# Patient Record
Sex: FEMALE | Race: BLACK OR AFRICAN AMERICAN | Employment: OTHER | URBAN - METROPOLITAN AREA
[De-identification: names, ages, dates, MRNs, and addresses within clinical notes are randomized per-mention and may not be internally consistent; named-entity substitution may affect disease eponyms.]

---

## 2017-03-18 PROBLEM — A41.9 SEPSIS (HCC): Status: ACTIVE | Noted: 2017-03-18

## 2017-04-04 ENCOUNTER — HOSPITAL ENCOUNTER (OUTPATIENT)
Dept: OTHER | Age: 67
Discharge: HOME | End: 2017-04-04
Attending: INTERNAL MEDICINE | Admitting: INTERNAL MEDICINE

## 2017-04-04 LAB
ALBUMIN SERPL-MCNC: 3.3 GM/DL (ref 3.4–5)
ALP BLD-CCNC: 85 IU/L (ref 40–129)
ALT SERPL-CCNC: 16 U/L (ref 10–40)
ANION GAP SERPL CALCULATED.3IONS-SCNC: 13 MMOL/L (ref 4–16)
AST SERPL-CCNC: 15 IU/L (ref 15–37)
BILIRUB SERPL-MCNC: 0.3 MG/DL (ref 0–1)
BUN BLDV-MCNC: 8 MG/DL (ref 6–23)
CALCIUM SERPL-MCNC: 9.6 MG/DL (ref 8.3–10.6)
CHLORIDE BLD-SCNC: 107 MMOL/L (ref 99–110)
CO2: 23 MMOL/L (ref 21–32)
CREAT SERPL-MCNC: 0.7 MG/DL (ref 0.6–1.1)
GFR AFRICAN AMERICAN: >60 ML/MIN/1.73M2
GFR NON-AFRICAN AMERICAN: >60 ML/MIN/1.73M2
GLUCOSE BLD-MCNC: 133 MG/DL (ref 70–140)
POTASSIUM SERPL-SCNC: 4.9 MMOL/L (ref 3.5–5.1)
SODIUM BLD-SCNC: 143 MMOL/L (ref 135–145)
TOTAL PROTEIN: 5.7 GM/DL (ref 6.4–8.2)

## 2017-04-10 PROBLEM — J44.9 CHRONIC OBSTRUCTIVE PULMONARY DISEASE (HCC): Status: ACTIVE | Noted: 2017-04-10

## 2017-04-14 ENCOUNTER — HOSPITAL ENCOUNTER (OUTPATIENT)
Dept: CT IMAGING | Age: 67
Discharge: HOME | End: 2017-05-13
Attending: INTERNAL MEDICINE | Admitting: INTERNAL MEDICINE

## 2017-04-26 ENCOUNTER — HOSPITAL ENCOUNTER (OUTPATIENT)
Dept: CT IMAGING | Age: 67
Discharge: HOME | End: 2017-04-26
Attending: INTERNAL MEDICINE | Admitting: INTERNAL MEDICINE

## 2017-04-26 DIAGNOSIS — C34.90 MALIGNANT NEOPLASM OF LUNG, UNSPECIFIED LATERALITY, UNSPECIFIED PART OF LUNG (HCC): ICD-10-CM

## 2017-07-21 PROBLEM — J18.9 HCAP (HEALTHCARE-ASSOCIATED PNEUMONIA): Status: ACTIVE | Noted: 2017-07-21

## 2017-10-08 PROBLEM — M62.82 NON-TRAUMATIC RHABDOMYOLYSIS: Status: ACTIVE | Noted: 2017-10-08
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
## 2018-02-20 ENCOUNTER — HOSPITAL ENCOUNTER (OUTPATIENT)
Dept: GENERAL RADIOLOGY | Age: 68
Discharge: HOME | End: 2018-02-20
Attending: PODIATRIST | Admitting: PODIATRIST

## 2018-02-20 DIAGNOSIS — M79.671 RIGHT FOOT PAIN: ICD-10-CM

## 2018-02-20 DIAGNOSIS — M25.475 SWELLING OF FOOT JOINT, LEFT: ICD-10-CM

## 2018-02-22 PROBLEM — J96.00 ACUTE RESPIRATORY FAILURE (HCC): Status: ACTIVE | Noted: 2018-02-22
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
## 2018-04-20 PROBLEM — I95.9 SEPSIS ASSOCIATED HYPOTENSION (HCC): Status: ACTIVE | Noted: 2018-04-20

## 2018-04-20 PROBLEM — A41.9 SEPSIS ASSOCIATED HYPOTENSION (HCC): Status: ACTIVE | Noted: 2018-04-20

## 2018-04-20 PROBLEM — I95.9 HYPOTENSION: Status: ACTIVE | Noted: 2018-04-20

## 2018-05-01 PROBLEM — J96.00 RESPIRATORY FAILURE, ACUTE (HCC): Status: ACTIVE | Noted: 2018-05-01

## 2018-06-05 ENCOUNTER — TELEPHONE (OUTPATIENT)
Dept: CARDIOLOGY CLINIC | Age: 68
End: 2018-06-05

## 2018-06-07 ENCOUNTER — HOSPITAL ENCOUNTER (OUTPATIENT)
Dept: OTHER | Age: 68
Discharge: HOME | End: 2018-06-07
Attending: FAMILY MEDICINE | Admitting: FAMILY MEDICINE

## 2018-06-07 LAB
BACTERIA: ABNORMAL /HPF
BILIRUBIN URINE: ABNORMAL MG/DL
BLOOD, URINE: NEGATIVE
CALCIUM OXALATE CRYSTALS: ABNORMAL /HPF
CLARITY: CLEAR
COLOR: YELLOW
GLUCOSE, URINE: NEGATIVE MG/DL
ICTOTEST: NEGATIVE
KETONES, URINE: NEGATIVE MG/DL
LEUKOCYTE ESTERASE, URINE: ABNORMAL
MUCUS: ABNORMAL HPF
NITRITE URINE, QUANTITATIVE: NEGATIVE
PH, URINE: 5 (ref 5–8)
PROTEIN UA: NEGATIVE MG/DL
RBC URINE: 3 /HPF (ref 0–6)
SPECIFIC GRAVITY UA: 1.03 (ref 1–1.03)
SQUAMOUS EPITHELIAL: 2 /HPF
TRICHOMONAS: ABNORMAL /HPF
UROBILINOGEN, URINE: 1 MG/DL (ref 0.2–1)
WBC UA: 1 /HPF (ref 0–5)

## 2018-06-09 LAB
CULTURE: NORMAL
REPORT STATUS: NORMAL
REQUEST PROBLEM: NORMAL
SPECIMEN: NORMAL
TOTAL COLONY COUNT: NORMAL

## 2018-06-12 PROBLEM — G93.40 ENCEPHALOPATHY: Status: ACTIVE | Noted: 2018-06-12

## 2018-08-30 ENCOUNTER — HOSPITAL ENCOUNTER (OUTPATIENT)
Dept: PULMONOLOGY | Age: 68
Discharge: HOME | End: 2018-08-30
Attending: INTERNAL MEDICINE | Admitting: INTERNAL MEDICINE

## 2018-08-30 NOTE — PROGRESS NOTES
8/30/2018 11:58 AM  Patient Room #: Room/bed info not found  Patient Name: Raúl Enriquez    (Step 1 Done by RN if possible otherwise call Pulmonary Diagnostics)  1. Place patient on room air at rest for at least 30 minutes. If patient falls below 88% before 30 minutes then you can record the level and stop. Record room air saturation level _93_ %. If patient is at 88% or below, they will qualify for home oxygen and you can stop. If level does not fall below 88%, fill in level above. If indicated continue to Step 2. Signature:__Stu River RRT___ Date: _8/30/18___  (Step 2&3 Done by RCP)  2. Ambulate patient on room air until saturation falls below 89%. Record level of room air saturation with ambulation_uta___ %. Next, place patient back on ______lpm oxygen and ambulate, record level _____%. (Note:  this level must show improvement from room air level done with ambulation.)  If patients saturation on room air with ambulation is 88% or below AND patient shows improvement with oxygen during ambulation, they will qualify for home oxygen and you can stop. If patient does not drop below 89%, then patient should have an overnight oximetry trending on room air to see if level falls below 88%. Complete level in Step 3 below. 3. Room air overnight oximetry level 88 % for__________  cumulative minutes. If patients room air oxygen level is < 89% for at least 5 cumulative minutes, patient will qualify for home oxygen and you can stop. (Attach Night Trending Report)    Complete order below: Diagnosis:______________________________________  Home oxygen at:  Length of Need: ? Lifetime ?  3 Months     _____lpm or _____%   via  [] nasal cannula  []mask  [] other:________________         []continuous []  with activity  []  Nocturnal   [] Portable Tanks []  Concentrator        Therapist Signature:  __Stu River RRT   Date:  _8/30/18  _____  Physician Signature:  ___Electronically Signed in

## 2018-10-23 ENCOUNTER — HOSPITAL ENCOUNTER (OUTPATIENT)
Age: 68
Setting detail: SPECIMEN
Discharge: HOME | End: 2018-10-23
Payer: MEDICARE

## 2018-10-23 LAB
BACTERIA: NEGATIVE /HPF
BILIRUBIN URINE: NEGATIVE MG/DL
BLOOD, URINE: NEGATIVE
CLARITY: CLEAR
COLOR: YELLOW
GLUCOSE, URINE: NEGATIVE MG/DL
KETONES, URINE: NEGATIVE MG/DL
LEUKOCYTE ESTERASE, URINE: NEGATIVE
NITRITE URINE, QUANTITATIVE: POSITIVE
PH, URINE: 5 (ref 5–8)
PROTEIN UA: NEGATIVE MG/DL
RBC URINE: ABNORMAL /HPF (ref 0–6)
SPECIFIC GRAVITY UA: 1.01 (ref 1–1.03)
SQUAMOUS EPITHELIAL: 3 /HPF
TRICHOMONAS: ABNORMAL /HPF
UROBILINOGEN, URINE: NORMAL MG/DL (ref 0.2–1)
WBC UA: 1 /HPF (ref 0–5)

## 2018-10-23 PROCEDURE — 87086 URINE CULTURE/COLONY COUNT: CPT

## 2018-10-23 PROCEDURE — 87077 CULTURE AEROBIC IDENTIFY: CPT

## 2018-10-23 PROCEDURE — 87186 SC STD MICRODIL/AGAR DIL: CPT

## 2018-10-23 PROCEDURE — 81001 URINALYSIS AUTO W/SCOPE: CPT

## 2018-10-26 LAB
CULTURE: NORMAL
Lab: NORMAL
ORGANISM: NORMAL
REPORT STATUS: NORMAL
SPECIMEN: NORMAL
TOTAL COLONY COUNT: NORMAL

## 2018-10-29 ENCOUNTER — HOSPITAL ENCOUNTER (EMERGENCY)
Age: 68
Discharge: HOME | End: 2018-10-29
Attending: EMERGENCY MEDICINE
Payer: MEDICARE

## 2018-10-29 ENCOUNTER — APPOINTMENT (OUTPATIENT)
Dept: GENERAL RADIOLOGY | Age: 68
End: 2018-10-29
Payer: MEDICARE

## 2018-10-29 VITALS
SYSTOLIC BLOOD PRESSURE: 129 MMHG | BODY MASS INDEX: 35.87 KG/M2 | RESPIRATION RATE: 19 BRPM | WEIGHT: 190 LBS | TEMPERATURE: 98.4 F | HEART RATE: 96 BPM | DIASTOLIC BLOOD PRESSURE: 88 MMHG | OXYGEN SATURATION: 93 % | HEIGHT: 61 IN

## 2018-10-29 DIAGNOSIS — N30.00 ACUTE CYSTITIS WITHOUT HEMATURIA: Primary | ICD-10-CM

## 2018-10-29 DIAGNOSIS — R10.30 LOWER ABDOMINAL PAIN: ICD-10-CM

## 2018-10-29 LAB
ALBUMIN SERPL-MCNC: 3.1 GM/DL (ref 3.4–5)
ALP BLD-CCNC: 176 IU/L (ref 40–129)
ALT SERPL-CCNC: 28 U/L (ref 10–40)
ANION GAP SERPL CALCULATED.3IONS-SCNC: 11 MMOL/L (ref 4–16)
AST SERPL-CCNC: 36 IU/L (ref 15–37)
BASOPHILS ABSOLUTE: 0.1 K/CU MM
BASOPHILS RELATIVE PERCENT: 0.7 % (ref 0–1)
BILIRUB SERPL-MCNC: 0.2 MG/DL (ref 0–1)
BUN BLDV-MCNC: 12 MG/DL (ref 6–23)
CALCIUM SERPL-MCNC: 10.1 MG/DL (ref 8.3–10.6)
CHLORIDE BLD-SCNC: 106 MMOL/L (ref 99–110)
CO2: 23 MMOL/L (ref 21–32)
CREAT SERPL-MCNC: 0.5 MG/DL (ref 0.6–1.1)
DIFFERENTIAL TYPE: ABNORMAL
EOSINOPHILS ABSOLUTE: 0.5 K/CU MM
EOSINOPHILS RELATIVE PERCENT: 5.6 % (ref 0–3)
GFR AFRICAN AMERICAN: >60 ML/MIN/1.73M2
GFR NON-AFRICAN AMERICAN: >60 ML/MIN/1.73M2
GLUCOSE BLD-MCNC: 86 MG/DL (ref 70–99)
HCT VFR BLD CALC: 46.7 % (ref 37–47)
HEMOGLOBIN: 15.2 GM/DL (ref 12.5–16)
IMMATURE NEUTROPHIL %: 0.2 % (ref 0–0.43)
LACTATE: 1.7 MMOL/L (ref 0.4–2)
LYMPHOCYTES ABSOLUTE: 4.2 K/CU MM
LYMPHOCYTES RELATIVE PERCENT: 47 % (ref 24–44)
MCH RBC QN AUTO: 27.7 PG (ref 27–31)
MCHC RBC AUTO-ENTMCNC: 32.5 % (ref 32–36)
MCV RBC AUTO: 85.1 FL (ref 78–100)
MONOCYTES ABSOLUTE: 0.6 K/CU MM
MONOCYTES RELATIVE PERCENT: 6.6 % (ref 0–4)
NUCLEATED RBC %: 0 %
PDW BLD-RTO: 18.2 % (ref 11.7–14.9)
PLATELET # BLD: 373 K/CU MM (ref 140–440)
PMV BLD AUTO: 11.1 FL (ref 7.5–11.1)
POTASSIUM SERPL-SCNC: 4.1 MMOL/L (ref 3.5–5.1)
RBC # BLD: 5.49 M/CU MM (ref 4.2–5.4)
SEGMENTED NEUTROPHILS ABSOLUTE COUNT: 3.6 K/CU MM
SEGMENTED NEUTROPHILS RELATIVE PERCENT: 39.9 % (ref 36–66)
SODIUM BLD-SCNC: 140 MMOL/L (ref 135–145)
TOTAL IMMATURE NEUTOROPHIL: 0.02 K/CU MM
TOTAL NUCLEATED RBC: 0 K/CU MM
TOTAL PROTEIN: 6.6 GM/DL (ref 6.4–8.2)
WBC # BLD: 8.9 K/CU MM (ref 4–10.5)

## 2018-10-29 PROCEDURE — 94761 N-INVAS EAR/PLS OXIMETRY MLT: CPT

## 2018-10-29 PROCEDURE — 2580000003 HC RX 258: Performed by: EMERGENCY MEDICINE

## 2018-10-29 PROCEDURE — 6370000000 HC RX 637 (ALT 250 FOR IP): Performed by: EMERGENCY MEDICINE

## 2018-10-29 PROCEDURE — 80053 COMPREHEN METABOLIC PANEL: CPT

## 2018-10-29 PROCEDURE — 85025 COMPLETE CBC W/AUTO DIFF WBC: CPT

## 2018-10-29 PROCEDURE — 87040 BLOOD CULTURE FOR BACTERIA: CPT

## 2018-10-29 PROCEDURE — 4500000027

## 2018-10-29 PROCEDURE — 36415 COLL VENOUS BLD VENIPUNCTURE: CPT

## 2018-10-29 PROCEDURE — 71045 X-RAY EXAM CHEST 1 VIEW: CPT

## 2018-10-29 PROCEDURE — 94640 AIRWAY INHALATION TREATMENT: CPT

## 2018-10-29 PROCEDURE — 83605 ASSAY OF LACTIC ACID: CPT

## 2018-10-29 PROCEDURE — 99284 EMERGENCY DEPT VISIT MOD MDM: CPT

## 2018-10-29 PROCEDURE — 96360 HYDRATION IV INFUSION INIT: CPT

## 2018-10-29 RX ORDER — HYDROCODONE BITARTRATE AND ACETAMINOPHEN 5; 325 MG/1; MG/1
1 TABLET ORAL ONCE
Status: COMPLETED | OUTPATIENT
Start: 2018-10-29 | End: 2018-10-29

## 2018-10-29 RX ORDER — AMOXICILLIN AND CLAVULANATE POTASSIUM 875; 125 MG/1; MG/1
1 TABLET, FILM COATED ORAL ONCE
Status: COMPLETED | OUTPATIENT
Start: 2018-10-29 | End: 2018-10-29

## 2018-10-29 RX ORDER — 0.9 % SODIUM CHLORIDE 0.9 %
1000 INTRAVENOUS SOLUTION INTRAVENOUS ONCE
Status: COMPLETED | OUTPATIENT
Start: 2018-10-29 | End: 2018-10-29

## 2018-10-29 RX ORDER — AMOXICILLIN AND CLAVULANATE POTASSIUM 875; 125 MG/1; MG/1
1 TABLET, FILM COATED ORAL 2 TIMES DAILY
Qty: 20 TABLET | Refills: 0 | Status: SHIPPED | OUTPATIENT
Start: 2018-10-29 | End: 2018-11-08

## 2018-10-29 RX ORDER — PHENAZOPYRIDINE HYDROCHLORIDE 100 MG/1
100 TABLET, FILM COATED ORAL 3 TIMES DAILY PRN
Qty: 10 TABLET | Refills: 0 | Status: SHIPPED | OUTPATIENT
Start: 2018-10-29 | End: 2018-11-01

## 2018-10-29 RX ORDER — IPRATROPIUM BROMIDE AND ALBUTEROL SULFATE 2.5; .5 MG/3ML; MG/3ML
3 SOLUTION RESPIRATORY (INHALATION) ONCE
Status: COMPLETED | OUTPATIENT
Start: 2018-10-29 | End: 2018-10-29

## 2018-10-29 RX ORDER — HYDROCODONE BITARTRATE AND ACETAMINOPHEN 5; 325 MG/1; MG/1
1 TABLET ORAL EVERY 6 HOURS PRN
Qty: 12 TABLET | Refills: 0 | Status: SHIPPED | OUTPATIENT
Start: 2018-10-29 | End: 2018-11-01

## 2018-10-29 RX ORDER — PHENAZOPYRIDINE HYDROCHLORIDE 100 MG/1
200 TABLET, FILM COATED ORAL ONCE
Status: COMPLETED | OUTPATIENT
Start: 2018-10-29 | End: 2018-10-29

## 2018-10-29 RX ADMIN — AMOXICILLIN AND CLAVULANATE POTASSIUM 1 TABLET: 875; 125 TABLET, FILM COATED ORAL at 17:28

## 2018-10-29 RX ADMIN — PHENAZOPYRIDINE HYDROCHLORIDE 200 MG: 100 TABLET ORAL at 17:34

## 2018-10-29 RX ADMIN — SODIUM CHLORIDE 1000 ML: 9 INJECTION, SOLUTION INTRAVENOUS at 17:17

## 2018-10-29 RX ADMIN — HYDROCODONE BITARTRATE AND ACETAMINOPHEN 1 TABLET: 5; 325 TABLET ORAL at 17:34

## 2018-10-29 RX ADMIN — IPRATROPIUM BROMIDE AND ALBUTEROL SULFATE 3 AMPULE: .5; 3 SOLUTION RESPIRATORY (INHALATION) at 18:12

## 2018-10-29 ASSESSMENT — PAIN DESCRIPTION - LOCATION: LOCATION: ABDOMEN

## 2018-10-29 ASSESSMENT — PAIN DESCRIPTION - DESCRIPTORS: DESCRIPTORS: ACHING;SHARP

## 2018-10-29 ASSESSMENT — PAIN DESCRIPTION - PAIN TYPE: TYPE: ACUTE PAIN

## 2018-10-29 ASSESSMENT — PAIN DESCRIPTION - ORIENTATION: ORIENTATION: LOWER

## 2018-10-29 ASSESSMENT — PAIN SCALES - GENERAL: PAINLEVEL_OUTOF10: 9

## 2018-10-29 NOTE — ED PROVIDER NOTES
rhythm  Pulmonary/Chest:  Normal breath sounds, No respiratory distress, No wheezing  Abdomen: Bowel sounds normal, Soft, No tenderness, No masses, No pulsatile masses  Back:  No tenderness, No CVA tenderness  Extremities:  Normal range of motion, Intact distal pulses, No edema, No tenderness  Skin:  Warm, Dry, No erythema, No rash      EKG:    None    Radiology / Procedures:  XR CHEST PORTABLE (Final result)   Result time 10/29/18 17:14:26   Final result by Maranda Maurer MD (10/29/18 17:14:26)                Impression:    No acute process. Narrative:    EXAMINATION:  SINGLE XRAY VIEW OF THE CHEST    10/29/2018 4:20 pm    COMPARISON:  07/04/2018    HISTORY:  ORDERING SYSTEM PROVIDED HISTORY: chest pain  TECHNOLOGIST PROVIDED HISTORY:  Reason for exam:->chest pain  Ordering Physician Provided Reason for Exam: chest pain  Acuity: Acute  Type of Exam: Initial    FINDINGS:  The lungs are without acute focal process.  There is no effusion or  pneumothorax. The cardiomediastinal silhouette is stable. The osseous  structures are stable.                    Labs Reviewed   CBC WITH AUTO DIFFERENTIAL - Abnormal; Notable for the following:        Result Value    RBC 5.49 (*)     RDW 18.2 (*)     Lymphocytes % 47.0 (*)     Monocytes % 6.6 (*)     Eosinophils % 5.6 (*)     All other components within normal limits   COMPREHENSIVE METABOLIC PANEL - Abnormal; Notable for the following:     CREATININE 0.5 (*)     Alb 3.1 (*)     Alkaline Phosphatase 176 (*)     All other components within normal limits   CULTURE BLOOD #1   CULTURE BLOOD #1   LACTIC ACID, PLASMA       ED COURSE & MEDICAL DECISION MAKING:  Pertinent Labs & Imaging studies reviewed. (See chart for details)  On exam, the patient is afebrile and nontoxic appearing. She is hemodynamically stable and neurologically intact. Labs are obtained and There are no clinically significant lab abnormalities.  The patient had a urine culture 3 days ago that was

## 2018-11-03 LAB
CULTURE: NORMAL
CULTURE: NORMAL
Lab: NORMAL
Lab: NORMAL
REPORT STATUS: NORMAL
REPORT STATUS: NORMAL
SPECIMEN: NORMAL
SPECIMEN: NORMAL

## 2018-11-05 ENCOUNTER — OFFICE VISIT (OUTPATIENT)
Dept: INTERNAL MEDICINE CLINIC | Age: 68
End: 2018-11-05
Payer: MEDICARE

## 2018-11-05 VITALS
HEIGHT: 60 IN | OXYGEN SATURATION: 90 % | DIASTOLIC BLOOD PRESSURE: 80 MMHG | BODY MASS INDEX: 31.8 KG/M2 | SYSTOLIC BLOOD PRESSURE: 128 MMHG | WEIGHT: 162 LBS | HEART RATE: 86 BPM

## 2018-11-05 DIAGNOSIS — E78.2 MIXED HYPERLIPIDEMIA: ICD-10-CM

## 2018-11-05 DIAGNOSIS — Z98.61 CAD S/P PERCUTANEOUS CORONARY ANGIOPLASTY: ICD-10-CM

## 2018-11-05 DIAGNOSIS — G89.29 CHRONIC LOW BACK PAIN WITH BILATERAL SCIATICA, UNSPECIFIED BACK PAIN LATERALITY: ICD-10-CM

## 2018-11-05 DIAGNOSIS — Z85.118 HISTORY OF LUNG CANCER: ICD-10-CM

## 2018-11-05 DIAGNOSIS — I25.10 CAD S/P PERCUTANEOUS CORONARY ANGIOPLASTY: ICD-10-CM

## 2018-11-05 DIAGNOSIS — E79.0 HYPERURICEMIA: ICD-10-CM

## 2018-11-05 DIAGNOSIS — F17.200 SMOKER: ICD-10-CM

## 2018-11-05 DIAGNOSIS — E55.9 VITAMIN D DEFICIENCY: ICD-10-CM

## 2018-11-05 DIAGNOSIS — M32.9 SYSTEMIC LUPUS ERYTHEMATOSUS, UNSPECIFIED SLE TYPE, UNSPECIFIED ORGAN INVOLVEMENT STATUS (HCC): ICD-10-CM

## 2018-11-05 DIAGNOSIS — Z11.59 ENCOUNTER FOR HEPATITIS C SCREENING TEST FOR LOW RISK PATIENT: ICD-10-CM

## 2018-11-05 DIAGNOSIS — F41.1 GENERALIZED ANXIETY DISORDER: ICD-10-CM

## 2018-11-05 DIAGNOSIS — M54.41 CHRONIC LOW BACK PAIN WITH BILATERAL SCIATICA, UNSPECIFIED BACK PAIN LATERALITY: ICD-10-CM

## 2018-11-05 DIAGNOSIS — J44.9 CHRONIC OBSTRUCTIVE PULMONARY DISEASE, UNSPECIFIED COPD TYPE (HCC): ICD-10-CM

## 2018-11-05 DIAGNOSIS — E11.42 CONTROLLED TYPE 2 DIABETES MELLITUS WITH DIABETIC POLYNEUROPATHY, WITHOUT LONG-TERM CURRENT USE OF INSULIN (HCC): Primary | ICD-10-CM

## 2018-11-05 DIAGNOSIS — Z11.4 ENCOUNTER FOR SCREENING FOR HIV: ICD-10-CM

## 2018-11-05 DIAGNOSIS — Z12.39 BREAST CANCER SCREENING: ICD-10-CM

## 2018-11-05 DIAGNOSIS — E53.8 VITAMIN B12 DEFICIENCY: ICD-10-CM

## 2018-11-05 DIAGNOSIS — E66.9 OBESITY (BMI 30.0-34.9): ICD-10-CM

## 2018-11-05 DIAGNOSIS — I10 ESSENTIAL HYPERTENSION: ICD-10-CM

## 2018-11-05 DIAGNOSIS — M54.42 CHRONIC LOW BACK PAIN WITH BILATERAL SCIATICA, UNSPECIFIED BACK PAIN LATERALITY: ICD-10-CM

## 2018-11-05 PROBLEM — J96.00 RESPIRATORY FAILURE, ACUTE (HCC): Status: RESOLVED | Noted: 2018-05-01 | Resolved: 2018-11-05

## 2018-11-05 PROBLEM — J18.9 HCAP (HEALTHCARE-ASSOCIATED PNEUMONIA): Status: RESOLVED | Noted: 2017-07-21 | Resolved: 2018-11-05

## 2018-11-05 PROBLEM — I95.9 HYPOTENSION: Status: RESOLVED | Noted: 2018-04-20 | Resolved: 2018-11-05

## 2018-11-05 PROBLEM — M62.82 NON-TRAUMATIC RHABDOMYOLYSIS: Status: RESOLVED | Noted: 2017-10-08 | Resolved: 2018-11-05

## 2018-11-05 PROBLEM — G93.40 ENCEPHALOPATHY: Status: RESOLVED | Noted: 2018-06-12 | Resolved: 2018-11-05

## 2018-11-05 PROBLEM — I95.9 SEPSIS ASSOCIATED HYPOTENSION (HCC): Status: RESOLVED | Noted: 2018-04-20 | Resolved: 2018-11-05

## 2018-11-05 PROBLEM — A41.9 SEPSIS ASSOCIATED HYPOTENSION (HCC): Status: RESOLVED | Noted: 2018-04-20 | Resolved: 2018-11-05

## 2018-11-05 PROBLEM — M10.9 GOUT: Status: ACTIVE | Noted: 2018-01-01

## 2018-11-05 PROBLEM — Z12.11 COLON CANCER SCREENING: Status: ACTIVE | Noted: 2018-11-05

## 2018-11-05 PROBLEM — J96.00 ACUTE RESPIRATORY FAILURE (HCC): Status: RESOLVED | Noted: 2018-02-22 | Resolved: 2018-11-05

## 2018-11-05 PROBLEM — A41.9 SEPSIS (HCC): Status: RESOLVED | Noted: 2017-03-18 | Resolved: 2018-11-05

## 2018-11-05 PROCEDURE — 1123F ACP DISCUSS/DSCN MKR DOCD: CPT | Performed by: INTERNAL MEDICINE

## 2018-11-05 PROCEDURE — 3017F COLORECTAL CA SCREEN DOC REV: CPT | Performed by: INTERNAL MEDICINE

## 2018-11-05 PROCEDURE — G8400 PT W/DXA NO RESULTS DOC: HCPCS | Performed by: INTERNAL MEDICINE

## 2018-11-05 PROCEDURE — 3023F SPIROM DOC REV: CPT | Performed by: INTERNAL MEDICINE

## 2018-11-05 PROCEDURE — 3044F HG A1C LEVEL LT 7.0%: CPT | Performed by: INTERNAL MEDICINE

## 2018-11-05 PROCEDURE — G8484 FLU IMMUNIZE NO ADMIN: HCPCS | Performed by: INTERNAL MEDICINE

## 2018-11-05 PROCEDURE — 1090F PRES/ABSN URINE INCON ASSESS: CPT | Performed by: INTERNAL MEDICINE

## 2018-11-05 PROCEDURE — G8598 ASA/ANTIPLAT THER USED: HCPCS | Performed by: INTERNAL MEDICINE

## 2018-11-05 PROCEDURE — G8926 SPIRO NO PERF OR DOC: HCPCS | Performed by: INTERNAL MEDICINE

## 2018-11-05 PROCEDURE — 4040F PNEUMOC VAC/ADMIN/RCVD: CPT | Performed by: INTERNAL MEDICINE

## 2018-11-05 PROCEDURE — 2022F DILAT RTA XM EVC RTNOPTHY: CPT | Performed by: INTERNAL MEDICINE

## 2018-11-05 PROCEDURE — G8427 DOCREV CUR MEDS BY ELIG CLIN: HCPCS | Performed by: INTERNAL MEDICINE

## 2018-11-05 PROCEDURE — 99205 OFFICE O/P NEW HI 60 MIN: CPT | Performed by: INTERNAL MEDICINE

## 2018-11-05 PROCEDURE — G8417 CALC BMI ABV UP PARAM F/U: HCPCS | Performed by: INTERNAL MEDICINE

## 2018-11-05 PROCEDURE — 1101F PT FALLS ASSESS-DOCD LE1/YR: CPT | Performed by: INTERNAL MEDICINE

## 2018-11-05 PROCEDURE — 4004F PT TOBACCO SCREEN RCVD TLK: CPT | Performed by: INTERNAL MEDICINE

## 2018-11-05 RX ORDER — AMITRIPTYLINE HYDROCHLORIDE 50 MG/1
50 TABLET, FILM COATED ORAL NIGHTLY
Status: ON HOLD | COMMUNITY
End: 2019-10-23 | Stop reason: HOSPADM

## 2018-11-05 RX ORDER — LOSARTAN POTASSIUM 50 MG/1
50 TABLET ORAL DAILY
Status: ON HOLD | COMMUNITY
End: 2019-01-27 | Stop reason: HOSPADM

## 2018-11-05 RX ORDER — LORAZEPAM 2 MG/1
2 TABLET ORAL EVERY 8 HOURS PRN
COMMUNITY
End: 2018-11-05 | Stop reason: ALTCHOICE

## 2018-11-05 RX ORDER — CITALOPRAM 10 MG/1
10 TABLET ORAL DAILY
Qty: 30 TABLET | Refills: 3 | Status: SHIPPED | OUTPATIENT
Start: 2018-11-05 | End: 2019-02-13 | Stop reason: SDUPTHER

## 2018-11-05 ASSESSMENT — ENCOUNTER SYMPTOMS
BACK PAIN: 1
DIARRHEA: 1
VOMITING: 0
HEARTBURN: 0
COUGH: 0
WHEEZING: 0
BLOOD IN STOOL: 0
NAUSEA: 0
SORE THROAT: 0
ABDOMINAL PAIN: 0
BLURRED VISION: 0
SPUTUM PRODUCTION: 0
DOUBLE VISION: 0
SHORTNESS OF BREATH: 0

## 2018-11-06 PROBLEM — I10 ESSENTIAL HYPERTENSION: Status: ACTIVE | Noted: 2018-11-06

## 2018-11-26 PROBLEM — J96.10 CHRONIC RESPIRATORY FAILURE (HCC): Status: ACTIVE | Noted: 2018-11-26

## 2018-12-05 PROBLEM — Z12.39 BREAST CANCER SCREENING: Status: RESOLVED | Noted: 2018-11-05 | Resolved: 2018-12-05

## 2018-12-05 PROBLEM — Z12.11 COLON CANCER SCREENING: Status: RESOLVED | Noted: 2018-11-05 | Resolved: 2018-12-05

## 2019-01-25 ENCOUNTER — HOSPITAL ENCOUNTER (OUTPATIENT)
Age: 69
Setting detail: OBSERVATION
Discharge: HOME | End: 2019-01-27
Attending: EMERGENCY MEDICINE | Admitting: FAMILY MEDICINE
Payer: MEDICARE

## 2019-01-25 ENCOUNTER — APPOINTMENT (OUTPATIENT)
Dept: CT IMAGING | Age: 69
End: 2019-01-25
Payer: MEDICARE

## 2019-01-25 DIAGNOSIS — R55 SYNCOPE AND COLLAPSE: Primary | ICD-10-CM

## 2019-01-25 DIAGNOSIS — R53.83 FATIGUE, UNSPECIFIED TYPE: ICD-10-CM

## 2019-01-25 DIAGNOSIS — J44.9 CHRONIC OBSTRUCTIVE PULMONARY DISEASE, UNSPECIFIED COPD TYPE (HCC): ICD-10-CM

## 2019-01-25 LAB
ALBUMIN SERPL-MCNC: 2.7 GM/DL (ref 3.4–5)
ALP BLD-CCNC: 146 IU/L (ref 40–129)
ALT SERPL-CCNC: 34 U/L (ref 10–40)
ANION GAP SERPL CALCULATED.3IONS-SCNC: 10 MMOL/L (ref 4–16)
AST SERPL-CCNC: 33 IU/L (ref 15–37)
BACTERIA: NEGATIVE /HPF
BASOPHILS ABSOLUTE: 0.1 K/CU MM
BASOPHILS RELATIVE PERCENT: 0.6 % (ref 0–1)
BILIRUB SERPL-MCNC: 0.2 MG/DL (ref 0–1)
BILIRUBIN URINE: NEGATIVE MG/DL
BLOOD, URINE: NEGATIVE
BUN BLDV-MCNC: 6 MG/DL (ref 6–23)
CALCIUM SERPL-MCNC: 9.7 MG/DL (ref 8.3–10.6)
CHLORIDE BLD-SCNC: 111 MMOL/L (ref 99–110)
CLARITY: CLEAR
CO2: 21 MMOL/L (ref 21–32)
COLOR: YELLOW
CREAT SERPL-MCNC: 0.6 MG/DL (ref 0.6–1.1)
DIFFERENTIAL TYPE: ABNORMAL
EOSINOPHILS ABSOLUTE: 0.1 K/CU MM
EOSINOPHILS RELATIVE PERCENT: 1.3 % (ref 0–3)
GFR AFRICAN AMERICAN: >60 ML/MIN/1.73M2
GFR NON-AFRICAN AMERICAN: >60 ML/MIN/1.73M2
GLUCOSE BLD-MCNC: 104 MG/DL (ref 70–99)
GLUCOSE BLD-MCNC: 140 MG/DL (ref 70–99)
GLUCOSE, URINE: NEGATIVE MG/DL
HCT VFR BLD CALC: 43.7 % (ref 37–47)
HEMOGLOBIN: 13.6 GM/DL (ref 12.5–16)
IMMATURE NEUTROPHIL %: 0.2 % (ref 0–0.43)
KETONES, URINE: NEGATIVE MG/DL
LEUKOCYTE ESTERASE, URINE: NEGATIVE
LYMPHOCYTES ABSOLUTE: 4.7 K/CU MM
LYMPHOCYTES RELATIVE PERCENT: 49.3 % (ref 24–44)
MCH RBC QN AUTO: 26.2 PG (ref 27–31)
MCHC RBC AUTO-ENTMCNC: 31.1 % (ref 32–36)
MCV RBC AUTO: 84.2 FL (ref 78–100)
MONOCYTES ABSOLUTE: 0.8 K/CU MM
MONOCYTES RELATIVE PERCENT: 8.7 % (ref 0–4)
MUCUS: ABNORMAL HPF
NITRITE URINE, QUANTITATIVE: NEGATIVE
NUCLEATED RBC %: 0 %
PDW BLD-RTO: 21.4 % (ref 11.7–14.9)
PH, URINE: 5 (ref 5–8)
PLATELET # BLD: 312 K/CU MM (ref 140–440)
PMV BLD AUTO: 12.9 FL (ref 7.5–11.1)
POTASSIUM SERPL-SCNC: 4.1 MMOL/L (ref 3.5–5.1)
PROTEIN UA: NEGATIVE MG/DL
RBC # BLD: 5.19 M/CU MM (ref 4.2–5.4)
RBC URINE: ABNORMAL /HPF (ref 0–6)
REASON FOR REJECTION: NORMAL
REJECTED TEST: NORMAL
SEGMENTED NEUTROPHILS ABSOLUTE COUNT: 3.8 K/CU MM
SEGMENTED NEUTROPHILS RELATIVE PERCENT: 39.9 % (ref 36–66)
SODIUM BLD-SCNC: 142 MMOL/L (ref 135–145)
SOURCE: NORMAL
SPECIFIC GRAVITY UA: 1.01 (ref 1–1.03)
SQUAMOUS EPITHELIAL: <1 /HPF
TOTAL IMMATURE NEUTOROPHIL: 0.02 K/CU MM
TOTAL NUCLEATED RBC: 0 K/CU MM
TOTAL PROTEIN: 5.7 GM/DL (ref 6.4–8.2)
TRANSITIONAL EPITHELIAL: <1 /HPF
TRICHOMONAS: ABNORMAL /HPF
TROPONIN T: <0.01 NG/ML
TSH HIGH SENSITIVITY: 0.53 UIU/ML (ref 0.27–4.2)
UROBILINOGEN, URINE: NORMAL MG/DL (ref 0.2–1)
WBC # BLD: 9.5 K/CU MM (ref 4–10.5)
WBC UA: 1 /HPF (ref 0–5)

## 2019-01-25 PROCEDURE — G0378 HOSPITAL OBSERVATION PER HR: HCPCS

## 2019-01-25 PROCEDURE — 6370000000 HC RX 637 (ALT 250 FOR IP): Performed by: EMERGENCY MEDICINE

## 2019-01-25 PROCEDURE — 94640 AIRWAY INHALATION TREATMENT: CPT

## 2019-01-25 PROCEDURE — 6370000000 HC RX 637 (ALT 250 FOR IP): Performed by: NURSE PRACTITIONER

## 2019-01-25 PROCEDURE — 36415 COLL VENOUS BLD VENIPUNCTURE: CPT

## 2019-01-25 PROCEDURE — 6360000002 HC RX W HCPCS: Performed by: NURSE PRACTITIONER

## 2019-01-25 PROCEDURE — 70450 CT HEAD/BRAIN W/O DYE: CPT

## 2019-01-25 PROCEDURE — 85025 COMPLETE CBC W/AUTO DIFF WBC: CPT

## 2019-01-25 PROCEDURE — 82962 GLUCOSE BLOOD TEST: CPT

## 2019-01-25 PROCEDURE — 80053 COMPREHEN METABOLIC PANEL: CPT

## 2019-01-25 PROCEDURE — 84443 ASSAY THYROID STIM HORMONE: CPT

## 2019-01-25 PROCEDURE — 81001 URINALYSIS AUTO W/SCOPE: CPT

## 2019-01-25 PROCEDURE — 94761 N-INVAS EAR/PLS OXIMETRY MLT: CPT

## 2019-01-25 PROCEDURE — 84484 ASSAY OF TROPONIN QUANT: CPT

## 2019-01-25 PROCEDURE — 93005 ELECTROCARDIOGRAM TRACING: CPT | Performed by: EMERGENCY MEDICINE

## 2019-01-25 PROCEDURE — 99285 EMERGENCY DEPT VISIT HI MDM: CPT

## 2019-01-25 PROCEDURE — 2580000003 HC RX 258: Performed by: NURSE PRACTITIONER

## 2019-01-25 RX ORDER — HYDROCODONE BITARTRATE AND ACETAMINOPHEN 5; 325 MG/1; MG/1
1 TABLET ORAL EVERY 6 HOURS PRN
Status: DISCONTINUED | OUTPATIENT
Start: 2019-01-25 | End: 2019-01-26

## 2019-01-25 RX ORDER — ALBUTEROL SULFATE 90 UG/1
2 AEROSOL, METERED RESPIRATORY (INHALATION) EVERY 6 HOURS PRN
Status: DISCONTINUED | OUTPATIENT
Start: 2019-01-25 | End: 2019-01-27 | Stop reason: HOSPADM

## 2019-01-25 RX ORDER — CITALOPRAM 20 MG/1
10 TABLET ORAL DAILY
Status: DISCONTINUED | OUTPATIENT
Start: 2019-01-26 | End: 2019-01-27 | Stop reason: HOSPADM

## 2019-01-25 RX ORDER — ALBUTEROL SULFATE 2.5 MG/3ML
2.5 SOLUTION RESPIRATORY (INHALATION) EVERY 6 HOURS PRN
Status: DISCONTINUED | OUTPATIENT
Start: 2019-01-25 | End: 2019-01-26

## 2019-01-25 RX ORDER — DEXTROSE MONOHYDRATE 25 G/50ML
12.5 INJECTION, SOLUTION INTRAVENOUS PRN
Status: DISCONTINUED | OUTPATIENT
Start: 2019-01-25 | End: 2019-01-27 | Stop reason: HOSPADM

## 2019-01-25 RX ORDER — IPRATROPIUM BROMIDE AND ALBUTEROL SULFATE 2.5; .5 MG/3ML; MG/3ML
1 SOLUTION RESPIRATORY (INHALATION) ONCE
Status: COMPLETED | OUTPATIENT
Start: 2019-01-25 | End: 2019-01-25

## 2019-01-25 RX ORDER — CLOPIDOGREL BISULFATE 75 MG/1
75 TABLET ORAL DAILY
Status: DISCONTINUED | OUTPATIENT
Start: 2019-01-26 | End: 2019-01-27 | Stop reason: HOSPADM

## 2019-01-25 RX ORDER — NICOTINE POLACRILEX 4 MG
15 LOZENGE BUCCAL PRN
Status: DISCONTINUED | OUTPATIENT
Start: 2019-01-25 | End: 2019-01-27 | Stop reason: HOSPADM

## 2019-01-25 RX ORDER — DEXTROSE MONOHYDRATE 50 MG/ML
100 INJECTION, SOLUTION INTRAVENOUS PRN
Status: DISCONTINUED | OUTPATIENT
Start: 2019-01-25 | End: 2019-01-27 | Stop reason: HOSPADM

## 2019-01-25 RX ORDER — SODIUM CHLORIDE 0.9 % (FLUSH) 0.9 %
10 SYRINGE (ML) INJECTION PRN
Status: DISCONTINUED | OUTPATIENT
Start: 2019-01-25 | End: 2019-01-27 | Stop reason: HOSPADM

## 2019-01-25 RX ORDER — HYDROCODONE BITARTRATE AND ACETAMINOPHEN 5; 325 MG/1; MG/1
1 TABLET ORAL ONCE
Status: COMPLETED | OUTPATIENT
Start: 2019-01-25 | End: 2019-01-25

## 2019-01-25 RX ORDER — LOPERAMIDE HYDROCHLORIDE 2 MG/1
2 CAPSULE ORAL 4 TIMES DAILY PRN
Status: DISCONTINUED | OUTPATIENT
Start: 2019-01-25 | End: 2019-01-27 | Stop reason: HOSPADM

## 2019-01-25 RX ORDER — FAMOTIDINE 20 MG/1
20 TABLET, FILM COATED ORAL 2 TIMES DAILY
Status: DISCONTINUED | OUTPATIENT
Start: 2019-01-25 | End: 2019-01-27 | Stop reason: HOSPADM

## 2019-01-25 RX ORDER — AMITRIPTYLINE HYDROCHLORIDE 50 MG/1
50 TABLET, FILM COATED ORAL NIGHTLY
Status: DISCONTINUED | OUTPATIENT
Start: 2019-01-25 | End: 2019-01-27 | Stop reason: HOSPADM

## 2019-01-25 RX ORDER — SODIUM CHLORIDE 9 MG/ML
INJECTION, SOLUTION INTRAVENOUS CONTINUOUS
Status: DISCONTINUED | OUTPATIENT
Start: 2019-01-25 | End: 2019-01-26

## 2019-01-25 RX ORDER — ASPIRIN 81 MG/1
81 TABLET, CHEWABLE ORAL DAILY
Status: DISCONTINUED | OUTPATIENT
Start: 2019-01-26 | End: 2019-01-27 | Stop reason: HOSPADM

## 2019-01-25 RX ORDER — GUAIFENESIN 600 MG/1
1200 TABLET, EXTENDED RELEASE ORAL 2 TIMES DAILY
Status: DISCONTINUED | OUTPATIENT
Start: 2019-01-25 | End: 2019-01-27 | Stop reason: HOSPADM

## 2019-01-25 RX ORDER — ONDANSETRON 2 MG/ML
4 INJECTION INTRAMUSCULAR; INTRAVENOUS EVERY 6 HOURS PRN
Status: DISCONTINUED | OUTPATIENT
Start: 2019-01-25 | End: 2019-01-27 | Stop reason: HOSPADM

## 2019-01-25 RX ORDER — SODIUM CHLORIDE 0.9 % (FLUSH) 0.9 %
10 SYRINGE (ML) INJECTION EVERY 12 HOURS SCHEDULED
Status: DISCONTINUED | OUTPATIENT
Start: 2019-01-25 | End: 2019-01-27 | Stop reason: HOSPADM

## 2019-01-25 RX ORDER — BENZONATATE 100 MG/1
100 CAPSULE ORAL 3 TIMES DAILY PRN
Status: DISCONTINUED | OUTPATIENT
Start: 2019-01-25 | End: 2019-01-27 | Stop reason: HOSPADM

## 2019-01-25 RX ADMIN — GUAIFENESIN 1200 MG: 600 TABLET, EXTENDED RELEASE ORAL at 22:58

## 2019-01-25 RX ADMIN — IPRATROPIUM BROMIDE AND ALBUTEROL SULFATE 1 AMPULE: .5; 3 SOLUTION RESPIRATORY (INHALATION) at 18:17

## 2019-01-25 RX ADMIN — HYDROCODONE BITARTRATE AND ACETAMINOPHEN 1 TABLET: 5; 325 TABLET ORAL at 21:29

## 2019-01-25 RX ADMIN — SODIUM CHLORIDE: 9 INJECTION, SOLUTION INTRAVENOUS at 22:58

## 2019-01-25 RX ADMIN — AMITRIPTYLINE HYDROCHLORIDE 50 MG: 50 TABLET, FILM COATED ORAL at 22:58

## 2019-01-25 RX ADMIN — FAMOTIDINE 20 MG: 20 TABLET, FILM COATED ORAL at 22:58

## 2019-01-25 RX ADMIN — ALBUTEROL SULFATE 2.5 MG: 2.5 SOLUTION RESPIRATORY (INHALATION) at 22:49

## 2019-01-25 RX ADMIN — Medication 2 PUFF: at 22:51

## 2019-01-25 ASSESSMENT — PAIN DESCRIPTION - LOCATION
LOCATION: GENERALIZED
LOCATION: ABDOMEN

## 2019-01-25 ASSESSMENT — PAIN DESCRIPTION - PAIN TYPE
TYPE: ACUTE PAIN
TYPE: ACUTE PAIN

## 2019-01-25 ASSESSMENT — PAIN SCALES - GENERAL
PAINLEVEL_OUTOF10: 9
PAINLEVEL_OUTOF10: 8

## 2019-01-25 ASSESSMENT — PAIN DESCRIPTION - ORIENTATION: ORIENTATION: RIGHT

## 2019-01-25 ASSESSMENT — PAIN DESCRIPTION - DESCRIPTORS: DESCRIPTORS: HEADACHE

## 2019-01-26 ENCOUNTER — APPOINTMENT (OUTPATIENT)
Dept: GENERAL RADIOLOGY | Age: 69
End: 2019-01-26
Payer: MEDICARE

## 2019-01-26 LAB
ALBUMIN SERPL-MCNC: 2.9 GM/DL (ref 3.4–5)
ALP BLD-CCNC: 130 IU/L (ref 40–128)
ALT SERPL-CCNC: 30 U/L (ref 10–40)
AMPHETAMINES: NEGATIVE
ANION GAP SERPL CALCULATED.3IONS-SCNC: 9 MMOL/L (ref 4–16)
AST SERPL-CCNC: 28 IU/L (ref 15–37)
BARBITURATE SCREEN URINE: NEGATIVE
BASOPHILS ABSOLUTE: 0 K/CU MM
BASOPHILS RELATIVE PERCENT: 0.5 % (ref 0–1)
BENZODIAZEPINE SCREEN, URINE: NEGATIVE
BILIRUB SERPL-MCNC: 0.2 MG/DL (ref 0–1)
BUN BLDV-MCNC: 6 MG/DL (ref 6–23)
CALCIUM SERPL-MCNC: 9.5 MG/DL (ref 8.3–10.6)
CANNABINOID SCREEN URINE: NEGATIVE
CHLORIDE BLD-SCNC: 107 MMOL/L (ref 99–110)
CO2: 22 MMOL/L (ref 21–32)
COCAINE METABOLITE: NEGATIVE
CREAT SERPL-MCNC: 0.6 MG/DL (ref 0.6–1.1)
DIFFERENTIAL TYPE: ABNORMAL
EOSINOPHILS ABSOLUTE: 0.1 K/CU MM
EOSINOPHILS RELATIVE PERCENT: 0.9 % (ref 0–3)
GFR AFRICAN AMERICAN: >60 ML/MIN/1.73M2
GFR NON-AFRICAN AMERICAN: >60 ML/MIN/1.73M2
GLUCOSE BLD-MCNC: 102 MG/DL (ref 70–99)
GLUCOSE BLD-MCNC: 127 MG/DL (ref 70–99)
GLUCOSE BLD-MCNC: 87 MG/DL (ref 70–99)
HCT VFR BLD CALC: 40.3 % (ref 37–47)
HEMOGLOBIN: 12.1 GM/DL (ref 12.5–16)
IMMATURE NEUTROPHIL %: 0.1 % (ref 0–0.43)
LYMPHOCYTES ABSOLUTE: 4.1 K/CU MM
LYMPHOCYTES RELATIVE PERCENT: 48.6 % (ref 24–44)
MAGNESIUM: 1.7 MG/DL (ref 1.8–2.4)
MCH RBC QN AUTO: 26.3 PG (ref 27–31)
MCHC RBC AUTO-ENTMCNC: 30 % (ref 32–36)
MCV RBC AUTO: 87.6 FL (ref 78–100)
MONOCYTES ABSOLUTE: 0.8 K/CU MM
MONOCYTES RELATIVE PERCENT: 9.1 % (ref 0–4)
NUCLEATED RBC %: 0 %
OPIATES, URINE: ABNORMAL
OXYCODONE: ABNORMAL
PDW BLD-RTO: 21.3 % (ref 11.7–14.9)
PHENCYCLIDINE, URINE: NEGATIVE
PLATELET # BLD: 242 K/CU MM (ref 140–440)
PMV BLD AUTO: 11.3 FL (ref 7.5–11.1)
POTASSIUM SERPL-SCNC: 4.1 MMOL/L (ref 3.5–5.1)
RBC # BLD: 4.6 M/CU MM (ref 4.2–5.4)
SEGMENTED NEUTROPHILS ABSOLUTE COUNT: 3.5 K/CU MM
SEGMENTED NEUTROPHILS RELATIVE PERCENT: 40.8 % (ref 36–66)
SODIUM BLD-SCNC: 138 MMOL/L (ref 135–145)
TOTAL IMMATURE NEUTOROPHIL: 0.01 K/CU MM
TOTAL NUCLEATED RBC: 0 K/CU MM
TOTAL PROTEIN: 5.1 GM/DL (ref 6.4–8.2)
TROPONIN T: <0.01 NG/ML
TROPONIN T: <0.01 NG/ML
WBC # BLD: 8.5 K/CU MM (ref 4–10.5)

## 2019-01-26 PROCEDURE — 93010 ELECTROCARDIOGRAM REPORT: CPT | Performed by: INTERNAL MEDICINE

## 2019-01-26 PROCEDURE — G0378 HOSPITAL OBSERVATION PER HR: HCPCS

## 2019-01-26 PROCEDURE — 96372 THER/PROPH/DIAG INJ SC/IM: CPT

## 2019-01-26 PROCEDURE — 83735 ASSAY OF MAGNESIUM: CPT

## 2019-01-26 PROCEDURE — 80053 COMPREHEN METABOLIC PANEL: CPT

## 2019-01-26 PROCEDURE — 6370000000 HC RX 637 (ALT 250 FOR IP): Performed by: NURSE PRACTITIONER

## 2019-01-26 PROCEDURE — 87798 DETECT AGENT NOS DNA AMP: CPT

## 2019-01-26 PROCEDURE — 76937 US GUIDE VASCULAR ACCESS: CPT

## 2019-01-26 PROCEDURE — C1751 CATH, INF, PER/CENT/MIDLINE: HCPCS

## 2019-01-26 PROCEDURE — 36415 COLL VENOUS BLD VENIPUNCTURE: CPT

## 2019-01-26 PROCEDURE — 82962 GLUCOSE BLOOD TEST: CPT

## 2019-01-26 PROCEDURE — 80307 DRUG TEST PRSMV CHEM ANLYZR: CPT

## 2019-01-26 PROCEDURE — 73501 X-RAY EXAM HIP UNI 1 VIEW: CPT

## 2019-01-26 PROCEDURE — 85025 COMPLETE CBC W/AUTO DIFF WBC: CPT

## 2019-01-26 PROCEDURE — 2580000003 HC RX 258: Performed by: NURSE PRACTITIONER

## 2019-01-26 PROCEDURE — 6370000000 HC RX 637 (ALT 250 FOR IP): Performed by: INTERNAL MEDICINE

## 2019-01-26 PROCEDURE — 94640 AIRWAY INHALATION TREATMENT: CPT

## 2019-01-26 PROCEDURE — 84484 ASSAY OF TROPONIN QUANT: CPT

## 2019-01-26 PROCEDURE — 6360000002 HC RX W HCPCS: Performed by: NURSE PRACTITIONER

## 2019-01-26 RX ORDER — PREDNISONE 20 MG/1
40 TABLET ORAL 2 TIMES DAILY
Status: DISCONTINUED | OUTPATIENT
Start: 2019-01-26 | End: 2019-01-27 | Stop reason: HOSPADM

## 2019-01-26 RX ORDER — METOPROLOL SUCCINATE 25 MG/1
25 TABLET, EXTENDED RELEASE ORAL DAILY
Status: DISCONTINUED | OUTPATIENT
Start: 2019-01-26 | End: 2019-01-27 | Stop reason: HOSPADM

## 2019-01-26 RX ORDER — OXYCODONE AND ACETAMINOPHEN 7.5; 325 MG/1; MG/1
1 TABLET ORAL EVERY 4 HOURS PRN
Status: DISCONTINUED | OUTPATIENT
Start: 2019-01-26 | End: 2019-01-27 | Stop reason: HOSPADM

## 2019-01-26 RX ORDER — MAGNESIUM SULFATE IN WATER 40 MG/ML
2 INJECTION, SOLUTION INTRAVENOUS ONCE
Status: DISCONTINUED | OUTPATIENT
Start: 2019-01-26 | End: 2019-01-27 | Stop reason: HOSPADM

## 2019-01-26 RX ORDER — MAGNESIUM SULFATE IN WATER 40 MG/ML
2 INJECTION, SOLUTION INTRAVENOUS
Status: DISCONTINUED | OUTPATIENT
Start: 2019-01-26 | End: 2019-01-26

## 2019-01-26 RX ORDER — IPRATROPIUM BROMIDE AND ALBUTEROL SULFATE 2.5; .5 MG/3ML; MG/3ML
1 SOLUTION RESPIRATORY (INHALATION) EVERY 4 HOURS PRN
Status: DISCONTINUED | OUTPATIENT
Start: 2019-01-26 | End: 2019-01-27 | Stop reason: HOSPADM

## 2019-01-26 RX ORDER — DOXYCYCLINE HYCLATE 100 MG
100 TABLET ORAL EVERY 12 HOURS SCHEDULED
Status: DISCONTINUED | OUTPATIENT
Start: 2019-01-26 | End: 2019-01-27 | Stop reason: HOSPADM

## 2019-01-26 RX ORDER — IPRATROPIUM BROMIDE AND ALBUTEROL SULFATE 2.5; .5 MG/3ML; MG/3ML
1 SOLUTION RESPIRATORY (INHALATION) 4 TIMES DAILY
Status: DISCONTINUED | OUTPATIENT
Start: 2019-01-26 | End: 2019-01-27 | Stop reason: HOSPADM

## 2019-01-26 RX ORDER — METHYLPREDNISOLONE SODIUM SUCCINATE 40 MG/ML
40 INJECTION, POWDER, LYOPHILIZED, FOR SOLUTION INTRAMUSCULAR; INTRAVENOUS 4 TIMES DAILY
Status: DISCONTINUED | OUTPATIENT
Start: 2019-01-26 | End: 2019-01-26

## 2019-01-26 RX ADMIN — OXYCODONE HYDROCHLORIDE AND ACETAMINOPHEN 1 TABLET: 7.5; 325 TABLET ORAL at 10:36

## 2019-01-26 RX ADMIN — GUAIFENESIN 1200 MG: 600 TABLET, EXTENDED RELEASE ORAL at 10:30

## 2019-01-26 RX ADMIN — Medication 2 PUFF: at 21:02

## 2019-01-26 RX ADMIN — DOXYCYCLINE HYCLATE 100 MG: 100 TABLET, COATED ORAL at 21:34

## 2019-01-26 RX ADMIN — OXYCODONE HYDROCHLORIDE AND ACETAMINOPHEN 1 TABLET: 7.5; 325 TABLET ORAL at 17:27

## 2019-01-26 RX ADMIN — IPRATROPIUM BROMIDE AND ALBUTEROL SULFATE 1 AMPULE: .5; 3 SOLUTION RESPIRATORY (INHALATION) at 21:01

## 2019-01-26 RX ADMIN — SODIUM CHLORIDE, PRESERVATIVE FREE 10 ML: 5 INJECTION INTRAVENOUS at 10:32

## 2019-01-26 RX ADMIN — GUAIFENESIN 1200 MG: 600 TABLET, EXTENDED RELEASE ORAL at 21:34

## 2019-01-26 RX ADMIN — OXYCODONE HYDROCHLORIDE AND ACETAMINOPHEN 1 TABLET: 7.5; 325 TABLET ORAL at 21:34

## 2019-01-26 RX ADMIN — CITALOPRAM HYDROBROMIDE 10 MG: 20 TABLET ORAL at 10:30

## 2019-01-26 RX ADMIN — FAMOTIDINE 20 MG: 20 TABLET, FILM COATED ORAL at 10:30

## 2019-01-26 RX ADMIN — DOXYCYCLINE HYCLATE 100 MG: 100 TABLET, COATED ORAL at 17:15

## 2019-01-26 RX ADMIN — AMITRIPTYLINE HYDROCHLORIDE 50 MG: 50 TABLET, FILM COATED ORAL at 21:35

## 2019-01-26 RX ADMIN — ASPIRIN 81 MG 81 MG: 81 TABLET ORAL at 10:30

## 2019-01-26 RX ADMIN — ENOXAPARIN SODIUM 40 MG: 40 INJECTION SUBCUTANEOUS at 10:30

## 2019-01-26 RX ADMIN — FAMOTIDINE 20 MG: 20 TABLET, FILM COATED ORAL at 21:34

## 2019-01-26 RX ADMIN — PREDNISONE 40 MG: 20 TABLET ORAL at 17:14

## 2019-01-26 RX ADMIN — CLOPIDOGREL BISULFATE 75 MG: 75 TABLET ORAL at 10:30

## 2019-01-26 RX ADMIN — METOPROLOL SUCCINATE 25 MG: 25 TABLET, EXTENDED RELEASE ORAL at 17:15

## 2019-01-26 RX ADMIN — IPRATROPIUM BROMIDE AND ALBUTEROL SULFATE 1 AMPULE: .5; 3 SOLUTION RESPIRATORY (INHALATION) at 12:35

## 2019-01-26 ASSESSMENT — PAIN DESCRIPTION - LOCATION
LOCATION: ABDOMEN;CHEST
LOCATION: ABDOMEN
LOCATION: ABDOMEN

## 2019-01-26 ASSESSMENT — PAIN DESCRIPTION - PAIN TYPE
TYPE: ACUTE PAIN
TYPE: ACUTE PAIN;CHRONIC PAIN
TYPE: ACUTE PAIN

## 2019-01-26 ASSESSMENT — PAIN DESCRIPTION - FREQUENCY
FREQUENCY: CONTINUOUS
FREQUENCY: CONTINUOUS

## 2019-01-26 ASSESSMENT — PAIN DESCRIPTION - DESCRIPTORS
DESCRIPTORS: ACHING
DESCRIPTORS: ACHING;CONSTANT

## 2019-01-26 ASSESSMENT — PAIN SCALES - GENERAL
PAINLEVEL_OUTOF10: 10
PAINLEVEL_OUTOF10: 2
PAINLEVEL_OUTOF10: 10

## 2019-01-26 ASSESSMENT — PAIN DESCRIPTION - ONSET: ONSET: ON-GOING

## 2019-01-26 ASSESSMENT — PAIN DESCRIPTION - ORIENTATION: ORIENTATION: LEFT;OUTER

## 2019-01-27 VITALS
TEMPERATURE: 98 F | BODY MASS INDEX: 32.55 KG/M2 | HEIGHT: 60 IN | SYSTOLIC BLOOD PRESSURE: 128 MMHG | DIASTOLIC BLOOD PRESSURE: 76 MMHG | WEIGHT: 165.8 LBS | OXYGEN SATURATION: 97 % | HEART RATE: 97 BPM | RESPIRATION RATE: 16 BRPM

## 2019-01-27 LAB
ADENOVIRUS DETECTION BY PCR: NOT DETECTED
BORDETELLA PERTUSSIS PCR: NOT DETECTED
CHLAMYDOPHILA PNEUMONIA PCR: NOT DETECTED
CORONAVIRUS 229E PCR: NOT DETECTED
CORONAVIRUS HKU1 PCR: NOT DETECTED
CORONAVIRUS NL63 PCR: NOT DETECTED
CORONAVIRUS OC43 PCR: NOT DETECTED
GLUCOSE BLD-MCNC: 110 MG/DL (ref 70–99)
HUMAN METAPNEUMOVIRUS PCR: NOT DETECTED
INFLUENZA A BY PCR: NOT DETECTED
INFLUENZA A H1 (2009) PCR: NOT DETECTED
INFLUENZA A H1 PANDEMIC PCR: NOT DETECTED
INFLUENZA A H3 PCR: NOT DETECTED
INFLUENZA B BY PCR: NOT DETECTED
MYCOPLASMA PNEUMONIAE PCR: NOT DETECTED
PARAINFLUENZA 1 PCR: NOT DETECTED
PARAINFLUENZA 2 PCR: NOT DETECTED
PARAINFLUENZA 3 PCR: NOT DETECTED
PARAINFLUENZA 4 PCR: NOT DETECTED
RHINOVIRUS ENTEROVIRUS PCR: NOT DETECTED
RSV PCR: NOT DETECTED

## 2019-01-27 PROCEDURE — 6360000002 HC RX W HCPCS: Performed by: NURSE PRACTITIONER

## 2019-01-27 PROCEDURE — 96372 THER/PROPH/DIAG INJ SC/IM: CPT

## 2019-01-27 PROCEDURE — 82962 GLUCOSE BLOOD TEST: CPT

## 2019-01-27 PROCEDURE — G0378 HOSPITAL OBSERVATION PER HR: HCPCS

## 2019-01-27 PROCEDURE — 6370000000 HC RX 637 (ALT 250 FOR IP): Performed by: INTERNAL MEDICINE

## 2019-01-27 PROCEDURE — 6370000000 HC RX 637 (ALT 250 FOR IP): Performed by: NURSE PRACTITIONER

## 2019-01-27 PROCEDURE — 94640 AIRWAY INHALATION TREATMENT: CPT

## 2019-01-27 RX ORDER — DOXYCYCLINE HYCLATE 100 MG
100 TABLET ORAL EVERY 12 HOURS SCHEDULED
Qty: 10 TABLET | Refills: 0 | Status: SHIPPED | OUTPATIENT
Start: 2019-01-27 | End: 2019-02-01

## 2019-01-27 RX ORDER — PREDNISONE 10 MG/1
TABLET ORAL
Qty: 24 TABLET | Refills: 0 | Status: ON HOLD | OUTPATIENT
Start: 2019-01-27 | End: 2019-04-25 | Stop reason: HOSPADM

## 2019-01-27 RX ADMIN — IPRATROPIUM BROMIDE AND ALBUTEROL SULFATE 1 AMPULE: .5; 3 SOLUTION RESPIRATORY (INHALATION) at 08:06

## 2019-01-27 RX ADMIN — PREDNISONE 40 MG: 20 TABLET ORAL at 08:44

## 2019-01-27 RX ADMIN — METOPROLOL SUCCINATE 25 MG: 25 TABLET, EXTENDED RELEASE ORAL at 08:44

## 2019-01-27 RX ADMIN — ENOXAPARIN SODIUM 40 MG: 40 INJECTION SUBCUTANEOUS at 08:43

## 2019-01-27 RX ADMIN — GUAIFENESIN 1200 MG: 600 TABLET, EXTENDED RELEASE ORAL at 08:43

## 2019-01-27 RX ADMIN — OXYCODONE HYDROCHLORIDE AND ACETAMINOPHEN 1 TABLET: 7.5; 325 TABLET ORAL at 08:44

## 2019-01-27 RX ADMIN — Medication 2 PUFF: at 08:07

## 2019-01-27 RX ADMIN — DOXYCYCLINE HYCLATE 100 MG: 100 TABLET, COATED ORAL at 08:44

## 2019-01-27 RX ADMIN — ASPIRIN 81 MG 81 MG: 81 TABLET ORAL at 08:44

## 2019-01-27 RX ADMIN — FAMOTIDINE 20 MG: 20 TABLET, FILM COATED ORAL at 08:44

## 2019-01-27 RX ADMIN — CITALOPRAM HYDROBROMIDE 10 MG: 20 TABLET ORAL at 08:44

## 2019-01-27 RX ADMIN — IPRATROPIUM BROMIDE AND ALBUTEROL SULFATE 1 AMPULE: .5; 3 SOLUTION RESPIRATORY (INHALATION) at 11:42

## 2019-01-27 RX ADMIN — CLOPIDOGREL BISULFATE 75 MG: 75 TABLET ORAL at 08:44

## 2019-01-27 ASSESSMENT — PAIN SCALES - GENERAL: PAINLEVEL_OUTOF10: 10

## 2019-01-29 LAB
EKG ATRIAL RATE: 95 BPM
EKG DIAGNOSIS: NORMAL
EKG P AXIS: 72 DEGREES
EKG P-R INTERVAL: 124 MS
EKG Q-T INTERVAL: 376 MS
EKG QRS DURATION: 96 MS
EKG QTC CALCULATION (BAZETT): 472 MS
EKG R AXIS: 259 DEGREES
EKG T AXIS: 47 DEGREES
EKG VENTRICULAR RATE: 95 BPM

## 2019-02-08 ENCOUNTER — HOSPITAL ENCOUNTER (OUTPATIENT)
Age: 69
Setting detail: SPECIMEN
Discharge: HOME | End: 2019-02-08
Payer: MEDICARE

## 2019-02-08 LAB
BACTERIA: ABNORMAL /HPF
BILIRUBIN URINE: NEGATIVE MG/DL
BLOOD, URINE: NEGATIVE
CLARITY: CLEAR
COLOR: YELLOW
GLUCOSE, URINE: NEGATIVE MG/DL
KETONES, URINE: NEGATIVE MG/DL
LEUKOCYTE ESTERASE, URINE: NEGATIVE
MUCUS: ABNORMAL HPF
NITRITE URINE, QUANTITATIVE: NEGATIVE
PH, URINE: 5 (ref 5–8)
PROTEIN UA: NEGATIVE MG/DL
RBC URINE: 1 /HPF (ref 0–6)
SPECIFIC GRAVITY UA: 1.02 (ref 1–1.03)
SQUAMOUS EPITHELIAL: 5 /HPF
TRICHOMONAS: ABNORMAL /HPF
UROBILINOGEN, URINE: NORMAL MG/DL (ref 0.2–1)
WBC UA: 1 /HPF (ref 0–5)

## 2019-02-08 PROCEDURE — 87086 URINE CULTURE/COLONY COUNT: CPT

## 2019-02-08 PROCEDURE — 81001 URINALYSIS AUTO W/SCOPE: CPT

## 2019-02-09 LAB
CULTURE: NORMAL
Lab: NORMAL
REPORT STATUS: NORMAL
SPECIMEN: NORMAL

## 2019-02-14 RX ORDER — CITALOPRAM 10 MG/1
TABLET ORAL
Qty: 90 TABLET | Refills: 2 | Status: ON HOLD | OUTPATIENT
Start: 2019-02-14 | End: 2019-05-27

## 2019-02-26 ENCOUNTER — HOSPITAL ENCOUNTER (OUTPATIENT)
Age: 69
Setting detail: SPECIMEN
Discharge: HOME | End: 2019-02-26
Payer: MEDICARE

## 2019-02-26 LAB
ALBUMIN SERPL-MCNC: 3.2 GM/DL (ref 3.4–5)
ALP BLD-CCNC: 159 IU/L (ref 40–129)
ALT SERPL-CCNC: 16 U/L (ref 10–40)
ANION GAP SERPL CALCULATED.3IONS-SCNC: 12 MMOL/L (ref 4–16)
AST SERPL-CCNC: 17 IU/L (ref 15–37)
BASOPHILS ABSOLUTE: 0 K/CU MM
BASOPHILS RELATIVE PERCENT: 0.3 % (ref 0–1)
BILIRUB SERPL-MCNC: 0.3 MG/DL (ref 0–1)
BUN BLDV-MCNC: 8 MG/DL (ref 6–23)
CALCIUM SERPL-MCNC: 9 MG/DL (ref 8.3–10.6)
CHLORIDE BLD-SCNC: 101 MMOL/L (ref 99–110)
CO2: 24 MMOL/L (ref 21–32)
CREAT SERPL-MCNC: 0.6 MG/DL (ref 0.6–1.1)
DIFFERENTIAL TYPE: ABNORMAL
EOSINOPHILS ABSOLUTE: 0.1 K/CU MM
EOSINOPHILS RELATIVE PERCENT: 0.9 % (ref 0–3)
GFR AFRICAN AMERICAN: >60 ML/MIN/1.73M2
GFR NON-AFRICAN AMERICAN: >60 ML/MIN/1.73M2
GLUCOSE BLD-MCNC: 118 MG/DL (ref 70–99)
HCT VFR BLD CALC: 46 % (ref 37–47)
HEMOGLOBIN: 13.9 GM/DL (ref 12.5–16)
IMMATURE NEUTROPHIL %: 0.2 % (ref 0–0.43)
LYMPHOCYTES ABSOLUTE: 3.3 K/CU MM
LYMPHOCYTES RELATIVE PERCENT: 32.7 % (ref 24–44)
MCH RBC QN AUTO: 26.1 PG (ref 27–31)
MCHC RBC AUTO-ENTMCNC: 30.2 % (ref 32–36)
MCV RBC AUTO: 86.5 FL (ref 78–100)
MONOCYTES ABSOLUTE: 0.9 K/CU MM
MONOCYTES RELATIVE PERCENT: 8.8 % (ref 0–4)
NUCLEATED RBC %: 0 %
PDW BLD-RTO: 21.9 % (ref 11.7–14.9)
PLATELET # BLD: 413 K/CU MM (ref 140–440)
PMV BLD AUTO: 10.9 FL (ref 7.5–11.1)
POTASSIUM SERPL-SCNC: 4.4 MMOL/L (ref 3.5–5.1)
RBC # BLD: 5.32 M/CU MM (ref 4.2–5.4)
SEGMENTED NEUTROPHILS ABSOLUTE COUNT: 5.7 K/CU MM
SEGMENTED NEUTROPHILS RELATIVE PERCENT: 57.1 % (ref 36–66)
SODIUM BLD-SCNC: 137 MMOL/L (ref 135–145)
TOTAL IMMATURE NEUTOROPHIL: 0.02 K/CU MM
TOTAL NUCLEATED RBC: 0 K/CU MM
TOTAL PROTEIN: 5.7 GM/DL (ref 6.4–8.2)
WBC # BLD: 10 K/CU MM (ref 4–10.5)

## 2019-02-26 PROCEDURE — 80053 COMPREHEN METABOLIC PANEL: CPT

## 2019-02-26 PROCEDURE — 85025 COMPLETE CBC W/AUTO DIFF WBC: CPT

## 2019-04-18 ENCOUNTER — APPOINTMENT (OUTPATIENT)
Dept: CT IMAGING | Age: 69
DRG: 641 | End: 2019-04-18
Payer: MEDICARE

## 2019-04-18 ENCOUNTER — HOSPITAL ENCOUNTER (INPATIENT)
Age: 69
LOS: 7 days | Discharge: NURSING, REHAB OR LONG-TERM CARE | DRG: 641 | End: 2019-04-25
Attending: EMERGENCY MEDICINE | Admitting: HOSPITALIST
Payer: MEDICARE

## 2019-04-18 ENCOUNTER — APPOINTMENT (OUTPATIENT)
Dept: GENERAL RADIOLOGY | Age: 69
DRG: 641 | End: 2019-04-18
Payer: MEDICARE

## 2019-04-18 DIAGNOSIS — R53.83 FATIGUE, UNSPECIFIED TYPE: ICD-10-CM

## 2019-04-18 DIAGNOSIS — R53.1 GENERALIZED WEAKNESS: Primary | ICD-10-CM

## 2019-04-18 DIAGNOSIS — I49.3 FREQUENT PVCS: ICD-10-CM

## 2019-04-18 DIAGNOSIS — E83.42 HYPOMAGNESEMIA: ICD-10-CM

## 2019-04-18 DIAGNOSIS — R29.6 FREQUENT FALLS: ICD-10-CM

## 2019-04-18 LAB
ALBUMIN SERPL-MCNC: 2.5 GM/DL (ref 3.4–5)
ALP BLD-CCNC: 89 IU/L (ref 40–129)
ALT SERPL-CCNC: 18 U/L (ref 10–40)
AMMONIA: 70 UMOL/L (ref 11–51)
ANION GAP SERPL CALCULATED.3IONS-SCNC: 6 MMOL/L (ref 4–16)
AST SERPL-CCNC: 35 IU/L (ref 15–37)
BACTERIA: NEGATIVE /HPF
BASOPHILS ABSOLUTE: 0 K/CU MM
BASOPHILS RELATIVE PERCENT: 0.4 % (ref 0–1)
BILIRUB SERPL-MCNC: 0.3 MG/DL (ref 0–1)
BILIRUBIN URINE: NEGATIVE MG/DL
BLOOD, URINE: NEGATIVE
BUN BLDV-MCNC: 12 MG/DL (ref 6–23)
CALCIUM SERPL-MCNC: 9.5 MG/DL (ref 8.3–10.6)
CHLORIDE BLD-SCNC: 109 MMOL/L (ref 99–110)
CLARITY: CLEAR
CO2: 27 MMOL/L (ref 21–32)
COLOR: YELLOW
CREAT SERPL-MCNC: 0.5 MG/DL (ref 0.6–1.1)
DIFFERENTIAL TYPE: ABNORMAL
EOSINOPHILS ABSOLUTE: 0.1 K/CU MM
EOSINOPHILS RELATIVE PERCENT: 0.8 % (ref 0–3)
GFR AFRICAN AMERICAN: >60 ML/MIN/1.73M2
GFR NON-AFRICAN AMERICAN: >60 ML/MIN/1.73M2
GLUCOSE BLD-MCNC: 82 MG/DL (ref 70–99)
GLUCOSE, URINE: NEGATIVE MG/DL
HCT VFR BLD CALC: 40.3 % (ref 37–47)
HEMOGLOBIN: 12.3 GM/DL (ref 12.5–16)
IMMATURE NEUTROPHIL %: 0.3 % (ref 0–0.43)
KETONES, URINE: NEGATIVE MG/DL
LACTATE: 1 MMOL/L (ref 0.4–2)
LEUKOCYTE ESTERASE, URINE: NEGATIVE
LYMPHOCYTES ABSOLUTE: 4 K/CU MM
LYMPHOCYTES RELATIVE PERCENT: 57.1 % (ref 24–44)
MAGNESIUM: 1.2 MG/DL (ref 1.8–2.4)
MCH RBC QN AUTO: 26.5 PG (ref 27–31)
MCHC RBC AUTO-ENTMCNC: 30.5 % (ref 32–36)
MCV RBC AUTO: 86.7 FL (ref 78–100)
MONOCYTES ABSOLUTE: 0.5 K/CU MM
MONOCYTES RELATIVE PERCENT: 7.2 % (ref 0–4)
MUCUS: ABNORMAL HPF
NITRITE URINE, QUANTITATIVE: NEGATIVE
NUCLEATED RBC %: 0 %
PDW BLD-RTO: 22.2 % (ref 11.7–14.9)
PH, URINE: 5 (ref 5–8)
PLATELET # BLD: 215 K/CU MM (ref 140–440)
PMV BLD AUTO: 10.9 FL (ref 7.5–11.1)
POTASSIUM SERPL-SCNC: 4.2 MMOL/L (ref 3.5–5.1)
PROTEIN UA: NEGATIVE MG/DL
RBC # BLD: 4.65 M/CU MM (ref 4.2–5.4)
RBC URINE: <1 /HPF (ref 0–6)
REASON FOR REJECTION: NORMAL
REASON FOR REJECTION: NORMAL
REJECTED TEST: NORMAL
SEGMENTED NEUTROPHILS ABSOLUTE COUNT: 2.4 K/CU MM
SEGMENTED NEUTROPHILS RELATIVE PERCENT: 34.2 % (ref 36–66)
SODIUM BLD-SCNC: 142 MMOL/L (ref 135–145)
SPECIFIC GRAVITY UA: 1.02 (ref 1–1.03)
TOTAL IMMATURE NEUTOROPHIL: 0.02 K/CU MM
TOTAL NUCLEATED RBC: 0 K/CU MM
TOTAL PROTEIN: 5.1 GM/DL (ref 6.4–8.2)
TRICHOMONAS: ABNORMAL /HPF
TROPONIN T: <0.01 NG/ML
UROBILINOGEN, URINE: NORMAL MG/DL (ref 0.2–1)
WBC # BLD: 7.1 K/CU MM (ref 4–10.5)
WBC UA: <1 /HPF (ref 0–5)

## 2019-04-18 PROCEDURE — 71045 X-RAY EXAM CHEST 1 VIEW: CPT

## 2019-04-18 PROCEDURE — 93005 ELECTROCARDIOGRAM TRACING: CPT | Performed by: EMERGENCY MEDICINE

## 2019-04-18 PROCEDURE — 1200000000 HC SEMI PRIVATE

## 2019-04-18 PROCEDURE — 81001 URINALYSIS AUTO W/SCOPE: CPT

## 2019-04-18 PROCEDURE — 94640 AIRWAY INHALATION TREATMENT: CPT

## 2019-04-18 PROCEDURE — 85025 COMPLETE CBC W/AUTO DIFF WBC: CPT

## 2019-04-18 PROCEDURE — 83605 ASSAY OF LACTIC ACID: CPT

## 2019-04-18 PROCEDURE — 82140 ASSAY OF AMMONIA: CPT

## 2019-04-18 PROCEDURE — 70450 CT HEAD/BRAIN W/O DYE: CPT

## 2019-04-18 PROCEDURE — 87040 BLOOD CULTURE FOR BACTERIA: CPT

## 2019-04-18 PROCEDURE — 6370000000 HC RX 637 (ALT 250 FOR IP): Performed by: EMERGENCY MEDICINE

## 2019-04-18 PROCEDURE — 99285 EMERGENCY DEPT VISIT HI MDM: CPT

## 2019-04-18 PROCEDURE — 84484 ASSAY OF TROPONIN QUANT: CPT

## 2019-04-18 PROCEDURE — 94761 N-INVAS EAR/PLS OXIMETRY MLT: CPT

## 2019-04-18 PROCEDURE — 96365 THER/PROPH/DIAG IV INF INIT: CPT

## 2019-04-18 PROCEDURE — 80053 COMPREHEN METABOLIC PANEL: CPT

## 2019-04-18 PROCEDURE — 2580000003 HC RX 258: Performed by: HOSPITALIST

## 2019-04-18 PROCEDURE — 6370000000 HC RX 637 (ALT 250 FOR IP): Performed by: HOSPITALIST

## 2019-04-18 PROCEDURE — 2700000000 HC OXYGEN THERAPY PER DAY

## 2019-04-18 PROCEDURE — 83735 ASSAY OF MAGNESIUM: CPT

## 2019-04-18 PROCEDURE — 6360000002 HC RX W HCPCS: Performed by: EMERGENCY MEDICINE

## 2019-04-18 PROCEDURE — 96366 THER/PROPH/DIAG IV INF ADDON: CPT

## 2019-04-18 RX ORDER — LORAZEPAM 2 MG/1
TABLET ORAL
Status: ON HOLD | COMMUNITY
Start: 2019-04-02 | End: 2019-07-03 | Stop reason: SDUPTHER

## 2019-04-18 RX ORDER — SODIUM CHLORIDE 0.9 % (FLUSH) 0.9 %
10 SYRINGE (ML) INJECTION PRN
Status: DISCONTINUED | OUTPATIENT
Start: 2019-04-18 | End: 2019-04-26 | Stop reason: HOSPADM

## 2019-04-18 RX ORDER — IPRATROPIUM BROMIDE AND ALBUTEROL SULFATE 2.5; .5 MG/3ML; MG/3ML
1 SOLUTION RESPIRATORY (INHALATION) EVERY 4 HOURS PRN
Status: DISCONTINUED | OUTPATIENT
Start: 2019-04-18 | End: 2019-04-26 | Stop reason: HOSPADM

## 2019-04-18 RX ORDER — OXYCODONE AND ACETAMINOPHEN 7.5; 325 MG/1; MG/1
TABLET ORAL
Status: ON HOLD | COMMUNITY
Start: 2019-03-25 | End: 2019-04-25 | Stop reason: HOSPADM

## 2019-04-18 RX ORDER — LOSARTAN POTASSIUM 25 MG/1
TABLET ORAL
Status: ON HOLD | COMMUNITY
Start: 2019-02-05 | End: 2019-08-07 | Stop reason: HOSPADM

## 2019-04-18 RX ORDER — LIDOCAINE HYDROCHLORIDE AND EPINEPHRINE 10; 10 MG/ML; UG/ML
20 INJECTION, SOLUTION INFILTRATION; PERINEURAL ONCE
Status: DISCONTINUED | OUTPATIENT
Start: 2019-04-18 | End: 2019-04-18

## 2019-04-18 RX ORDER — MAGNESIUM SULFATE 1 G/100ML
1 INJECTION INTRAVENOUS PRN
Status: DISCONTINUED | OUTPATIENT
Start: 2019-04-18 | End: 2019-04-26 | Stop reason: HOSPADM

## 2019-04-18 RX ORDER — CITALOPRAM 20 MG/1
10 TABLET ORAL DAILY
Status: DISCONTINUED | OUTPATIENT
Start: 2019-04-19 | End: 2019-04-26 | Stop reason: HOSPADM

## 2019-04-18 RX ORDER — ACETAMINOPHEN 325 MG/1
650 TABLET ORAL EVERY 4 HOURS PRN
Status: DISCONTINUED | OUTPATIENT
Start: 2019-04-18 | End: 2019-04-26 | Stop reason: HOSPADM

## 2019-04-18 RX ORDER — POTASSIUM CHLORIDE 20 MEQ/1
40 TABLET, EXTENDED RELEASE ORAL PRN
Status: DISCONTINUED | OUTPATIENT
Start: 2019-04-18 | End: 2019-04-26 | Stop reason: HOSPADM

## 2019-04-18 RX ORDER — ASPIRIN 81 MG/1
81 TABLET, CHEWABLE ORAL DAILY
Status: DISCONTINUED | OUTPATIENT
Start: 2019-04-19 | End: 2019-04-26 | Stop reason: HOSPADM

## 2019-04-18 RX ORDER — SODIUM CHLORIDE 0.9 % (FLUSH) 0.9 %
10 SYRINGE (ML) INJECTION EVERY 12 HOURS SCHEDULED
Status: DISCONTINUED | OUTPATIENT
Start: 2019-04-18 | End: 2019-04-26 | Stop reason: HOSPADM

## 2019-04-18 RX ORDER — IPRATROPIUM BROMIDE AND ALBUTEROL SULFATE 2.5; .5 MG/3ML; MG/3ML
1 SOLUTION RESPIRATORY (INHALATION) ONCE
Status: COMPLETED | OUTPATIENT
Start: 2019-04-18 | End: 2019-04-18

## 2019-04-18 RX ORDER — CLOPIDOGREL BISULFATE 75 MG/1
75 TABLET ORAL DAILY
Status: DISCONTINUED | OUTPATIENT
Start: 2019-04-19 | End: 2019-04-26 | Stop reason: HOSPADM

## 2019-04-18 RX ORDER — METOPROLOL SUCCINATE 50 MG/1
50 TABLET, EXTENDED RELEASE ORAL DAILY
Status: DISCONTINUED | OUTPATIENT
Start: 2019-04-19 | End: 2019-04-26 | Stop reason: HOSPADM

## 2019-04-18 RX ORDER — POTASSIUM CHLORIDE 1.5 G/1.77G
40 POWDER, FOR SOLUTION ORAL PRN
Status: DISCONTINUED | OUTPATIENT
Start: 2019-04-18 | End: 2019-04-26 | Stop reason: HOSPADM

## 2019-04-18 RX ORDER — SIMVASTATIN 20 MG
20 TABLET ORAL NIGHTLY
Status: DISCONTINUED | OUTPATIENT
Start: 2019-04-19 | End: 2019-04-26 | Stop reason: HOSPADM

## 2019-04-18 RX ORDER — OXYCODONE HYDROCHLORIDE AND ACETAMINOPHEN 5; 325 MG/1; MG/1
1 TABLET ORAL ONCE
Status: COMPLETED | OUTPATIENT
Start: 2019-04-18 | End: 2019-04-18

## 2019-04-18 RX ORDER — AMITRIPTYLINE HYDROCHLORIDE 50 MG/1
50 TABLET, FILM COATED ORAL NIGHTLY
Status: DISCONTINUED | OUTPATIENT
Start: 2019-04-18 | End: 2019-04-26 | Stop reason: HOSPADM

## 2019-04-18 RX ORDER — FLUCONAZOLE 150 MG/1
TABLET ORAL
Status: ON HOLD | COMMUNITY
Start: 2019-04-17 | End: 2019-08-07 | Stop reason: HOSPADM

## 2019-04-18 RX ORDER — ONDANSETRON 2 MG/ML
4 INJECTION INTRAMUSCULAR; INTRAVENOUS EVERY 6 HOURS PRN
Status: DISCONTINUED | OUTPATIENT
Start: 2019-04-18 | End: 2019-04-26 | Stop reason: HOSPADM

## 2019-04-18 RX ORDER — MAGNESIUM SULFATE 4 G/50ML
4 INJECTION INTRAVENOUS ONCE
Status: COMPLETED | OUTPATIENT
Start: 2019-04-18 | End: 2019-04-19

## 2019-04-18 RX ORDER — FAMOTIDINE 20 MG/1
20 TABLET, FILM COATED ORAL 2 TIMES DAILY
Status: DISCONTINUED | OUTPATIENT
Start: 2019-04-18 | End: 2019-04-26 | Stop reason: HOSPADM

## 2019-04-18 RX ORDER — POTASSIUM CHLORIDE 7.45 MG/ML
10 INJECTION INTRAVENOUS PRN
Status: DISCONTINUED | OUTPATIENT
Start: 2019-04-18 | End: 2019-04-26 | Stop reason: HOSPADM

## 2019-04-18 RX ADMIN — AMITRIPTYLINE HYDROCHLORIDE 50 MG: 50 TABLET, FILM COATED ORAL at 23:40

## 2019-04-18 RX ADMIN — OXYCODONE HYDROCHLORIDE AND ACETAMINOPHEN 1 TABLET: 5; 325 TABLET ORAL at 21:16

## 2019-04-18 RX ADMIN — SODIUM CHLORIDE, PRESERVATIVE FREE 10 ML: 5 INJECTION INTRAVENOUS at 23:34

## 2019-04-18 RX ADMIN — MAGNESIUM OXIDE TAB 400 MG (241.3 MG ELEMENTAL MG) 400 MG: 400 (241.3 MG) TAB at 23:40

## 2019-04-18 RX ADMIN — ACETAMINOPHEN 650 MG: 325 TABLET ORAL at 23:40

## 2019-04-18 RX ADMIN — MAGNESIUM SULFATE HEPTAHYDRATE 4 G: 80 INJECTION, SOLUTION INTRAVENOUS at 20:30

## 2019-04-18 RX ADMIN — FAMOTIDINE 20 MG: 20 TABLET ORAL at 23:40

## 2019-04-18 RX ADMIN — IPRATROPIUM BROMIDE AND ALBUTEROL SULFATE 1 AMPULE: .5; 3 SOLUTION RESPIRATORY (INHALATION) at 16:31

## 2019-04-18 ASSESSMENT — PAIN DESCRIPTION - PAIN TYPE
TYPE: ACUTE PAIN
TYPE: ACUTE PAIN

## 2019-04-18 ASSESSMENT — PAIN SCALES - GENERAL
PAINLEVEL_OUTOF10: 9

## 2019-04-18 ASSESSMENT — PAIN - FUNCTIONAL ASSESSMENT: PAIN_FUNCTIONAL_ASSESSMENT: ACTIVITIES ARE NOT PREVENTED

## 2019-04-18 ASSESSMENT — PAIN DESCRIPTION - FREQUENCY: FREQUENCY: CONTINUOUS

## 2019-04-18 ASSESSMENT — PAIN DESCRIPTION - DESCRIPTORS
DESCRIPTORS: ACHING
DESCRIPTORS: ACHING

## 2019-04-18 ASSESSMENT — PAIN DESCRIPTION - LOCATION
LOCATION: ABDOMEN
LOCATION: BACK

## 2019-04-18 ASSESSMENT — PAIN DESCRIPTION - ORIENTATION: ORIENTATION: LOWER;UPPER

## 2019-04-18 ASSESSMENT — PAIN DESCRIPTION - ONSET: ONSET: ON-GOING

## 2019-04-18 ASSESSMENT — PAIN DESCRIPTION - PROGRESSION: CLINICAL_PROGRESSION: NOT CHANGED

## 2019-04-18 NOTE — ED TRIAGE NOTES
Pt from home via EMS. Family stated that she has been weaker and more confused x3days. Pt lives at home by herself and hasn't been able to stand due to generalized weakness. Was seen by PCP and started on antibiotic. Physician make home visits. She had chest x-ray this AM. Pt alert and oriented to place, self, situation, some confusion on time.

## 2019-04-18 NOTE — ED PROVIDER NOTES
Restless leg syndrome     Rheumatoid arthritis (HCC)     Smoker     Systemic lupus erythematosus (HCC) 1992    Dr Artem Vera, visiting physician    Type II diabetes mellitus ([facility] [address] 1969    Ventral hernia 06/2018    right ventral hernia seen on CT abd 6/2018     Past Surgical History:   Procedure Laterality Date    ABDOMINAL EXPLORATION SURGERY  Last Done 2005    2-3 times; repeated bowel obstructions;    APPENDECTOMY  1972    CATARACT REMOVAL WITH IMPLANT Bilateral 2000's    CHOLECYSTECTOMY  1967    FEMUR FRACTURE SURGERY Right 2000's    Right Thigh, Plates, Rods, Screws    HUMERUS FRACTURE SURGERY Left 2000's    Broken Left Arm, Plates And Screws    LUNG REMOVAL, PARTIAL Left 09/30/2014    Lung CA, left; Robotic Assisted Left Thoracotomy, Left Lung Cancer; ROWDY lobectomy;  Nonsmall Cell CA    OTHER SURGICAL HISTORY  2/20/15    excision of hydradenitis suppurative of cesilia anal area    REVISION TOTAL KNEE ARTHROPLASTY Right 2005    SKIN CANCER EXCISION Right 2012    Right Ankle    ANTONY AND BSO  1972    TOTAL KNEE ARTHROPLASTY Right 2003    Total Right Knee with revision     Family History   Problem Relation Age of Onset    Heart Disease Mother     Diabetes Mother    [facility] Arthritis Mother     Depression Mother     High Blood Pressure Mother     High Cholesterol Mother     Kidney Disease Mother         On Dialysis    Learning Disabilities Mother     Mental Illness Mother     Stroke Mother     Vision Loss Mother     Early Death Father         Brain Hemorrhage    Other Father         \"Bowel Problems\"    High Blood Pressure Father     Heart Disease Father     Mental Illness Brother         Schizophrenia , Paranoia    Diabetes Brother     Lupus Sister     Arthritis Sister     Depression Sister     Diabetes Sister     Heart Disease Sister     High Blood Pressure Sister     High Cholesterol Sister     Kidney Disease Sister     Stroke Sister      Social History     Socioeconomic History    Marital status:      Spouse name: Not on file    Number of children: Not on file    Years of education: Not on file    Highest education level: Not on file   Occupational History    Not on file   Social Needs    Financial resource strain: Not on file    Food insecurity:     Worry: Not on file     Inability: Not on file    Transportation needs:     Medical: Not on file     Non-medical: Not on file   Tobacco Use    Smoking status: Current Every Day Smoker     Packs/day: 0.50     Years: 32.00     Pack years: 16.00     Types: Cigarettes     Start date: 1986    Smokeless tobacco: Never Used   Substance and Sexual Activity    Alcohol use: No     Alcohol/week: 0.0 oz    Drug use: No     Comment: states quit 1-2-14    Sexual activity: Not Currently   Lifestyle    Physical activity:     Days per week: Not on file     Minutes per session: Not on file    Stress: Not on file   Relationships    Social connections:     Talks on phone: Not on file     Gets together: Not on file     Attends Gnosticism service: Not on file     Active member of club or organization: Not on file     Attends meetings of clubs or organizations: Not on file     Relationship status: Not on file    Intimate partner violence:     Fear of current or ex partner: Not on file     Emotionally abused: Not on file     Physically abused: Not on file     Forced sexual activity: Not on file   Other Topics Concern    Not on file   Social History Narrative    Not on file     Current Facility-Administered Medications   Medication Dose Route Frequency Provider Last Rate Last Dose    lidocaine-EPINEPHrine 1 percent-[id number] injection 20 mL  20 mL Intradermal Once Wilfred Melara MD        magnesium sulfate 4 g in 50 mL IVPB premix  4 g Intravenous Once Wilfred Melara MD         Current Outpatient Medications   Medication Sig Dispense Refill    citalopram (CELEXA) 10 MG tablet TAKE ONE (1) TABLET BY MOUTH ONCE DAILY.  90 tablet 2    predniSONE all of the currently available lab results from this visit (if applicable):  Results for orders placed or performed during the hospital encounter of 04/18/19   CMP   Result Value Ref Range    Sodium 142 135 - 145 MMOL/L    Potassium 4.2 3.5 - 5.1 MMOL/L    Chloride 109 99 - 110 mMol/L    CO2 27 21 - 32 MMOL/L    BUN 12 6 - 23 MG/DL    CREATININE 0.5 (L) 0.6 - 1.1 MG/DL    Glucose 82 70 - 99 MG/DL    Calcium 9.5 8.3 - 10.6 MG/DL    Alb 2.5 (L) 3.4 - 5.0 GM/DL    Total Protein 5.1 (L) 6.4 - 8.2 GM/DL    Total Bilirubin 0.3 0.0 - 1.0 MG/DL    ALT 18 10 - 40 U/L    AST 35 15 - 37 IU/L    Alkaline Phosphatase 89 40 - 129 IU/L    GFR Non-African American >60 >60 mL/min/1.73m2    GFR African American >60 >60 mL/min/1.73m2    Anion Gap 6 4 - 16   Urinalysis   Result Value Ref Range    Color, UA YELLOW YELLOW    Clarity, UA CLEAR CLEAR    Glucose, Urine NEGATIVE NEGATIVE MG/DL    Bilirubin Urine NEGATIVE NEGATIVE MG/DL    Ketones, Urine NEGATIVE NEGATIVE MG/DL    Specific Gravity, UA 1.020 1.001 - 1.035    Blood, Urine NEGATIVE NEGATIVE    pH, Urine 5.0 5.0 - 8.0    Protein, UA NEGATIVE NEGATIVE MG/DL    Urobilinogen, Urine NORMAL 0.2 - 1.0 MG/DL    Nitrite Urine, Quantitative NEGATIVE NEGATIVE    Leukocyte Esterase, Urine NEGATIVE NEGATIVE    RBC, UA <1 0 - 6 /HPF    WBC, UA <1 0 - 5 /HPF    Bacteria, UA NEGATIVE NEGATIVE /HPF    Mucus, UA RARE (A) NEGATIVE HPF    Trichomonas, UA NONE SEEN NONE SEEN /HPF   Troponin   Result Value Ref Range    Troponin T <0.010 <0.01 NG/ML   Lactic Acid, Plasma   Result Value Ref Range    Lactate 1.0 0.4 - 2.0 mMOL/L   Magnesium   Result Value Ref Range    Magnesium 1.2 (L) 1.8 - 2.4 mg/dl   SPECIMEN REJECTION   Result Value Ref Range    Rejected Test CD     Reason for Rejection UNABLE TO PERFORM TESTING:     Reason for Rejection SPECIMEN CLOTTED    EKG 12 Lead   Result Value Ref Range    Ventricular Rate 70 BPM    Atrial Rate 70 BPM    P-R Interval 150 ms    QRS Duration 104 ms    Q-T Interval 426 ms    QTc Calculation (Bazett) 460 ms    P Axis 50 degrees    R Axis 267 degrees    T Axis 41 degrees    Diagnosis       Sinus rhythm with occasional premature ventricular complexes  Low voltage QRS  Incomplete right bundle branch block  Possible Anterolateral infarct (cited on or before 20-APR-2018)  Abnormal ECG  When compared with ECG of 25-JAN-2019 17:37,  fusion complexes are no longer present        Radiographs (if obtained):  [] The following radiograph was interpreted by myself in the absence of a radiologist:   [x] Radiologist's Report Reviewed:  CT HEAD WO CONTRAST   Final Result   No acute intracranial abnormality. XR CHEST PORTABLE   Final Result   1. No acute cardiopulmonary process identified. EKG (if obtained): (All EKG's are interpreted by myself in the absence of a cardiologist)  12 lead EKG per my interpretation:  Normal Sinus Rhythm with PVCs at 70  [place] is   Left axis deviation  QTc is  within an acceptable range  There is no specific T wave changes appreciated. There is no specific ST wave changes appreciated. Incomplete RBBB  No STEMI    Prior EKG to compare with was available and no clinically significant changes overall morphology. Chart review shows recent radiographs:  Ct Head Wo Contrast    Result Date: 4/18/2019  EXAMINATION: CT OF THE HEAD WITHOUT CONTRAST  4/18/2019 4:05 pm TECHNIQUE: CT of the head was performed without the administration of intravenous contrast. Dose modulation, iterative reconstruction, and/or weight based adjustment of the mA/kV was utilized to reduce the radiation dose to as low as reasonably achievable. COMPARISON: 01/25/2019 HISTORY: ORDERING SYSTEM PROVIDED HISTORY: AMS TECHNOLOGIST PROVIDED HISTORY: Has a \"code stroke\" or \"stroke alert\" been called? ->No Ordering Physician Provided Reason for Exam: AMS Acuity: Acute Type of Exam: Initial Relevant Medical/Surgical History: none FINDINGS: BRAIN/VENTRICLES: There is some distortion of the base of the brain due to patient motion artifact, even with repeated sequences. Within this limitation, there is no hemorrhage, edema, or mass effect. There is generalized atrophy. There are no abnormal extra-axial fluid collections ORBITS: The visualized portion of the orbits demonstrate no acute abnormality. SINUSES: The visualized paranasal sinuses and mastoid air cells demonstrate no acute abnormality. SOFT TISSUES/SKULL:  No acute abnormality of the visualized skull or soft tissues. No acute intracranial abnormality. Xr Chest Portable    Result Date: 4/18/2019  EXAMINATION: SINGLE XRAY VIEW OF THE CHEST 4/18/2019 3:35 pm COMPARISON: Chest x-ray October 29, 2018; chest x-ray July 4, 2018 HISTORY: ORDERING SYSTEM PROVIDED HISTORY: wheezing, sob, AMS TECHNOLOGIST PROVIDED HISTORY: Reason for exam:->wheezing, sob, AMS Ordering Physician Provided Reason for Exam: wheezing, sob, AMS Acuity: Acute Type of Exam: Initial Additional signs and symptoms: na Relevant Medical/Surgical History: cad, copd, hypertension FINDINGS: Cardiac silhouette appears stable. Atherosclerotic changes of the aorta. No focal consolidation, pleural effusion, or pneumothorax identified. Mild chronic underlying interstitial lung changes appear grossly stable when compared with previous exams. Osseous structures appear stable. 1. No acute cardiopulmonary process identified. MDM:  Pt presents as above. Emergent conditions considered. Presentation prompted initial labs, EKG and imaging. EKG with normal sinus rhythm with PVCs as above. Chest x-ray and CT head negative for acute process. Patient is hemodynamically very well and with nonfocal neurological exam. Patient with very nonspecific symptoms but is getting more challenging for her daughter to care for at home. Case management was consulted to assist with discharge planning needs.     Significant difficulty obtaining blood for this patient with multiple attempts by laboratory phlebotomist and nursing as well as myself. I was able to access patient's left IJ with ultrasound guidance and blood work was sent. Patient was found to be markedly hypomagnesemic in the setting of generalized weakness, frequent falls and frequent PVCs. IV magnesium sulfate was ordered 4 g and given patient's overall debilitated status decision made to admit the patient for further PT/OT and repeat labs and possible placement. Case to be discussed with hospitalist team and patient to be admitted to medicine. Questions sought and answered with the patient and daughters. They voice understanding and agree with plan. Clinical Impression:  1. Generalized weakness    2. Hypomagnesemia    3. Fatigue, unspecified type    4. Frequent falls    5. Frequent PVCs      Disposition referral (if applicable):  No follow-up provider specified. Disposition medications (if applicable):  New Prescriptions    No medications on file       Comment: Please note this report has been produced using speech recognition software and may contain errors related to that system including errors in grammar, punctuation, and spelling, as well as words and phrases that may be inappropriate. If there are any questions or concerns please feel free to contact the dictating provider for clarification.        Beena Devlin MD  04/18/19 2020

## 2019-04-18 NOTE — ED NOTES
Bed: ED-15  Expected date:   Expected time:   Means of arrival:   Comments:  [id number] Adolfo Fontaine RN  04/18/19 1948

## 2019-04-18 NOTE — CARE COORDINATION
Consult requested by Dr Ariadna Washburn for possible home care. Pt being seen in the ED for general weakness and confusion. LSW met with pt to initiate discharge planning. Pt is alert and oriented. Pt reports that she lives in a one level home with her daughter. Pt stated that she has services through [facility] which include SN, PT/OT. Each discipline comes to the home twice/week. Pt stated that she is not interested in SNF and would like to continue [facility]. PT/OT. There are no needs identified at this time. Updated Dr Ariadna Washburn regarding consult request. It is unknown whether or not pt will be admitted at this time.

## 2019-04-19 LAB
ADENOVIRUS DETECTION BY PCR: NOT DETECTED
ANION GAP SERPL CALCULATED.3IONS-SCNC: 9 MMOL/L (ref 4–16)
BORDETELLA PERTUSSIS PCR: NOT DETECTED
BUN BLDV-MCNC: 12 MG/DL (ref 6–23)
CALCIUM SERPL-MCNC: 9.3 MG/DL (ref 8.3–10.6)
CHLAMYDOPHILA PNEUMONIA PCR: NOT DETECTED
CHLORIDE BLD-SCNC: 116 MMOL/L (ref 99–110)
CO2: 19 MMOL/L (ref 21–32)
CORONAVIRUS 229E PCR: NOT DETECTED
CORONAVIRUS HKU1 PCR: NOT DETECTED
CORONAVIRUS NL63 PCR: NOT DETECTED
CORONAVIRUS OC43 PCR: NOT DETECTED
CREAT SERPL-MCNC: 0.5 MG/DL (ref 0.6–1.1)
EKG ATRIAL RATE: 70 BPM
EKG DIAGNOSIS: NORMAL
EKG P AXIS: 50 DEGREES
EKG P-R INTERVAL: 150 MS
EKG Q-T INTERVAL: 426 MS
EKG QRS DURATION: 104 MS
EKG QTC CALCULATION (BAZETT): 460 MS
EKG R AXIS: 267 DEGREES
EKG T AXIS: 41 DEGREES
EKG VENTRICULAR RATE: 70 BPM
GFR AFRICAN AMERICAN: >60 ML/MIN/1.73M2
GFR NON-AFRICAN AMERICAN: >60 ML/MIN/1.73M2
GLUCOSE BLD-MCNC: 90 MG/DL (ref 70–99)
HUMAN METAPNEUMOVIRUS PCR: NOT DETECTED
INFLUENZA A BY PCR: NOT DETECTED
INFLUENZA A H1 (2009) PCR: NOT DETECTED
INFLUENZA A H1 PANDEMIC PCR: NOT DETECTED
INFLUENZA A H3 PCR: NOT DETECTED
INFLUENZA B BY PCR: NOT DETECTED
MAGNESIUM: 2.2 MG/DL (ref 1.8–2.4)
MYCOPLASMA PNEUMONIAE PCR: NOT DETECTED
PARAINFLUENZA 1 PCR: NOT DETECTED
PARAINFLUENZA 2 PCR: NOT DETECTED
PARAINFLUENZA 3 PCR: NOT DETECTED
PARAINFLUENZA 4 PCR: NOT DETECTED
POTASSIUM SERPL-SCNC: 4.6 MMOL/L (ref 3.5–5.1)
RHINOVIRUS ENTEROVIRUS PCR: NOT DETECTED
RSV PCR: NOT DETECTED
SODIUM BLD-SCNC: 144 MMOL/L (ref 135–145)

## 2019-04-19 PROCEDURE — 94761 N-INVAS EAR/PLS OXIMETRY MLT: CPT

## 2019-04-19 PROCEDURE — 6370000000 HC RX 637 (ALT 250 FOR IP): Performed by: INTERNAL MEDICINE

## 2019-04-19 PROCEDURE — 6360000002 HC RX W HCPCS: Performed by: HOSPITALIST

## 2019-04-19 PROCEDURE — 2580000003 HC RX 258: Performed by: HOSPITALIST

## 2019-04-19 PROCEDURE — 93010 ELECTROCARDIOGRAM REPORT: CPT | Performed by: INTERNAL MEDICINE

## 2019-04-19 PROCEDURE — 97530 THERAPEUTIC ACTIVITIES: CPT

## 2019-04-19 PROCEDURE — 83735 ASSAY OF MAGNESIUM: CPT

## 2019-04-19 PROCEDURE — 80048 BASIC METABOLIC PNL TOTAL CA: CPT

## 2019-04-19 PROCEDURE — 2700000000 HC OXYGEN THERAPY PER DAY

## 2019-04-19 PROCEDURE — 87581 M.PNEUMON DNA AMP PROBE: CPT

## 2019-04-19 PROCEDURE — 97163 PT EVAL HIGH COMPLEX 45 MIN: CPT

## 2019-04-19 PROCEDURE — 97535 SELF CARE MNGMENT TRAINING: CPT

## 2019-04-19 PROCEDURE — 87798 DETECT AGENT NOS DNA AMP: CPT

## 2019-04-19 PROCEDURE — 97166 OT EVAL MOD COMPLEX 45 MIN: CPT

## 2019-04-19 PROCEDURE — 97116 GAIT TRAINING THERAPY: CPT

## 2019-04-19 PROCEDURE — 1200000000 HC SEMI PRIVATE

## 2019-04-19 PROCEDURE — 6370000000 HC RX 637 (ALT 250 FOR IP): Performed by: HOSPITALIST

## 2019-04-19 PROCEDURE — 87486 CHLMYD PNEUM DNA AMP PROBE: CPT

## 2019-04-19 RX ORDER — OXYCODONE HYDROCHLORIDE AND ACETAMINOPHEN 5; 325 MG/1; MG/1
TABLET ORAL
Status: DISPENSED
Start: 2019-04-19 | End: 2019-04-20

## 2019-04-19 RX ORDER — OXYCODONE HYDROCHLORIDE AND ACETAMINOPHEN 5; 325 MG/1; MG/1
1 TABLET ORAL EVERY 4 HOURS PRN
Status: DISCONTINUED | OUTPATIENT
Start: 2019-04-19 | End: 2019-04-26 | Stop reason: HOSPADM

## 2019-04-19 RX ADMIN — CITALOPRAM HYDROBROMIDE 10 MG: 20 TABLET ORAL at 10:10

## 2019-04-19 RX ADMIN — SODIUM CHLORIDE, PRESERVATIVE FREE 10 ML: 5 INJECTION INTRAVENOUS at 22:51

## 2019-04-19 RX ADMIN — SODIUM CHLORIDE, PRESERVATIVE FREE 10 ML: 5 INJECTION INTRAVENOUS at 10:10

## 2019-04-19 RX ADMIN — MAGNESIUM OXIDE TAB 400 MG (241.3 MG ELEMENTAL MG) 400 MG: 400 (241.3 MG) TAB at 10:09

## 2019-04-19 RX ADMIN — OXYCODONE AND ACETAMINOPHEN 1 TABLET: 5; 325 TABLET ORAL at 21:13

## 2019-04-19 RX ADMIN — METOPROLOL SUCCINATE 50 MG: 50 TABLET, EXTENDED RELEASE ORAL at 10:10

## 2019-04-19 RX ADMIN — ENOXAPARIN SODIUM 40 MG: 40 INJECTION SUBCUTANEOUS at 10:11

## 2019-04-19 RX ADMIN — SIMVASTATIN 20 MG: 20 TABLET, FILM COATED ORAL at 21:14

## 2019-04-19 RX ADMIN — AMITRIPTYLINE HYDROCHLORIDE 50 MG: 50 TABLET, FILM COATED ORAL at 21:14

## 2019-04-19 RX ADMIN — OXYCODONE AND ACETAMINOPHEN 1 TABLET: 5; 325 TABLET ORAL at 14:53

## 2019-04-19 RX ADMIN — FAMOTIDINE 20 MG: 20 TABLET ORAL at 10:10

## 2019-04-19 RX ADMIN — CLOPIDOGREL BISULFATE 75 MG: 75 TABLET ORAL at 10:09

## 2019-04-19 RX ADMIN — ASPIRIN 81 MG 81 MG: 81 TABLET ORAL at 10:09

## 2019-04-19 RX ADMIN — FAMOTIDINE 20 MG: 20 TABLET ORAL at 21:14

## 2019-04-19 RX ADMIN — MAGNESIUM OXIDE TAB 400 MG (241.3 MG ELEMENTAL MG) 400 MG: 400 (241.3 MG) TAB at 21:14

## 2019-04-19 ASSESSMENT — PAIN SCALES - GENERAL
PAINLEVEL_OUTOF10: 2
PAINLEVEL_OUTOF10: 9
PAINLEVEL_OUTOF10: 9
PAINLEVEL_OUTOF10: 5
PAINLEVEL_OUTOF10: 9

## 2019-04-19 ASSESSMENT — PAIN DESCRIPTION - ONSET: ONSET: ON-GOING

## 2019-04-19 ASSESSMENT — PAIN DESCRIPTION - LOCATION
LOCATION: LEG
LOCATION: GENERALIZED

## 2019-04-19 ASSESSMENT — PAIN DESCRIPTION - DESCRIPTORS: DESCRIPTORS: DISCOMFORT

## 2019-04-19 ASSESSMENT — PAIN DESCRIPTION - PAIN TYPE
TYPE: ACUTE PAIN
TYPE: ACUTE PAIN;CHRONIC PAIN

## 2019-04-19 ASSESSMENT — PAIN - FUNCTIONAL ASSESSMENT: PAIN_FUNCTIONAL_ASSESSMENT: ACTIVITIES ARE NOT PREVENTED

## 2019-04-19 ASSESSMENT — PAIN DESCRIPTION - FREQUENCY: FREQUENCY: CONTINUOUS

## 2019-04-19 ASSESSMENT — PAIN DESCRIPTION - ORIENTATION: ORIENTATION: RIGHT;LEFT;LOWER

## 2019-04-19 ASSESSMENT — PAIN DESCRIPTION - PROGRESSION: CLINICAL_PROGRESSION: NOT CHANGED

## 2019-04-19 NOTE — PROGRESS NOTES
Occupational Therapy   Occupational Therapy Initial Assessment  Date: 2019   Patient Name: Garrett Sanchez  MRN: [id number]     : 1950    Date of Service: 2019    Discharge Recommendations:  Subacute/Skilled Nursing Facility  OT Equipment Recommendations  Other: defer    Assessment   Performance deficits / Impairments: Decreased functional mobility ; Decreased strength;Decreased endurance;Decreased ADL status; Decreased safe awareness;Decreased high-level IADLs;Decreased balance  Treatment Diagnosis: Hypomagnesemia syndrome  Prognosis: Good  Decision Making: Medium Complexity  Assistance / Modification: Pt is a 81 yo female admitted from home for Hypomagnesemia syndrome. Pt at baseline is Independent for ADLs, needs assistance for high level IADLs and is Independent for functional mobility/transfers w/ AD. Pt currently presents w/ above deficits and requires increased assistance for functional activities. Pt would benefit from continued acute care OT services w/ discharge to SNF. Patient Education: ot role, discharge rec, proper hand placement for safe functional transfers  Barriers to Learning: none  REQUIRES OT FOLLOW UP: Yes  Activity Tolerance  Activity Tolerance: Patient Tolerated treatment well  Safety Devices  Safety Devices in place: Yes  Type of devices: All fall risk precautions in place; Left in chair;Gait belt;Call light within reach;Nurse notified; Chair alarm in place; Patient at risk for falls  Restraints  Initially in place: No           Patient Diagnosis(es): The primary encounter diagnosis was Generalized weakness. Diagnoses of Hypomagnesemia, Fatigue, unspecified type, Frequent falls, and Frequent PVCs were also pertinent to this visit.      has a past medical history of Anxiety, CAD (coronary artery disease), CAD S/P percutaneous coronary angioplasty, Chronic back pain, COPD (chronic obstructive pulmonary disease) ([facility] [address] Gout, History of cerebral infarction, Hyperlipidemia, Hypertension, Lumbar spinal stenosis, Lung cancer ([facility] [address] Obesity (BMI 30.0-34.9), Restless leg syndrome, Rheumatoid arthritis ([facility] [address] Smoker, Systemic lupus erythematosus ([facility] [address] Type II diabetes mellitus ([facility] [address] and Ventral hernia. has a past surgical history that includes Skin cancer excision (Right, 2012); Appendectomy (1972); Cholecystectomy (2926); other surgical history (2/20/15); Total knee arthroplasty (Right, 2003); Revision Total Knee Arthroplasty (Right, 2005); aurora and bso (cervix removed) (1972); Humerus fracture surgery (Left, 2000's); Femur fracture surgery (Right, 2000's); Abdominal exploration surgery (Last Done 2005); Cataract removal with implant (Bilateral, 2000's); Lung removal, partial (Left, 09/30/2014); and Ankle fracture surgery.     Treatment Diagnosis: Hypomagnesemia syndrome      Restrictions  Restrictions/Precautions  Restrictions/Precautions: Fall Risk, General Precautions, Contact Precautions(ESBL)  Required Braces or Orthoses?: No    Subjective   General  Chart Reviewed: Yes  Patient assessed for rehabilitation services?: Yes  Family / Caregiver Present: No  Oxygen Therapy  SpO2: 93 %  O2 Device: Nasal cannula  O2 Flow Rate (L/min): 3 L/min     Social/Functional History    Lives With: Alone  Type of Home: Apartment  Home Layout: One level  Home Access: Level entry  Bathroom Shower/Tub: Tub/Shower unit  Bathroom Toilet: Handicap height  Bathroom Equipment: Tub transfer bench, Grab bars in shower, Grab bars around toilet  Bathroom Accessibility: Accessible  Home Equipment: Rolling walker, 4 wheeled walker, Wheelchair-manual  Receives Help From: Home health (daily per pt)  ADL Assistance: Independent  Homemaking Assistance: Needs assistance ([facility] aide manages)  Homemaking Responsibilities: No  Ambulation Assistance: Independent (mod I with manual w/c or RW)  Transfer Assistance: Independent  Active : Yes (Last drove 1 month ago)  Occupation: Retired  Leisure & Hobbies: Watching TV, Listening to music, Computers            Objective   Vision: Within Functional Limits(glasses)  Hearing: Within functional limits    Orientation  Overall Orientation Status: Within Normal Limits  Observation/Palpation  Posture: Fair  Observation: pt received supine in bed, requesting to use restroom, agreeabe to therapy  Balance  Sitting Balance: Stand by assistance  Standing Balance: Moderate assistance  Standing Balance  Time: ~30 seconds x 2  Activity: stand step transfers x 2  Functional Mobility  Functional - Mobility Device: No device  Activity: Other  Assist Level: Moderate assistance  Functional Mobility Comments: see PT notes for gait details. Stand step transfer from E[facility] to [facility], [facility]  Toilet Transfers  Toilet - Technique: Stand step  Equipment Used: Standard bedside commode  Toilet Transfer: Moderate assistance  ADL  Feeding: Modified independent   Grooming: Setup(washing face/hands sitting upright in chair and oral care sitting upright in chair)  UE Bathing: Setup(washing trunk/arms sitting upright in chair)  LE Bathing: Maximum assistance(pt able to wash cesilia area/upper legs sitting upright, assistance for LE legs sitting upright and for washing buttocks in stand)  UE Dressing: Setup(doffing/donning gown sitting upright in chair)  LE Dressing: Maximum assistance(doffing/donning socks sitting upright in chair)  Toileting: Moderate assistance(pt sitting on C toileting, assistance for washing buttocks, able to wash cesilia area)  Tone RUE  RUE Tone: Normotonic  Tone LUE  LUE Tone: Normotonic  Coordination  Movements Are Fluid And Coordinated: Yes     Bed mobility  Supine to Sit: Moderate assistance  Scooting: Moderate assistance  Transfers  Stand Step Transfers: Moderate assistance  Sit to stand:  Moderate assistance  Stand to sit: Moderate assistance     Cognition  Overall Cognitive Status: WNL  Perception  Overall Perceptual Status: WFL     Sensation  Overall Sensation Status: WFL        LUE AROM (degrees)  LUE AROM : WFL  RUE AROM (degrees)  RUE AROM : WFL  LUE Strength  Gross LUE Strength: Exceptions to [facility] [place]  L Hand Grasp: 4-/5  RUE Strength  Gross RUE Strength: Exceptions to [facility] [place]  R Hand Grasp: 4-/5           Self Care Training:   Cues were given for safety, sequence, UE/LE placement, visual cues, and balance. Activities performed today included UE/LE dressing, UE/LE bathing, toileting, grooming    Therapeutic Activity Training:   Therapeutic activity training was instructed today. Cues were given for safety, sequence, UE/LE placement, awareness, and balance.     Activities performed today included bed mobility training, sup-sit, sit-stand, stand step, stand to sit          Plan   Plan  Times per week: 2x+  Times per day: Daily  Current Treatment Recommendations: Strengthening, Functional Mobility Training, Gait Training, Patient/Caregiver Education & Training, Positioning, Home Management Training, Endurance Training, Pain Management, Equipment Evaluation, Education, & procurement, Balance Training, Safety Education & Training, Neuromuscular Re-education, Self-Care / ADL                        AM-PAC Score        AM-[facility] Inpatient Daily Activity Raw Score: 14  AM-PAC Inpatient ADL T-Scale Score : 33.39  ADL Inpatient CMS 0-100% Score: 59.67  ADL Inpatient CMS G-Code Modifier : CK    Goals  Short term goals  Time Frame for Short term goals: discharge  Short term goal 1: Pt mckinley perform UE bathing/dressing Supervision  Short term goal 2: Pt will perform LE bathing/dressing Supervision  Short term goal 3: Pt mckinley perform functional transfer w/ AD SBA  Short term goal 4: Pt will perform functiona mobility to/from bathroom w/ AD CGA  Short term goal 5: Pt will perform toileting SBA  Patient Goals   Patient goals : go home     Time IW:5342  Time Out:1036  Total Treatment Minutes: 53 minutes  Total Treatment Time: 58 minutes    Danyel Valdez OTR/L [id number]  11:51 AM,4/19/2019

## 2019-04-19 NOTE — ED NOTES
Report called to [facility] on [address],informed the room is dirty and she will be down to get her as soon as it is clean     Cm Guajardo, [address] [facility]  04/18/19 2050

## 2019-04-19 NOTE — CARE COORDINATION
Saman Zamora was evaluated today and a DME order was entered for a wheeled walker with seat because she requires this to successfully complete daily living tasks of eating, bathing, toileting, personal cares, ambulating, grooming, hygiene, dressing upper body, dressing lower body, meal preparation and taking own medications. A wheeled walker with seat is necessary due to the patient's unsteady gait, upper body weakness, inability to  and ambulation device, ambulating only short distances by pushing a walker, and the need to sit for a short time before resuming ambulation. These tasks cannot be completed with a lesser ambulation device such as a cane, crutch, or standard walker. The need for this equipment was discussed with the patient and she understands and is in agreement.

## 2019-04-19 NOTE — H&P
History and Physical      Name:  Stanley Ibrahim /Age/Sex: 1950  (80 y.o. female)   MRN & CSN:  [id number] & [id number] Admission Date/Time: 2019  3:06 PM   Location:  ED15/ED-15 PCP: Shashank Christie [address] [place] [address] Day: 1    Assessment and Plan:   Stanley Ibrahim is a 80 y.o.  female  who presents with generalized weakness and frequent falls    Generalized weakness, frequent falls  Severe Hypomagnesemia  Mg 1.2  MgSO4 infusion started in ED 4 grams  Repeat levels in am  Remaining labs unremarkable except for low albumin  CXR and CT brain negative for acute findings  UA negative for infection    COPD  Chronic resp failure  Not in exacerbation  Cont home inhalers  PRN inhaled bronchodilators  On home O2 2L/min    CAD s/p PCI  Cont ASA/Plavix/BB  Not on a Statin, unclear why, will start    Anxiety  Resume home meds    Deconditioning  PT/OT Eval  May need Subacute rehab      Diet cardiac   DVT Prophylaxis [x] Lovenox, []  Heparin, [] SCDs, [] No VTE prophylaxis, patient ambulating   GI Prophylaxis [] PPI, [] H2 Blocker, [] No GI prophylaxis, patient is receiving diet/Tube Feeds   Code Status Full code             History of Present Illness:     Chief Complaint: generalized weakness and difficulty ambulating  Stanley Ibrahim is a 80 y.o.  female  With COPD, chronic resp failure, CAD who presents with generalized weakness, difficulty ambulating and frequent falls, she states her symptoms started acutely on Saturday, she woke up in the morning and couldn't get out of bed and was unable to talk to any one, she felt very weak and tired. She states her whole body is aching as well, she denies any chest pain or SOB, no fever, no chills, no sick contacts.       Ten point ROS reviewed negative, unless as noted above    Objective:   No intake or output data in the 24 hours ending 19   Vitals:   Vitals:    19   BP: 97/74   Pulse: 78   Resp: 16   Temp:    SpO2: 95%     Physical Exam: GEN Awake female, sitting upright in bed in no apparent distress. Appears given age. EYES Pupils are equally round. No scleral erythema, discharge, or conjunctivitis. HENT Mucous membranes are moist.   NECK No apparent thyromegaly or masses. RESP Clear to auscultation, no wheezes, rales or rhonchi. Symmetric chest movement while on room air. CARDIO/VASC S1/S2 auscultated. Regular rate without appreciable murmurs, rubs, or gallops. Peripheral pulses equal bilaterally and palpable. No peripheral edema. GI Abdomen is soft without significant tenderness, masses, or guarding. Bowel sounds are normoactive. Rectal exam deferred.  Carlos catheter is not present. HEME/LYMPH No petechiae or ecchymoses. MSK No gross joint deformities. Spontaneous movement of all extremities  SKIN Normal coloration, warm, dry. NEURO Cranial nerves appear grossly intact, normal speech, no lateralizing weakness. PSYCH Awake, alert, oriented x 4. Affect appropriate. Past Medical History:      Past Medical History:   Diagnosis Date    Anxiety     CAD (coronary artery disease)     Dr Teto Coronado CAD S/P percutaneous coronary angioplasty 2012    stent RCA; Ahmed    Chronic back pain     COPD (chronic obstructive pulmonary disease) ([facility] [address]     Dara Sjogren Gout 2018    History of cerebral infarction 2000    right hemiparesis with full recovery    Hyperlipidemia     Hypertension     Lumbar spinal stenosis     Lung cancer ([address] 09/2014    Dr Renan Cuevas; left upper lobectomy; non small cell CA    Obesity (BMI 30.0-34. 9)     Restless leg syndrome     Rheumatoid arthritis (HCC)     Smoker     Systemic lupus erythematosus (HCC) 1992    Dr Judy Veloz, visiting physician    Type II diabetes mellitus ([facility] [address] 1969    Ventral hernia 06/2018    right ventral hernia seen on CT abd 6/2018     PSHX:  has a past surgical history that includes Skin cancer excision (Right, 2012); Appendectomy (1972);  Cholecystectomy (7116); other surgical history (2/20/15); Total knee arthroplasty (Right, 2003); Revision Total Knee Arthroplasty (Right, 2005); aurora and bso (cervix removed) (1972); Humerus fracture surgery (Left, 2000's); Femur fracture surgery (Right, 2000's); Abdominal exploration surgery (Last Done 2005); Cataract removal with implant (Bilateral, 2000's); and Lung removal, partial (Left, 09/30/2014). Allergies: Allergies   Allergen Reactions    Food Swelling     \"Rye, Whole grains, Barley\"    Trental [Pentoxifylline] Swelling    Tramadol      \"I Felt Shaky\"    Vistaril [Hydroxyzine Hcl] Other (See Comments)     Sedated and was placed on respirator    Erythromycin      \"Stomach Pain\"    Motrin [Ibuprofen] Diarrhea    Tetracyclines & Related Nausea And Vomiting       FAM HX: family history includes Arthritis in her mother and sister; Depression in her mother and sister; Diabetes in her brother, mother, and sister; Early Death in her father; Heart Disease in her father, mother, and sister; High Blood Pressure in her father, mother, and sister; High Cholesterol in her mother and sister; Kidney Disease in her mother and sister; Learning Disabilities in her mother; Lupus in her sister; Mental Illness in her brother and mother; Other in her father; Stroke in her mother and sister; Vision Loss in her mother. Soc HX:   Social History     Socioeconomic History    Marital status:      Spouse name: None    Number of children: None    Years of education: None    Highest education level: None   Occupational History    None   Social Needs    Financial resource strain: None    Food insecurity:     Worry: None     Inability: None    Transportation needs:     Medical: None     Non-medical: None   Tobacco Use    Smoking status: Current Every Day Smoker     Packs/day: 0.50     Years: 32.00     Pack years: 16.00     Types: Cigarettes     Start date: 1986    Smokeless tobacco: Never Used   Substance and Sexual Activity    Alcohol use:  No Alcohol/week: 0.0 oz    Drug use: No     Comment: states quit 1-2-14    Sexual activity: Not Currently   Lifestyle    Physical activity:     Days per week: None     Minutes per session: None    Stress: None   Relationships    Social connections:     Talks on phone: None     Gets together: None     Attends Sikh service: None     Active member of club or organization: None     Attends meetings of clubs or organizations: None     Relationship status: None    Intimate partner violence:     Fear of current or ex partner: None     Emotionally abused: None     Physically abused: None     Forced sexual activity: None   Other Topics Concern    None   Social History Narrative    None       Medications:   Home Medications:   Prior to Admission medications    Medication Sig Start Date End Date Taking? Authorizing Provider   citalopram (CELEXA) 10 MG tablet TAKE ONE (1) TABLET BY MOUTH ONCE DAILY.  2/14/19   Gissel Ronquillo MD   predniSONE (DELTASONE) 10 MG tablet Take 1 tablet by mouth daily 40mg daily for 2 days, 20 mg daily for 2 days, 10 mg daily for 2 days, 5 mg daily for 2 days 2/13/19   Cj Richmond MD   ipratropium-albuterol (DUONEB) 0.5-2.5 (3) MG/3ML SOLN nebulizer solution Inhale 3 mLs into the lungs 4 times daily Dx: COPD J44.9 2/12/19   Cj Richmnod MD   predniSONE (DELTASONE) 10 MG tablet Taper: 40mg BID x 3 days, 20mg BID x 4 days, 20mg QD x 4 days 1/27/19   Cecil Cadet MD   amitriptyline (ELAVIL) 50 MG tablet Take 50 mg by mouth nightly    Historical Provider, MD   Metoprolol Succinate 50 MG CS24 Take 1 tablet by mouth daily     Historical Provider, MD   OXYGEN Inhale 2 L/min into the lungs as needed    Historical Provider, MD   guaiFENesin (MUCINEX) 600 MG extended release tablet Take 2 tablets by mouth 2 times daily 7/31/18   Cj Richmond MD   clopidogrel (PLAVIX) 75 MG tablet Take 75 mg by mouth daily 3/20/18   Historical Provider, MD   famotidine (PEPCID) 20 MG tablet Take 20 mg by mouth 2 times daily 3/20/18   Historical Provider, MD   loperamide (IMODIUM) 2 MG capsule Take 2 mg by mouth 4 times daily as needed for Diarrhea  4/16/18   Historical Provider, MD   metFORMIN (GLUCOPHAGE) 500 MG tablet Take 500 mg by mouth daily (with breakfast) 3/20/18   Historical Provider, MD   PROAIR  (90 Base) MCG/ACT inhaler Inhale 2 puffs into the lungs every 6 hours as needed for Wheezing or Shortness of Breath  3/20/18   Historical Provider, MD   budesonide-formoterol (SYMBICORT) 160-4.5 MCG/ACT AERO Inhale 2 puffs into the lungs 2 times daily 2/1/18   Rudy Robles MD   aspirin 81 MG chewable tablet Take 1 tablet by mouth daily 1/10/17   Sravanthi Nevarez MD   benzonatate (TESSALON) 100 MG capsule Take 100 mg by mouth 3 times daily as needed for Cough     Historical Provider, MD   pregabalin (LYRICA) 150 MG capsule Take 150 mg by mouth 2 times daily    Historical Provider, MD   albuterol (PROVENTIL) (2.5 MG/3ML) 0.083% nebulizer solution Take 3 mLs by nebulization every 6 hours as needed for Wheezing 9/17/15   Rudy Robles MD   Multiple Vitamins-Minerals (MULTIVITAMIN PO) Take  by mouth every morning.  Over The Counter    Historical Provider, MD      Medications:    lidocaine-EPINEPHrine  20 mL Intradermal Once    magnesium sulfate  4 g Intravenous Once      Infusions:   PRN Meds:        Electronically signed by Tunde Jung MD on 4/18/2019 at 8:38 PM

## 2019-04-19 NOTE — DISCHARGE INSTR - COC
whole    Wound Care Documentation and Therapy:  Wound 05/05/18 Abrasion(s) Leg Lower; Anterior wound from fall @ home (Active)   Number of days: 349       Wound 05/07/18 Left medial knee (Active)   Number of days: 346       Wound 05/07/18 Left lateral knee (Active)   Number of days: 346        Elimination:  Continence:   · Bowel: Yes  · Bladder: Yes  Urinary Catheter: None   Colostomy/Ileostomy/Ileal Conduit: No       Date of Last BM: 4/23/19    Intake/Output Summary (Last 24 hours) at 4/19/2019 1437  Last data filed at 4/19/2019 0645  Gross per 24 hour   Intake 200 ml   Output --   Net 200 ml     I/O last 3 completed shifts: In: 200 [P.O.:200]  Out: -     Safety Concerns: At Risk for Falls    Impairments/Disabilities:      Vision and Hearing      Patient's personal belongings (please select all that are sent with patient):  Riki    RN SIGNATURE:  Electronically signed by Jose Roberto Umaña RN on 4/25/19 at 7:08 PM    CASE MANAGEMENT/SOCIAL WORK SECTION    Inpatient Status Date: ***    Readmission Risk Assessment Score:  Readmission Risk              Risk of Unplanned Readmission:        22           Discharging to Facility/ Agency   · Name:   · Address:  · Phone:  · Fax:    Dialysis Facility (if applicable)   · Name:  · Address:  · Dialysis Schedule:  · Phone:  · Fax:    / signature: {Esignature:[id number]}        PHYSICIAN SECTION      Nutrition Therapy:  Current Nutrition Therapy:   - Oral Diet:  Cardiac    Routes of Feeding: Oral  Liquids: No Restrictions  Daily Fluid Restriction: no  Last Modified Barium Swallow with Video (Video Swallowing Test): not done    Treatments at the Time of Hospital Discharge:   Respiratory Treatments: ***  Oxygen Therapy:  is not on home oxygen therapy.   Ventilator:    - No ventilator support    Rehab Therapies: Physical Therapy and Occupational Therapy  Weight Bearing Status/Restrictions: per PT  Other Medical Equipment (for information only, NOT a DME

## 2019-04-19 NOTE — ED NOTES
Hospitalist, Dr Karen Torres, returned call @ 2026 -- transferred call to Dr Jane Gonsalez  Waiting orders     [facility]  04/18/19 2027

## 2019-04-19 NOTE — PROGRESS NOTES
Physical Therapy    Facility/Department: [facility]  Initial Assessment    NAME: Benitez Parmar  : 1950  MRN: [id number]    Date of Service: 2019    Discharge Recommendations:  Subacute/Skilled Nursing Facility        Assessment   Assessment: Pt is a 80 y.o. Female with medical history, surgical history, co-morbidities, and personal factors including Anxiety, CAD (coronary artery disease), CAD S/P percutaneous coronary angioplasty, Chronic back pain, COPD (chronic obstructive pulmonary disease), Gout, History of cerebral infarction, Hyperlipidemia, Hypertension, Lumbar spinal stenosis, Lung cancer, Obesity (BMI 30.0-34.9), Restless leg syndrome, Rheumatoid arthritis, Smoker, Systemic lupus erythematosus, Type II diabetes mellitus, Ventral hernia, Skin cancer excision (Right, ); Appendectomy (); Cholecystectomy (751); other surgical history (2/20/15); Total knee arthroplasty (Right, ); Revision Total Knee Arthroplasty (Right, ); aurora and bso (cervix removed) (); Humerus fracture surgery (Left, ); Femur fracture surgery (Right, ); Abdominal exploration surgery (Last Done ); Cataract removal with implant (Bilateral, ); Lung removal, partial (Left, 2014); and Ankle fracture surgery with admission for Generalized weakness, Hypomagnesemia, Fatigue, Frequent falls, and Frequent PVCs. Prior to admission, pt was modified independent with functional mobility and ADLs. Examination of body systems reveals decreased strength, decreased balance, decreased aerobic capacity, and decreased independence with functional mobility.          Prognosis: Good  Decision Making: High Complexity  Clinical Presentation: unpredictable characteristics  REQUIRES PT FOLLOW UP: Yes  Activity Tolerance  Activity Tolerance: Patient Tolerated treatment well         Restrictions  Restrictions/Precautions  Restrictions/Precautions: Fall Risk, General Precautions, Contact Precautions  Required Braces or Orthoses?: No  Vision/Hearing  Vision: Within Functional Limits  Hearing: Within functional limits     Subjective  General  Chart Reviewed: Yes  Patient assessed for rehabilitation services?: Yes  Family / Caregiver Present: No  Follows Commands: Within Functional Limits  Pain Screening  Patient Currently in Pain: Denies  Vital Signs  Patient Currently in Pain: Denies       Orientation  Orientation  Overall Orientation Status: Within Functional Limits     Social/Functional History     Lives With: Alone  Type of Home: Apartment  Home Layout: One level  Home Access: Level entry  Bathroom Shower/Tub: Tub/Shower unit  Bathroom Toilet: Handicap height  Bathroom Equipment: Tub transfer bench, Grab bars in shower, Grab bars around toilet  Bathroom Accessibility: Accessible  Home Equipment: Rolling walker, 4 wheeled walker, [address] Help From: Home health (daily per pt)  ADL Assistance: [address]: Needs assistance ([facility] aide manages)  Homemaking Responsibilities: No  Ambulation Assistance: Independent (mod I with manual w/c or RW)  Transfer Assistance: Independent  Active : Yes (Last drove 1 month ago)  Occupation: Retired  Leisure & Hobbies: Watching TV, Listening to music, Computers       Cognition   Cognition  Overall Cognitive Status: WFL    Objective             Strength RLE  Comment: knee extension: at least 3+/5 observed functionally  Strength LLE  Comment: knee extension: at least 3+/5 observed functionally     Sensation  Overall Sensation Status: WFL  Bed mobility  Supine to Sit: Moderate assistance(with verbal and tactile cues for BUE and BLE placement throughout transfer; with HOB elevated; with increased time for task completion)  Transfers  Sit to Stand: Minimal Assistance  Stand to sit: Minimal Assistance(with verbal cues to feel chair against back of legs, reach back, and sit slowly)  Ambulation  Ambulation?: Yes  Ambulation 1  Surface: level tile  Device: No Device  Assistance: Minimal assistance; Moderate assistance  Quality of Gait: decreased gait speed, decreased step length bilaterally, unsteady gait  Distance: 4 feet  Comments: with verbal and tactile cues to maintain upright posture in order to avoid COM shifting outside of [place]     Balance  Posture: Poor(forward head, rounded shoulders, increased thoracic kyphosis)  Sitting - Static: Good  Sitting - Dynamic: Good  Standing - Static: Poor;+  Standing - Dynamic: +;Poor      Gait Training:  Cues were given for safety, sequence, device management, balance, posture, awareness, path. Therapeutic Activity Training:   Therapeutic activity training was instructed today. Cues were given for safety, sequence, UE/LE placement, awareness, and balance. Activities performed today included bed mobility training, sup-sit, sit-stand. Plan   Plan  Times per week: 3+  Current Treatment Recommendations: Strengthening, ROM, Balance Training, Functional Mobility Training, Transfer Training, ADL/Self-care Training, Endurance Training, Neuromuscular Re-education, Patient/Caregiver Education & Training, Equipment Evaluation, Education, & procurement, Wheelchair Mobility Training, IADL Training, Gait Training, Home Exercise Program, Safety Education & Training, Positioning, Stair training, Pain Management  Safety Devices  Type of devices: All fall risk precautions in place, Patient at risk for falls, Call light within reach, Left in chair, Chair alarm in place, Gait belt, Nurse notified      AM-PAC Score  AM-PAC Inpatient Mobility Raw Score : 11  AM-PAC Inpatient T-Scale Score : 33.86  Mobility Inpatient CMS 0-100% Score: 72.57  Mobility Inpatient CMS G-Code Modifier : CL          Goals  Long term goals  Long term goal 1: In one week, pt will complete all bed mobility with CGAx1  Long term goal 2: In one week, pt will complete sit <> stand transfers with CGAx1  Long term goal 3:  In one week, pt will ambulate

## 2019-04-19 NOTE — PLAN OF CARE
Problem: Pain:  Description  Pain management should include both nonpharmacologic and pharmacologic interventions. Goal: Pain level will decrease  Description  Pain level will decrease  Outcome: Ongoing  Goal: Control of acute pain  Description  Control of acute pain  Outcome: Ongoing  Goal: Control of chronic pain  Description  Control of chronic pain  Outcome: Ongoing     Problem: Falls - Risk of:  Goal: Will remain free from falls  Description  Will remain free from falls  Outcome: Ongoing  Goal: Absence of physical injury  Description  Absence of physical injury  Outcome: Ongoing     Problem:  Activity:  Goal: Ability to avoid complications of mobility impairment will improve  Description  Ability to avoid complications of mobility impairment will improve  Outcome: Ongoing  Goal: Range of joint motion will improve  Description  Range of joint motion will improve  Outcome: Ongoing  Goal: Ability to ambulate will improve  Description  Ability to ambulate will improve  Outcome: Ongoing  Goal: Muscle strength will improve  Description  Muscle strength will improve  Outcome: Ongoing     Problem: Health Behavior:  Goal: Identification of resources available to assist in meeting health care needs will improve  Description  Identification of resources available to assist in meeting health care needs will improve  Outcome: Ongoing     Problem: Safety:  Goal: Ability to remain free from injury will improve  Description  Ability to remain free from injury will improve  Outcome: Ongoing

## 2019-04-19 NOTE — PROGRESS NOTES
Appears given age. EYES Pupils are equally round. No scleral erythema, discharge, or conjunctivitis. HENT Mucous membranes are moist.   RESP Clear to auscultation, no wheezes, rales or rhonchi. CARDIO/VASC S1/S2 auscultated. No murmurs  GI Abdomen is soft without significant tenderness, masses, or guarding. MSK No gross joint deformities. Spontaneous movement of all extremities  SKIN Normal coloration, warm, dry. NEURO Grossly normal  PSYCH Awake, alert, oriented x 4. Affect appropriate.     Medications:   Medications:    amitriptyline  50 mg Oral Nightly    aspirin  81 mg Oral Daily    mometasone-formoterol  2 puff Inhalation BID    citalopram  10 mg Oral Daily    clopidogrel  75 mg Oral Daily    famotidine  20 mg Oral BID    metoprolol succinate  50 mg Oral Daily    sodium chloride flush  10 mL Intravenous 2 times per day    enoxaparin  40 mg Subcutaneous Daily    magnesium oxide  400 mg Oral BID    simvastatin  20 mg Oral Nightly      Infusions:   PRN Meds:   ipratropium-albuterol 1 ampule Q4H PRN   sodium chloride flush 10 mL PRN   magnesium hydroxide 30 mL Daily PRN   ondansetron 4 mg Q6H PRN   acetaminophen 650 mg Q4H PRN   potassium chloride 40 mEq PRN   Or     potassium alternative oral replacement 40 mEq PRN   Or     potassium chloride 10 mEq PRN   magnesium sulfate 1 g PRN         Electronically signed by Roxana King MD on 4/19/2019 at 11:50 AM

## 2019-04-19 NOTE — ED NOTES
[address] [facility] called informed they will be down to get patient     Franchesca Acosta RN  04/18/19 [id number]

## 2019-04-20 LAB
APTT: 32.1 SECONDS (ref 21.2–33)
GLUCOSE BLD-MCNC: 96 MG/DL (ref 70–99)
INR BLD: 1.17 INDEX
PROTHROMBIN TIME: 13.3 SECONDS (ref 9.12–12.5)

## 2019-04-20 PROCEDURE — 93005 ELECTROCARDIOGRAM TRACING: CPT | Performed by: INTERNAL MEDICINE

## 2019-04-20 PROCEDURE — 82962 GLUCOSE BLOOD TEST: CPT

## 2019-04-20 PROCEDURE — 36415 COLL VENOUS BLD VENIPUNCTURE: CPT

## 2019-04-20 PROCEDURE — 85610 PROTHROMBIN TIME: CPT

## 2019-04-20 PROCEDURE — 6360000002 HC RX W HCPCS: Performed by: HOSPITALIST

## 2019-04-20 PROCEDURE — 2580000003 HC RX 258: Performed by: HOSPITALIST

## 2019-04-20 PROCEDURE — 6370000000 HC RX 637 (ALT 250 FOR IP): Performed by: HOSPITALIST

## 2019-04-20 PROCEDURE — 6370000000 HC RX 637 (ALT 250 FOR IP): Performed by: INTERNAL MEDICINE

## 2019-04-20 PROCEDURE — 94640 AIRWAY INHALATION TREATMENT: CPT

## 2019-04-20 PROCEDURE — 1200000000 HC SEMI PRIVATE

## 2019-04-20 PROCEDURE — 85730 THROMBOPLASTIN TIME PARTIAL: CPT

## 2019-04-20 RX ORDER — AMIODARONE HYDROCHLORIDE 200 MG/1
200 TABLET ORAL DAILY
Status: DISCONTINUED | OUTPATIENT
Start: 2019-04-20 | End: 2019-04-26 | Stop reason: HOSPADM

## 2019-04-20 RX ADMIN — AMIODARONE HYDROCHLORIDE 200 MG: 200 TABLET ORAL at 16:42

## 2019-04-20 RX ADMIN — FAMOTIDINE 20 MG: 20 TABLET ORAL at 08:35

## 2019-04-20 RX ADMIN — ENOXAPARIN SODIUM 40 MG: 40 INJECTION SUBCUTANEOUS at 08:35

## 2019-04-20 RX ADMIN — Medication 2 PUFF: at 20:14

## 2019-04-20 RX ADMIN — MAGNESIUM OXIDE TAB 400 MG (241.3 MG ELEMENTAL MG) 400 MG: 400 (241.3 MG) TAB at 20:49

## 2019-04-20 RX ADMIN — METOPROLOL SUCCINATE 50 MG: 50 TABLET, EXTENDED RELEASE ORAL at 08:35

## 2019-04-20 RX ADMIN — AMITRIPTYLINE HYDROCHLORIDE 50 MG: 50 TABLET, FILM COATED ORAL at 20:49

## 2019-04-20 RX ADMIN — ASPIRIN 81 MG 81 MG: 81 TABLET ORAL at 08:35

## 2019-04-20 RX ADMIN — SODIUM CHLORIDE, PRESERVATIVE FREE 10 ML: 5 INJECTION INTRAVENOUS at 20:49

## 2019-04-20 RX ADMIN — SIMVASTATIN 20 MG: 20 TABLET, FILM COATED ORAL at 20:49

## 2019-04-20 RX ADMIN — SODIUM CHLORIDE, PRESERVATIVE FREE 10 ML: 5 INJECTION INTRAVENOUS at 08:38

## 2019-04-20 RX ADMIN — MAGNESIUM OXIDE TAB 400 MG (241.3 MG ELEMENTAL MG) 400 MG: 400 (241.3 MG) TAB at 08:35

## 2019-04-20 RX ADMIN — CLOPIDOGREL BISULFATE 75 MG: 75 TABLET ORAL at 08:35

## 2019-04-20 RX ADMIN — CITALOPRAM HYDROBROMIDE 10 MG: 20 TABLET ORAL at 08:35

## 2019-04-20 RX ADMIN — FAMOTIDINE 20 MG: 20 TABLET ORAL at 20:49

## 2019-04-20 ASSESSMENT — PAIN SCALES - GENERAL
PAINLEVEL_OUTOF10: 0

## 2019-04-20 NOTE — PROGRESS NOTES
Hospitalist Progress Note      Name:  Petey Botello /Age/Sex: 1950  (80 y.o. female)   MRN & CSN:  [id number] & [id number] Admission Date/Time: 2019  3:06 PM   Location:  [address] [place] [address] PCP: Salima Chisholm DO         Hospital Day: 3    Assessment and Plan:   Petey Botello is a 80 y.o.  female  who presents with  generalized weakness and frequent falls       Pending SNF placement    Generalized weakness, frequent falls  Severe Hypomagnesemia: resolved  Mg 1.2- 2.2  Remaining labs unremarkable except for low albumin  CXR and CT brain negative for acute findings  UA negative for infection  PT/OT--> Subacute/Skilled Nursing Facility       COPD  Chronic resp failure  Not in exacerbation  Cont home inhalers  PRN inhaled bronchodilators  On home O2 2L/min     CAD s/p PCI  Cont ASA/Plavix/BB  Not on a Statin, unclear why, will start     Anxiety  Resume home meds     Deconditioning  PT/OT  OOB      Chronic treatment continued per home medications unless  contraindicated by above plan and assessment. The above assessment/plan has been explained to the patient, who indicated understanding. Diet DIET GENERAL;   DVT Prophylaxis ? Lovenox   GI Prophylaxis ? H2 Blocker   Code Status Full Code             History of Present Illness:     Chief Complaint:     Patient was seen and examined at bedside today. Patient sitting up comfortably in bed in [facility]. Patient denied CP, SOB, cough, Abd pain, diarrhea, constipation or dysuria. Ten point ROS reviewed negative, unless as noted above    Objective: Intake/Output Summary (Last 24 hours) at 2019 0818  Last data filed at 2019 1900  Gross per 24 hour   Intake 120 ml   Output --   Net 120 ml      Vitals:   Vitals:    19 0545   BP: 127/85   Pulse: 94   Resp: 17   Temp: 97.9 °F (36.6 °C)   SpO2: 94%     Physical Exam:      GEN Awake female, sitting upright in bed in no apparent distress. Appears given age. EYES Pupils are equally round.   No scleral erythema, discharge, or conjunctivitis. HENT Mucous membranes are moist.   RESP Clear to auscultation, no wheezes, rales or rhonchi. CARDIO/VASC S1/S2 auscultated. No murmurs  GI Abdomen is soft without significant tenderness, masses, or guarding. MSK No gross joint deformities. Spontaneous movement of all extremities  SKIN Normal coloration, warm, dry. NEURO Grossly normal  PSYCH Awake, alert, oriented x 4. Affect appropriate.     Medications:   Medications:    amitriptyline  50 mg Oral Nightly    aspirin  81 mg Oral Daily    mometasone-formoterol  2 puff Inhalation BID    citalopram  10 mg Oral Daily    clopidogrel  75 mg Oral Daily    famotidine  20 mg Oral BID    metoprolol succinate  50 mg Oral Daily    sodium chloride flush  10 mL Intravenous 2 times per day    enoxaparin  40 mg Subcutaneous Daily    magnesium oxide  400 mg Oral BID    simvastatin  20 mg Oral Nightly      Infusions:   PRN Meds:     oxyCODONE-acetaminophen 1 tablet Q4H PRN   ipratropium-albuterol 1 ampule Q4H PRN   sodium chloride flush 10 mL PRN   magnesium hydroxide 30 mL Daily PRN   ondansetron 4 mg Q6H PRN   acetaminophen 650 mg Q4H PRN   potassium chloride 40 mEq PRN   Or     potassium alternative oral replacement 40 mEq PRN   Or     potassium chloride 10 mEq PRN   magnesium sulfate 1 g PRN         Electronically signed by Jesus Mendez MD on 4/20/2019 at 8:18 AM

## 2019-04-20 NOTE — PROGRESS NOTES
Patient with increased confusion as the day progresses. Constant on call light asking staff questions such as \" have you seen my sister?, her name is ABC, like [id number]\", \"I need a list of peoples names that keep calling my name\" easily to redirect but requiring constant redirecting. Patient in bed with bed alarm on.

## 2019-04-20 NOTE — CONSULTS
[address] [place] [address], [address] [facility]                                  CONSULTATION    PATIENT NAME: Didier Thomas                      :        1950  MED REC NO:   [id number]                          ROOM:       4130  ACCOUNT NO:   [de-identified]                           ADMIT DATE: 2019  PROVIDER:     Jasson Rankin MD    CONSULT DATE:  2019    INDICATION:  Nonsustained ventricular tachycardia. HISTORY OF PRESENT ILLNESS:  This is a 27-year-old female patient who is  here in the hospital.  She came to the hospital with fatigueness  present. She was found to have hypomagnesemia. She was replaced, her  magnesium was corrected. She is sitting in the chair, she answers the  questions. No chest pain. No fever, no chills. No cough or sputum  production. No other  or GI complaints. She had a 9-beat of  nonsustained ventricular tachycardia present. The patient had an echo done last year in 2018. Her echo shows LV  function was preserved and found to have moderate pulmonary hypertension  present. The patient has a history of having chest pain and syncope in the past.   She had a Holter done back in 2017, sinus rhythm, sinus arrhythmia  was noted. The patient had a heart catheterization in , left main  was patent, LAD had 50% stenosis, circ and OM had mild disease noted. RCA stent was patent. PAST MEDICAL HISTORY:  Coronary artery disease, status post angioplasty  of the right coronary artery; history of hypertension, hyperlipidemia,  lupus, osteoarthritis, polycythemia vera, squamous cell carcinoma,  history of stroke, hypothyroidism, history of lung cancer and restless  legs syndrome. PAST SURGICAL HISTORY:  Angioplasty of the right coronary artery,  gallbladder surgery, appendix removed, hysterectomy and lung cancer. SOCIAL HISTORY:  She smokes. She uses drugs also.     ALLERGIES:  FOOD, RYE, TRENTAL, TRAMADOL, VISTARIL, ERYTHROMYCIN, MOTRIN  and TETRACYCLINE. MEDICATIONS AT HOME:  She is on Diflucan, Ativan, Cozaar, Percocet,  Celexa, Plavix, Glucophage, and see the list for medications. She is on  beta-blocker, metoprolol 50 mg a day and oxygen and Plavix also. PHYSICAL EXAMINATION:  GENERAL:  The patient is awake, alert, answering questions, not in acute  distress. VITAL SIGNS:  Temperature afebrile, pulse is 100, blood pressure 127/78. HEENT:  Head is normocephalic, atraumatic. Pupils are equal and  reactive to light. CHEST:  Equal expansion. LUNGS:  Clear to auscultation. No wheezing or rhonchi. HEART:  Regular rhythm. ABDOMEN:  Soft, nontender. Bowel sounds are present. No  hepatosplenomegaly or guarding appreciated. EXTREMITIES:  No cyanosis or clubbing noted. NEUROLOGIC:  Cranial nerves II through XII are grossly intact. LABORATORY DATA:  Her BUN is 12, creatinine 0.5. Mag was 1.2, is  corrected to 2.2. Troponins are negative. LFTs are normal.  CBC is  within normal range. IMPRESSION AND PLAN:  This is a 60-year-old female patient who was  admitted to the hospital with fatigueness present, found to have  nonsustained ventricular tachycardia present. From a cardiac stand, I  will get an echo and I think we will get a stress test also on Monday. She has not had any testing done for a while, she is known to have  coronary artery disease present and her last echo was about a year ago. Her stress test has been also back in 2017. So, I think we will plan  for the cardiac workup. Further recommendations based on the hospital  course.         Kinza Rosenberg MD    D: 04/20/2019 14:22:28       T: 04/20/2019 15:51:52     [email or web address]  Job#: [id number]     Doc#: [id number]    CC:

## 2019-04-21 LAB
AMMONIA: 44 UMOL/L (ref 11–51)
AMPHETAMINES: NEGATIVE
BARBITURATE SCREEN URINE: NEGATIVE
BENZODIAZEPINE SCREEN, URINE: NEGATIVE
CANNABINOID SCREEN URINE: NEGATIVE
COCAINE METABOLITE: NEGATIVE
EKG ATRIAL RATE: 78 BPM
EKG DIAGNOSIS: NORMAL
EKG P AXIS: 48 DEGREES
EKG P-R INTERVAL: 138 MS
EKG Q-T INTERVAL: 402 MS
EKG QRS DURATION: 100 MS
EKG QTC CALCULATION (BAZETT): 458 MS
EKG R AXIS: 267 DEGREES
EKG T AXIS: 34 DEGREES
EKG VENTRICULAR RATE: 78 BPM
OPIATES, URINE: NEGATIVE
OXYCODONE: ABNORMAL
PHENCYCLIDINE, URINE: NEGATIVE

## 2019-04-21 PROCEDURE — 94761 N-INVAS EAR/PLS OXIMETRY MLT: CPT

## 2019-04-21 PROCEDURE — 80307 DRUG TEST PRSMV CHEM ANLYZR: CPT

## 2019-04-21 PROCEDURE — 36415 COLL VENOUS BLD VENIPUNCTURE: CPT

## 2019-04-21 PROCEDURE — 1200000000 HC SEMI PRIVATE

## 2019-04-21 PROCEDURE — 93010 ELECTROCARDIOGRAM REPORT: CPT | Performed by: INTERNAL MEDICINE

## 2019-04-21 PROCEDURE — 6370000000 HC RX 637 (ALT 250 FOR IP): Performed by: INTERNAL MEDICINE

## 2019-04-21 PROCEDURE — 82140 ASSAY OF AMMONIA: CPT

## 2019-04-21 PROCEDURE — 2580000003 HC RX 258: Performed by: HOSPITALIST

## 2019-04-21 PROCEDURE — 6370000000 HC RX 637 (ALT 250 FOR IP): Performed by: HOSPITALIST

## 2019-04-21 PROCEDURE — 6360000002 HC RX W HCPCS: Performed by: HOSPITALIST

## 2019-04-21 PROCEDURE — 2700000000 HC OXYGEN THERAPY PER DAY

## 2019-04-21 PROCEDURE — 94640 AIRWAY INHALATION TREATMENT: CPT

## 2019-04-21 RX ADMIN — FAMOTIDINE 20 MG: 20 TABLET ORAL at 10:47

## 2019-04-21 RX ADMIN — FAMOTIDINE 20 MG: 20 TABLET ORAL at 19:44

## 2019-04-21 RX ADMIN — Medication 2 PUFF: at 08:33

## 2019-04-21 RX ADMIN — AMIODARONE HYDROCHLORIDE 200 MG: 200 TABLET ORAL at 10:47

## 2019-04-21 RX ADMIN — CLOPIDOGREL BISULFATE 75 MG: 75 TABLET ORAL at 10:47

## 2019-04-21 RX ADMIN — Medication 2 PUFF: at 20:30

## 2019-04-21 RX ADMIN — AMITRIPTYLINE HYDROCHLORIDE 50 MG: 50 TABLET, FILM COATED ORAL at 19:44

## 2019-04-21 RX ADMIN — MAGNESIUM OXIDE TAB 400 MG (241.3 MG ELEMENTAL MG) 400 MG: 400 (241.3 MG) TAB at 19:44

## 2019-04-21 RX ADMIN — METOPROLOL SUCCINATE 50 MG: 50 TABLET, EXTENDED RELEASE ORAL at 10:47

## 2019-04-21 RX ADMIN — CITALOPRAM HYDROBROMIDE 10 MG: 20 TABLET ORAL at 10:47

## 2019-04-21 RX ADMIN — SODIUM CHLORIDE, PRESERVATIVE FREE 10 ML: 5 INJECTION INTRAVENOUS at 10:48

## 2019-04-21 RX ADMIN — ASPIRIN 81 MG 81 MG: 81 TABLET ORAL at 10:47

## 2019-04-21 RX ADMIN — MAGNESIUM OXIDE TAB 400 MG (241.3 MG ELEMENTAL MG) 400 MG: 400 (241.3 MG) TAB at 10:47

## 2019-04-21 RX ADMIN — SODIUM CHLORIDE, PRESERVATIVE FREE 10 ML: 5 INJECTION INTRAVENOUS at 19:44

## 2019-04-21 RX ADMIN — ENOXAPARIN SODIUM 40 MG: 40 INJECTION SUBCUTANEOUS at 10:48

## 2019-04-21 RX ADMIN — SIMVASTATIN 20 MG: 20 TABLET, FILM COATED ORAL at 19:44

## 2019-04-21 ASSESSMENT — PAIN SCALES - GENERAL: PAINLEVEL_OUTOF10: 0

## 2019-04-21 NOTE — PROGRESS NOTES
(36.6 °C)   SpO2: 95%        General: AAO, NAD  Chest: Nontender  Cardiac: First and Second Heart Sounds are Normal, No Murmurs or Gallops noted  Lungs:Clear to auscultation and percussion. Abdomen: Soft, NT, ND, +BS  Extremities: No clubbing, no edema  Vascular:  Equal 2+ peripheral pulses. Lab Data:  CBC:   Recent Labs     04/18/19  2325   WBC 7.1   HGB 12.3*   HCT 40.3   MCV 86.7        BMP:   Recent Labs     04/18/19 1920 04/19/19  0504    144   K 4.2 4.6    116*   CO2 27 19*   BUN 12 12   CREATININE 0.5* 0.5*     LIVER PROFILE:   Recent Labs     04/18/19 1920   AST 35   ALT 18   BILITOT 0.3   ALKPHOS 89     PT/INR:   Recent Labs     04/20/19  1530   PROTIME 13.3*   INR 1.17     APTT:   Recent Labs     04/20/19  1530   APTT 32.1     BNP:  No results for input(s): BNP in the last 72 hours. Assessment:  Patient Active Problem List    Diagnosis Date Noted    Hypomagnesemia syndrome 04/18/2019    Syncope and collapse 01/25/2019    Chronic respiratory failure ([address] 11/26/2018    Essential hypertension 11/06/2018    Chronic low back pain with bilateral sciatica 11/05/2018    CAD S/P percutaneous coronary angioplasty     Smoker     Obesity (BMI 30.0-34. 9)     Gout 01/01/2018    Chronic obstructive pulmonary disease ([address] 04/10/2017    Lupus 10/21/2016    Anxiety disorder 08/07/2016    Controlled type 2 diabetes mellitus with diabetic polyneuropathy, without long-term current use of insulin ([address] 05/28/2016    CAD (coronary artery disease) 05/28/2016    Hidradenitis suppurativa 02/20/2015    Lung cancer ([address] 10/28/2014    History of lung cancer 09/01/2014       Electronically signed by Wali Johnston PA-C on 4/21/2019 at 12:49 PM

## 2019-04-22 ENCOUNTER — APPOINTMENT (OUTPATIENT)
Dept: NUCLEAR MEDICINE | Age: 69
DRG: 641 | End: 2019-04-22
Payer: MEDICARE

## 2019-04-22 LAB
ANION GAP SERPL CALCULATED.3IONS-SCNC: 9 MMOL/L (ref 4–16)
BUN BLDV-MCNC: 7 MG/DL (ref 6–23)
CALCIUM SERPL-MCNC: 9.5 MG/DL (ref 8.3–10.6)
CHLORIDE BLD-SCNC: 109 MMOL/L (ref 99–110)
CO2: 24 MMOL/L (ref 21–32)
CREAT SERPL-MCNC: 0.5 MG/DL (ref 0.6–1.1)
GFR AFRICAN AMERICAN: >60 ML/MIN/1.73M2
GFR NON-AFRICAN AMERICAN: >60 ML/MIN/1.73M2
GLUCOSE BLD-MCNC: 73 MG/DL (ref 70–99)
HCT VFR BLD CALC: 41.5 % (ref 37–47)
HEMOGLOBIN: 13 GM/DL (ref 12.5–16)
LV EF: 41 %
LV EF: 50 %
LVEF MODALITY: NORMAL
LVEF MODALITY: NORMAL
MCH RBC QN AUTO: 26.9 PG (ref 27–31)
MCHC RBC AUTO-ENTMCNC: 31.3 % (ref 32–36)
MCV RBC AUTO: 85.9 FL (ref 78–100)
PDW BLD-RTO: 21.4 % (ref 11.7–14.9)
PLATELET # BLD: 179 K/CU MM (ref 140–440)
PMV BLD AUTO: 11.1 FL (ref 7.5–11.1)
POTASSIUM SERPL-SCNC: 4.4 MMOL/L (ref 3.5–5.1)
RBC # BLD: 4.83 M/CU MM (ref 4.2–5.4)
SODIUM BLD-SCNC: 142 MMOL/L (ref 135–145)
WBC # BLD: 6.4 K/CU MM (ref 4–10.5)

## 2019-04-22 PROCEDURE — 6360000002 HC RX W HCPCS: Performed by: HOSPITALIST

## 2019-04-22 PROCEDURE — 78452 HT MUSCLE IMAGE SPECT MULT: CPT

## 2019-04-22 PROCEDURE — A9500 TC99M SESTAMIBI: HCPCS | Performed by: INTERNAL MEDICINE

## 2019-04-22 PROCEDURE — 93306 TTE W/DOPPLER COMPLETE: CPT

## 2019-04-22 PROCEDURE — 2580000003 HC RX 258: Performed by: HOSPITALIST

## 2019-04-22 PROCEDURE — 80048 BASIC METABOLIC PNL TOTAL CA: CPT

## 2019-04-22 PROCEDURE — 1200000000 HC SEMI PRIVATE

## 2019-04-22 PROCEDURE — 85027 COMPLETE CBC AUTOMATED: CPT

## 2019-04-22 PROCEDURE — 76937 US GUIDE VASCULAR ACCESS: CPT

## 2019-04-22 PROCEDURE — 6360000002 HC RX W HCPCS: Performed by: INTERNAL MEDICINE

## 2019-04-22 PROCEDURE — 6370000000 HC RX 637 (ALT 250 FOR IP): Performed by: HOSPITALIST

## 2019-04-22 PROCEDURE — 36415 COLL VENOUS BLD VENIPUNCTURE: CPT

## 2019-04-22 PROCEDURE — 6370000000 HC RX 637 (ALT 250 FOR IP): Performed by: INTERNAL MEDICINE

## 2019-04-22 PROCEDURE — 93017 CV STRESS TEST TRACING ONLY: CPT

## 2019-04-22 PROCEDURE — 3430000000 HC RX DIAGNOSTIC RADIOPHARMACEUTICAL: Performed by: INTERNAL MEDICINE

## 2019-04-22 RX ADMIN — SODIUM CHLORIDE, PRESERVATIVE FREE 10 ML: 5 INJECTION INTRAVENOUS at 19:50

## 2019-04-22 RX ADMIN — CITALOPRAM HYDROBROMIDE 10 MG: 20 TABLET ORAL at 14:01

## 2019-04-22 RX ADMIN — MAGNESIUM OXIDE TAB 400 MG (241.3 MG ELEMENTAL MG) 400 MG: 400 (241.3 MG) TAB at 19:50

## 2019-04-22 RX ADMIN — REGADENOSON 0.4 MG: 0.08 INJECTION, SOLUTION INTRAVENOUS at 11:51

## 2019-04-22 RX ADMIN — Medication 30 MILLICURIE: at 11:55

## 2019-04-22 RX ADMIN — METOPROLOL SUCCINATE 50 MG: 50 TABLET, EXTENDED RELEASE ORAL at 14:01

## 2019-04-22 RX ADMIN — AMITRIPTYLINE HYDROCHLORIDE 50 MG: 50 TABLET, FILM COATED ORAL at 19:50

## 2019-04-22 RX ADMIN — SIMVASTATIN 20 MG: 20 TABLET, FILM COATED ORAL at 19:50

## 2019-04-22 RX ADMIN — ASPIRIN 81 MG 81 MG: 81 TABLET ORAL at 14:02

## 2019-04-22 RX ADMIN — SODIUM CHLORIDE, PRESERVATIVE FREE 10 ML: 5 INJECTION INTRAVENOUS at 14:02

## 2019-04-22 RX ADMIN — MAGNESIUM OXIDE TAB 400 MG (241.3 MG ELEMENTAL MG) 400 MG: 400 (241.3 MG) TAB at 14:01

## 2019-04-22 RX ADMIN — CLOPIDOGREL BISULFATE 75 MG: 75 TABLET ORAL at 14:01

## 2019-04-22 RX ADMIN — AMIODARONE HYDROCHLORIDE 200 MG: 200 TABLET ORAL at 14:01

## 2019-04-22 RX ADMIN — FAMOTIDINE 20 MG: 20 TABLET ORAL at 14:02

## 2019-04-22 RX ADMIN — FAMOTIDINE 20 MG: 20 TABLET ORAL at 19:50

## 2019-04-22 RX ADMIN — ENOXAPARIN SODIUM 40 MG: 40 INJECTION SUBCUTANEOUS at 14:02

## 2019-04-22 RX ADMIN — Medication 10 MILLICURIE: at 07:40

## 2019-04-22 NOTE — PROGRESS NOTES
Daily Progress Note     patient is awake alert feeling ok  No chest pain  Has few PVC on telemetry  Stress test and echo today  NSVT  And PVC--on amiodarone now  Hx of drug abuse in past  Hx of moderate CAD last cath 2014-LAD  Hx of COPD and DIMPLE    PAST MEDICAL HISTORY:  Coronary artery disease, status post angioplasty  of the right coronary artery; history of hypertension, hyperlipidemia,  lupus, osteoarthritis, polycythemia vera, squamous cell carcinoma,  history of stroke, hypothyroidism, history of lung cancer and restless  legs syndrome.     PAST SURGICAL HISTORY:  Angioplasty of the right coronary artery,  gallbladder surgery, appendix removed, hysterectomy and lung cancer.     SOCIAL HISTORY:  She smokes.  She uses drugs also.     ALLERGIES:  FOOD, RYE, TRENTAL, TRAMADOL, VISTARIL, ERYTHROMYCIN, MOTRIN  and TETRACYCLINE.     MEDICATIONS AT HOME:  She is on Diflucan, Ativan, Cozaar, Percocet,  Celexa, Plavix, Glucophage, and see the list for medications.  She is on  beta-blocker, metoprolol 50 mg a day and oxygen and Plavix also.     Objective:   /78   Pulse 75   Temp 97.9 °F (36.6 °C) (Oral)   Resp 16   Ht 5' 1\" (1.549 m)   Wt 160 lb (72.6 kg)   SpO2 99%   BMI 30.23 kg/m²   No intake or output data in the 24 hours ending 04/22/19 1043    Medications:   Scheduled Meds:   amiodarone  200 mg Oral Daily    amitriptyline  50 mg Oral Nightly    aspirin  81 mg Oral Daily    mometasone-formoterol  2 puff Inhalation BID    citalopram  10 mg Oral Daily    clopidogrel  75 mg Oral Daily    famotidine  20 mg Oral BID    metoprolol succinate  50 mg Oral Daily    sodium chloride flush  10 mL Intravenous 2 times per day    enoxaparin  40 mg Subcutaneous Daily    magnesium oxide  400 mg Oral BID    simvastatin  20 mg Oral Nightly      Infusions:     PRN Meds:  technetium sestamibi, technetium sestamibi, regadenoson, oxyCODONE-acetaminophen, ipratropium-albuterol, sodium chloride flush, magnesium

## 2019-04-22 NOTE — PROGRESS NOTES
Hospitalist Progress Note      Name:  Tosha You /Age/Sex: 1950  (80 y.o. female)   MRN & CSN:  [id number] & [id number] Admission Date/Time: 2019  3:06 PM   Location:  [address] [place] [address] PCP: Juan Manuel Churchill. Fabian Wagner 59 Day: 5    Assessment and Plan:   Tosha You is a 80 y.o.  female  who presents with  generalized weakness and frequent falls         Pending SNF placement  ECHO and stress test today      Generalized weakness, frequent falls  Severe Hypomagnesemia: resolved  Mg 1.2- 2.2  Remaining labs unremarkable except for low albumin  CXR and CT brain negative for acute findings  UA negative for infection  PT/OT--> Subacute/Skilled Nursing Facility      Nonsustained ventricular tachycardia  ECHO and stress test today  Cardio on board     COPD  Chronic resp failure  Not in exacerbation  Cont home inhalers  PRN inhaled bronchodilators  On home O2 2L/min       CAD s/p PCI  Cont ASA/Plavix/BB  Not on a Statin, unclear why, will start       Anxiety  Resume home meds       Deconditioning  PT/OT  OOB      Chronic treatment continued per home medications unless  contraindicated by above plan and assessment. The above assessment/plan has been explained to the patient, who indicated understanding. Diet DIET GENERAL;   DVT Prophylaxis ? Lovenox   GI Prophylaxis ? H2 Blocker   Code Status Full Code             History of Present Illness:     Chief Complaint:     Patient was seen and examined at bedside today. Patient sitting up comfortably in bed in NAD. Patient denied CP, SOB, cough, Abd pain, diarrhea, constipation or dysuria. Ten point ROS reviewed negative, unless as noted above    Objective:     No intake or output data in the 24 hours ending 19 0821   Vitals:   Vitals:    19 0547   BP: 122/78   Pulse: 75   Resp: 16   Temp: 97.9 °F (36.6 °C)   SpO2: 99%     Physical Exam:      GEN Awake female, sitting upright in bed in no apparent distress. Appears given age.   EYES Pupils are equally round. No scleral erythema, discharge, or conjunctivitis. HENT Mucous membranes are moist.   RESP Clear to auscultation, no wheezes, rales or rhonchi. CARDIO/VASC S1/S2 auscultated. No murmurs  GI Abdomen is soft without significant tenderness, masses, or guarding. MSK No gross joint deformities. Spontaneous movement of all extremities  SKIN Normal coloration, warm, dry. NEURO Grossly normal  PSYCH Awake, alert, oriented x 4. Affect appropriate.     Medications:   Medications:    amiodarone  200 mg Oral Daily    amitriptyline  50 mg Oral Nightly    aspirin  81 mg Oral Daily    mometasone-formoterol  2 puff Inhalation BID    citalopram  10 mg Oral Daily    clopidogrel  75 mg Oral Daily    famotidine  20 mg Oral BID    metoprolol succinate  50 mg Oral Daily    sodium chloride flush  10 mL Intravenous 2 times per day    enoxaparin  40 mg Subcutaneous Daily    magnesium oxide  400 mg Oral BID    simvastatin  20 mg Oral Nightly      Infusions:   PRN Meds:     oxyCODONE-acetaminophen 1 tablet Q4H PRN   ipratropium-albuterol 1 ampule Q4H PRN   sodium chloride flush 10 mL PRN   magnesium hydroxide 30 mL Daily PRN   ondansetron 4 mg Q6H PRN   acetaminophen 650 mg Q4H PRN   potassium chloride 40 mEq PRN   Or     potassium alternative oral replacement 40 mEq PRN   Or     potassium chloride 10 mEq PRN   magnesium sulfate 1 g PRN         Electronically signed by Chloe Moore MD on 4/22/2019 at 8:21 AM

## 2019-04-22 NOTE — CARE COORDINATION
Left vm coco Robles at [facility] to check status of precert, placed whiteboard note to request updated therapy treatments.

## 2019-04-22 NOTE — CONSULTS
Consult completed. Procedure/rationale explained to pt & consent obtained. #20ga 45mm-long PIV initiated to LUE AC using sterile, UltraSound-guided technique without difficulty/complications. Sterile dressing with SkinPrep, StatLock Securing Device, & SwabCap applied. Pt tolerated well & denies other c/o or needs.  [facility] staff notified pt is ready for her exam.

## 2019-04-23 LAB — MAGNESIUM: 1.7 MG/DL (ref 1.8–2.4)

## 2019-04-23 PROCEDURE — 6360000002 HC RX W HCPCS: Performed by: HOSPITALIST

## 2019-04-23 PROCEDURE — 83735 ASSAY OF MAGNESIUM: CPT

## 2019-04-23 PROCEDURE — 36415 COLL VENOUS BLD VENIPUNCTURE: CPT

## 2019-04-23 PROCEDURE — 94761 N-INVAS EAR/PLS OXIMETRY MLT: CPT

## 2019-04-23 PROCEDURE — 2580000003 HC RX 258: Performed by: HOSPITALIST

## 2019-04-23 PROCEDURE — 6370000000 HC RX 637 (ALT 250 FOR IP): Performed by: HOSPITALIST

## 2019-04-23 PROCEDURE — 6370000000 HC RX 637 (ALT 250 FOR IP): Performed by: INTERNAL MEDICINE

## 2019-04-23 PROCEDURE — 1200000000 HC SEMI PRIVATE

## 2019-04-23 PROCEDURE — 94640 AIRWAY INHALATION TREATMENT: CPT

## 2019-04-23 PROCEDURE — 97535 SELF CARE MNGMENT TRAINING: CPT

## 2019-04-23 PROCEDURE — 97530 THERAPEUTIC ACTIVITIES: CPT

## 2019-04-23 RX ADMIN — MAGNESIUM HYDROXIDE 30 ML: 400 SUSPENSION ORAL at 10:12

## 2019-04-23 RX ADMIN — SIMVASTATIN 20 MG: 20 TABLET, FILM COATED ORAL at 21:46

## 2019-04-23 RX ADMIN — Medication 2 PUFF: at 21:24

## 2019-04-23 RX ADMIN — FAMOTIDINE 20 MG: 20 TABLET ORAL at 10:00

## 2019-04-23 RX ADMIN — CITALOPRAM HYDROBROMIDE 10 MG: 20 TABLET ORAL at 10:00

## 2019-04-23 RX ADMIN — Medication 2 PUFF: at 12:14

## 2019-04-23 RX ADMIN — ASPIRIN 81 MG 81 MG: 81 TABLET ORAL at 10:00

## 2019-04-23 RX ADMIN — METOPROLOL SUCCINATE 50 MG: 50 TABLET, EXTENDED RELEASE ORAL at 10:00

## 2019-04-23 RX ADMIN — ENOXAPARIN SODIUM 40 MG: 40 INJECTION SUBCUTANEOUS at 10:00

## 2019-04-23 RX ADMIN — SODIUM CHLORIDE, PRESERVATIVE FREE 10 ML: 5 INJECTION INTRAVENOUS at 21:46

## 2019-04-23 RX ADMIN — FAMOTIDINE 20 MG: 20 TABLET ORAL at 21:46

## 2019-04-23 RX ADMIN — MAGNESIUM OXIDE TAB 400 MG (241.3 MG ELEMENTAL MG) 400 MG: 400 (241.3 MG) TAB at 21:46

## 2019-04-23 RX ADMIN — MAGNESIUM SULFATE IN DEXTROSE 1 G: 10 INJECTION, SOLUTION INTRAVENOUS at 12:27

## 2019-04-23 RX ADMIN — OXYCODONE AND ACETAMINOPHEN 1 TABLET: 5; 325 TABLET ORAL at 10:12

## 2019-04-23 RX ADMIN — MAGNESIUM SULFATE IN DEXTROSE 1 G: 10 INJECTION, SOLUTION INTRAVENOUS at 11:20

## 2019-04-23 RX ADMIN — CLOPIDOGREL BISULFATE 75 MG: 75 TABLET ORAL at 10:00

## 2019-04-23 RX ADMIN — AMITRIPTYLINE HYDROCHLORIDE 50 MG: 50 TABLET, FILM COATED ORAL at 21:46

## 2019-04-23 RX ADMIN — AMIODARONE HYDROCHLORIDE 200 MG: 200 TABLET ORAL at 10:00

## 2019-04-23 RX ADMIN — OXYCODONE AND ACETAMINOPHEN 1 TABLET: 5; 325 TABLET ORAL at 18:35

## 2019-04-23 RX ADMIN — MAGNESIUM OXIDE TAB 400 MG (241.3 MG ELEMENTAL MG) 400 MG: 400 (241.3 MG) TAB at 10:00

## 2019-04-23 RX ADMIN — SODIUM CHLORIDE, PRESERVATIVE FREE 10 ML: 5 INJECTION INTRAVENOUS at 10:00

## 2019-04-23 ASSESSMENT — PAIN SCALES - GENERAL
PAINLEVEL_OUTOF10: 10
PAINLEVEL_OUTOF10: 9
PAINLEVEL_OUTOF10: 10
PAINLEVEL_OUTOF10: 9

## 2019-04-23 ASSESSMENT — PAIN DESCRIPTION - PROGRESSION
CLINICAL_PROGRESSION: GRADUALLY IMPROVING
CLINICAL_PROGRESSION: NOT CHANGED
CLINICAL_PROGRESSION: GRADUALLY IMPROVING
CLINICAL_PROGRESSION: GRADUALLY IMPROVING

## 2019-04-23 ASSESSMENT — PAIN - FUNCTIONAL ASSESSMENT
PAIN_FUNCTIONAL_ASSESSMENT: PREVENTS OR INTERFERES SOME ACTIVE ACTIVITIES AND ADLS

## 2019-04-23 ASSESSMENT — PAIN DESCRIPTION - PAIN TYPE
TYPE: ACUTE PAIN

## 2019-04-23 ASSESSMENT — PAIN DESCRIPTION - LOCATION
LOCATION: LEG
LOCATION: LEG
LOCATION: LEG;ABDOMEN

## 2019-04-23 ASSESSMENT — PAIN DESCRIPTION - FREQUENCY
FREQUENCY: CONTINUOUS

## 2019-04-23 ASSESSMENT — PAIN DESCRIPTION - ORIENTATION
ORIENTATION: RIGHT;LEFT

## 2019-04-23 ASSESSMENT — PAIN DESCRIPTION - ONSET
ONSET: ON-GOING

## 2019-04-23 ASSESSMENT — PAIN DESCRIPTION - DESCRIPTORS
DESCRIPTORS: DISCOMFORT

## 2019-04-23 NOTE — CARE COORDINATION
Per Antonio Castillo at [facility] still awaiting PT treatment note for precert. ..

## 2019-04-23 NOTE — PROGRESS NOTES
Cardiology Progress Note       Stanley Ibrahim is a 80 y.o. female   1950     SUBJECTIVE:   Patient  Seen  And  Examined  No  Chest  Pain  Or  Sob  Rhythm  Is  sinjus  With  Occasional  pvcs   OBJECTIVE:    Review of Systems:  General appearance: alert, appears stated age and cooperative  Skin: Skin color, texture, normal. No rashes or lesions  HEENT: No nose bleed, headache, vision problems  CV: C/O chest pain, tightness, pressure,   Respiratory: C/o no SOB, RUBI, Orthopnea, PND  GI: No abdominal pain, black stool, bloating  Limbs: No c/o edema, pain, swelling, intermittent claudication, joint pains  Neuro: No dizziness, lightheadedness, syncope, gait problems, memory problems  Psych: grossly normal. No SI/depression. Vitals:   Blood pressure (!) 146/85, pulse 79, temperature 98.9 °F (37.2 °C), temperature source Oral, resp. rate 17, height 5' 1\" (1.549 m), weight 160 lb (72.6 kg), SpO2 95 %, not currently breastfeeding. HEENT: AT, NC, PERRLA  Neck: No JVD  Heart: S1 S2 audible, no murmur   Lungs: CTA   Abdomen: Nontender   Limbs: No edema   CNS: no focal deficit      Past Medical History:   Diagnosis Date    Anxiety     CAD (coronary artery disease)     Dr Radha Larry CAD S/P percutaneous coronary angioplasty 2012    stent RCA; Ahmed    Chronic back pain     COPD (chronic obstructive pulmonary disease) ([facility]     Carmen Pickerel Gout 2018    History of cerebral infarction 2000    right hemiparesis with full recovery    Hyperlipidemia     Hypertension     Lumbar spinal stenosis     Lung cancer ([facility] 09/2014    Dr Denice Hugo; left upper lobectomy; non small cell CA    Obesity (BMI 30.0-34. 9)     Restless leg syndrome     Rheumatoid arthritis (HCC)     Smoker     Systemic lupus erythematosus (HCC) 1992    Dr Óscar Chang, visiting physician    Type II diabetes mellitus ([facility] 1969    Ventral hernia 06/2018    right ventral hernia seen on CT abd 6/2018        Patient Active Problem List   Diagnosis    Lung [facility] ([facility] [address]    Hidradenitis suppurativa    Controlled type 2 diabetes mellitus with diabetic polyneuropathy, without long-term current use of insulin (HCC)    CAD (coronary artery disease)    Anxiety disorder    Lupus    Chronic obstructive pulmonary disease (HCC)    CAD S/P percutaneous coronary angioplasty    Gout    History of lung cancer    Smoker    Chronic low back pain with bilateral sciatica    Obesity (BMI 30.0-34. 9)    Essential hypertension    Chronic respiratory failure (HCC)    Syncope and collapse    Hypomagnesemia syndrome        Allergies   Allergen Reactions    Food Swelling     \"Rye, Whole grains, Barley\"    Trental [Pentoxifylline] Swelling    Tramadol      \"I Felt Shaky\"    Vistaril [Hydroxyzine Hcl] Other (See Comments)     Sedated and was placed on respirator    Erythromycin      \"Stomach Pain\"    Motrin [Ibuprofen] Diarrhea    Tetracyclines & Related Nausea And Vomiting        Current Inpatient Medications:    Current Facility-Administered Medications   Medication Dose Route Frequency Provider Last Rate Last Dose    amiodarone (CORDARONE) tablet 200 mg  200 mg Oral Daily Lula Ragland MD   200 mg at 04/22/19 1401    oxyCODONE-acetaminophen (PERCOCET) 5-325 MG per tablet 1 tablet  1 tablet Oral Q4H PRN Darvin Dasilva MD   1 tablet at 04/19/19 2113    amitriptyline (ELAVIL) tablet 50 mg  50 mg Oral Nightly Tunde Jung MD   50 mg at 04/22/19 1950    aspirin chewable tablet 81 mg  81 mg Oral Daily Louise Doyle MD   81 mg at 04/22/19 1402    mometasone-formoterol (DULERA) 200-5 MCG/ACT inhaler 2 puff  2 puff Inhalation BID Tunde Jung MD   2 puff at 04/21/19 2030    citalopram (CELEXA) tablet 10 mg  10 mg Oral Daily Louise Doyle MD   10 mg at 04/22/19 1401    clopidogrel (PLAVIX) tablet 75 mg  75 mg Oral Daily Louise Doyle MD   75 mg at 04/22/19 1401    famotidine (PEPCID) tablet 20 mg  20 mg Oral BID Tunde Jung MD   20 mg at 04/22/19 1950    metoprolol succinate (TOPROL ASSESSMENT:   1  Non  Sustained    Vt    Resolved    Rhythm  Is  Sinus  With  Occasional  pvcs  k  Ok  Will  Check  Mag  Today  2  Cad  Stent  rca  In  Past  Nuclear  Stress  Yesterday  No  Ischemia  Echo  Ef  50%  3  hypetension  Relatively  Controlled  4  High  cholesterol  PLAN    mAG  LEVEL  Stable  From  Cardiac  standpoint

## 2019-04-23 NOTE — PROGRESS NOTES
Occupational Therapy Treatment Note  Name: Jennifer Molina MRN: [id number] :   1950   Date:  2019   Admission Date: 2019 [facility]   Restrictions/Precautions:  Restrictions/Precautions  Restrictions/Precautions: Fall Risk, General Precautions, Contact Precautions  Required Braces or Orthoses?: No   Communication with other providers:  Cleared for OT tx by HENRIQUE Trevizo  Subjective:  Patient states: \"I know you want blood. Where do you want it from? \" educated pt on role of OT   Pain:   Location, Type, Intensity (0/10 to 10/10):  denied  Objective:    Observation:  Pt supine in bed    Treatment, including education:  Therapeutic Activity Training:   Therapeutic activity training was instructed today. Cues were given for safety, sequence, UE/LE placement, awareness, and balance. Activities performed today included bed mobility training, sup-sit, sit-stand, SPT. Supine to EOB with S  Sit to stand at EOB and recliner with S  Stood ~1 min sinkside for hand hygiene with RW and S; stood ~1 min 2x for buttock hygiene following toileting  Ambulated bed to/from BR with RW and CGA; poor walker safety with walker way out front, poor carryover of safety cues    Self Care Training:   Cues were given for safety, sequence, UE/LE placement, visual cues, and balance. Activities performed today included dressing, toileting, hand hygiene, and grooming.   Pt Mod A for toileting; tx with S then after voiding pt stood and walked to sink w/o performing hygiene, therapist assisted with hygiene at sink, pt did not seem concerned about toilet hygiene  Pt doffed/donned socks with S  Pt washed cesilia area after S/U; D to wash buttocks; performed in stance    Pt required Mod-Max cues throughout to initiate and complete tasks  Assessment / Impression:        Patient's tolerance of treatment:  well  Adverse Reaction: none  Significant change in status and impact:  none  Barriers to improvement:  None noted  Plan for Next

## 2019-04-23 NOTE — PROGRESS NOTES
Hospitalist Progress Note      Name:  Garrett Sanchez /Age/Sex: 1950  (80 y.o. female)   MRN & CSN:  [id number] & [id number] Admission Date/Time: 2019  3:06 PM   Location:  [address] [place] [address] PCP: [id number] Day: 6    Assessment and Plan:   Garrett Sanchez is a 80 y.o.  female  who presents with  generalized weakness and frequent falls         Pending SNF placement        Generalized weakness, frequent falls  Severe Hypomagnesemia: resolved  Mg 1.2- 2.2  Remaining labs unremarkable except for low albumin  CXR and CT brain negative for acute findings  UA negative for infection  PT/OT--> Subacute/Skilled Nursing Facility      Nonsustained ventricular tachycardia  ECHO and stress test done. EF 50%  Cardio on board     COPD  Chronic resp failure  Not in exacerbation  Cont home inhalers  PRN inhaled bronchodilators  On home O2 2L/min       CAD s/p PCI  Cont ASA/Plavix/BB  Not on a Statin, unclear why, will start       Anxiety  Resume home meds       Deconditioning  PT/OT  OOB      Chronic treatment continued per home medications unless  contraindicated by above plan and assessment. The above assessment/plan has been explained to the patient, who indicated understanding. Diet DIET CARDIAC;   DVT Prophylaxis ? Lovenox   GI Prophylaxis ? H2 Blocker   Code Status Full Code             History of Present Illness:     Chief Complaint:     Patient was seen and examined at bedside today. Patient sitting up comfortably in bed in [facility]. Patient denied CP, SOB, cough, Abd pain, diarrhea, constipation or dysuria. Ten point ROS reviewed negative, unless as noted above    Objective:        Intake/Output Summary (Last 24 hours) at 2019 0827  Last data filed at 2019 1628  Gross per 24 hour   Intake --   Output 900 ml   Net -900 ml      Vitals:   Vitals:    19 0546   BP: (!) 146/85   Pulse: 79   Resp: 17   Temp: 98.9 °F (37.2 °C)   SpO2: 95%     Physical Exam:      GEN Awake female, sitting upright in bed in no apparent distress. Appears given age. EYES Pupils are equally round. No scleral erythema, discharge, or conjunctivitis. HENT Mucous membranes are moist.   RESP Clear to auscultation, no wheezes, rales or rhonchi. CARDIO/VASC S1/S2 auscultated. No murmurs  GI Abdomen is soft without significant tenderness, masses, or guarding. MSK No gross joint deformities. Spontaneous movement of all extremities  SKIN Normal coloration, warm, dry. NEURO Grossly normal  PSYCH Awake, alert, oriented x 4. Affect appropriate.     Medications:   Medications:    amiodarone  200 mg Oral Daily    amitriptyline  50 mg Oral Nightly    aspirin  81 mg Oral Daily    mometasone-formoterol  2 puff Inhalation BID    citalopram  10 mg Oral Daily    clopidogrel  75 mg Oral Daily    famotidine  20 mg Oral BID    metoprolol succinate  50 mg Oral Daily    sodium chloride flush  10 mL Intravenous 2 times per day    enoxaparin  40 mg Subcutaneous Daily    magnesium oxide  400 mg Oral BID    simvastatin  20 mg Oral Nightly      Infusions:   PRN Meds:     oxyCODONE-acetaminophen 1 tablet Q4H PRN   ipratropium-albuterol 1 ampule Q4H PRN   sodium chloride flush 10 mL PRN   magnesium hydroxide 30 mL Daily PRN   ondansetron 4 mg Q6H PRN   acetaminophen 650 mg Q4H PRN   potassium chloride 40 mEq PRN   Or     potassium alternative oral replacement 40 mEq PRN   Or     potassium chloride 10 mEq PRN   magnesium sulfate 1 g PRN         Electronically signed by Chloe Moore MD on 4/23/2019 at 8:27 AM

## 2019-04-24 PROCEDURE — 6360000002 HC RX W HCPCS: Performed by: HOSPITALIST

## 2019-04-24 PROCEDURE — 6370000000 HC RX 637 (ALT 250 FOR IP): Performed by: HOSPITALIST

## 2019-04-24 PROCEDURE — 6370000000 HC RX 637 (ALT 250 FOR IP): Performed by: INTERNAL MEDICINE

## 2019-04-24 PROCEDURE — 6360000002 HC RX W HCPCS: Performed by: INTERNAL MEDICINE

## 2019-04-24 PROCEDURE — 97116 GAIT TRAINING THERAPY: CPT

## 2019-04-24 PROCEDURE — 2580000003 HC RX 258: Performed by: HOSPITALIST

## 2019-04-24 PROCEDURE — 1200000000 HC SEMI PRIVATE

## 2019-04-24 PROCEDURE — 2700000000 HC OXYGEN THERAPY PER DAY

## 2019-04-24 PROCEDURE — 94640 AIRWAY INHALATION TREATMENT: CPT

## 2019-04-24 PROCEDURE — 94761 N-INVAS EAR/PLS OXIMETRY MLT: CPT

## 2019-04-24 PROCEDURE — 97530 THERAPEUTIC ACTIVITIES: CPT

## 2019-04-24 RX ORDER — MAGNESIUM SULFATE IN WATER 40 MG/ML
2 INJECTION, SOLUTION INTRAVENOUS ONCE
Status: DISCONTINUED | OUTPATIENT
Start: 2019-04-24 | End: 2019-04-24

## 2019-04-24 RX ORDER — MAGNESIUM SULFATE IN WATER 40 MG/ML
2 INJECTION, SOLUTION INTRAVENOUS ONCE
Status: COMPLETED | OUTPATIENT
Start: 2019-04-24 | End: 2019-04-24

## 2019-04-24 RX ADMIN — MAGNESIUM OXIDE TAB 400 MG (241.3 MG ELEMENTAL MG) 400 MG: 400 (241.3 MG) TAB at 20:35

## 2019-04-24 RX ADMIN — AMIODARONE HYDROCHLORIDE 200 MG: 200 TABLET ORAL at 08:41

## 2019-04-24 RX ADMIN — MAGNESIUM HYDROXIDE 30 ML: 400 SUSPENSION ORAL at 09:04

## 2019-04-24 RX ADMIN — FAMOTIDINE 20 MG: 20 TABLET ORAL at 20:35

## 2019-04-24 RX ADMIN — METOPROLOL SUCCINATE 50 MG: 50 TABLET, EXTENDED RELEASE ORAL at 08:41

## 2019-04-24 RX ADMIN — OXYCODONE AND ACETAMINOPHEN 1 TABLET: 5; 325 TABLET ORAL at 20:35

## 2019-04-24 RX ADMIN — AMITRIPTYLINE HYDROCHLORIDE 50 MG: 50 TABLET, FILM COATED ORAL at 20:35

## 2019-04-24 RX ADMIN — MAGNESIUM SULFATE HEPTAHYDRATE 2 G: 40 INJECTION, SOLUTION INTRAVENOUS at 20:38

## 2019-04-24 RX ADMIN — Medication 2 PUFF: at 09:11

## 2019-04-24 RX ADMIN — SIMVASTATIN 20 MG: 20 TABLET, FILM COATED ORAL at 20:34

## 2019-04-24 RX ADMIN — Medication 2 PUFF: at 21:50

## 2019-04-24 RX ADMIN — ENOXAPARIN SODIUM 40 MG: 40 INJECTION SUBCUTANEOUS at 08:41

## 2019-04-24 RX ADMIN — CLOPIDOGREL BISULFATE 75 MG: 75 TABLET ORAL at 08:41

## 2019-04-24 RX ADMIN — SODIUM CHLORIDE, PRESERVATIVE FREE 10 ML: 5 INJECTION INTRAVENOUS at 08:41

## 2019-04-24 RX ADMIN — SODIUM CHLORIDE, PRESERVATIVE FREE 10 ML: 5 INJECTION INTRAVENOUS at 20:59

## 2019-04-24 RX ADMIN — FAMOTIDINE 20 MG: 20 TABLET ORAL at 08:41

## 2019-04-24 RX ADMIN — OXYCODONE AND ACETAMINOPHEN 1 TABLET: 5; 325 TABLET ORAL at 09:04

## 2019-04-24 RX ADMIN — ASPIRIN 81 MG 81 MG: 81 TABLET ORAL at 08:41

## 2019-04-24 RX ADMIN — CITALOPRAM HYDROBROMIDE 10 MG: 20 TABLET ORAL at 08:41

## 2019-04-24 RX ADMIN — MAGNESIUM OXIDE TAB 400 MG (241.3 MG ELEMENTAL MG) 400 MG: 400 (241.3 MG) TAB at 08:41

## 2019-04-24 ASSESSMENT — PAIN DESCRIPTION - PAIN TYPE: TYPE: ACUTE PAIN

## 2019-04-24 ASSESSMENT — PAIN DESCRIPTION - PROGRESSION
CLINICAL_PROGRESSION: NOT CHANGED

## 2019-04-24 ASSESSMENT — PAIN DESCRIPTION - ONSET: ONSET: ON-GOING

## 2019-04-24 ASSESSMENT — PAIN DESCRIPTION - DESCRIPTORS: DESCRIPTORS: ACHING

## 2019-04-24 ASSESSMENT — PAIN SCALES - GENERAL
PAINLEVEL_OUTOF10: 9

## 2019-04-24 ASSESSMENT — PAIN DESCRIPTION - LOCATION: LOCATION: LEG

## 2019-04-24 ASSESSMENT — PAIN - FUNCTIONAL ASSESSMENT: PAIN_FUNCTIONAL_ASSESSMENT: PREVENTS OR INTERFERES SOME ACTIVE ACTIVITIES AND ADLS

## 2019-04-24 ASSESSMENT — PAIN DESCRIPTION - ORIENTATION: ORIENTATION: RIGHT;LEFT

## 2019-04-24 ASSESSMENT — PAIN DESCRIPTION - FREQUENCY: FREQUENCY: CONTINUOUS

## 2019-04-24 NOTE — PROGRESS NOTES
mg Oral Daily    sodium chloride flush  10 mL Intravenous 2 times per day    enoxaparin  40 mg Subcutaneous Daily    magnesium oxide  400 mg Oral BID    simvastatin  20 mg Oral Nightly         Assessment:       Patient Active Problem List    Diagnosis Date Noted    Hypomagnesemia syndrome 04/18/2019    Syncope and collapse 01/25/2019    Chronic respiratory failure ([facility] 11/26/2018    Essential hypertension 11/06/2018    Chronic low back pain with bilateral sciatica 11/05/2018    CAD S/P percutaneous coronary angioplasty     Smoker     Obesity (BMI 30.0-34. 9)     Gout 01/01/2018    Chronic obstructive [facility] 04/10/2017    Lupus 10/21/2016    Anxiety disorder 08/07/2016    Controlled type 2 diabetes mellitus with diabetic polyneuropathy, without long-term current use of insulin ([facility] 05/28/2016    CAD (coronary artery disease) 05/28/2016    Hidradenitis suppurativa 02/20/2015    Lung cancer ([facility] 10/28/2014    History of lung cancer 09/01/2014       Plan:     Problems being addressed this admission:     Failure to thrive / Weakness / hypomagnesemia  4/23/19-Generalized weakness, frequent falls Severe Hypomagnesemia: resolved  Mg 1.2- 2.2 emaining labs unremarkable except for low albumin CXR and CT brain negative for acute findings UA negative for infection PT/OT--> Subacute/Skilled Nursing Facility  4/24/19- getting mag replacement as it is still low. Will need to go to SNF. 3 major causes of low mag is nutrition, GI losses and hypoalbuminemia. Will get diet consult as she is not having any diarrhoea.     NSVT / CAD s/p PCI  4/23/19-Nonsustained ventricular tachycardia ECHO and stress test done. EF 50%  Cardio on board Cont ASA/Plavix/BB Not on a Statin, unclear why, will start  4/24/19- stable and resolved. Had stress test yesterday and no ischemia noted as per cardiology.     COPD  4/23/19-Chronic resp failure Not in exacerbation Cont home inhalers PRN inhaled bronchodilators On

## 2019-04-24 NOTE — CONSULTS
Consult for IV team for IV access. No vessels for new ultrasound guided IV noted. Current peripheral kinked, dressing changed, flushed vigorously, no leaking or pain with flushing. RN notified. Patient tolerated well.  Clifford Meager

## 2019-04-24 NOTE — CARE COORDINATION
I spoke with [id number] Director this am to illicit help in getting PT notes for SNF precert- she states she would speak with Edilberto/ manager of PT. In the meantime I spoke with Srinivasan Monae PTA who saw pt/ sent message to Archana Fabian with [facility] that therapy notes available for precert.

## 2019-04-24 NOTE — CARE COORDINATION
Reviewed chart for continued discharge planning-  I still am unable to obtain precert until PT sees pt and notes available to send to insurance. WB note in place notifying PT of need.

## 2019-04-24 NOTE — PROGRESS NOTES
Cardiology Progress Note       Alton Sue is a 80 y.o. female   1950     SUBJECTIVE:   Patient  Seen  And  Examined    No  New  Complains    OBJECTIVE:    Review of Systems:  General appearance: alert, appears stated age and cooperative  Skin: Skin color, texture, normal. No rashes or lesions  HEENT: No nose bleed, headache, vision problems  CV: C/O chest pain, tightness, pressure,   Respiratory: C/o no SOB, RUBI, Orthopnea, PND  GI: No abdominal pain, black stool, bloating  Limbs: No c/o edema, pain, swelling, intermittent claudication, joint pains  Neuro: No dizziness, lightheadedness, syncope, gait problems, memory problems  Psych: grossly normal. No SI/depression. Vitals:   Blood pressure 128/80, pulse 80, temperature 98 °F (36.7 °C), temperature source Oral, resp. rate 16, height 5' 1\" (1.549 m), weight 160 lb (72.6 kg), SpO2 98 %, not currently breastfeeding. HEENT: AT, NC, PERRLA  Neck: No JVD  Heart: S1 S2 audible, no murmur   Lungs: CTA   Abdomen: Nontender   Limbs: No edema   CNS: no focal deficit      Past Medical History:   Diagnosis Date    Anxiety     CAD (coronary artery disease)     Dr Randy Duffy CAD S/P percutaneous coronary angioplasty 2012    stent RCA; Ahmed    Chronic back pain     COPD (chronic obstructive pulmonary disease) ([facility] [address]     Arlyce [place] Gout 2018    History of cerebral infarction 2000    right hemiparesis with full recovery    Hyperlipidemia     Hypertension     Lumbar spinal stenosis     Lung cancer ([facility] [address] 09/2014    Dr Gina Cazares; left upper lobectomy; non small cell CA    Obesity (BMI 30.0-34. 9)     Restless leg syndrome     Rheumatoid arthritis (HCC)     Smoker     Systemic lupus erythematosus (HCC) 1992    Dr Greg Son, visiting physician    Type II diabetes mellitus ([facility] 1969    Ventral hernia 06/2018    right ventral hernia seen on CT abd 6/2018        Patient Active Problem List   Diagnosis    Lung cancer ([facility]    Hidradenitis suppurativa    Controlled type 2 diabetes mellitus with diabetic polyneuropathy, without long-term current use of insulin (HCC)    CAD (coronary artery disease)    Anxiety disorder    Lupus    Chronic obstructive pulmonary disease (HCC)    CAD S/P percutaneous coronary angioplasty    Gout    History of lung cancer    Smoker    Chronic low back pain with bilateral sciatica    Obesity (BMI 30.0-34. 9)    Essential hypertension    Chronic respiratory failure (HCC)    Syncope and collapse    Hypomagnesemia syndrome        Allergies   Allergen Reactions    Food Swelling     \"Rye, Whole grains, Barley\"    Trental [Pentoxifylline] Swelling    Tramadol      \"I Felt Shaky\"    Vistaril [Hydroxyzine Hcl] Other (See Comments)     Sedated and was placed on respirator    Erythromycin      \"Stomach Pain\"    Motrin [Ibuprofen] Diarrhea    Tetracyclines & Related Nausea And Vomiting        Current Inpatient Medications:    Current Facility-Administered Medications   Medication Dose Route Frequency Provider Last Rate Last Dose    magnesium sulfate 2 g in 50 mL IVPB premix  2 g Intravenous Once Cyn Nixon MD        amiodarone (CORDARONE) tablet 200 mg  200 mg Oral Daily Nimco Pan MD   200 mg at 04/24/19 0841    oxyCODONE-acetaminophen (PERCOCET) 5-325 MG per tablet 1 tablet  1 tablet Oral Q4H PRN Morena Bustos MD   1 tablet at 04/24/19 0904    amitriptyline (ELAVIL) tablet 50 mg  50 mg Oral Nightly Héctor Zamora MD   50 mg at 04/23/19 2146    aspirin chewable tablet 81 mg  81 mg Oral Daily Louise Doyle MD   81 mg at 04/24/19 0841    mometasone-formoterol (DULERA) 200-5 MCG/ACT inhaler 2 puff  2 puff Inhalation BID Héctor Zamora MD   2 puff at 04/24/19 0911    citalopram (CELEXA) tablet 10 mg  10 mg Oral Daily Héctor Zamora MD   10 mg at 04/24/19 0841    clopidogrel (PLAVIX) tablet 75 mg  75 mg Oral Daily Héctor Zamora MD   75 mg at 04/24/19 0841    famotidine (PEPCID) tablet 20 mg  20 mg Oral BID Héctor Zamora MD   20 mg at 04/24/19 4642    metoprolol succinate (TOPROL XL) extended release tablet 50 mg  50 mg Oral Daily Godfrey Awad MD   50 mg at 04/24/19 0841    ipratropium-albuterol (DUONEB) nebulizer solution 1 ampule  1 ampule Inhalation Q4H PRN Godfrey Awad MD        sodium chloride flush 0.9 % injection 10 mL  10 mL Intravenous 2 times per day Godfrey Awad MD   10 mL at 04/24/19 0841    sodium chloride flush 0.9 % injection 10 mL  10 mL Intravenous PRN Godfrey Aawd MD        magnesium hydroxide (MILK OF MAGNESIA) 400 MG/5ML suspension 30 mL  30 mL Oral Daily PRN Godfrey Awad MD   30 mL at 04/24/19 0904    ondansetron (ZOFRAN) injection 4 mg  4 mg Intravenous Q6H PRN Gdofrey Awad MD        enoxaparin (LOVENOX) injection 40 mg  40 mg Subcutaneous Daily Godfrey Awda MD   40 mg at 04/24/19 0841    acetaminophen (TYLENOL) tablet 650 mg  650 mg Oral Q4H PRN Godfrey Awad MD   650 mg at 04/18/19 2340    potassium chloride (KLOR-CON M) extended release tablet 40 mEq  40 mEq Oral PRN Godfrey Awad MD        Or    potassium chloride (KLOR-CON) packet 40 mEq  40 mEq Oral PRN Godfrey Awad MD        Or    potassium chloride 10 mEq/100 mL IVPB (Peripheral Line)  10 mEq Intravenous PRN Godfrey Awad MD        magnesium sulfate 1 g in dextrose 5% 100 mL IVPB  1 g Intravenous PRN Godfrey Awad MD   Stopped at 04/23/19 1327    magnesium oxide (MAG-OX) tablet 400 mg  400 mg Oral BID Godfrey Awad MD   400 mg at 04/24/19 0841    simvastatin (ZOCOR) tablet 20 mg  20 mg Oral Nightly Godfrey Awad MD   20 mg at 04/23/19 2146           Labs:  CBC with Differential:    Lab Results   Component Value Date    WBC 6.4 04/22/2019    RBC 4.83 04/22/2019    HGB 13.0 04/22/2019    HCT 41.5 04/22/2019     04/22/2019    MCV 85.9 04/22/2019    MCH 26.9 04/22/2019    MCHC 31.3 04/22/2019    RDW 21.4 04/22/2019    NRBC 1 03/23/2017    SEGSPCT 34.2 04/18/2019    BANDSPCT 5 03/24/2017    LYMPHOPCT 57.1 04/18/2019    MONOPCT 7.2 04/18/2019    MYELOPCT 1 03/24/2017    EOSPCT 0.1 06/08/2011 06/13/2018     TSH:  No results found for: TSH   DATA:   ECG: Sinus Rhythm       ASSESSMENT:   1  Non  Sustained    Vt    Resolved    Rhythm  Is  Sinus  With  Occasional  pvcs  k  mag  1.7   2  Cad  Stent  rca  In  Past  Nuclear  Stress  Yesterday  No  Ischemia  Echo  Ef  50%  3  hypetension  Relatively  Controlled  4  High  cholesterol  PLAN    mag  2  Gm  now

## 2019-04-24 NOTE — PROGRESS NOTES
Physical Therapy  . Physical Therapy Treatment Note  Name: Mouna Liz MRN: [id number] :   1950   Date:  2019   Admission Date: 2019 Room:  [address] [place] [address]       Restrictions/Precautions:  Restrictions/Precautions  Restrictions/Precautions: Fall Risk, General Precautions, Contact Precautions  Required Braces or Orthoses?: No         Communication with other providers:  HENRIQUE Chahal cleared patient for physical therapy. Subjective:  Patient states: \"How soon am I getting out of here? \"  Pain:   Location, Type, Intensity (0/10 to 10/10): \"Down under my stomach\" 9/10    Objective:    Observation:  Patient semi folwers in bed with 2L of 02 upon therapist arrival. [facility]. A&O x4      Treatment, including education/measures:  Transfers  Supine to sit :SBA with HOB elevated and use of bed rails   Sit to supine :NT  Scooting :SBA to scoot to EOB  Rolling :NT  Sit to stand :SBA from bed (performed x2); SBA from chair  Stand to sit :SBA to bed; CGA to chair with verbal instructions for hand placement and safe technique      Therapeutic Exercise:  Therapeutic exercises were instructed today. Instructions were given for technique, safety, recruitment, and rationale. Instructions were verbal and/or tactile. Seated: Ankle pumps 1 sets of 10 reps  Long arc quads 1 sets of 10 reps  Marching 1 sets of 10 reps  Hip abduction / adduction 1 sets of 10 reps     Gait:  Patient ambulated with RW for 25 ft with CGA with x1 episode of minimal assistance secondary to unsteady gait. No LOB. Patient presents with significantly forward flexed posture, narrow AINSLEY and tendency to ambulate outside of AD. Patient required verbal instructions for safety awareness, proper use of AD and postural corrections. Safety  Patient left safely in the chair, with call light/phone in reach with alarm applied. Gait belt was used for transfers and gait.       Assessment / Impression:    Patient's tolerance of treatment: well   Adverse Reaction: none  Significant change in status and impact:  none  Barriers to improvement:  none  Discharge plan: pending Nacho Butler for Next Session:    Per POC    Time in:  1115  Time out:  1138  Timed treatment minutes:  23  Total treatment time:  23    Previously filed items:        Long term goals  Long term goal 1: In one week, pt will complete all bed mobility with CGAx1  Long term goal 2: In one week, pt will complete sit <> stand transfers with CGAx1  Long term goal 3: In one week, pt will ambulate 40 feet with CGAx1 with LRAD  Long term goal 4:  In one week, pt will independently complete 3 sets of 10 reps of BLE AROM exercises in available and allowed ROM    Electronically signed by:    Yara Gleason PTA [id number]

## 2019-04-25 VITALS
TEMPERATURE: 97.8 F | WEIGHT: 160 LBS | DIASTOLIC BLOOD PRESSURE: 57 MMHG | HEART RATE: 71 BPM | OXYGEN SATURATION: 96 % | SYSTOLIC BLOOD PRESSURE: 93 MMHG | HEIGHT: 61 IN | BODY MASS INDEX: 30.21 KG/M2 | RESPIRATION RATE: 16 BRPM

## 2019-04-25 PROBLEM — E44.0 MODERATE MALNUTRITION (HCC): Chronic | Status: ACTIVE | Noted: 2019-04-25

## 2019-04-25 LAB
ANION GAP SERPL CALCULATED.3IONS-SCNC: 7 MMOL/L (ref 4–16)
BUN BLDV-MCNC: 11 MG/DL (ref 6–23)
CALCIUM SERPL-MCNC: 9.6 MG/DL (ref 8.3–10.6)
CHLORIDE BLD-SCNC: 107 MMOL/L (ref 99–110)
CHOLESTEROL: 73 MG/DL
CO2: 25 MMOL/L (ref 21–32)
CREAT SERPL-MCNC: 0.5 MG/DL (ref 0.6–1.1)
GFR AFRICAN AMERICAN: >60 ML/MIN/1.73M2
GFR NON-AFRICAN AMERICAN: >60 ML/MIN/1.73M2
GLUCOSE BLD-MCNC: 124 MG/DL (ref 70–99)
HCT VFR BLD CALC: 42.1 % (ref 37–47)
HDLC SERPL-MCNC: 28 MG/DL
HEMOGLOBIN: 13 GM/DL (ref 12.5–16)
LDL CHOLESTEROL DIRECT: 25 MG/DL
MAGNESIUM: 2.4 MG/DL (ref 1.8–2.4)
MCH RBC QN AUTO: 26.7 PG (ref 27–31)
MCHC RBC AUTO-ENTMCNC: 30.9 % (ref 32–36)
MCV RBC AUTO: 86.6 FL (ref 78–100)
PDW BLD-RTO: 21 % (ref 11.7–14.9)
PLATELET # BLD: 162 K/CU MM (ref 140–440)
PMV BLD AUTO: 12.1 FL (ref 7.5–11.1)
POTASSIUM SERPL-SCNC: 5 MMOL/L (ref 3.5–5.1)
RBC # BLD: 4.86 M/CU MM (ref 4.2–5.4)
SODIUM BLD-SCNC: 139 MMOL/L (ref 135–145)
TRIGL SERPL-MCNC: 82 MG/DL
WBC # BLD: 8.5 K/CU MM (ref 4–10.5)

## 2019-04-25 PROCEDURE — 6370000000 HC RX 637 (ALT 250 FOR IP): Performed by: INTERNAL MEDICINE

## 2019-04-25 PROCEDURE — 80048 BASIC METABOLIC PNL TOTAL CA: CPT

## 2019-04-25 PROCEDURE — 6370000000 HC RX 637 (ALT 250 FOR IP): Performed by: HOSPITALIST

## 2019-04-25 PROCEDURE — 2700000000 HC OXYGEN THERAPY PER DAY

## 2019-04-25 PROCEDURE — 83721 ASSAY OF BLOOD LIPOPROTEIN: CPT

## 2019-04-25 PROCEDURE — 2580000003 HC RX 258: Performed by: HOSPITALIST

## 2019-04-25 PROCEDURE — 80061 LIPID PANEL: CPT

## 2019-04-25 PROCEDURE — 6360000002 HC RX W HCPCS: Performed by: HOSPITALIST

## 2019-04-25 PROCEDURE — 1200000000 HC SEMI PRIVATE

## 2019-04-25 PROCEDURE — 94640 AIRWAY INHALATION TREATMENT: CPT

## 2019-04-25 PROCEDURE — 85027 COMPLETE CBC AUTOMATED: CPT

## 2019-04-25 PROCEDURE — 94761 N-INVAS EAR/PLS OXIMETRY MLT: CPT

## 2019-04-25 PROCEDURE — 83735 ASSAY OF MAGNESIUM: CPT

## 2019-04-25 PROCEDURE — 36415 COLL VENOUS BLD VENIPUNCTURE: CPT

## 2019-04-25 RX ORDER — SIMVASTATIN 20 MG
20 TABLET ORAL NIGHTLY
Qty: 30 TABLET | Refills: 3 | Status: ON HOLD | OUTPATIENT
Start: 2019-04-25 | End: 2019-10-23 | Stop reason: HOSPADM

## 2019-04-25 RX ORDER — AMIODARONE HYDROCHLORIDE 200 MG/1
200 TABLET ORAL DAILY
Qty: 30 TABLET | Refills: 3 | Status: SHIPPED | OUTPATIENT
Start: 2019-04-26

## 2019-04-25 RX ADMIN — ENOXAPARIN SODIUM 40 MG: 40 INJECTION SUBCUTANEOUS at 09:57

## 2019-04-25 RX ADMIN — OXYCODONE AND ACETAMINOPHEN 1 TABLET: 5; 325 TABLET ORAL at 10:02

## 2019-04-25 RX ADMIN — MAGNESIUM OXIDE TAB 400 MG (241.3 MG ELEMENTAL MG) 400 MG: 400 (241.3 MG) TAB at 20:22

## 2019-04-25 RX ADMIN — FAMOTIDINE 20 MG: 20 TABLET ORAL at 09:57

## 2019-04-25 RX ADMIN — ASPIRIN 81 MG 81 MG: 81 TABLET ORAL at 09:56

## 2019-04-25 RX ADMIN — SIMVASTATIN 20 MG: 20 TABLET, FILM COATED ORAL at 20:22

## 2019-04-25 RX ADMIN — MAGNESIUM OXIDE TAB 400 MG (241.3 MG ELEMENTAL MG) 400 MG: 400 (241.3 MG) TAB at 09:57

## 2019-04-25 RX ADMIN — OXYCODONE AND ACETAMINOPHEN 1 TABLET: 5; 325 TABLET ORAL at 14:45

## 2019-04-25 RX ADMIN — METOPROLOL SUCCINATE 50 MG: 50 TABLET, EXTENDED RELEASE ORAL at 09:57

## 2019-04-25 RX ADMIN — SODIUM CHLORIDE, PRESERVATIVE FREE 10 ML: 5 INJECTION INTRAVENOUS at 09:57

## 2019-04-25 RX ADMIN — AMIODARONE HYDROCHLORIDE 200 MG: 200 TABLET ORAL at 09:56

## 2019-04-25 RX ADMIN — CLOPIDOGREL BISULFATE 75 MG: 75 TABLET ORAL at 09:56

## 2019-04-25 RX ADMIN — Medication 2 PUFF: at 09:04

## 2019-04-25 RX ADMIN — AMITRIPTYLINE HYDROCHLORIDE 50 MG: 50 TABLET, FILM COATED ORAL at 20:22

## 2019-04-25 RX ADMIN — Medication 2 PUFF: at 19:37

## 2019-04-25 RX ADMIN — FAMOTIDINE 20 MG: 20 TABLET ORAL at 20:22

## 2019-04-25 RX ADMIN — OXYCODONE AND ACETAMINOPHEN 1 TABLET: 5; 325 TABLET ORAL at 20:21

## 2019-04-25 RX ADMIN — CITALOPRAM HYDROBROMIDE 10 MG: 20 TABLET ORAL at 09:57

## 2019-04-25 ASSESSMENT — PAIN DESCRIPTION - ORIENTATION
ORIENTATION: RIGHT;LEFT
ORIENTATION: RIGHT;LEFT

## 2019-04-25 ASSESSMENT — PAIN DESCRIPTION - DESCRIPTORS
DESCRIPTORS: ACHING
DESCRIPTORS: ACHING

## 2019-04-25 ASSESSMENT — PAIN DESCRIPTION - PAIN TYPE
TYPE: CHRONIC PAIN;ACUTE PAIN
TYPE: ACUTE PAIN;CHRONIC PAIN

## 2019-04-25 ASSESSMENT — PAIN DESCRIPTION - FREQUENCY: FREQUENCY: CONTINUOUS

## 2019-04-25 ASSESSMENT — PAIN DESCRIPTION - LOCATION
LOCATION: LEG
LOCATION: LEG

## 2019-04-25 ASSESSMENT — PAIN DESCRIPTION - ONSET: ONSET: ON-GOING

## 2019-04-25 ASSESSMENT — PAIN SCALES - GENERAL
PAINLEVEL_OUTOF10: 10
PAINLEVEL_OUTOF10: 0
PAINLEVEL_OUTOF10: 10
PAINLEVEL_OUTOF10: 8

## 2019-04-25 NOTE — DISCHARGE SUMMARY
Discharge Summary    Name:  Malcolm Barry /Age/Sex: 1950  (80 y.o. female)   MRN & CSN:  [id number] & [id number] Admission Date/Time: 2019  3:06 PM   Attending:  Geremias Estrada MD Discharging Physician: Geremias Estrada MD     Hospital Course:     Discharge diagnoses    Failure to thrive  Hypomagnesemia  Nonsustained SVT  COPD not in exacerbation  Hypertension  Anxiety    Patient is a 50-year-old female who presented to the ED with progressively worsening fatigue and frequent falls at home. The patient lives with her daughter who reports is become increasingly difficult to take care of her mother who is progressively getting weak with worsening ambulatory dysfunction. On presentation, patient was found to have hypomagnesemia. The remaining of her findings were not significant. Patient however was deconditioned and on PTOT evaluation, a short-term rehab was recommended. Magnesium was appropriately supplemented. Patient also had nonsustained VT which resolved. She had a nuclear stress test done which was a negative study with an ejection fraction of 50%. Patient is hypertensive and blood pressure was appropriately controlled with her home medications. Patient discharged to a skilled nursing facility.     Consults this admission:  IP CONSULT TO CASE MANAGEMENT  IP CONSULT TO IV TEAM  IP CONSULT TO HOSPITALIST  IP CONSULT TO CASE MANAGEMENT  IP CONSULT TO HOME CARE NEEDS  IP CONSULT TO CARDIOLOGY  IP CONSULT TO IV TEAM  IP CONSULT TO PAIN MANAGEMENT  IP CONSULT TO [address] [place] [address] TO IV TEAM    Discharge Instruction:   Follow up appointments:   Primary care physician:  within 2 weeks    Diet:  cardiac diet   Activity: activity as tolerated  Disposition: Discharged to:   []Home, []HHC, [x]SNF, []Acute Rehab, []Hospice   Condition on discharge: Stable    Discharge Medications:      Jasmin Mckeon, [address] [facility] Medication Instructions B:[id number]    Printed on:19 [phone]   Medication Information

## 2019-04-25 NOTE — CONSULTS
[address], [address] [facility]                                  CONSULTATION    PATIENT NAME: Valorie Lawson                      :        1950  MED REC NO:   [id number]                          ROOM:       4130  ACCOUNT NO:   [de-identified]                           ADMIT DATE: 2019  PROVIDER:     Josiane Marquez MD    CONSULT DATE:  2019    HISTORY OF PRESENT ILLNESS:  This is an Critical access hospital American female who is  admitted with weakness and found to have hypomagnesemia has complex  chronic pain syndrome due to multiple pathologies that was documented in  her review of systems. She is complaining of generalized body aching as  well as mid and low back pain. REVIEW OF SYSTEMS:  Positive for diabetes, lupus, rheumatoid arthritis,  hypertension, COPD, as well as gout, and coronary artery disease. The  patient is seen in the pain clinic. PHYSICAL EXAMINATION:  HEENT:  Neck is supple. No lymph node palpated. CHEST:  Unlabored breathing. CARDIOVASCULAR:  No cyanosis, no edema. ABDOMEN:  Soft. NEUROLOGIC:  Grossly intact. No neurological deficit. No ataxic gait. No incontinence of urine or stools. Motor and sensory functions are  within normal in both lower and upper extremities. Tenderness of  paravertebral muscles in the thoracic area. DATA:  I also reviewed her imaging studies. IMPRESSION:  Chronic pain syndrome, lupus, rheumatoid arthritis,  fracture T12 old. PLAN:  The patient is currently on Percocet 5 mg one p.o. q.4 hours  p.r.n. Does not seem to be complaining of pain. This dose could be  easily adjusted to Percocet 5 mg q.6 hours p.r.n.         Summer Caballero MD    D: 2019 22:15:33       T: 2019 23:00:39     [email or web address]  Job#: [id number]     Doc#: [id number]    CC:  <>

## 2019-04-25 NOTE — CONSULTS
Nutrition Assessment    Type and Reason for Visit: Initial, Consult(poor appetite, low alb/mag)    Nutrition Recommendations:   · Liberalize Cardiac Diet to General 2/2 poor intake. · Begin low ariane high protein oral nutrition supplement tid. · Begin frozen oral supplement bid. Nutrition Assessment: Moderate nutrition risk with moderate malnutrition AEB  poor intake, less than half of meals dos on Cardiac Diet. Pt consumed most of omelet breakfast this a.m. Doesn't care for our food, generally. Willing to try frozen oral supplement while here. Malnutrition Assessment:  · Malnutrition Status: Meets the criteria for moderate malnutrition  · Context: Chronic illness  · Findings of the 6 clinical characteristics of malnutrition (Minimum of 2 out of 6 clinical characteristics is required to make the diagnosis of moderate or severe Protein Calorie Malnutrition based on AND/ASPEN Guidelines):  1. Energy Intake-Less than or equal to 50% of estimated energy requirement, Greater than or equal to 7 days    2. Weight Loss-10% loss or greater, in 1 year  3. Fat Loss-Mild subcutaneous fat loss, Orbital  4. Muscle Loss-Mild muscle mass loss, Temples (temporalis muscle)  5. Fluid Accumulation-No significant fluid accumulation,    6.  Strength-Not measured    Nutrition Risk Level: Moderate    Nutrient Needs:  · Estimated Daily Total Kcal: 6408-4915  · Estimated Daily Protein (g): 48-72 (1-1.5 g/kg IBW)  · Estimated Daily Total Fluid (ml/day): 8115-7852 (1 ml/kcal)    Nutrition Diagnosis:   · Problem:  Moderate malnutrition, In context of chronic illness  · Etiology: related to Insufficient energy/nutrient consumption     Signs and symptoms:  as evidenced by Diet history of poor intake, Weight loss, Mild loss of subcutaneous fat, Mild muscle loss    Objective Information:  · Wound Type: None  · Current Nutrition Therapies:  · Oral Diet Orders: Cardiac   · Oral Diet intake: 1-25%, 26-50%, 51-75%  · Oral Nutrition Supplement (ONS) Orders: None  · Anthropometric Measures:  · Ht: 5' 1\" (154.9 cm)   · Current Body Wt: 160 lb (72.6 kg)  · Admission Body Wt: 154 lb 12.8 oz (70.2 kg)  · Usual Body Wt: 193 lb 4.8 oz (87.7 kg)(5/1/18)  · % Weight Change:   -17% in 1 yr  · Ideal Body Wt: 105 lb (47.6 kg), % Ideal Body 152  · BMI Classification: BMI 30.0 - 34.9 Obese Class I(30.3)    Nutrition Interventions:   Modify current diet, Start ONS  Continued Inpatient Monitoring, Education Initiated, Coordination of Care    Nutrition Evaluation:   · Evaluation: Goals set   · Goals:  Patient will meet at least 75% of estimated nutrient needs during los with meals and supplements provided. Patient will meet at least 75% of estimated nutrient needs during los with meals and supplements provided.     · Monitoring: Meal Intake, Supplement Intake, Diet Tolerance, Weight, Pertinent Labs      Electronically signed by Papo Gilliland RD, KAITY on 4/25/19 at 10:29 AM    Contact Number: [phone]

## 2019-04-25 NOTE — CARE COORDINATION
Pt approved for [facility] View/ sent PS to Dr. Ghazala Wilson to update. HENS complete/ pt ready for discharge from CM standpoint.

## 2019-04-25 NOTE — PROGRESS NOTES
Cardiology Progress Note       Rosita Yoder is a 80 y.o. female   1950     SUBJECTIVE:   Patient  Seen  And  Examined    No  Chest  Pain  Rhythm,  Is  Sinus  With  Some  pvcs   But  Less  frequent  OBJECTIVE:    Review of Systems:  General appearance: alert, appears stated age and cooperative  Skin: Skin color, texture, normal. No rashes or lesions  HEENT: No nose bleed, headache, vision problems  CV: C/O chest pain, tightness, pressure,   Respiratory: C/o no SOB, RUBI, Orthopnea, PND  GI: No abdominal pain, black stool, bloating  Limbs: No c/o edema, pain, swelling, intermittent claudication, joint pains  Neuro: No dizziness, lightheadedness, syncope, gait problems, memory problems  Psych: grossly normal. No SI/depression. Vitals:   Blood pressure 110/75, pulse 88, temperature 97.5 °F (36.4 °C), temperature source Oral, resp. rate 17, height 5' 1\" (1.549 m), weight 160 lb (72.6 kg), SpO2 96 %, not currently breastfeeding. HEENT: AT, NC, PERRLA  Neck: No JVD  Heart: S1 S2 audible, no murmur   Lungs: CTA   Abdomen: Nontender   Limbs: No edema   CNS: no focal deficit      Past Medical History:   Diagnosis Date    Anxiety     CAD (coronary artery disease)     Dr Valery Feldman CAD S/P percutaneous coronary angioplasty 2012    stent RCA; Ahmed    Chronic back pain     COPD (chronic obstructive pulmonary disease) ([facility]     Aramisdhruv Aj Gout 2018    History of cerebral infarction 2000    right hemiparesis with full recovery    Hyperlipidemia     Hypertension     Lumbar spinal stenosis     Lung cancer ([facility] 09/2014    Dr Waleska Gonzalez; left upper lobectomy; non small cell CA    Obesity (BMI 30.0-34. 9)     Restless leg syndrome     Rheumatoid arthritis (HCC)     Smoker     Systemic lupus erythematosus (HCC) 1992    Dr Kaushik Yusuf, visiting physician    Type II diabetes mellitus ([facility] 1969    Ventral hernia 06/2018    right ventral hernia seen on CT abd 6/2018        Patient Active Problem List   Diagnosis    [facility]    Hidradenitis suppurativa    Controlled type 2 diabetes mellitus with diabetic polyneuropathy, without long-term current use of insulin (HCC)    CAD (coronary artery disease)    Anxiety disorder    Lupus    Chronic obstructive pulmonary disease (HCC)    CAD S/P percutaneous coronary angioplasty    Gout    History of lung cancer    Smoker    Chronic low back pain with bilateral sciatica    Obesity (BMI 30.0-34. 9)    Essential hypertension    Chronic respiratory failure (HCC)    Syncope and collapse    Hypomagnesemia syndrome        Allergies   Allergen Reactions    Food Swelling     \"Rye, Whole grains, Barley\"    Trental [Pentoxifylline] Swelling    Tramadol      \"I Felt Shaky\"    Vistaril [Hydroxyzine Hcl] Other (See Comments)     Sedated and was placed on respirator    Erythromycin      \"Stomach Pain\"    Motrin [Ibuprofen] Diarrhea    Tetracyclines & Related Nausea And Vomiting        Current Inpatient Medications:    Current Facility-Administered Medications   Medication Dose Route Frequency Provider Last Rate Last Dose    amiodarone (CORDARONE) tablet 200 mg  200 mg Oral Daily Isrrael Correa MD   200 mg at 04/25/19 0956    oxyCODONE-acetaminophen (PERCOCET) 5-325 MG per tablet 1 tablet  1 tablet Oral Q4H PRN Jennifer Gutierrez MD   1 tablet at 04/25/19 1002    amitriptyline (ELAVIL) tablet 50 mg  50 mg Oral Nightly Jennifer Ward MD   50 mg at 04/24/19 2035    aspirin chewable tablet 81 mg  81 mg Oral Daily Louise Doyle MD   81 mg at 04/25/19 0956    mometasone-formoterol (DULERA) 200-5 MCG/ACT inhaler 2 puff  2 puff Inhalation BID Jennifer Ward MD   2 puff at 04/25/19 0904    citalopram (CELEXA) tablet 10 mg  10 mg Oral Daily Jennifer Ward MD   10 mg at 04/25/19 0957    clopidogrel (PLAVIX) tablet 75 mg  75 mg Oral Daily Jennifer Ward MD   75 mg at 04/25/19 0956    famotidine (PEPCID) tablet 20 mg  20 mg Oral BID Jennifer Ward MD   20 mg at 04/25/19 0957    metoprolol succinate (TOPROL XL) extended release tablet 50 mg  50 mg Oral Daily Jacqueline Sanchez MD   50 mg at 04/25/19 0957    ipratropium-albuterol (DUONEB) nebulizer solution 1 ampule  1 ampule Inhalation Q4H PRN Jacqueline Sanchez MD        sodium chloride flush 0.9 % injection 10 mL  10 mL Intravenous 2 times per day Jacqueline Sanchez MD   10 mL at 04/25/19 0957    sodium chloride flush 0.9 % injection 10 mL  10 mL Intravenous PRN Jacqueline Sanchez MD        magnesium hydroxide (MILK OF MAGNESIA) 400 MG/5ML suspension 30 mL  30 mL Oral Daily PRN Jacqueline Sanchez MD   30 mL at 04/24/19 0904    ondansetron (ZOFRAN) injection 4 mg  4 mg Intravenous Q6H PRN Jacqueline Sanchez MD        enoxaparin (LOVENOX) injection 40 mg  40 mg Subcutaneous Daily Jacqueline Sanchez MD   40 mg at 04/25/19 0957    acetaminophen (TYLENOL) tablet 650 mg  650 mg Oral Q4H PRN Jacqueline Sanchez MD   650 mg at 04/18/19 2340    potassium chloride (KLOR-CON M) extended release tablet 40 mEq  40 mEq Oral PRN Jacqueline Sanchez MD        Or    potassium chloride (KLOR-CON) packet 40 mEq  40 mEq Oral PRN Jacqueline Sanchez MD        Or    potassium chloride 10 mEq/100 mL IVPB (Peripheral Line)  10 mEq Intravenous PRN Jacqueline Sanchez MD        magnesium sulfate 1 g in dextrose 5% 100 mL IVPB  1 g Intravenous PRN Jacqueline Sanchez MD   Stopped at 04/23/19 1327    magnesium oxide (MAG-OX) tablet 400 mg  400 mg Oral BID Jacqueline Sanchez MD   400 mg at 04/25/19 0957    simvastatin (ZOCOR) tablet 20 mg  20 mg Oral Nightly Jacqueline Sanchez MD   20 mg at 04/24/19 2034           Labs:  CBC with Differential:    Lab Results   Component Value Date    WBC 8.5 04/25/2019    RBC 4.86 04/25/2019    HGB 13.0 04/25/2019    HCT 42.1 04/25/2019     04/25/2019    MCV 86.6 04/25/2019    MCH 26.7 04/25/2019    MCHC 30.9 04/25/2019    RDW 21.0 04/25/2019    NRBC 1 03/23/2017    SEGSPCT 34.2 04/18/2019    BANDSPCT 5 03/24/2017    LYMPHOPCT 57.1 04/18/2019    MONOPCT 7.2 04/18/2019    MYELOPCT 1 03/24/2017    EOSPCT 0.1 06/08/2011    BASOPCT 0.4 04/18/2019    MONOSABS 0.5 04/18/2019    LYMPHSABS 4.0 04/18/2019    EOSABS 0.1 04/18/2019    BASOSABS 0.0 04/18/2019    DIFFTYPE AUTOMATED DIFFERENTIAL 04/18/2019     CMP:    Lab Results   Component Value Date     04/25/2019    K 5.0 04/25/2019     04/25/2019    CO2 25 04/25/2019    BUN 11 04/25/2019    CREATININE 0.5 04/25/2019    GFRAA >60 04/25/2019    LABGLOM >60 04/25/2019    GLUCOSE 124 04/25/2019    PROT 5.1 04/18/2019    PROT 7.7 06/07/2011    LABALBU 2.5 04/18/2019    CALCIUM 9.6 04/25/2019    BILITOT 0.3 04/18/2019    ALKPHOS 89 04/18/2019    AST 35 04/18/2019    ALT 18 04/18/2019     Hepatic Function Panel:    Lab Results   Component Value Date    ALKPHOS 89 04/18/2019    ALT 18 04/18/2019    AST 35 04/18/2019    PROT 5.1 04/18/2019    PROT 7.7 06/07/2011    BILITOT 0.3 04/18/2019    BILIDIR 0.2 05/30/2017    IBILI 0.0 05/30/2017    LABALBU 2.5 04/18/2019     Magnesium:    Lab Results   Component Value Date    MG 1.7 04/23/2019     PT/INR:    Lab Results   Component Value Date    PROTIME 13.3 04/20/2019    PROTIME 11.4 06/07/2011    INR 1.17 04/20/2019     Last 3 Troponin:    Lab Results   Component Value Date    TROPONINI <0.006 02/08/2014    TROPONINI 0.065 06/07/2011    TROPONINI 0.065 06/07/2011    TROPONINI <0.006 06/07/2011     U/A:    Lab Results   Component Value Date    COLORU YELLOW 04/18/2019    WBCUA <1 04/18/2019    RBCUA <1 04/18/2019    MUCUS RARE 04/18/2019    TRICHOMONAS NONE SEEN 04/18/2019    YEAST RARE 08/06/2016    BACTERIA NEGATIVE 04/18/2019    CLARITYU CLEAR 04/18/2019    SPECGRAV 1.020 04/18/2019    LEUKOCYTESUR NEGATIVE 04/18/2019    UROBILINOGEN NORMAL 04/18/2019    BILIRUBINUR NEGATIVE 04/18/2019    BLOODU NEGATIVE 04/18/2019     ABG:    Lab Results   Component Value Date    MCC6JID 39.0 05/02/2018    PO2ART 83 05/02/2018    WRL0HAH 27.1 05/02/2018     FLP:    Lab Results   Component Value Date    TRIG 58 06/13/2018    HDL 42 06/13/2018    LDLDIRECT 30 06/13/2018     TSH:  No results found for: TSH   DATA:   ECG: Sinus Rhythm       ASSESSMENT:    1  Non  Sustained    Vt [employer]    Rhythm  Is  Sinus  With  Occasional  pvcs  k  mag  1.7   2  Cad  Stent  rca  In  Past  Nuclear  Stress  Yesterday  No  Ischemia  Echo  Ef  50%  3  hypetension  Relatively  Controlled  4  High  cholesterol  aslt  ldl  30    Not  On  antilipid  Will  check  level  PLAN   Stable  From  Cardiac  standpoint

## 2019-05-26 ENCOUNTER — HOSPITAL ENCOUNTER (INPATIENT)
Age: 69
LOS: 3 days | Discharge: HOME | DRG: 689 | End: 2019-05-29
Attending: EMERGENCY MEDICINE | Admitting: INTERNAL MEDICINE
Payer: MEDICARE

## 2019-05-26 ENCOUNTER — APPOINTMENT (OUTPATIENT)
Dept: CT IMAGING | Age: 69
DRG: 689 | End: 2019-05-26
Payer: MEDICARE

## 2019-05-26 ENCOUNTER — APPOINTMENT (OUTPATIENT)
Dept: GENERAL RADIOLOGY | Age: 69
DRG: 689 | End: 2019-05-26
Payer: MEDICARE

## 2019-05-26 DIAGNOSIS — R41.82 ALTERED MENTAL STATUS, UNSPECIFIED ALTERED MENTAL STATUS TYPE: ICD-10-CM

## 2019-05-26 DIAGNOSIS — E83.42 HYPOMAGNESEMIA: ICD-10-CM

## 2019-05-26 DIAGNOSIS — N30.00 ACUTE CYSTITIS WITHOUT HEMATURIA: Primary | ICD-10-CM

## 2019-05-26 PROBLEM — N39.0 UTI (URINARY TRACT INFECTION): Status: ACTIVE | Noted: 2019-05-26

## 2019-05-26 LAB
ALBUMIN SERPL-MCNC: 2.2 GM/DL (ref 3.4–5)
ALP BLD-CCNC: 85 IU/L (ref 40–129)
ALT SERPL-CCNC: 16 U/L (ref 10–40)
ANION GAP SERPL CALCULATED.3IONS-SCNC: 6 MMOL/L (ref 4–16)
AST SERPL-CCNC: 26 IU/L (ref 15–37)
BACTERIA: ABNORMAL /HPF
BASOPHILS ABSOLUTE: 0 K/CU MM
BASOPHILS RELATIVE PERCENT: 0.3 % (ref 0–1)
BILIRUB SERPL-MCNC: 0.6 MG/DL (ref 0–1)
BILIRUBIN URINE: NEGATIVE MG/DL
BLOOD, URINE: ABNORMAL
BUN BLDV-MCNC: 7 MG/DL (ref 6–23)
CALCIUM SERPL-MCNC: 8.8 MG/DL (ref 8.3–10.6)
CHLORIDE BLD-SCNC: 112 MMOL/L (ref 99–110)
CLARITY: ABNORMAL
CO2: 26 MMOL/L (ref 21–32)
COLOR: ABNORMAL
CREAT SERPL-MCNC: 0.5 MG/DL (ref 0.6–1.1)
DIFFERENTIAL TYPE: ABNORMAL
EOSINOPHILS ABSOLUTE: 0.1 K/CU MM
EOSINOPHILS RELATIVE PERCENT: 1 % (ref 0–3)
GFR AFRICAN AMERICAN: >60 ML/MIN/1.73M2
GFR NON-AFRICAN AMERICAN: >60 ML/MIN/1.73M2
GLUCOSE BLD-MCNC: 90 MG/DL (ref 70–99)
GLUCOSE, URINE: NEGATIVE MG/DL
HCT VFR BLD CALC: 33.4 % (ref 37–47)
HEMOGLOBIN: 10.8 GM/DL (ref 12.5–16)
HYALINE CASTS: 0 /LPF
IMMATURE NEUTROPHIL %: 0.3 % (ref 0–0.43)
KETONES, URINE: NEGATIVE MG/DL
LEUKOCYTE ESTERASE, URINE: ABNORMAL
LYMPHOCYTES ABSOLUTE: 5.8 K/CU MM
LYMPHOCYTES RELATIVE PERCENT: 42.7 % (ref 24–44)
MAGNESIUM: 1.5 MG/DL (ref 1.8–2.4)
MCH RBC QN AUTO: 27 PG (ref 27–31)
MCHC RBC AUTO-ENTMCNC: 32.3 % (ref 32–36)
MCV RBC AUTO: 83.5 FL (ref 78–100)
MONOCYTES ABSOLUTE: 0.8 K/CU MM
MONOCYTES RELATIVE PERCENT: 5.5 % (ref 0–4)
MUCUS: ABNORMAL HPF
NITRITE URINE, QUANTITATIVE: POSITIVE
NUCLEATED RBC %: 0.1 %
PDW BLD-RTO: 18.1 % (ref 11.7–14.9)
PH, URINE: 5 (ref 5–8)
PLATELET # BLD: 248 K/CU MM (ref 140–440)
PMV BLD AUTO: 11.2 FL (ref 7.5–11.1)
POTASSIUM SERPL-SCNC: 3.9 MMOL/L (ref 3.5–5.1)
PROTEIN UA: NEGATIVE MG/DL
RBC # BLD: 4 M/CU MM (ref 4.2–5.4)
RBC URINE: 1 /HPF (ref 0–6)
REASON FOR REJECTION: NORMAL
REASON FOR REJECTION: NORMAL
REJECTED TEST: NORMAL
SEGMENTED NEUTROPHILS ABSOLUTE COUNT: 6.8 K/CU MM
SEGMENTED NEUTROPHILS RELATIVE PERCENT: 50.2 % (ref 36–66)
SODIUM BLD-SCNC: 144 MMOL/L (ref 135–145)
SPECIFIC GRAVITY UA: 1.02 (ref 1–1.03)
TOTAL CK: 67 IU/L (ref 26–140)
TOTAL IMMATURE NEUTOROPHIL: 0.04 K/CU MM
TOTAL NUCLEATED RBC: 0 K/CU MM
TOTAL PROTEIN: 4.8 GM/DL (ref 6.4–8.2)
TRICHOMONAS: ABNORMAL /HPF
UROBILINOGEN, URINE: NORMAL MG/DL (ref 0.2–1)
WBC # BLD: 13.5 K/CU MM (ref 4–10.5)
WBC UA: 9 /HPF (ref 0–5)

## 2019-05-26 PROCEDURE — 71045 X-RAY EXAM CHEST 1 VIEW: CPT

## 2019-05-26 PROCEDURE — 72125 CT NECK SPINE W/O DYE: CPT

## 2019-05-26 PROCEDURE — 96367 TX/PROPH/DG ADDL SEQ IV INF: CPT

## 2019-05-26 PROCEDURE — 85025 COMPLETE CBC W/AUTO DIFF WBC: CPT

## 2019-05-26 PROCEDURE — 93005 ELECTROCARDIOGRAM TRACING: CPT | Performed by: EMERGENCY MEDICINE

## 2019-05-26 PROCEDURE — 87086 URINE CULTURE/COLONY COUNT: CPT

## 2019-05-26 PROCEDURE — 1200000000 HC SEMI PRIVATE

## 2019-05-26 PROCEDURE — 83735 ASSAY OF MAGNESIUM: CPT

## 2019-05-26 PROCEDURE — 80053 COMPREHEN METABOLIC PANEL: CPT

## 2019-05-26 PROCEDURE — 70450 CT HEAD/BRAIN W/O DYE: CPT

## 2019-05-26 PROCEDURE — 6360000002 HC RX W HCPCS: Performed by: EMERGENCY MEDICINE

## 2019-05-26 PROCEDURE — 2580000003 HC RX 258: Performed by: EMERGENCY MEDICINE

## 2019-05-26 PROCEDURE — 96361 HYDRATE IV INFUSION ADD-ON: CPT

## 2019-05-26 PROCEDURE — 82550 ASSAY OF CK (CPK): CPT

## 2019-05-26 PROCEDURE — 81001 URINALYSIS AUTO W/SCOPE: CPT

## 2019-05-26 PROCEDURE — 87077 CULTURE AEROBIC IDENTIFY: CPT

## 2019-05-26 PROCEDURE — 96365 THER/PROPH/DIAG IV INF INIT: CPT

## 2019-05-26 PROCEDURE — 99285 EMERGENCY DEPT VISIT HI MDM: CPT

## 2019-05-26 PROCEDURE — 87186 SC STD MICRODIL/AGAR DIL: CPT

## 2019-05-26 RX ORDER — MAGNESIUM SULFATE IN WATER 40 MG/ML
2 INJECTION, SOLUTION INTRAVENOUS ONCE
Status: COMPLETED | OUTPATIENT
Start: 2019-05-26 | End: 2019-05-27

## 2019-05-26 RX ORDER — 0.9 % SODIUM CHLORIDE 0.9 %
1000 INTRAVENOUS SOLUTION INTRAVENOUS ONCE
Status: COMPLETED | OUTPATIENT
Start: 2019-05-26 | End: 2019-05-26

## 2019-05-26 RX ADMIN — SODIUM CHLORIDE 1000 ML: 9 INJECTION, SOLUTION INTRAVENOUS at 17:46

## 2019-05-26 RX ADMIN — SODIUM CHLORIDE 1.5 G: 900 INJECTION, SOLUTION INTRAVENOUS at 21:49

## 2019-05-26 RX ADMIN — MAGNESIUM SULFATE HEPTAHYDRATE 2 G: 40 INJECTION, SOLUTION INTRAVENOUS at 23:37

## 2019-05-26 NOTE — ED PROVIDER NOTES
file    Highest education level: Not on file   Occupational History    Not on file   Social Needs    Financial resource strain: Not on file    Food insecurity:     Worry: Not on file     Inability: Not on file    Transportation needs:     Medical: Not on file     Non-medical: Not on file   Tobacco Use    Smoking status: Current Every Day Smoker     Packs/day: 0.50     Years: 32.00     Pack years: 16.00     Types: Cigarettes     Start date: 12    Smokeless tobacco: Never Used   Substance and Sexual Activity    Alcohol use: No     Alcohol/week: 0.0 oz    Drug use: No     Comment: states quit 1-2-14    Sexual activity: Not Currently   Lifestyle    Physical activity:     Days per week: Not on file     Minutes per session: Not on file    Stress: Not on file   Relationships    Social connections:     Talks on phone: Not on file     Gets together: Not on file     Attends Jewish service: Not on file     Active member of club or organization: Not on file     Attends meetings of clubs or organizations: Not on file     Relationship status: Not on file    Intimate partner violence:     Fear of current or ex partner: Not on file     Emotionally abused: Not on file     Physically abused: Not on file     Forced sexual activity: Not on file   Other Topics Concern    Not on file   Social History Narrative    Not on file     Current Facility-Administered Medications   Medication Dose Route Frequency Provider Last Rate Last Dose    sodium chloride flush 0.9 % injection 10 mL  10 mL Intravenous 2 times per day Rockwell Ahumada, MD        sodium chloride flush 0.9 % injection 10 mL  10 mL Intravenous PRN Rockwell Ahumada, MD        magnesium hydroxide (MILK OF MAGNESIA) 400 MG/5ML suspension 30 mL  30 mL Oral Daily PRN Rockwell Ahumada, MD        ondansetron (ZOFRAN) injection 4 mg  4 mg Intravenous Q6H PRN Rockwell Ahumada, MD        enoxaparin (LOVENOX) injection 40 mg  40 mg Subcutaneous Daily Rockwell Ahumada, MD Intact   Respiratory:  Lungs clear to auscultation bilaterally. Respirations nonlabored. Speaking clearly in full sentences. No respiratory distress. Abdominal:  Normal bowel sounds. Soft. Nontender. Non distended. Elevated BMI. Back:  No CVA tenderness to palpation     Neurological:  Alert and oriented times 3. No focal neuro deficits.  Sensation intact to light touch to distal upper/lower extremities; 5/5 and symmetric  and dorsi/plantar flexion            Psychiatric:  Appropriate    I have reviewed and interpreted all of the currently available lab results from this visit (if applicable):  Results for orders placed or performed during the hospital encounter of 05/26/19   CBC Auto Differential   Result Value Ref Range    WBC 13.5 (H) 4.0 - 10.5 K/CU MM    RBC 4.00 (L) 4.2 - 5.4 M/CU MM    Hemoglobin 10.8 (L) 12.5 - 16.0 GM/DL    Hematocrit 33.4 (L) 37 - 47 %    MCV 83.5 78 - 100 FL    MCH 27.0 27 - 31 PG    MCHC 32.3 32.0 - 36.0 %    RDW 18.1 (H) 11.7 - 14.9 %    Platelets 708 593 - 048 K/CU MM    MPV 11.2 (H) 7.5 - 11.1 FL    Differential Type AUTOMATED DIFFERENTIAL     Segs Relative 50.2 36 - 66 %    Lymphocytes % 42.7 24 - 44 %    Monocytes % 5.5 (H) 0 - 4 %    Eosinophils % 1.0 0 - 3 %    Basophils % 0.3 0 - 1 %    Segs Absolute 6.8 K/CU MM    Lymphocytes # 5.8 K/CU MM    Monocytes # 0.8 K/CU MM    Eosinophils # 0.1 K/CU MM    Basophils # 0.0 K/CU MM    Nucleated RBC % 0.1 %    Total Nucleated RBC 0.0 K/CU MM    Total Immature Neutrophil 0.04 K/CU MM    Immature Neutrophil % 0.3 0 - 0.43 %   Urinalysis   Result Value Ref Range    Color, UA RAH (A) YELLOW    Clarity, UA HAZY (A) CLEAR    Glucose, Urine NEGATIVE NEGATIVE MG/DL    Bilirubin Urine NEGATIVE NEGATIVE MG/DL    Ketones, Urine NEGATIVE NEGATIVE MG/DL    Specific Gravity, UA 1.017 1.001 - 1.035    Blood, Urine SMALL (A) NEGATIVE    pH, Urine 5.0 5.0 - 8.0    Protein, UA NEGATIVE NEGATIVE MG/DL    Urobilinogen, Urine NORMAL 0.2 - 1.0 MG/DL    Nitrite Urine, Quantitative POSITIVE (A) NEGATIVE    Leukocyte Esterase, Urine TRACE (A) NEGATIVE    RBC, UA 1 0 - 6 /HPF    WBC, UA 9 (H) 0 - 5 /HPF    Bacteria, UA FEW (A) NEGATIVE /HPF    Mucus, UA RARE (A) NEGATIVE HPF    Trichomonas, UA NONE SEEN NONE SEEN /HPF    Hyaline Casts, UA 0 /LPF   Comprehensive Metabolic Panel   Result Value Ref Range    Sodium 144 135 - 145 MMOL/L    Potassium 3.9 3.5 - 5.1 MMOL/L    Chloride 112 (H) 99 - 110 mMol/L    CO2 26 21 - 32 MMOL/L    BUN 7 6 - 23 MG/DL    CREATININE 0.5 (L) 0.6 - 1.1 MG/DL    Glucose 90 70 - 99 MG/DL    Calcium 8.8 8.3 - 10.6 MG/DL    Alb 2.2 (L) 3.4 - 5.0 GM/DL    Total Protein 4.8 (L) 6.4 - 8.2 GM/DL    Total Bilirubin 0.6 0.0 - 1.0 MG/DL    ALT 16 10 - 40 U/L    AST 26 15 - 37 IU/L    Alkaline Phosphatase 85 40 - 129 IU/L    GFR Non-African American >60 >60 mL/min/1.73m2    GFR African American >60 >60 mL/min/1.73m2    Anion Gap 6 4 - 16   CK   Result Value Ref Range    Total CK 67 26 - 140 IU/L   Magnesium   Result Value Ref Range    Magnesium 1.5 (L) 1.8 - 2.4 mg/dl   SPECIMEN REJECTION   Result Value Ref Range    Rejected Test CMPR CPK MG     Reason for Rejection UNABLE TO PERFORM TESTING:     Reason for Rejection SPECIMEN HEMOLYZED    EKG 12 Lead   Result Value Ref Range    Ventricular Rate 80 BPM    Atrial Rate 80 BPM    P-R Interval 132 ms    QRS Duration 102 ms    Q-T Interval 414 ms    QTc Calculation (Bazett) 477 ms    P Axis 44 degrees    R Axis -85 degrees    T Axis 23 degrees    Diagnosis       Sinus rhythm with occasional premature ventricular complexes  Left axis deviation  Low voltage QRS  Possible Anterolateral infarct , age undetermined  Abnormal ECG  When compared with ECG of 20-APR-2019 13:26,  premature ventricular complexes are now present        Radiographs (if obtained):  [] The following radiograph was interpreted by myself in the absence of a radiologist:   [x] Radiologist's Report Reviewed:  CT CERVICAL SPINE WO CONTRAST   Final Result   1. Image quality is degraded secondary to motion artifact. 2. No acute fracture identified within limits of the exam.         CT HEAD WO CONTRAST   Final Result   No acute intracranial abnormality. XR CHEST PORTABLE   Final Result   There is mild bibasilar disease superimposed on mild left basilar chronic   atelectasis/fibrosis. Airspace disease may represent pneumonia and/or   atelectasis. EKG (if obtained): (All EKG's are interpreted by myself in the absence of a cardiologist)  12 lead EKG per my interpretation:  Normal Sinus Rhythm at 80 with PVC  Axis is   Left axis deviation  QTc is  within an acceptable range  There is no specific T wave changes appreciated. There is no specific ST wave changes appreciated. No STEMI    Prior EKG to compare with was available and T-wave inversion and downsloping ST segment in V2 seen on prior. Chart review shows recent radiographs:  No results found. MDM:  Pt presents as above. Emergent conditions considered. Presentation prompted initial labs, EKG and imaging. EKG with normal sinus rhythm as above. IV was established and IV fluid bolus was given. CBC with small leukocytosis. CMP without clinically significant derangement. Magnesium is slightly low. CK is not suggestive of rhabdomyolysis. Urinalysis is consistent with urinary tract infection. Patient with waxing and waning mental status in the emergency department and was very confused for our  not even knowing where she was. CT head obtained and negative for acute process. CT cervical spine also negative. Chest x-ray demonstrating a possible pneumonia however patient without fever or cough. Given and patient's urine findings and waxing waning mental status decision made to admit the patient. Magnesium was repleted in the emergency department and patient was treated with IV Unasyn given patient prior culture results.  Case discussed with hospitalist team and patient admitted to medicine. Questions sought and answered with the patient. They voice understanding and agree with plan. Clinical Impression:  1. Acute cystitis without hematuria    2. Altered mental status, unspecified altered mental status type    3. Hypomagnesemia      Disposition referral (if applicable):  DO Lilliam Thompson [address] [facility]  [phone]          Disposition medications (if applicable):  Current Discharge Medication List          Comment: Please note this report has been produced using speech recognition software and may contain errors related to that system including errors in grammar, punctuation, and spelling, as well as words and phrases that may be inappropriate. If there are any questions or concerns please feel free to contact the dictating provider for clarification.        Frida Capellan MD  05/27/19 [id number]

## 2019-05-27 ENCOUNTER — APPOINTMENT (OUTPATIENT)
Dept: CT IMAGING | Age: 69
DRG: 689 | End: 2019-05-27
Payer: MEDICARE

## 2019-05-27 LAB
ANION GAP SERPL CALCULATED.3IONS-SCNC: 9 MMOL/L (ref 4–16)
BASOPHILS ABSOLUTE: 0 K/CU MM
BASOPHILS RELATIVE PERCENT: 0.4 % (ref 0–1)
BUN BLDV-MCNC: 6 MG/DL (ref 6–23)
CALCIUM SERPL-MCNC: 8.8 MG/DL (ref 8.3–10.6)
CHLORIDE BLD-SCNC: 112 MMOL/L (ref 99–110)
CO2: 24 MMOL/L (ref 21–32)
CREAT SERPL-MCNC: 0.6 MG/DL (ref 0.6–1.1)
DIFFERENTIAL TYPE: ABNORMAL
EOSINOPHILS ABSOLUTE: 0.1 K/CU MM
EOSINOPHILS RELATIVE PERCENT: 1.4 % (ref 0–3)
ESTIMATED AVERAGE GLUCOSE: 114 MG/DL
GFR AFRICAN AMERICAN: >60 ML/MIN/1.73M2
GFR NON-AFRICAN AMERICAN: >60 ML/MIN/1.73M2
GLUCOSE BLD-MCNC: 102 MG/DL (ref 70–99)
GLUCOSE BLD-MCNC: 110 MG/DL (ref 70–99)
GLUCOSE BLD-MCNC: 140 MG/DL (ref 70–99)
GLUCOSE BLD-MCNC: 147 MG/DL (ref 70–99)
GLUCOSE BLD-MCNC: 61 MG/DL (ref 70–99)
GLUCOSE BLD-MCNC: 64 MG/DL (ref 70–99)
GLUCOSE BLD-MCNC: 69 MG/DL (ref 70–99)
GLUCOSE BLD-MCNC: 72 MG/DL (ref 70–99)
GLUCOSE BLD-MCNC: 83 MG/DL (ref 70–99)
HBA1C MFR BLD: 5.6 % (ref 4.2–6.3)
HCT VFR BLD CALC: 37.9 % (ref 37–47)
HEMOGLOBIN: 12.2 GM/DL (ref 12.5–16)
IMMATURE NEUTROPHIL %: 0.2 % (ref 0–0.43)
LYMPHOCYTES ABSOLUTE: 2.8 K/CU MM
LYMPHOCYTES RELATIVE PERCENT: 34.4 % (ref 24–44)
MAGNESIUM: 1.9 MG/DL (ref 1.8–2.4)
MCH RBC QN AUTO: 27 PG (ref 27–31)
MCHC RBC AUTO-ENTMCNC: 32.2 % (ref 32–36)
MCV RBC AUTO: 83.8 FL (ref 78–100)
MONOCYTES ABSOLUTE: 0.4 K/CU MM
MONOCYTES RELATIVE PERCENT: 5.4 % (ref 0–4)
NUCLEATED RBC %: 0 %
PDW BLD-RTO: 18.4 % (ref 11.7–14.9)
PLATELET # BLD: 247 K/CU MM (ref 140–440)
PMV BLD AUTO: 10.7 FL (ref 7.5–11.1)
POTASSIUM SERPL-SCNC: 4.1 MMOL/L (ref 3.5–5.1)
RBC # BLD: 4.52 M/CU MM (ref 4.2–5.4)
REASON FOR REJECTION: NORMAL
REASON FOR REJECTION: NORMAL
REJECTED TEST: NORMAL
SEGMENTED NEUTROPHILS ABSOLUTE COUNT: 4.7 K/CU MM
SEGMENTED NEUTROPHILS RELATIVE PERCENT: 58.2 % (ref 36–66)
SODIUM BLD-SCNC: 145 MMOL/L (ref 135–145)
TOTAL IMMATURE NEUTOROPHIL: 0.02 K/CU MM
TOTAL NUCLEATED RBC: 0 K/CU MM
WBC # BLD: 8.1 K/CU MM (ref 4–10.5)

## 2019-05-27 PROCEDURE — 6370000000 HC RX 637 (ALT 250 FOR IP): Performed by: HOSPITALIST

## 2019-05-27 PROCEDURE — 2580000003 HC RX 258: Performed by: HOSPITALIST

## 2019-05-27 PROCEDURE — 80048 BASIC METABOLIC PNL TOTAL CA: CPT

## 2019-05-27 PROCEDURE — 1200000000 HC SEMI PRIVATE

## 2019-05-27 PROCEDURE — 82962 GLUCOSE BLOOD TEST: CPT

## 2019-05-27 PROCEDURE — 93010 ELECTROCARDIOGRAM REPORT: CPT | Performed by: INTERNAL MEDICINE

## 2019-05-27 PROCEDURE — 51798 US URINE CAPACITY MEASURE: CPT

## 2019-05-27 PROCEDURE — 83036 HEMOGLOBIN GLYCOSYLATED A1C: CPT

## 2019-05-27 PROCEDURE — 71250 CT THORAX DX C-: CPT

## 2019-05-27 PROCEDURE — 83735 ASSAY OF MAGNESIUM: CPT

## 2019-05-27 PROCEDURE — 94640 AIRWAY INHALATION TREATMENT: CPT

## 2019-05-27 PROCEDURE — 36415 COLL VENOUS BLD VENIPUNCTURE: CPT

## 2019-05-27 PROCEDURE — 6370000000 HC RX 637 (ALT 250 FOR IP): Performed by: INTERNAL MEDICINE

## 2019-05-27 PROCEDURE — 2580000003 HC RX 258: Performed by: INTERNAL MEDICINE

## 2019-05-27 PROCEDURE — 6360000002 HC RX W HCPCS: Performed by: INTERNAL MEDICINE

## 2019-05-27 PROCEDURE — 94761 N-INVAS EAR/PLS OXIMETRY MLT: CPT

## 2019-05-27 PROCEDURE — 51702 INSERT TEMP BLADDER CATH: CPT

## 2019-05-27 PROCEDURE — 85025 COMPLETE CBC W/AUTO DIFF WBC: CPT

## 2019-05-27 RX ORDER — ONDANSETRON 2 MG/ML
4 INJECTION INTRAMUSCULAR; INTRAVENOUS EVERY 6 HOURS PRN
Status: DISCONTINUED | OUTPATIENT
Start: 2019-05-27 | End: 2019-05-29 | Stop reason: HOSPADM

## 2019-05-27 RX ORDER — ASPIRIN 81 MG/1
81 TABLET, CHEWABLE ORAL DAILY
Status: DISCONTINUED | OUTPATIENT
Start: 2019-05-27 | End: 2019-05-29 | Stop reason: HOSPADM

## 2019-05-27 RX ORDER — DEXTROSE MONOHYDRATE 50 MG/ML
100 INJECTION, SOLUTION INTRAVENOUS PRN
Status: DISCONTINUED | OUTPATIENT
Start: 2019-05-27 | End: 2019-05-29 | Stop reason: HOSPADM

## 2019-05-27 RX ORDER — OXYCODONE AND ACETAMINOPHEN 7.5; 325 MG/1; MG/1
1 TABLET ORAL EVERY 6 HOURS PRN
Status: ON HOLD | COMMUNITY
End: 2019-07-03 | Stop reason: HOSPADM

## 2019-05-27 RX ORDER — METOPROLOL SUCCINATE 50 MG/1
50 TABLET, EXTENDED RELEASE ORAL DAILY
Status: ON HOLD | COMMUNITY
End: 2019-08-07 | Stop reason: HOSPADM

## 2019-05-27 RX ORDER — NICOTINE POLACRILEX 4 MG
15 LOZENGE BUCCAL PRN
Status: DISCONTINUED | OUTPATIENT
Start: 2019-05-27 | End: 2019-05-29 | Stop reason: HOSPADM

## 2019-05-27 RX ORDER — POTASSIUM CHLORIDE 20 MEQ/1
40 TABLET, EXTENDED RELEASE ORAL PRN
Status: DISCONTINUED | OUTPATIENT
Start: 2019-05-27 | End: 2019-05-29 | Stop reason: HOSPADM

## 2019-05-27 RX ORDER — ACETAMINOPHEN 325 MG/1
650 TABLET ORAL EVERY 4 HOURS PRN
Status: DISCONTINUED | OUTPATIENT
Start: 2019-05-27 | End: 2019-05-29 | Stop reason: HOSPADM

## 2019-05-27 RX ORDER — PREGABALIN 75 MG/1
150 CAPSULE ORAL 2 TIMES DAILY
Status: DISCONTINUED | OUTPATIENT
Start: 2019-05-27 | End: 2019-05-29 | Stop reason: HOSPADM

## 2019-05-27 RX ORDER — SIMVASTATIN 20 MG
20 TABLET ORAL NIGHTLY
Status: DISCONTINUED | OUTPATIENT
Start: 2019-05-27 | End: 2019-05-29 | Stop reason: HOSPADM

## 2019-05-27 RX ORDER — POTASSIUM CHLORIDE 20MEQ/15ML
40 LIQUID (ML) ORAL PRN
Status: DISCONTINUED | OUTPATIENT
Start: 2019-05-27 | End: 2019-05-29 | Stop reason: HOSPADM

## 2019-05-27 RX ORDER — SODIUM CHLORIDE 9 MG/ML
INJECTION, SOLUTION INTRAVENOUS CONTINUOUS
Status: DISCONTINUED | OUTPATIENT
Start: 2019-05-27 | End: 2019-05-27

## 2019-05-27 RX ORDER — DEXTROSE MONOHYDRATE 25 G/50ML
12.5 INJECTION, SOLUTION INTRAVENOUS PRN
Status: DISCONTINUED | OUTPATIENT
Start: 2019-05-27 | End: 2019-05-29 | Stop reason: HOSPADM

## 2019-05-27 RX ORDER — AMITRIPTYLINE HYDROCHLORIDE 50 MG/1
50 TABLET, FILM COATED ORAL NIGHTLY
Status: DISCONTINUED | OUTPATIENT
Start: 2019-05-27 | End: 2019-05-29 | Stop reason: HOSPADM

## 2019-05-27 RX ORDER — FAMOTIDINE 20 MG/1
20 TABLET, FILM COATED ORAL 2 TIMES DAILY
Status: DISCONTINUED | OUTPATIENT
Start: 2019-05-27 | End: 2019-05-29 | Stop reason: HOSPADM

## 2019-05-27 RX ORDER — SODIUM CHLORIDE 0.9 % (FLUSH) 0.9 %
10 SYRINGE (ML) INJECTION EVERY 12 HOURS SCHEDULED
Status: DISCONTINUED | OUTPATIENT
Start: 2019-05-27 | End: 2019-05-29 | Stop reason: HOSPADM

## 2019-05-27 RX ORDER — MAGNESIUM SULFATE 1 G/100ML
1 INJECTION INTRAVENOUS PRN
Status: DISCONTINUED | OUTPATIENT
Start: 2019-05-27 | End: 2019-05-29 | Stop reason: HOSPADM

## 2019-05-27 RX ORDER — IPRATROPIUM BROMIDE AND ALBUTEROL SULFATE 2.5; .5 MG/3ML; MG/3ML
1 SOLUTION RESPIRATORY (INHALATION) 4 TIMES DAILY
Status: DISCONTINUED | OUTPATIENT
Start: 2019-05-27 | End: 2019-05-29 | Stop reason: HOSPADM

## 2019-05-27 RX ORDER — DEXTROSE AND SODIUM CHLORIDE 5; .9 G/100ML; G/100ML
INJECTION, SOLUTION INTRAVENOUS CONTINUOUS
Status: DISCONTINUED | OUTPATIENT
Start: 2019-05-27 | End: 2019-05-29 | Stop reason: HOSPADM

## 2019-05-27 RX ORDER — AMIODARONE HYDROCHLORIDE 200 MG/1
200 TABLET ORAL DAILY
Status: DISCONTINUED | OUTPATIENT
Start: 2019-05-27 | End: 2019-05-29 | Stop reason: HOSPADM

## 2019-05-27 RX ORDER — POTASSIUM CHLORIDE 7.45 MG/ML
10 INJECTION INTRAVENOUS PRN
Status: DISCONTINUED | OUTPATIENT
Start: 2019-05-27 | End: 2019-05-29 | Stop reason: HOSPADM

## 2019-05-27 RX ORDER — OXYCODONE AND ACETAMINOPHEN 7.5; 325 MG/1; MG/1
1 TABLET ORAL EVERY 6 HOURS PRN
Status: DISCONTINUED | OUTPATIENT
Start: 2019-05-27 | End: 2019-05-29 | Stop reason: HOSPADM

## 2019-05-27 RX ORDER — SODIUM CHLORIDE 0.9 % (FLUSH) 0.9 %
10 SYRINGE (ML) INJECTION PRN
Status: DISCONTINUED | OUTPATIENT
Start: 2019-05-27 | End: 2019-05-29 | Stop reason: HOSPADM

## 2019-05-27 RX ORDER — BENZONATATE 100 MG/1
100 CAPSULE ORAL 3 TIMES DAILY PRN
Status: DISCONTINUED | OUTPATIENT
Start: 2019-05-27 | End: 2019-05-29 | Stop reason: HOSPADM

## 2019-05-27 RX ADMIN — INSULIN LISPRO 1 UNITS: 100 INJECTION, SOLUTION INTRAVENOUS; SUBCUTANEOUS at 23:58

## 2019-05-27 RX ADMIN — SODIUM CHLORIDE 3 G: 900 INJECTION, SOLUTION INTRAVENOUS at 10:14

## 2019-05-27 RX ADMIN — SODIUM CHLORIDE 3 G: 900 INJECTION, SOLUTION INTRAVENOUS at 04:51

## 2019-05-27 RX ADMIN — AMITRIPTYLINE HYDROCHLORIDE 50 MG: 50 TABLET, FILM COATED ORAL at 21:18

## 2019-05-27 RX ADMIN — IPRATROPIUM BROMIDE AND ALBUTEROL SULFATE 3 ML: .5; 3 SOLUTION RESPIRATORY (INHALATION) at 16:18

## 2019-05-27 RX ADMIN — SODIUM CHLORIDE, PRESERVATIVE FREE 10 ML: 5 INJECTION INTRAVENOUS at 10:15

## 2019-05-27 RX ADMIN — ASPIRIN 81 MG 81 MG: 81 TABLET ORAL at 16:29

## 2019-05-27 RX ADMIN — SIMVASTATIN 20 MG: 20 TABLET, FILM COATED ORAL at 21:18

## 2019-05-27 RX ADMIN — FAMOTIDINE 20 MG: 20 TABLET ORAL at 21:18

## 2019-05-27 RX ADMIN — IPRATROPIUM BROMIDE AND ALBUTEROL SULFATE 3 ML: .5; 3 SOLUTION RESPIRATORY (INHALATION) at 19:49

## 2019-05-27 RX ADMIN — PREGABALIN 150 MG: 75 CAPSULE ORAL at 21:18

## 2019-05-27 RX ADMIN — SODIUM CHLORIDE: 9 INJECTION, SOLUTION INTRAVENOUS at 01:45

## 2019-05-27 RX ADMIN — DEXTROSE AND SODIUM CHLORIDE: 5; 900 INJECTION, SOLUTION INTRAVENOUS at 11:34

## 2019-05-27 RX ADMIN — ENOXAPARIN SODIUM 40 MG: 40 INJECTION SUBCUTANEOUS at 10:14

## 2019-05-27 RX ADMIN — OXYCODONE HYDROCHLORIDE AND ACETAMINOPHEN 1 TABLET: 7.5; 325 TABLET ORAL at 16:30

## 2019-05-27 RX ADMIN — DEXTROSE 50 % IN WATER (D50W) INTRAVENOUS SYRINGE 12.5 G: at 08:20

## 2019-05-27 ASSESSMENT — PAIN SCALES - GENERAL
PAINLEVEL_OUTOF10: 0
PAINLEVEL_OUTOF10: 0
PAINLEVEL_OUTOF10: 10

## 2019-05-27 NOTE — PROGRESS NOTES
[facility] Hospitalist Progress Note     Admit Date: 5/26/2019      Hospital Day: 2      Subjective:   Pt seen and examined. She came in with fatigue yesterday. She c/o lower abdominal pain, for the past 3 days, sharp, continuous, worse when she urinates. She admits having fever a few days ago. She admits having more SOB than usual and mild cough. She denies sputum production. She uses 2L of nasal O2 at home. General ROS: No fever, chills. Cardiovascular ROS: no chest pain. Respiratory ROS: + cough, +shortness of breath. Gastrointestinal ROS: +abdominal pain, no diarrhea, N/V. Objective:   BP (!) 123/58   Pulse 71   Temp 98.1 °F (36.7 °C) (Oral)   Resp 19   Ht 5' 1\" (1.549 m)   Wt 160 lb (72.6 kg)   SpO2 95%   BMI 30.23 kg/m²    General: The patient appears as stated age. Well appearing, generally weak, in no distress. On nasalo O2. Mental status: Drowsy, oriented. Coherent. No agitation. Eyes: CHAVEZ. Normal conjunctiva. ENT/Mouth: normal appearing jaw and neck, no neck nodes or sinus tenderness. Clear oropharynx with moist mucous membrane. Cardiovascular:  normal rate, regular rhythm, normal S1, S2, no murmurs, rubs, clicks or gallops. No peripheral edema. Dorsal pedis pulses 2+ bilaterally. Respiratory: clear to auscultation, no wheezes, rales or rhonchi, symmetric air entry. Gastrointestinal: soft, nontender, nondistended, no masses or organomegaly. Genitourinary:  No CVA tenderness. Musculoskletal:  no clubbing or cyanosis. No joint swelling, warmth, or tenderness. Skin:  normal coloration and turgor, no rashes, no suspicious skin lesions noted. Neurologic: Normal speech, no focal findings or movement disorder noted. Data Review  Labs were reviewed. Imaging films was personally reviewed with finding below.     Ct Head Wo Contrast    Result Date: 5/26/2019  EXAMINATION: CT OF THE HEAD WITHOUT CONTRAST  5/26/2019 7:23 pm TECHNIQUE: CT of the head was performed without the administration of intravenous contrast. Dose modulation, iterative reconstruction, and/or weight based adjustment of the mA/kV was utilized to reduce the radiation dose to as low as reasonably achievable. COMPARISON: CT head April 18, 2019 HISTORY: ORDERING SYSTEM PROVIDED HISTORY: fatigue, generalized weakness TECHNOLOGIST PROVIDED HISTORY: Has a \"code stroke\" or \"stroke alert\" been called? ->No Ordering Physician Provided Reason for Exam: fatigue, generalized weakness Acuity: Acute Type of Exam: Initial FINDINGS: BRAIN/VENTRICLES: There is no acute intracranial hemorrhage, mass effect or midline shift. No abnormal extra-axial fluid collection. The gray-white differentiation is maintained without evidence of an acute infarct. There is no evidence of hydrocephalus. There is mild periventricular and subcortical white matter hypoattenuation most consistent with microvascular ischemic changes. There is mild age appropriate global cerebral atrophy. Mild atherosclerotic changes of the bilateral carotid siphons. ORBITS: The visualized portion of the orbits demonstrate no acute abnormality. SINUSES: The visualized paranasal sinuses and mastoid air cells demonstrate no acute abnormality. SOFT TISSUES/SKULL:  No acute abnormality of the visualized skull or soft tissues. No acute intracranial abnormality. Ct Cervical Spine Wo Contrast    Result Date: 5/26/2019  EXAMINATION: CT OF THE CERVICAL SPINE WITHOUT CONTRAST 5/26/2019 7:24 pm TECHNIQUE: CT of the cervical spine was performed without the administration of intravenous contrast. Multiplanar reformatted images are provided for review. Dose modulation, iterative reconstruction, and/or weight based adjustment of the mA/kV was utilized to reduce the radiation dose to as low as reasonably achievable.  COMPARISON: CT cervical spine July 4, 2018 HISTORY: ORDERING SYSTEM PROVIDED HISTORY: weakness, falls TECHNOLOGIST PROVIDED HISTORY: Ordering Physician Provided Reason for Exam: weakness, falls Acuity: Acute Type of Exam: Initial FINDINGS: Image quality graded secondary to motion artifact. BONES/ALIGNMENT: There is no evidence of an acute cervical spine fracture. There is normal alignment of the cervical spine. DEGENERATIVE CHANGES: Multilevel degenerative changes of the cervical spine similar to previous exam. SOFT TISSUES: There is no prevertebral soft tissue swelling. 1. Image quality is degraded secondary to motion artifact. 2. No acute fracture identified within limits of the exam.     Xr Chest Portable    Result Date: 5/26/2019  EXAMINATION: ONE XRAY VIEW OF THE CHEST 5/26/2019 6:54 pm COMPARISON: 04/18/2019 HISTORY: ORDERING SYSTEM PROVIDED HISTORY: chest pain TECHNOLOGIST PROVIDED HISTORY: Reason for exam:->chest pain Ordering Physician Provided Reason for Exam: chest pain FINDINGS: There is mild bibasilar airspace disease superimposed on chronic atelectasis at the left lung base. There is pleural fluid and or airspace disease in/adjacent to the minor fissure. The upper lungs are clear and the heart size is normal.  There is right shoulder arthropathy. There is mild bibasilar disease superimposed on mild left basilar chronic atelectasis/fibrosis. Airspace disease may represent pneumonia and/or atelectasis. Telemetry EKG strip was personally reviewed. NSR. Assessment/Plan:     Acute metabolic encephalopathy  Generalized weakness  - continue antibiotics for poss UTI or pneumonia  - check CT chest to look for pneumonia  - hold extra sedatives. - monitor mental status.   - PT/OT. Pt declines to go to SNF this time, went to SNF after recent admission in April. Pyria with dysuria, suspect cystitis  - Change unasyn to zosyn for recent hospitalization. F/u urine culture. Bibasilar opacities on CXR, atelectasis vs scarrying vs pneumonjia  - check CT chest today. Chronic hypoxic resp failure  COPD   - continue duoneb. - O2 to keep Sat >92%. Hypoglycemia, with h/o DM, taking metformin  - hold metformin or other hypoglycemics  - D5NS iv ordered. Monitor BS. Chronic pain syndrome on percocet  Chronic back pain  - state record was checked. She has been on percocet and lyrica. Continue both. Restless leg syndrome  - continue lyrica. DVT prophylaxis - lovenox. Ulcer prophylaxis - H2B  Code status- full. The above assessment/plan has been explained to the patient, who indicated understanding.     Leanna Pedro MD  5/27/2019 2:42 PM

## 2019-05-27 NOTE — PROGRESS NOTES
Pt bladder scanned per physician order and 254 mL noted to be in bladder. Urinary catheter inserted per physician order using sterile technique. Blood sugar checked, 83. Pt extremely sleepy but answers questions. Will continue to monitor.       Electronically signed by Perfecto Fung RN on 5/27/19 at 4:18 AM

## 2019-05-27 NOTE — CARE COORDINATION
Consult requested by Dr Georgette Moody. Pt presents to the ED with general weakness. LSW met with pt to initiate discharge planning. Pt was A&O upon initial assessment. Pt reported that she still has HC via [facility]- SN, PT/OT. Pt still lives with daughter and reported that all of her needs are met. Pt stated that she does not feel that she needs additional services.

## 2019-05-27 NOTE — PROGRESS NOTES
Patient was very lethargic, barely arouseable this morning. Blood sugar was 61. I gave 12.5 of dextrose and rechecked her sugar 15 minutes later. it was 59 but she is talking to me. I gave her breakfast tray. and she is eating now. Skyla Dolan and he told me to recheck sugar at 1000.

## 2019-05-27 NOTE — H&P
Hospital Medicine History & Physical      PCP: Tarun Johnston DO    Date of Admission: 5/26/2019    Patient's Name: Saman Zamora  Full Code  Primary Care Physician: Tarun Johnston DO  Primary team attending: Laya Bob MD  Chief Complaint: Other (increased weakness)    History of present illness:  Patient is a 80 y.o. female with past medical history of CAD, HTN, HLD, anxiety/depression, restless leg syndrome, NSVT who presents with weakness and fatigue. Had a + UA in the ED. Given Unasyn dose in the ED. Apparently was in the bathroom today and could not get up due to weakness. She is some what altered in the ED and is not able to give a clear history. Lives with a family member, possibly a granddaughter. Hemodynamically stable in the ED. Review of Systems  Could not be obtained due to mental status. Allergies: Allergies   Allergen Reactions    Food Swelling     \"Rye, Whole grains, Barley\"    Trental [Pentoxifylline] Swelling    Tramadol      \"I Felt Shaky\"    Vistaril [Hydroxyzine Hcl] Other (See Comments)     Sedated and was placed on respirator    Erythromycin      \"Stomach Pain\"    Motrin [Ibuprofen] Diarrhea    Tetracyclines & Related Nausea And Vomiting     MEDICATIONS:   Prior to Admission medications    Medication Sig Start Date End Date Taking? Authorizing Provider   amiodarone (CORDARONE) 200 MG tablet Take 1 tablet by mouth daily 4/26/19   Kofi Singh MD   simvastatin (ZOCOR) 20 MG tablet Take 1 tablet by mouth nightly 4/25/19   Kofi Singh MD   magnesium oxide (MAG-OX) 400 (241.3 Mg) MG TABS tablet Take 1 tablet by mouth 2 times daily 4/25/19   Kofi Singh MD   fluconazole (DIFLUCAN) 150 MG tablet  4/17/19   Historical Provider, MD   LORazepam (ATIVAN) 2 MG tablet  4/2/19   Historical Provider, MD   losartan (COZAAR) 25 MG tablet  2/5/19   Historical Provider, MD   citalopram (CELEXA) 10 MG tablet TAKE ONE (1) TABLET BY MOUTH ONCE DAILY.  2/14/19   Keshav Mccartney MD  Stroke Mother     Vision Loss Mother     Early Death Father         Brain Hemorrhage    Other Father         \"Bowel Problems\"    High Blood Pressure Father     Heart Disease Father     Mental Illness Brother         Schizophrenia , Paranoia    Diabetes Brother     Lupus Sister     Arthritis Sister     Depression Sister     Diabetes Sister     Heart Disease Sister     High Blood Pressure Sister     High Cholesterol Sister     Kidney Disease Sister     Stroke Sister      History:  Social History     Socioeconomic History    Marital status:       Spouse name: Not on file    Number of children: Not on file    Years of education: Not on file    Highest education level: Not on file   Occupational History    Not on file   Social Needs    Financial resource strain: Not on file    Food insecurity:     Worry: Not on file     Inability: Not on file    Transportation needs:     Medical: Not on file     Non-medical: Not on file   Tobacco Use    Smoking status: Current Every Day Smoker     Packs/day: 0.50     Years: 32.00     Pack years: 16.00     Types: Cigarettes     Start date: 1986    Smokeless tobacco: Never Used   Substance and Sexual Activity    Alcohol use: No     Alcohol/week: 0.0 oz    Drug use: No     Comment: states quit 1-2-14    Sexual activity: Not Currently   Lifestyle    Physical activity:     Days per week: Not on file     Minutes per session: Not on file    Stress: Not on file   Relationships    Social connections:     Talks on phone: Not on file     Gets together: Not on file     Attends Scientologist service: Not on file     Active member of club or organization: Not on file     Attends meetings of clubs or organizations: Not on file     Relationship status: Not on file    Intimate partner violence:     Fear of current or ex partner: Not on file     Emotionally abused: Not on file     Physically abused: Not on file     Forced sexual activity: Not on file   Other Topics Concern    Not on file   Social History Narrative    Not on file     Physical Exam:  /77   Pulse 83   Temp 97.8 °F (36.6 °C)   Resp 22   Ht 5' 1\" (1.549 m)   Wt 160 lb (72.6 kg)   SpO2 97%   BMI 30.23 kg/m²   Physical Exam  GEN: NAD  HEENT: AT, NC, No cyanosis, anicteric  EYES: EOMI, Pupils equal and reactive to light  LYMPHATICS: No lymphadenopathy  CVS: RRR, S1, S2, No m/r/g  RESP: CTAB, no wheezes, rhales, or rhonchi  ABD: Soft, NT, ND, +BS  EXT: no edema, no rashes, 2+ DP  NEURO: A/Ox1 CNII-XII are intact, no focal motor or sensory deficits  PSYCH: pleasantly confused  Labs and Results:  Lab Results   Component Value Date    GLUCOSE 90 05/26/2019    GLUCOSE 124 (H) 04/25/2019    GLUCOSE 73 04/22/2019     05/26/2019     04/25/2019     04/22/2019    K 3.9 05/26/2019    K 5.0 04/25/2019    K 4.4 04/22/2019    CO2 26 05/26/2019    CO2 25 04/25/2019    CO2 24 04/22/2019     (H) 05/26/2019    BUN 7 05/26/2019    BUN 11 04/25/2019    BUN 7 04/22/2019    CREATININE 0.5 (L) 05/26/2019    CREATININE 0.5 (L) 04/25/2019    CREATININE 0.5 (L) 04/22/2019    CALCIUM 8.8 05/26/2019    PHOS 2.9 03/02/2018    MG 1.5 (L) 05/26/2019     Lab Results   Component Value Date    WBC 13.5 (H) 05/26/2019    WBC 8.5 04/25/2019    WBC 6.4 04/22/2019    HGB 10.8 (L) 05/26/2019    HGB 13.0 04/25/2019    HGB 13.0 04/22/2019    HCT 33.4 (L) 05/26/2019    MCV 83.5 05/26/2019     05/26/2019     04/25/2019     04/22/2019     No results found. ? Assessment and Plan:    - Metabolic encephalopathy. Will treat UTI and see if she improves. Try to avoid CNS meds. Takes many of those. According to last d/c summary, she is on ativan, lyrica, Celexa, amitriptylene    - UTI. Will place on Unasyn as started in the ED. Last urine cultures showed E. Coli resistant to ceftriaxone and cipro. - Generalized weakness. Due to above.  Would benefit from PT/OT eval and possible placeement    - Hypomagnesemia. Replace per protocol. Already given 2 g IV in the ED.    - h/o CAD  - h/o NSVT  - h/o HTN  - h/o HLD  - h/o RLS  - h/o anxiety  - h/o gout     Resume home meds except CNS altering meds. ?  VTE PROPHYLAXIS: Lovenox Vonnie Sharma MD    Thank you Juliette Monahan DO for the opportunity to be involved inthis patient's care. If you have any questions or concerns please feel free to contact me at [phone].

## 2019-05-27 NOTE — ED NOTES
Patient admitted to room 4017. Report called to [facility], RN.       Tino Sandoval RN  05/27/19 [id number]

## 2019-05-27 NOTE — ED NOTES
Dr Sarah Louise returned call @ [phone] -- transferred call to Dr Carolyn Cohen  Waiting orders     Kathrine Ly  05/26/19 [id number]

## 2019-05-27 NOTE — ED NOTES
Patient straight cathed per dr orders at this time. 700cc of foul smelling, dark jolie urine returned. Dr. Fly Valenzuela notified. Urine sent to lab at this time.       Simuel Hamman, RN  05/26/19 2019

## 2019-05-27 NOTE — PROGRESS NOTES
Nutrition Assessment (Low Risk)    Type and Reason for Visit: Positive Nutrition Screen(Pressure ulcer)    Nutrition Recommendations:   · Recommend continuing current diet  · Recommend providing oral wound healing supplement BID     Nutrition Assessment:  Patient assessed for nutritional risk. Deemed to be at low risk at this time. Pt admitted with UTI. Pt currently with stage 1 pressure ulcer. Pt has MST of 0 and wt is stable per flowsheet. Will order wound healing supplement and continue to follow.      Malnutrition Assessment:  · Malnutrition Status: No malnutrition    Nutrition Risk Level   Risk Level: Low    Nutrition Diagnosis:   · Problem: Increased nutrient needs  · Etiology: Acute injury/trauma    Signs and symptoms: Presence of wounds    Nutrition Intervention:  Food and/or Delivery: Continue current diet, Start ONS  Nutrition Education/Counseling/Coordination of Care:  Continued Inpatient Monitoring, Coordination of Care      Electronically signed by Raúl Carbajal RD, LD on 5/27/19 at 9:13 AM    Contact Number: [phone]

## 2019-05-28 LAB
GLUCOSE BLD-MCNC: 108 MG/DL (ref 70–99)
GLUCOSE BLD-MCNC: 113 MG/DL (ref 70–99)
GLUCOSE BLD-MCNC: 118 MG/DL (ref 70–99)
GLUCOSE BLD-MCNC: 78 MG/DL (ref 70–99)
GLUCOSE BLD-MCNC: 97 MG/DL (ref 70–99)

## 2019-05-28 PROCEDURE — 97535 SELF CARE MNGMENT TRAINING: CPT

## 2019-05-28 PROCEDURE — 97166 OT EVAL MOD COMPLEX 45 MIN: CPT

## 2019-05-28 PROCEDURE — 2700000000 HC OXYGEN THERAPY PER DAY

## 2019-05-28 PROCEDURE — 6370000000 HC RX 637 (ALT 250 FOR IP): Performed by: HOSPITALIST

## 2019-05-28 PROCEDURE — 6360000002 HC RX W HCPCS: Performed by: INTERNAL MEDICINE

## 2019-05-28 PROCEDURE — 97530 THERAPEUTIC ACTIVITIES: CPT

## 2019-05-28 PROCEDURE — 6360000002 HC RX W HCPCS: Performed by: HOSPITALIST

## 2019-05-28 PROCEDURE — 97162 PT EVAL MOD COMPLEX 30 MIN: CPT

## 2019-05-28 PROCEDURE — 94761 N-INVAS EAR/PLS OXIMETRY MLT: CPT

## 2019-05-28 PROCEDURE — 94640 AIRWAY INHALATION TREATMENT: CPT

## 2019-05-28 PROCEDURE — 2580000003 HC RX 258: Performed by: HOSPITALIST

## 2019-05-28 PROCEDURE — 1200000000 HC SEMI PRIVATE

## 2019-05-28 RX ADMIN — AMIODARONE HYDROCHLORIDE 200 MG: 200 TABLET ORAL at 10:15

## 2019-05-28 RX ADMIN — FAMOTIDINE 20 MG: 20 TABLET ORAL at 10:14

## 2019-05-28 RX ADMIN — SIMVASTATIN 20 MG: 20 TABLET, FILM COATED ORAL at 20:20

## 2019-05-28 RX ADMIN — PIPERACILLIN SODIUM,TAZOBACTAM SODIUM 3.38 G: 3; .375 INJECTION, POWDER, FOR SOLUTION INTRAVENOUS at 10:15

## 2019-05-28 RX ADMIN — Medication 2 PUFF: at 20:29

## 2019-05-28 RX ADMIN — PIPERACILLIN SODIUM,TAZOBACTAM SODIUM 3.38 G: 3; .375 INJECTION, POWDER, FOR SOLUTION INTRAVENOUS at 02:19

## 2019-05-28 RX ADMIN — PREGABALIN 150 MG: 75 CAPSULE ORAL at 20:22

## 2019-05-28 RX ADMIN — FAMOTIDINE 20 MG: 20 TABLET ORAL at 20:21

## 2019-05-28 RX ADMIN — IPRATROPIUM BROMIDE AND ALBUTEROL SULFATE 3 ML: .5; 3 SOLUTION RESPIRATORY (INHALATION) at 16:23

## 2019-05-28 RX ADMIN — AMITRIPTYLINE HYDROCHLORIDE 50 MG: 50 TABLET, FILM COATED ORAL at 20:21

## 2019-05-28 RX ADMIN — DEXTROSE AND SODIUM CHLORIDE: 5; 900 INJECTION, SOLUTION INTRAVENOUS at 18:28

## 2019-05-28 RX ADMIN — ASPIRIN 81 MG 81 MG: 81 TABLET ORAL at 10:14

## 2019-05-28 RX ADMIN — PIPERACILLIN SODIUM,TAZOBACTAM SODIUM 3.38 G: 3; .375 INJECTION, POWDER, FOR SOLUTION INTRAVENOUS at 18:28

## 2019-05-28 RX ADMIN — ENOXAPARIN SODIUM 40 MG: 40 INJECTION SUBCUTANEOUS at 10:15

## 2019-05-28 RX ADMIN — OXYCODONE HYDROCHLORIDE AND ACETAMINOPHEN 1 TABLET: 7.5; 325 TABLET ORAL at 04:55

## 2019-05-28 RX ADMIN — IPRATROPIUM BROMIDE AND ALBUTEROL SULFATE 3 ML: .5; 3 SOLUTION RESPIRATORY (INHALATION) at 20:29

## 2019-05-28 RX ADMIN — IPRATROPIUM BROMIDE AND ALBUTEROL SULFATE 3 ML: .5; 3 SOLUTION RESPIRATORY (INHALATION) at 12:19

## 2019-05-28 RX ADMIN — PREGABALIN 150 MG: 75 CAPSULE ORAL at 10:14

## 2019-05-28 RX ADMIN — OXYCODONE HYDROCHLORIDE AND ACETAMINOPHEN 1 TABLET: 7.5; 325 TABLET ORAL at 20:21

## 2019-05-28 ASSESSMENT — PAIN SCALES - GENERAL
PAINLEVEL_OUTOF10: 10
PAINLEVEL_OUTOF10: 9

## 2019-05-28 NOTE — CARE COORDINATION
Reviewed chart and spoke with pt about discharge needs/plans. Pt lives with her daughter, has a walker, also an aid 6 1/2 hrs /day through [facility] care. Pt has a PCP and insurance that covers medications. Pt is wanting to return home with Home care. She states daughter is home with her at all time. Denies any other needs at this time. Patients white board updated and  card provided to pt/family.

## 2019-05-28 NOTE — CONSULTS
Attempted to complete wound care assessment, pt requested return visit as pt eating breakfast at this time.   Santos Mcneal RN

## 2019-05-28 NOTE — PROGRESS NOTES
Occupational Therapy   Occupational Therapy Initial Assessment  Date: 2019   Patient Name: Courtney Fernando  MRN: [id number]     : 1950    Date of Service: 2019    Discharge Recommendations:  Subacute/Skilled Nursing Facility  OT Equipment Recommendations  Other: defer    Assessment   Performance deficits / Impairments: Decreased functional mobility ; Decreased safe awareness;Decreased endurance;Decreased balance;Decreased high-level IADLs;Decreased ADL status; Decreased strength;Decreased cognition  Treatment Diagnosis: UTI  Prognosis: Good  Decision Making: Medium Complexity  Assistance / Modification: Pt is a 79 yo female admitted from home for UTI. Pt at baseline is Independent for ADLs, needs assistance for high level IADLs and is Independent w/ functional transfers/mobility w/ AD. Pt currently presents w/ above deficits and requires increased assistance for functional activities. Pt would benefit from continued acute care OT services w/ discharge to SNF. Patient Education: ot role, discharge rec, proper hand placement for safe functional transfers  Barriers to Learning: decreased cognition  REQUIRES OT FOLLOW UP: Yes  Activity Tolerance  Activity Tolerance: Patient Tolerated treatment well  Safety Devices  Safety Devices in place: Yes  Type of devices: All fall risk precautions in place; Chair alarm in place;Gait belt;Left in chair;Nurse notified; Patient at risk for falls;Call light within reach  Restraints  Initially in place: No           Patient Diagnosis(es): The primary encounter diagnosis was Acute cystitis without hematuria. Diagnoses of Altered mental status, unspecified altered mental status type and Hypomagnesemia were also pertinent to this visit.      has a past medical history of Anxiety, CAD (coronary artery disease), CAD S/P percutaneous coronary angioplasty, Chronic back pain, COPD (chronic obstructive pulmonary disease) ([facility] [address] Gout, History of cerebral infarction, Hyperlipidemia, Hypertension, Lumbar spinal stenosis, Lung cancer ([facility] [address] Obesity (BMI 30.0-34.9), Restless leg syndrome, Rheumatoid arthritis ([facility] [address] Smoker, Systemic lupus erythematosus ([facility] [address] Type II diabetes mellitus ([facility] [address] and Ventral hernia. has a past surgical history that includes Skin cancer excision (Right, 2012); Appendectomy (1972); Cholecystectomy (4769); other surgical history (2/20/15); Total knee arthroplasty (Right, 2003); Revision Total Knee Arthroplasty (Right, 2005); aurora and bso (cervix removed) (1972); Humerus fracture surgery (Left, 2000's); Femur fracture surgery (Right, 2000's); Abdominal exploration surgery (Last Done 2005); Cataract removal with implant (Bilateral, 2000's); Lung removal, partial (Left, 09/30/2014); and Ankle fracture surgery. Treatment Diagnosis: UTI      Restrictions  Restrictions/Precautions  Restrictions/Precautions: Fall Risk, General Precautions, Contact Precautions    Subjective         Social/Functional History    Lives With: With her daughter  Type of Home: Apartment  Home Layout: One level  Home Access: Level entry  Bathroom Shower/Tub: Tub/Shower unit  Bathroom Toilet: Handicap height  Bathroom Equipment: Tub transfer bench, Grab bars in shower, Grab bars around toilet  Bathroom Accessibility: Accessible  Home Equipment: Rolling walker, 4 wheeled walker, [address] Help From: Home health (daily per pt)  ADL Assistance: Independent  Homemaking Assistance: Needs assistance ([facility] aide manages)  Homemaking Responsibilities: No  Ambulation Assistance: Independent (mod I with manual w/c or RW)  Transfer Assistance: Independent, daughter assists PRN  Active : Yes (Last drove 1 month ago)  Occupation: Retired  Leisure & Hobbies: Watching TV, Listening to music, Computers   Patient reported that daughter is available 24/7.                Objective   Vision: Within Functional Limits(glasses)  Hearing: Within functional limits    Orientation  Overall Orientation Status: Within Functional Limits  Observation/Palpation  Posture: Fair(posterior lean sitting EOB)  Observation: pt received supine in bed, agreeable to therapy  Balance  Sitting Balance: Contact guard assistance(sitting EOB)  Standing Balance: Contact guard assistance  Standing Balance  Time: ~3 minutes  Activity: functional mobility in room  Functional Mobility  Functional - Mobility Device: Rolling Walker  Activity: Other  Assist Level: Contact guard assistance  Functional Mobility Comments: see PT notes for gait details  ADL  Feeding: Modified independent   Grooming: Setup;Contact guard assistance(sitting EOB washing face w/ warm washcloth)  UE Bathing: Contact guard assistance  LE Bathing: Moderate assistance  UE Dressing: Contact guard assistance  LE Dressing: Moderate assistance  Toileting: Minimal assistance  Tone RUE  RUE Tone: Normotonic  Tone LUE  LUE Tone: Normotonic  Coordination  Movements Are Fluid And Coordinated: Yes     Bed mobility  Rolling to Left: Contact guard assistance  Supine to Sit: Moderate assistance  Comment: HOB elevated at 45 degrees, VC for use of RUE to scoot hips forward and use of bedrails supine to sit   Transfers  Stand Step Transfers: Contact guard assistance  Sit to stand: Contact guard assistance  Stand to sit: Contact guard assistance     Cognition  Overall Cognitive Status: Exceptions  Arousal/Alertness: Appropriate responses to stimuli  Following Commands: Follows one step commands with increased time; Follows one step commands with repetition  Attention Span: Attends with cues to redirect  Memory: Appears intact  Safety Judgement: Decreased awareness of need for assistance;Decreased awareness of need for safety  Problem Solving: Assistance required to generate solutions;Assistance required to correct errors made;Assistance required to implement solutions;Decreased awareness of errors;Assistance required to identify errors made  Insights: Decreased awareness of deficits  Initiation: Requires cues for some  Sequencing: Requires cues for some  Perception  Overall Perceptual Status: WFL     Sensation  Overall Sensation Status: WFL        LUE AROM (degrees)  LUE AROM : WFL  LUE General AROM: 175 ROM at shoulder, full ROM at elbow flex/ext, WFL for fine motor  RUE PROM (degrees)  RUE PROM: WFL  RUE General PROM: 175 ROM at shoulder, full ROM at elbow flex/ext, WFL for fine motor  LUE Strength  Gross LUE Strength: Exceptions to [facility] [place]  L Hand Grasp: 4/5  RUE Strength  Gross RUE Strength: Exceptions to [facility] [place]  R Hand Grasp: 4/5           Self Care Training:   Cues were given for safety, sequence, UE/LE placement, visual cues, and balance. Activities performed today included UE ADL grooming    Therapeutic Activity Training:   Therapeutic activity training was instructed today. Cues were given for safety, sequence, UE/LE placement, awareness, and balance.     Activities performed today included bed mobility training, sup-sit, sit-stand, functional mobility, stand to sit          Plan   Plan  Times per week: 2x+  Times per day: Daily  Current Treatment Recommendations: Strengthening, Balance Training, Endurance Training, Gait Training, Pain Management, Patient/Caregiver Education & Training, Home Management Training, Cognitive/Perceptual Training, Functional Mobility Training, Cognitive Reorientation, Equipment Evaluation, Education, & procurement, Positioning, Safety Education & Training, Self-Care / ADL                          AM-PAC Score        AM-[facility] Inpatient Daily Activity Raw Score: 17  AM-PAC Inpatient ADL T-Scale Score : 37.26  ADL Inpatient CMS 0-100% Score: 50.11  ADL Inpatient CMS G-Code Modifier : CK    Goals  Short term goals  Time Frame for Short term goals: discharge  Short term goal 1: Pt will perform UE bathing/dressing Supervision  Short term goal 2: Pt will perform LE bathing/dressing CGA  Short term goal 3: Pt will perform toileting Supervision  Short term goal 4: Pt will perform functional transfer w/ AD Supervision  Short term goal 5: Pt will peform functional mobility to/from bathroom w/ AD Supervision  Patient Goals   Patient goals : go home       Time In: 3803  Time Out: 1513  Total Treatment Minutes: 26 minutes  Total Treatment Time: 31 minutes    Austine Bumpers OTR/L [id number]  3:55 PM,5/28/2019

## 2019-05-28 NOTE — PROGRESS NOTES
assessment/plan has been explained to the patient, who indicated understanding.         Michelle Lombardi MD, MSc

## 2019-05-28 NOTE — PLAN OF CARE
Problem: Falls - Risk of:  Goal: Will remain free from falls  Description  Will remain free from falls  Outcome: Met This Shift  Goal: Absence of physical injury  Description  Absence of physical injury  Outcome: Met This Shift     Problem: Risk for Impaired Skin Integrity  Goal: Tissue integrity - skin and mucous membranes  Description  Structural intactness and normal physiological function of skin and  mucous membranes.   Outcome: Ongoing     Problem: Sensory:  Goal: General experience of comfort will improve  Description  General experience of comfort will improve  Outcome: Ongoing     Problem: Urinary Elimination:  Goal: Signs and symptoms of infection will decrease  Description  Signs and symptoms of infection will decrease  Outcome: Ongoing  Goal: Ability to reestablish a normal urinary elimination pattern will improve - after catheter removal  Description  Ability to reestablish a normal urinary elimination pattern will improve  Outcome: Ongoing  Goal: Complications related to the disease process, condition or treatment will be avoided or minimized  Description  Complications related to the disease process, condition or treatment will be avoided or minimized  Outcome: Ongoing

## 2019-05-28 NOTE — PLAN OF CARE
Problem: Falls - Risk of:  Goal: Will remain free from falls  Description  Will remain free from falls  Outcome: Ongoing  Goal: Absence of physical injury  Description  Absence of physical injury  Outcome: Ongoing     Problem: Risk for Impaired Skin Integrity  Goal: Tissue integrity - skin and mucous membranes  Description  Structural intactness and normal physiological function of skin and  mucous membranes.   Outcome: Ongoing     Problem: Sensory:  Goal: General experience of comfort will improve  Description  General experience of comfort will improve  Outcome: Ongoing     Problem: Urinary Elimination:  Goal: Signs and symptoms of infection will decrease  Description  Signs and symptoms of infection will decrease  Outcome: Ongoing  Goal: Ability to reestablish a normal urinary elimination pattern will improve - after catheter removal  Description  Ability to reestablish a normal urinary elimination pattern will improve  Outcome: Ongoing  Goal: Complications related to the disease process, condition or treatment will be avoided or minimized  Description  Complications related to the disease process, condition or treatment will be avoided or minimized  Outcome: Ongoing

## 2019-05-28 NOTE — CONSULTS
[address] [place] [address], 1950, [id number], 5/28/2019    History  Cachil DeHe:  The primary encounter diagnosis was Acute cystitis without hematuria. Diagnoses of Altered mental status, unspecified altered mental status type and Hypomagnesemia were also pertinent to this visit. Patient  has a past medical history of Anxiety, CAD (coronary artery disease), CAD S/P percutaneous coronary angioplasty, Chronic back pain, COPD (chronic obstructive pulmonary disease) ([address] Gout, History of cerebral infarction, Hyperlipidemia, Hypertension, Lumbar spinal stenosis, Lung cancer ([address] Obesity (BMI 30.0-34.9), Restless leg syndrome, Rheumatoid arthritis ([address] Smoker, Systemic lupus erythematosus ([address] Type II diabetes mellitus ([address] and Ventral hernia. Patient  has a past surgical history that includes Skin cancer excision (Right, 2012); Appendectomy (1972); Cholecystectomy (8472); other surgical history (2/20/15); Total knee arthroplasty (Right, 2003); Revision Total Knee Arthroplasty (Right, 2005); aurora and bso (cervix removed) (1972); Humerus fracture surgery (Left, 2000's); Femur fracture surgery (Right, 2000's); Abdominal exploration surgery (Last Done 2005); Cataract removal with implant (Bilateral, 2000's); Lung removal, partial (Left, 09/30/2014); and Ankle fracture surgery. Subjective:  Patient states:  Amenable to therapy. Pain:  5/10, abdomen. Communication with other providers:  Handoff to RN, co-eval with OT.    Restrictions: Fall risk    Home Setup/Prior level of function  Lives With: With her daughter  Type of Home: Apartment  Home Layout: One level  Home Access: Level entry  Bathroom Shower/Tub: Tub/Shower unit  Bathroom Toilet: Handicap height  Bathroom Equipment: Tub transfer bench, Grab bars in shower, Grab bars around toilet  Bathroom Accessibility: Sadi Fortuneald: Rolling walker, 4 wheeled walker, [address] Help From: Home health (daily per pt)  ADL Assistance: Independent  Homemaking Assistance: Needs assistance ([facility] aide manages)  Homemaking Responsibilities: No  Ambulation Assistance: Independent (mod I with manual w/c or RW)  Transfer Assistance: Independent, daughter assists PRN  Active : Yes (Last drove 1 month ago)  Occupation: Retired  Leisure & Hobbies: Watching TV, Listening to music, Computers   Patient reported that daughter is available 24/7. Examination of body systems (includes body structures/functions, activity/participation limitations):  · Observation:  Supine in bed upon arrival   · Vision:  WFL, glasses  · Hearing:  [facility] [place]  · Cardiopulmonary:  On 2L of O2  · Cognition: impaired, see OT/SLP note for further evaluation. Musculoskeletal  · ROM R/L:  WFL. · Strength R/L:  4/5, weakness in function and endurance. · Neuro:  [facility]      Mobility:  · Supine to sit:  Min A  · Transfers: CGA with RW  · Sitting balance:  SBA. · Standing balance:  CGA with RW.    · Gait: CGA with RW    [facility] [address] Inpatient Mobility:  AM-PAC Inpatient Mobility Raw Score : 17    Treatment:  Patient performed supine to sit transfer with min verbal cues and min A at hips, with HOB elevated 30 degrees, with use of bedrail. Patient performed sitting balance at EOB with SBA. Patient performed STS with min cues and CGA for safety with RW, ambulated x10ft with cues for decreased AD excursion and pursed lip breathing. Safety: patient left in chair with chair alarm, call light within reach, RN notified, gait belt used. Assessment:  Patient is a 79 yo female who presents with AMS and generalized weakness possibly d/t UTI. Patient demonstrates impaired mobility, unclear if patient is at her baseline d/t poor patient cognition. Patient would benefit from skilled PT services and d/c to SNF vs [facility] S[facility], noted that patient is refusing SNF at this time, reports daughter is available all day, should verify.    Complexity: Moderate  Prognosis: Good, no significant barriers to participation at this time. Plan Times per week: 5/week, 1 week,   Discharge Recommendations: [address], Home with Home health PT, S Level 3  Equipment: patient has necessary equipment. Goals:  Short term goals  Time Frame for Short term goals: 1 week  Short term goal 1: Patient will perform bed mobility mod I. Short term goal 2: Patient will perform STS mod I with RW. Short term goal 3: Patient will ambulate 40ft mod I with RW. Treatment plan:  Bed mobility, transfers, balance, gait, TA, TX. Recommendations for NURSING mobility: ambulate to BR with RW and gait belt.     Time:   Time in: 1442  Time out: 1514  Timed treatment minutes: 10  Total time: 32    Electronically signed by:    Roxane Molina PT  5/28/2019, 3:27 PM

## 2019-05-29 VITALS
DIASTOLIC BLOOD PRESSURE: 73 MMHG | OXYGEN SATURATION: 96 % | HEART RATE: 99 BPM | TEMPERATURE: 98.1 F | HEIGHT: 61 IN | SYSTOLIC BLOOD PRESSURE: 113 MMHG | BODY MASS INDEX: 34.51 KG/M2 | RESPIRATION RATE: 18 BRPM | WEIGHT: 182.8 LBS

## 2019-05-29 PROBLEM — N39.0 UTI (URINARY TRACT INFECTION): Status: RESOLVED | Noted: 2019-05-26 | Resolved: 2019-05-29

## 2019-05-29 LAB
ANION GAP SERPL CALCULATED.3IONS-SCNC: 12 MMOL/L (ref 4–16)
BUN BLDV-MCNC: 5 MG/DL (ref 6–23)
CALCIUM SERPL-MCNC: 8.7 MG/DL (ref 8.3–10.6)
CHLORIDE BLD-SCNC: 114 MMOL/L (ref 99–110)
CO2: 19 MMOL/L (ref 21–32)
CREAT SERPL-MCNC: 0.5 MG/DL (ref 0.6–1.1)
CULTURE: ABNORMAL
FERRITIN: 173 NG/ML (ref 15–150)
GFR AFRICAN AMERICAN: >60 ML/MIN/1.73M2
GFR NON-AFRICAN AMERICAN: >60 ML/MIN/1.73M2
GLUCOSE BLD-MCNC: 84 MG/DL (ref 70–99)
GLUCOSE BLD-MCNC: 87 MG/DL (ref 70–99)
GLUCOSE BLD-MCNC: 98 MG/DL (ref 70–99)
HCT VFR BLD CALC: 35.9 % (ref 37–47)
HEMOGLOBIN: 11 GM/DL (ref 12.5–16)
HIGH SENSITIVE C-REACTIVE PROTEIN: 6.7 MG/L
IRON: 106 UG/DL (ref 37–145)
Lab: ABNORMAL
MCH RBC QN AUTO: 26.6 PG (ref 27–31)
MCHC RBC AUTO-ENTMCNC: 30.6 % (ref 32–36)
MCV RBC AUTO: 86.9 FL (ref 78–100)
PCT TRANSFERRIN: 88 % (ref 10–44)
PDW BLD-RTO: 18.7 % (ref 11.7–14.9)
PLATELET # BLD: 215 K/CU MM (ref 140–440)
PMV BLD AUTO: 11.1 FL (ref 7.5–11.1)
POTASSIUM SERPL-SCNC: 3.8 MMOL/L (ref 3.5–5.1)
PREALBUMIN: ABNORMAL MG/DL (ref 20–40)
RBC # BLD: 4.13 M/CU MM (ref 4.2–5.4)
REASON FOR REJECTION: NORMAL
REJECTED TEST: NORMAL
REJECTED TEST: NORMAL
SODIUM BLD-SCNC: 145 MMOL/L (ref 135–145)
SPECIMEN: ABNORMAL
TOTAL COLONY COUNT: ABNORMAL
TOTAL IRON BINDING CAPACITY: 121 UG/DL (ref 250–450)
UNSATURATED IRON BINDING CAPACITY: 15 UG/DL (ref 110–370)
WBC # BLD: 9 K/CU MM (ref 4–10.5)

## 2019-05-29 PROCEDURE — 86038 ANTINUCLEAR ANTIBODIES: CPT

## 2019-05-29 PROCEDURE — 94640 AIRWAY INHALATION TREATMENT: CPT

## 2019-05-29 PROCEDURE — 2580000003 HC RX 258: Performed by: INTERNAL MEDICINE

## 2019-05-29 PROCEDURE — 6370000000 HC RX 637 (ALT 250 FOR IP): Performed by: HOSPITALIST

## 2019-05-29 PROCEDURE — 82962 GLUCOSE BLOOD TEST: CPT

## 2019-05-29 PROCEDURE — 83550 IRON BINDING TEST: CPT

## 2019-05-29 PROCEDURE — 51798 US URINE CAPACITY MEASURE: CPT

## 2019-05-29 PROCEDURE — 36415 COLL VENOUS BLD VENIPUNCTURE: CPT

## 2019-05-29 PROCEDURE — 6360000002 HC RX W HCPCS: Performed by: INTERNAL MEDICINE

## 2019-05-29 PROCEDURE — 97530 THERAPEUTIC ACTIVITIES: CPT

## 2019-05-29 PROCEDURE — 85027 COMPLETE CBC AUTOMATED: CPT

## 2019-05-29 PROCEDURE — 84134 ASSAY OF PREALBUMIN: CPT

## 2019-05-29 PROCEDURE — 6360000002 HC RX W HCPCS: Performed by: HOSPITALIST

## 2019-05-29 PROCEDURE — 97110 THERAPEUTIC EXERCISES: CPT

## 2019-05-29 PROCEDURE — 80048 BASIC METABOLIC PNL TOTAL CA: CPT

## 2019-05-29 PROCEDURE — 86141 C-REACTIVE PROTEIN HS: CPT

## 2019-05-29 PROCEDURE — 85652 RBC SED RATE AUTOMATED: CPT

## 2019-05-29 PROCEDURE — 94761 N-INVAS EAR/PLS OXIMETRY MLT: CPT

## 2019-05-29 PROCEDURE — 2580000003 HC RX 258: Performed by: HOSPITALIST

## 2019-05-29 PROCEDURE — 99211 OFF/OP EST MAY X REQ PHY/QHP: CPT

## 2019-05-29 PROCEDURE — 82728 ASSAY OF FERRITIN: CPT

## 2019-05-29 PROCEDURE — 97116 GAIT TRAINING THERAPY: CPT

## 2019-05-29 PROCEDURE — 83540 ASSAY OF IRON: CPT

## 2019-05-29 RX ORDER — AMOXICILLIN AND CLAVULANATE POTASSIUM 875; 125 MG/1; MG/1
1 TABLET, FILM COATED ORAL 2 TIMES DAILY
Qty: 14 TABLET | Refills: 0 | Status: SHIPPED | OUTPATIENT
Start: 2019-05-29 | End: 2019-06-05

## 2019-05-29 RX ADMIN — PREGABALIN 150 MG: 75 CAPSULE ORAL at 14:13

## 2019-05-29 RX ADMIN — SODIUM CHLORIDE, PRESERVATIVE FREE 10 ML: 5 INJECTION INTRAVENOUS at 14:15

## 2019-05-29 RX ADMIN — IPRATROPIUM BROMIDE AND ALBUTEROL SULFATE 3 ML: .5; 3 SOLUTION RESPIRATORY (INHALATION) at 07:45

## 2019-05-29 RX ADMIN — ASPIRIN 81 MG 81 MG: 81 TABLET ORAL at 14:14

## 2019-05-29 RX ADMIN — ENOXAPARIN SODIUM 40 MG: 40 INJECTION SUBCUTANEOUS at 14:14

## 2019-05-29 RX ADMIN — PIPERACILLIN SODIUM,TAZOBACTAM SODIUM 3.38 G: 3; .375 INJECTION, POWDER, FOR SOLUTION INTRAVENOUS at 14:14

## 2019-05-29 RX ADMIN — OXYCODONE HYDROCHLORIDE AND ACETAMINOPHEN 1 TABLET: 7.5; 325 TABLET ORAL at 06:55

## 2019-05-29 RX ADMIN — PIPERACILLIN SODIUM,TAZOBACTAM SODIUM 3.38 G: 3; .375 INJECTION, POWDER, FOR SOLUTION INTRAVENOUS at 02:26

## 2019-05-29 RX ADMIN — IPRATROPIUM BROMIDE AND ALBUTEROL SULFATE 3 ML: .5; 3 SOLUTION RESPIRATORY (INHALATION) at 11:18

## 2019-05-29 RX ADMIN — FAMOTIDINE 20 MG: 20 TABLET ORAL at 14:14

## 2019-05-29 RX ADMIN — AMIODARONE HYDROCHLORIDE 200 MG: 200 TABLET ORAL at 14:14

## 2019-05-29 RX ADMIN — Medication 2 PUFF: at 07:47

## 2019-05-29 ASSESSMENT — PAIN SCALES - GENERAL: PAINLEVEL_OUTOF10: 10

## 2019-05-29 NOTE — CARE COORDINATION
Pt on discharge home with [facility], notified Bee Vasquez of pt's discharge and faxed info/ sent PS to Dr. Nancy Choi requesting Vinay  orders to resume services.

## 2019-05-29 NOTE — PLAN OF CARE
Problem: Falls - Risk of:  Goal: Will remain free from falls  Description  Will remain free from falls  5/29/2019 0310 by Crystal Bartlett RN  Outcome: Met This Shift  5/28/2019 1756 by Fiazan Evans RN  Outcome: Ongoing  Goal: Absence of physical injury  Description  Absence of physical injury  5/29/2019 0310 by Crystal Bartlett RN  Outcome: Met This Shift  5/28/2019 1756 by Faizan Evans RN  Outcome: Ongoing     Problem: Risk for Impaired Skin Integrity  Goal: Tissue integrity - skin and mucous membranes  Description  Structural intactness and normal physiological function of skin and  mucous membranes.   5/29/2019 0310 by Crystal Bartlett RN  Outcome: Ongoing  5/28/2019 1756 by Faizan Evans RN  Outcome: Ongoing     Problem: Sensory:  Goal: General experience of comfort will improve  Description  General experience of comfort will improve  5/29/2019 0310 by Crystal Bartlett RN  Outcome: Ongoing  5/28/2019 1756 by Faizan Evans RN  Outcome: Ongoing     Problem: Urinary Elimination:  Goal: Signs and symptoms of infection will decrease  Description  Signs and symptoms of infection will decrease  5/29/2019 0310 by Crystal Bartlett RN  Outcome: Ongoing  5/28/2019 1756 by Faizan Evans RN  Outcome: Ongoing  Goal: Ability to reestablish a normal urinary elimination pattern will improve - after catheter removal  Description  Ability to reestablish a normal urinary elimination pattern will improve  5/29/2019 0310 by Crystal Bartlett RN  Outcome: Ongoing  5/28/2019 1756 by Faizan Evans RN  Outcome: Ongoing  Goal: Complications related to the disease process, condition or treatment will be avoided or minimized  Description  Complications related to the disease process, condition or treatment will be avoided or minimized  5/29/2019 0310 by Crystal Bartlett RN  Outcome: Ongoing  5/28/2019 1756 by Faizan Evans RN  Outcome: Ongoing

## 2019-05-29 NOTE — PROGRESS NOTES
Lab unable to collect sample for sed rate nurse attempted unable to collect blood sample. Pt refused another attempt. Informed physician okay to [facility].

## 2019-05-29 NOTE — DISCHARGE SUMMARY
Discharge Summary    Name:  Alton Sue /Age/Sex: 1950  (80 y.o. female)   MRN & CSN:  [id number] & [id number] Admission Date/Time: 2019  5:36 PM   Attending:  Zuleyka Reynoso MD Discharging Physician: Michelle Lombardi MD     Hospital Course:   Alton Sue is a 80 y.o.  female  who presents with with AMS who was found to have a UTI. The urine culture grew E. Coli with ESBL. After discussion with pharmacy and the lab about sensitivities and treatment, it was decided that she would benefit from Augmentin for complicated UTI. She will be treated for 14 days. The patient expressed appropriate understanding of and agreement with the discharge recommendations, medications, and plan.      Consults this admission:  IP CONSULT TO CASE MANAGEMENT  IP CONSULT TO HOSPITALIST    Discharge Instruction:   Follow up appointments: none  Primary care physician:  within 2 weeks    Diet:  diabetic diet   Activity: activity as tolerated  Disposition: Discharged to:   [x]Home, []C, []SNF, []Acute Rehab, []Hospice   Condition on discharge: Stable    Discharge Medications:      [facility] Medication Instructions [facility]:[id number]    Printed on:19 1005   Medication Information                      albuterol (PROVENTIL) (2.5 MG/3ML) 0.083% nebulizer solution  Take 3 mLs by nebulization every 6 hours as needed for Wheezing             amiodarone (CORDARONE) 200 MG tablet  Take 1 tablet by mouth daily             amitriptyline (ELAVIL) 50 MG tablet  Take 50 mg by mouth nightly             amoxicillin-clavulanate (AUGMENTIN) 875-125 MG per tablet  Take 1 tablet by mouth 2 times daily for 7 days             aspirin 81 MG chewable tablet  Take 1 tablet by mouth daily             benzonatate (TESSALON) 100 MG capsule  Take 100 mg by mouth 3 times daily as needed for Cough              budesonide-formoterol (SYMBICORT) 160-4.5 MCG/ACT AERO  Inhale 2 puffs into the lungs 2 times daily clopidogrel (PLAVIX) 75 MG tablet  Take 75 mg by mouth daily             famotidine (PEPCID) 20 MG tablet  Take 20 mg by mouth 2 times daily             fluconazole (DIFLUCAN) 150 MG tablet               guaiFENesin (MUCINEX) 600 MG extended release tablet  Take 2 tablets by mouth 2 times daily             ipratropium-albuterol (DUONEB) 0.5-2.5 (3) MG/3ML SOLN nebulizer solution  Inhale 3 mLs into the lungs 4 times daily Dx: COPD J44.9             loperamide (IMODIUM) 2 MG capsule  Take 2 mg by mouth 4 times daily as needed for Diarrhea              LORazepam (ATIVAN) 2 MG tablet               losartan (COZAAR) 25 MG tablet               magnesium oxide (MAG-OX) 400 (241.3 Mg) MG TABS tablet  Take 1 tablet by mouth 2 times daily             metFORMIN (GLUCOPHAGE) 500 MG tablet  Take 500 mg by mouth daily (with breakfast)             metoprolol succinate (TOPROL XL) 50 MG extended release tablet  Take 50 mg by mouth daily             Multiple Vitamins-Minerals (MULTIVITAMIN PO)  Take  by mouth every morning. Over The Counter             oxyCODONE-acetaminophen (PERCOCET) 7.5-325 MG per tablet  Take 1 tablet by mouth every 6 hours as needed for Pain (lupus pain). OXYGEN  Inhale 2 L/min into the lungs as needed             pregabalin (LYRICA) 150 MG capsule  Take 150 mg by mouth 2 times daily             PROAIR  (90 Base) MCG/ACT inhaler  Inhale 2 puffs into the lungs every 6 hours as needed for Wheezing or Shortness of Breath              sertraline (ZOLOFT) 50 MG tablet  Take 50 mg by mouth nightly             simvastatin (ZOCOR) 20 MG tablet  Take 1 tablet by mouth nightly                 Objective Findings at Discharge:   /77   Pulse 91   Temp 97.9 °F (36.6 °C) (Oral)   Resp 16   Ht 5' 1\" (1.549 m)   Wt 182 lb 12.8 oz (82.9 kg)   SpO2 95%   BMI 34.54 kg/m²            PHYSICAL EXAM   GEN Awake female, sitting upright in bed in no apparent distress. Appears given age.   EYES Pupils are equally round. No scleral erythema, discharge, or conjunctivitis. HENT Mucous membranes are moist. Oral pharynx without exudates, no evidence of thrush. NECK Supple, no apparent thyromegaly or masses. RESP Clear to auscultation, no wheezes, rales or rhonchi. Symmetric chest movement while on room air. CARDIO/VASC S1/S2 auscultated. Regular rate without appreciable murmurs, rubs, or gallops. No JVD or carotid bruits. Peripheral pulses equal bilaterally and palpable. No peripheral edema. GI Abdomen is soft without significant tenderness, masses, or guarding. Bowel sounds are normoactive. Rectal exam deferred.  No costovertebral angle tenderness. Normal appearing external genitalia. Carlos catheter is not present. HEME/LYMPH No palpable cervical lymphadenopathy and no hepatosplenomegaly. No petechiae or ecchymoses. MSK No gross joint deformities. SKIN Normal coloration, warm, dry. NEURO Cranial nerves appear grossly intact, normal speech, no lateralizing weakness. PSYCH Awake, alert, oriented x 4. Affect appropriate.     BMP/CBC  Recent Labs     05/26/19  1752 05/26/19  2200 05/27/19  0739 05/27/19  1157 05/29/19  0839   NA  --  144 145  --  145   K  --  3.9 4.1  --  3.8   CL  --  112* 112*  --  114*   CO2  --  26 24  --  19*   BUN  --  7 6  --  5*   CREATININE  --  0.5* 0.6  --  0.5*   WBC 13.5*  --   --  8.1 9.0   HCT 33.4*  --   --  37.9 35.9*     --   --  247 215     Urine culture    Specimen 05/26/2019  8:11 PM [address]    Special Requests 05/26/2019  8:11 PM [address] [place] [address]    NONE    Total [place] Count 05/26/2019  8:11 PM [address] [place] [address]    >[id number] COL/ML    Culture Abnormal  05/26/2019  8:11 PM [facility] (ESBL) HEAVY GROWTH RESIST DUE TO EXTENDED SPECTRUM BETA-LACTAMASE (ESBL)    Culture Abnormal  05/26/2019  8:11 PM [facility] [address] [place] [address] [address]    Culture           Discharge Time of 35 minutes    Electronically signed by Precious Jones MD on 5/29/2019 at 10:09 AM

## 2019-05-29 NOTE — PROGRESS NOTES
Physical Therapy    Physical Therapy Treatment Note  Name: Mana Valle MRN: [id number] :   1950   Date:  2019   Admission Date: 2019 Room:  [facility]-A   Restrictions/Precautions:  Restrictions/Precautions  Restrictions/Precautions: Fall Risk, General Precautions, Contact Precautions      Communication with other providers:  Per nurse ok to tx  Subjective:  Patient states:  Pt states \"I am getting out of here one way or another\". Pt very motivated and agreeable to tx but declined staying up in chair and requested to return to bed at end of tx session. Pain:   Location, Type, Intensity (0/10 to 10/10): Pt reports only feeling pain when transferring off commode in low abdomen. Objective:    Observation:  Alert and oriented. Treatment, including education/measures:  Sup to sit min assist and cues needing increased time and effort. Sit to sup sba. Sit<=>stand from bed and commode cga and cues for safety. Pt sba for cesilia care. amb with rw 10' x 1 20' x 1 cga andcues for posture and walker safety. Ex in sittin reps trunk stretches with shoulder flex and cues for deep breathing  10 reps aps  10 reps laqs  Safety  Patient left safely in the bed, with call light/phone in reach with alarm applied. Gait belt was used for transfers and gait. Assessment / Impression:       Patient's tolerance of treatment:  good  Adverse Reaction: na  Significant change in status and impact:  na  Barriers to improvement:  safety  Plan for Next Session:    Cont. POC  Time in:  1310  Time out:  1350  Timed treatment minutes:  40  Total treatment time:  40    Previously filed items:     Short term goals  Time Frame for Short term goals: 1 week  Short term goal 1: Patient will perform bed mobility mod I. Short term goal 2: Patient will perform STS mod I with RW. Short term goal 3: Patient will ambulate 40ft mod I with RW.        Electronically signed by:    Juan M Garcia PTA  2019, 8:14 AM

## 2019-05-29 NOTE — CONSULTS
[address] [address] Continence Nurse  Consult Note       Becky Mock  AGE: 80 y.o. GENDER: female  : 1950  TODAY'S DATE:  2019    Subjective:     Reason for   Evaluation and Assessment:  Wound care assessment       Becky Mock is a 80 y.o. female referred by:    [x] Physician  [] Nursing  [] Other:     Wound Identification:  Wound Type: pressure  Contributing Factors: decreased mobility        PAST MEDICAL HISTORY        Diagnosis Date    Anxiety     CAD (coronary artery disease)     Dr Ruthy Jaramillo CAD S/P percutaneous coronary angioplasty 2012    stent RCA; Ahmed    Chronic back pain     COPD (chronic obstructive pulmonary disease) ([facility]     Geoffery Canard Gout     History of cerebral infarction     right hemiparesis with full recovery    Hyperlipidemia     Hypertension     Lumbar spinal stenosis     Lung cancer ([facility] 2014    Dr Colin Brown; left upper lobectomy; non small cell CA    Obesity (BMI 30.0-34. 9)     Restless leg syndrome     Rheumatoid arthritis (HCC)     Smoker     Systemic lupus erythematosus (HCC)     Dr Pamela Lopez, visiting physician    Type II diabetes mellitus ([facility] 1969    Ventral hernia 2018    right ventral hernia seen on CT abd 2018       PAST SURGICAL HISTORY    Past Surgical History:   Procedure Laterality Date    ABDOMINAL EXPLORATION SURGERY  Last Done     2-3 times; repeated bowel obstructions;    ANKLE FRACTURE SURGERY      APPENDECTOMY      CATARACT REMOVAL WITH IMPLANT Bilateral     CHOLECYSTECTOMY      FEMUR FRACTURE SURGERY Right     Right Thigh, Plates, Rods, Screws    HUMERUS FRACTURE SURGERY Left     Broken Left Arm, Plates And Screws    LUNG REMOVAL, PARTIAL Left 2014    Lung CA, left; Robotic Assisted Left Thoracotomy, Left Lung Cancer; ROWDY lobectomy;  Nonsmall Cell CA    OTHER SURGICAL HISTORY  2/20/15    excision of hydradenitis suppurative of cesilia anal area    REVISION TOTAL KNEE ARTHROPLASTY Right 2005    SKIN CANCER EXCISION Right 2012    Right Ankle    ANTONY AND BSO  1972    TOTAL KNEE ARTHROPLASTY Right 2003    Total Right Knee with revision       FAMILY HISTORY    Family History   Problem Relation Age of Onset    Heart Disease Mother     Diabetes Mother    [facility] Arthritis Mother     Depression Mother     High Blood Pressure Mother     High Cholesterol Mother     Kidney Disease Mother         On Dialysis    Learning Disabilities Mother     Mental Illness Mother     Stroke Mother     Vision Loss Mother     Early Death Father         Brain Hemorrhage    Other Father         \"Bowel Problems\"    High Blood Pressure Father     Heart Disease Father     Mental Illness Brother         Schizophrenia , Paranoia    Diabetes Brother     Lupus Sister     Arthritis Sister     Depression Sister     Diabetes Sister     Heart Disease Sister     High Blood Pressure Sister     High Cholesterol Sister     Kidney Disease Sister     Stroke Sister        SOCIAL HISTORY    Social History     Tobacco Use    Smoking status: Current Every Day Smoker     Packs/day: 0.50     Years: 32.00     Pack years: 16.00     Types: Cigarettes     Start date: 1986    Smokeless tobacco: Never Used   Substance Use Topics    Alcohol use: No     Alcohol/week: 0.0 oz    Drug use: No     Comment: states quit 1-2-14       ALLERGIES    Allergies   Allergen Reactions    Food Swelling     \"Rye, Whole grains, Barley\"    Trental [Pentoxifylline] Swelling    Tramadol      \"I Felt Shaky\"    Vistaril [Hydroxyzine Hcl] Other (See Comments)     Sedated and was placed on respirator    Erythromycin      \"Stomach Pain\"    Motrin [Ibuprofen] Diarrhea    Tetracyclines & Related Nausea And Vomiting       MEDICATIONS    No current facility-administered medications on file prior to encounter.       Current Outpatient Medications on File Prior to Encounter   Medication Sig Dispense Refill    metoprolol succinate (TOPROL XL) 50 MG extended release tablet Take 50 mg by mouth daily      oxyCODONE-acetaminophen (PERCOCET) 7.5-325 MG per tablet Take 1 tablet by mouth every 6 hours as needed for Pain (lupus pain).       sertraline (ZOLOFT) 50 MG tablet Take 50 mg by mouth nightly      LORazepam (ATIVAN) 2 MG tablet       amitriptyline (ELAVIL) 50 MG tablet Take 50 mg by mouth nightly      OXYGEN Inhale 2 L/min into the lungs as needed      guaiFENesin (MUCINEX) 600 MG extended release tablet Take 2 tablets by mouth 2 times daily 120 tablet 5    clopidogrel (PLAVIX) 75 MG tablet Take 75 mg by mouth daily      famotidine (PEPCID) 20 MG tablet Take 20 mg by mouth 2 times daily      loperamide (IMODIUM) 2 MG capsule Take 2 mg by mouth 4 times daily as needed for Diarrhea       metFORMIN (GLUCOPHAGE) 500 MG tablet Take 500 mg by mouth daily (with breakfast)      PROAIR  (90 Base) MCG/ACT inhaler Inhale 2 puffs into the lungs every 6 hours as needed for Wheezing or Shortness of Breath       aspirin 81 MG chewable tablet Take 1 tablet by mouth daily 30 tablet 0    benzonatate (TESSALON) 100 MG capsule Take 100 mg by mouth 3 times daily as needed for Cough       pregabalin (LYRICA) 150 MG capsule Take 150 mg by mouth 2 times daily      albuterol (PROVENTIL) (2.5 MG/3ML) 0.083% nebulizer solution Take 3 mLs by nebulization every 6 hours as needed for Wheezing 120 each 3    amiodarone (CORDARONE) 200 MG tablet Take 1 tablet by mouth daily 30 tablet 3    simvastatin (ZOCOR) 20 MG tablet Take 1 tablet by mouth nightly 30 tablet 3    magnesium oxide (MAG-OX) 400 (241.3 Mg) MG TABS tablet Take 1 tablet by mouth 2 times daily 30 tablet 0    fluconazole (DIFLUCAN) 150 MG tablet       losartan (COZAAR) 25 MG tablet       ipratropium-albuterol (DUONEB) 0.5-2.5 (3) MG/3ML SOLN nebulizer solution Inhale 3 mLs into the lungs 4 times daily Dx: COPD J44.9 360 mL 5    budesonide-formoterol (SYMBICORT) 160-4.5 MCG/ACT AERO Inhale 2 puffs into the lungs 2 times daily 1 Inhaler 5    Multiple Vitamins-Minerals (MULTIVITAMIN PO) Take  by mouth every morning. Over The Counter           Objective:      /73   Pulse 99   Temp 98.1 °F (36.7 °C) (Oral)   Resp 18   Ht 5' 1\" (1.549 m)   Wt 182 lb 12.8 oz (82.9 kg)   SpO2 96%   BMI 34.54 kg/m²   Devendra Risk Score: Devendra Scale Score: 16    LABS    CBC:   Lab Results   Component Value Date    WBC 9.0 05/29/2019    RBC 4.13 05/29/2019    HGB 11.0 05/29/2019    HCT 35.9 05/29/2019    MCV 86.9 05/29/2019    MCH 26.6 05/29/2019    MCHC 30.6 05/29/2019    RDW 18.7 05/29/2019     05/29/2019    MPV 11.1 05/29/2019     CMP:    Lab Results   Component Value Date     05/29/2019    K 3.8 05/29/2019     05/29/2019    CO2 19 05/29/2019    BUN 5 05/29/2019    CREATININE 0.5 05/29/2019    GFRAA >60 05/29/2019    LABGLOM >60 05/29/2019    GLUCOSE 84 05/29/2019    PROT 4.8 05/26/2019    PROT 7.7 06/07/2011    LABALBU 2.2 05/26/2019    CALCIUM 8.7 05/29/2019    BILITOT 0.6 05/26/2019    ALKPHOS 85 05/26/2019    AST 26 05/26/2019    ALT 16 05/26/2019     Albumin:    Lab Results   Component Value Date    LABALBU 2.2 05/26/2019     PT/INR:    Lab Results   Component Value Date    PROTIME 13.3 04/20/2019    PROTIME 11.4 06/07/2011    INR 1.17 04/20/2019     HgBA1c:    Lab Results   Component Value Date    LABA1C 5.6 05/27/2019         Assessment:     Patient Active Problem List   Diagnosis    Lung cancer ([facility]    Hidradenitis suppurativa    Controlled type 2 diabetes mellitus with diabetic polyneuropathy, without long-term current use of insulin (HCC)    CAD (coronary artery disease)    Anxiety disorder    Lupus    [facility]    CAD S/P percutaneous coronary angioplasty    Gout    History of lung cancer    Smoker    Chronic low back pain with bilateral sciatica    Obesity (BMI 30.0-34. 9)    Essential hypertension    Chronic respiratory failure ([address]    Syncope and collapse    Hypomagnesemia syndrome    Moderate malnutrition (HCC)       Measurements:  Wound 05/05/18 Abrasion(s) Leg Lower; Anterior wound from fall @ home (Active)   Number of days: 389       Wound 05/07/18 Left medial knee (Active)   Number of days: 386       Wound 05/07/18 Left lateral knee (Active)   Number of days: 386       Wound 04/20/19 Coccyx Inner;Mid;Medial stage 2 (Active)   Number of days: 39       Wound 05/27/19 Coccyx (Active)   Wound Pressure Stage  2 5/29/2019 11:35 AM   Dressing Status Clean;Dry; Intact 5/29/2019 11:35 AM   Dressing Changed Changed/New 5/29/2019 11:35 AM   Dressing/Treatment Other (comment) 5/29/2019  7:46 AM   Wound Cleansed Rinsed/Irrigated with saline 5/29/2019 11:35 AM   Dressing Change Due 05/29/19 5/29/2019  7:46 AM   Wound Length (cm) 0.5 cm 5/29/2019 11:35 AM   Wound Width (cm) 0.5 cm 5/29/2019 11:35 AM   Wound Depth (cm) 0.1 cm 5/29/2019 11:35 AM   Wound Surface Area (cm^2) 0.25 cm^2 5/29/2019 11:35 AM   Wound Volume (cm^3) 0.02 cm^3 5/29/2019 11:35 AM   Distance Tunneling (cm) 0 cm 5/29/2019 11:35 AM   Tunneling Position ___ O'Clock 0 5/29/2019 11:35 AM   Undermining Starts ___ O'Clock 0 5/29/2019 11:35 AM   Undermining Ends___ O'Clock 0 5/29/2019 11:35 AM   Undermining Maxium Distance (cm) 0 5/29/2019 11:35 AM   Wound Assessment Red 5/29/2019 11:35 AM   Drainage Amount Small 5/29/2019 11:35 AM   Drainage Description Serosanguinous 5/29/2019 11:35 AM   Odor None 5/29/2019 11:35 AM   Margins Defined edges 5/29/2019 11:35 AM   Jana-wound Assessment White 5/29/2019 11:35 AM   Non-staged Wound Description Full thickness 5/29/2019 11:35 AM   Red%Wound Bed 100 5/29/2019 11:35 AM   Number of days: 2       Response to treatment:  Well tolerated by patient.      Pain Assessment:  Severity:  0  Quality of pain: none  Wound Pain Timing/Severity:   Premedicated: no    Plan:     Plan of Care: Wound 05/27/19 Coccyx-Dressing/Treatment: (MEPILEX BORDER) cleanse with soap and water change mepilex border every other day pt is risk for skin breakdown AEB sarah score follow sarah orders bilateral heels intact w/o redness     Specialty Bed Required : yes  [] Low Air Loss   [x] Pressure Redistribution  [] Fluid Immersion  [] Bariatric  [] Total Pressure Relief  [] Other:     Discharge Plan:  Placement for patient upon discharge: to be determined  Hospice Care: no  Patient appropriate for Outpatient [address]: pt states has been treating herself does not want to be referred     Patient/Caregiver Teaching:  Level of patient/caregiver understanding able to: patient verbalized understanding       Electronically signed by Oneal Marshall RN, [facility] on 5/29/2019 at 11:39 AM

## 2019-05-29 NOTE — PROGRESS NOTES
Pt left discharge papers and prescription in the room. Called in Augmentin script to [facility] pharmacy listed as preferred pharmacy. Filling prescription and stated will contact patient when ready to .

## 2019-05-30 LAB
EKG ATRIAL RATE: 80 BPM
EKG DIAGNOSIS: NORMAL
EKG P AXIS: 44 DEGREES
EKG P-R INTERVAL: 132 MS
EKG Q-T INTERVAL: 414 MS
EKG QRS DURATION: 102 MS
EKG QTC CALCULATION (BAZETT): 477 MS
EKG R AXIS: -85 DEGREES
EKG T AXIS: 23 DEGREES
EKG VENTRICULAR RATE: 80 BPM

## 2019-05-31 LAB
ANTI-NUCLEAR ANTIBODY (ANA): NORMAL
ANTI-NUCLEAR ANTIBODY (ANA): NORMAL

## 2019-06-10 ENCOUNTER — HOSPITAL ENCOUNTER (OUTPATIENT)
Dept: GENERAL RADIOLOGY | Age: 69
Discharge: HOME | End: 2019-06-10
Payer: MEDICARE

## 2019-06-10 ENCOUNTER — HOSPITAL ENCOUNTER (OUTPATIENT)
Dept: SPEECH THERAPY | Age: 69
Setting detail: THERAPIES SERIES
Discharge: HOME | End: 2019-06-10

## 2019-06-10 DIAGNOSIS — R13.10 PROBLEMS WITH SWALLOWING AND MASTICATION: ICD-10-CM

## 2019-06-10 PROCEDURE — 92611 MOTION FLUOROSCOPY/SWALLOW: CPT

## 2019-06-10 PROCEDURE — 74230 X-RAY XM SWLNG FUNCJ C+: CPT

## 2019-06-10 NOTE — PROCEDURES
INSTRUMENTAL SWALLOW REPORT  MODIFIED BARIUM SWALLOW    NAME: Pedro Luis Wadsworth   : 1950  MRN: [id number]       Date of Eval: 6/10/2019     Ordering Physician: Dr. Esthela Castelan  Radiologist: available upon request     Referring Diagnosis(es): Referring Diagnosis: dysphagia R13.10    Past Medical History:  has a past medical history of Anxiety, CAD (coronary artery disease), CAD S/P percutaneous coronary angioplasty, Chronic back pain, COPD (chronic obstructive pulmonary disease) ([address] Gout, History of cerebral infarction, Hyperlipidemia, Hypertension, Lumbar spinal stenosis, Lung cancer ([address] Obesity (BMI 30.0-34.9), Restless leg syndrome, Rheumatoid arthritis ([facility] [address] Smoker, Systemic lupus erythematosus ([facility] [address] Type II diabetes mellitus ([address] and Ventral hernia. Past Surgical History:  has a past surgical history that includes Skin cancer excision (Right, ); Appendectomy (); Cholecystectomy (); other surgical history (2/20/15); Total knee arthroplasty (Right, ); Revision Total Knee Arthroplasty (Right, ); aurora and bso (cervix removed) (); Humerus fracture surgery (Left, ); Femur fracture surgery (Right, ); Abdominal exploration surgery (Last Done ); Cataract removal with implant (Bilateral, ); Lung removal, partial (Left, 2014); and Ankle fracture surgery. Current Diet Solid Consistency: Dental Soft  Current Diet Liquid Consistency: Thin       Type of Study: Initial MBS       Recent CXR/CT of Chest: see chart    Patient Complaints/Reason for Referral:  Pedro Luis Wadsworth was referred for a MBS to assess the efficiency of his/her swallow function, assess for aspiration, and to make recommendations regarding safe dietary consistencies, effective compensatory strategies, and safe eating environment.   Patient complaints: unable to state    Onset of problem:   Unclear, pt is poor historian and is unable to provide case history Behavior/Cognition/Vision/Hearing:  Behavior/Cognition: Alert;Confused; Distractible  Vision: Within Functional Limits  Hearing: Within functional limits    Impressions:  Treatment Dx and ICD 10: Oral dysphagia R13.11   Patient Position: Lateral and Patient Degrees: 90  Consistencies Administered: Soft solid;Puree; Thin cup; Thin teaspoon; Thin straw    Petey Botello was seen this date for an outpatient modified barium swallow study, alert, confused, requiring cues for participation. She is a poor historian and case history was obtained from chart review in [facility]. Medical hx significant for CVA, lupus, lung cancer, COPD, CAD, DM. Pt is edentulous. She has been seen by SLP during previous hospital admissions (most recently in May 2018) recommending softer solid/thin liquid diet. No previous MBS report available in chart. She was seen for today's study seated upright in chair, filmed in lateral view. She was presented with PO trials of thin liquids by tsp/cup/straw, puree, and soft solids. Oral stage is mild-moderately impaired, characterized by prolonged and disorganized mastication (likely impacted by lack of dentition), lingual pumping for AP transit, and adequate oral clearance for all textures. Pharyngeal stage is [facility] with mildly delayed swallow initiation (liquids and solids to valleculae), reduced anterior hyoid excursion with intact laryngeal elevation, reduced tongue base retraction, transient penetration of thin liquids to underside of epiglottis during the swallow (clears with swallow completion), intact UES opening, minimal-trace residue lining base of tongue and in valleculae following the swallow. Recommend puree/soft solid diet with thin liquids and upright positioning for all PO. Recommend delivery of pills whole in puree one at a time. Discussed with pt and daughter. Daughter indicates understanding and agreement to all recommendations.  No further outpatient SLP needs identified at this time.    Dysphagia Outcome Severity Scale: Level 5: Mild dysphagia- Distant supervision. May need one diet consistency restricted  Penetration-Aspiration Scale (PAS): 2 - Material enters the airway, remains above the vocal folds, and is ejected from the airway    Recommended Diet:  Solid consistency: Dysphagia I Pureed  Liquid consistency:  Thin  Liquid administration via: Cup;Straw    Medication administration: PO    Safe Swallow Protocol:  Supervision: Distant  Compensatory Swallowing Strategies: Upright as possible for all oral intake;Assist feed              Recommendations/Treatment  Requires SLP Intervention: No        D/C Recommendations: 24 hour supervision/assistance         Recommended Exercises:              Education: Images and recommendations were reviewed with Parker Leo and her daughter following this exam.   Patient Education: results/recommendations  Patient Education Response: No evidence of learning    Duration/Frequency of Treatment  Duration/Frequency of Treatment: N/A  Safety Devices  Safety Devices in place: Not Applicable      Goals:    N/A          Oral Preparation / Oral Phase  Oral Phase: Impaired        Pharyngeal Phase  Pharyngeal Phase: WFL      Esophageal Phase  Not assessed          Therapy Time:   Individual Concurrent Group Co-treatment   Time In 0930         Time Out 1000         Minutes Haja Art MA CCC-SLP, 6/10/2019, 3:50 PM

## 2019-06-16 ENCOUNTER — APPOINTMENT (OUTPATIENT)
Dept: GENERAL RADIOLOGY | Age: 69
DRG: 871 | End: 2019-06-16
Payer: MEDICARE

## 2019-06-16 ENCOUNTER — HOSPITAL ENCOUNTER (INPATIENT)
Age: 69
LOS: 4 days | Discharge: HOME HEALTH | DRG: 871 | End: 2019-06-20
Attending: EMERGENCY MEDICINE | Admitting: INTERNAL MEDICINE
Payer: MEDICARE

## 2019-06-16 ENCOUNTER — APPOINTMENT (OUTPATIENT)
Dept: CT IMAGING | Age: 69
DRG: 871 | End: 2019-06-16
Payer: MEDICARE

## 2019-06-16 DIAGNOSIS — J18.9 PNEUMONIA DUE TO ORGANISM: Primary | ICD-10-CM

## 2019-06-16 DIAGNOSIS — M32.9 LUPUS (HCC): ICD-10-CM

## 2019-06-16 DIAGNOSIS — R53.1 GENERAL WEAKNESS: ICD-10-CM

## 2019-06-16 DIAGNOSIS — R41.82 ALTERED MENTAL STATUS, UNSPECIFIED ALTERED MENTAL STATUS TYPE: ICD-10-CM

## 2019-06-16 DIAGNOSIS — R79.89 ELEVATED LACTIC ACID LEVEL: ICD-10-CM

## 2019-06-16 DIAGNOSIS — R09.02 HYPOXEMIA: ICD-10-CM

## 2019-06-16 PROBLEM — A41.9 SEPSIS (HCC): Status: ACTIVE | Noted: 2019-06-16

## 2019-06-16 LAB
ACETAMINOPHEN LEVEL: <5 UG/ML (ref 15–30)
ALBUMIN SERPL-MCNC: 2.3 GM/DL (ref 3.4–5)
ALCOHOL SCREEN SERUM: NORMAL %WT/VOL
ALP BLD-CCNC: 84 IU/L (ref 40–128)
ALT SERPL-CCNC: 17 U/L (ref 10–40)
AMMONIA: 41 UMOL/L (ref 11–51)
AMPHETAMINES: NEGATIVE
ANION GAP SERPL CALCULATED.3IONS-SCNC: 8 MMOL/L (ref 4–16)
AST SERPL-CCNC: 27 IU/L (ref 15–37)
BACTERIA: NEGATIVE /HPF
BARBITURATE SCREEN URINE: NEGATIVE
BASOPHILS ABSOLUTE: 0 K/CU MM
BASOPHILS RELATIVE PERCENT: 0.4 % (ref 0–1)
BENZODIAZEPINE SCREEN, URINE: NEGATIVE
BILIRUB SERPL-MCNC: 0.4 MG/DL (ref 0–1)
BILIRUBIN URINE: ABNORMAL MG/DL
BLOOD, URINE: NEGATIVE
BUN BLDV-MCNC: 9 MG/DL (ref 6–23)
CALCIUM SERPL-MCNC: 8.9 MG/DL (ref 8.3–10.6)
CANNABINOID SCREEN URINE: NEGATIVE
CHLORIDE BLD-SCNC: 108 MMOL/L (ref 99–110)
CLARITY: CLEAR
CO2: 22 MMOL/L (ref 21–32)
COCAINE METABOLITE: NEGATIVE
COLOR: ABNORMAL
CREAT SERPL-MCNC: 0.6 MG/DL (ref 0.6–1.1)
DIFFERENTIAL TYPE: ABNORMAL
DOSE AMOUNT: ABNORMAL
DOSE AMOUNT: ABNORMAL
DOSE TIME: ABNORMAL
DOSE TIME: ABNORMAL
EOSINOPHILS ABSOLUTE: 0.1 K/CU MM
EOSINOPHILS RELATIVE PERCENT: 1.8 % (ref 0–3)
GFR AFRICAN AMERICAN: >60 ML/MIN/1.73M2
GFR NON-AFRICAN AMERICAN: >60 ML/MIN/1.73M2
GLUCOSE BLD-MCNC: 71 MG/DL (ref 70–99)
GLUCOSE BLD-MCNC: 72 MG/DL (ref 70–99)
GLUCOSE BLD-MCNC: 86 MG/DL (ref 70–99)
GLUCOSE, URINE: NEGATIVE MG/DL
HCT VFR BLD CALC: 35.9 % (ref 37–47)
HEMOGLOBIN: 10.5 GM/DL (ref 12.5–16)
ICTOTEST: NEGATIVE
IMMATURE NEUTROPHIL %: 0.1 % (ref 0–0.43)
KETONES, URINE: NEGATIVE MG/DL
LACTIC ACID, SEPSIS: 2 MMOL/L (ref 0.5–1.9)
LACTIC ACID, SEPSIS: ABNORMAL MMOL/L (ref 0.5–1.9)
LEUKOCYTE ESTERASE, URINE: NEGATIVE
LIPASE: 4 IU/L (ref 13–60)
LYMPHOCYTES ABSOLUTE: 2.9 K/CU MM
LYMPHOCYTES RELATIVE PERCENT: 39.5 % (ref 24–44)
MAGNESIUM: 1.5 MG/DL (ref 1.8–2.4)
MCH RBC QN AUTO: 27.4 PG (ref 27–31)
MCHC RBC AUTO-ENTMCNC: 29.2 % (ref 32–36)
MCV RBC AUTO: 93.7 FL (ref 78–100)
MONOCYTES ABSOLUTE: 0.5 K/CU MM
MONOCYTES RELATIVE PERCENT: 7.2 % (ref 0–4)
MUCUS: ABNORMAL HPF
NITRITE URINE, QUANTITATIVE: NEGATIVE
NUCLEATED RBC %: 0 %
OPIATES, URINE: NEGATIVE
OXYCODONE: ABNORMAL
PDW BLD-RTO: 19.4 % (ref 11.7–14.9)
PH, URINE: 5 (ref 5–8)
PHENCYCLIDINE, URINE: NEGATIVE
PHOSPHORUS: 2.9 MG/DL (ref 2.5–4.9)
PLATELET # BLD: 221 K/CU MM (ref 140–440)
PMV BLD AUTO: 11.6 FL (ref 7.5–11.1)
POTASSIUM SERPL-SCNC: 3.6 MMOL/L (ref 3.5–5.1)
PRO-BNP: 100.4 PG/ML
PROTEIN UA: NEGATIVE MG/DL
RBC # BLD: 3.83 M/CU MM (ref 4.2–5.4)
RBC URINE: <1 /HPF (ref 0–6)
SALICYLATE LEVEL: <0.3 MG/DL (ref 15–30)
SEGMENTED NEUTROPHILS ABSOLUTE COUNT: 3.8 K/CU MM
SEGMENTED NEUTROPHILS RELATIVE PERCENT: 51 % (ref 36–66)
SODIUM BLD-SCNC: 138 MMOL/L (ref 135–145)
SPECIFIC GRAVITY UA: 1.02 (ref 1–1.03)
SQUAMOUS EPITHELIAL: <1 /HPF
TOTAL IMMATURE NEUTOROPHIL: 0.01 K/CU MM
TOTAL NUCLEATED RBC: 0 K/CU MM
TOTAL PROTEIN: 4.6 GM/DL (ref 6.4–8.2)
TRICHOMONAS: ABNORMAL /HPF
TROPONIN T: <0.01 NG/ML
TSH HIGH SENSITIVITY: 0.43 UIU/ML (ref 0.27–4.2)
UROBILINOGEN, URINE: NORMAL MG/DL (ref 0.2–1)
WBC # BLD: 7.4 K/CU MM (ref 4–10.5)
WBC UA: 1 /HPF (ref 0–5)

## 2019-06-16 PROCEDURE — 83690 ASSAY OF LIPASE: CPT

## 2019-06-16 PROCEDURE — 2580000003 HC RX 258: Performed by: PHYSICIAN ASSISTANT

## 2019-06-16 PROCEDURE — G0480 DRUG TEST DEF 1-7 CLASSES: HCPCS

## 2019-06-16 PROCEDURE — 84484 ASSAY OF TROPONIN QUANT: CPT

## 2019-06-16 PROCEDURE — 82140 ASSAY OF AMMONIA: CPT

## 2019-06-16 PROCEDURE — 6360000002 HC RX W HCPCS: Performed by: PHYSICIAN ASSISTANT

## 2019-06-16 PROCEDURE — 02HV33Z INSERTION OF INFUSION DEVICE INTO SUPERIOR VENA CAVA, PERCUTANEOUS APPROACH: ICD-10-PCS | Performed by: INTERNAL MEDICINE

## 2019-06-16 PROCEDURE — 36415 COLL VENOUS BLD VENIPUNCTURE: CPT

## 2019-06-16 PROCEDURE — 96367 TX/PROPH/DG ADDL SEQ IV INF: CPT

## 2019-06-16 PROCEDURE — 94640 AIRWAY INHALATION TREATMENT: CPT

## 2019-06-16 PROCEDURE — 83735 ASSAY OF MAGNESIUM: CPT

## 2019-06-16 PROCEDURE — 84443 ASSAY THYROID STIM HORMONE: CPT

## 2019-06-16 PROCEDURE — 2580000003 HC RX 258: Performed by: NURSE PRACTITIONER

## 2019-06-16 PROCEDURE — 6360000002 HC RX W HCPCS: Performed by: EMERGENCY MEDICINE

## 2019-06-16 PROCEDURE — 96365 THER/PROPH/DIAG IV INF INIT: CPT

## 2019-06-16 PROCEDURE — 2060000000 HC ICU INTERMEDIATE R&B

## 2019-06-16 PROCEDURE — 83605 ASSAY OF LACTIC ACID: CPT

## 2019-06-16 PROCEDURE — 36569 INSJ PICC 5 YR+ W/O IMAGING: CPT

## 2019-06-16 PROCEDURE — 80307 DRUG TEST PRSMV CHEM ANLYZR: CPT

## 2019-06-16 PROCEDURE — 6370000000 HC RX 637 (ALT 250 FOR IP): Performed by: NURSE PRACTITIONER

## 2019-06-16 PROCEDURE — 87581 M.PNEUMON DNA AMP PROBE: CPT

## 2019-06-16 PROCEDURE — 2580000003 HC RX 258: Performed by: EMERGENCY MEDICINE

## 2019-06-16 PROCEDURE — 85025 COMPLETE CBC W/AUTO DIFF WBC: CPT

## 2019-06-16 PROCEDURE — 70450 CT HEAD/BRAIN W/O DYE: CPT

## 2019-06-16 PROCEDURE — 80053 COMPREHEN METABOLIC PANEL: CPT

## 2019-06-16 PROCEDURE — 87040 BLOOD CULTURE FOR BACTERIA: CPT

## 2019-06-16 PROCEDURE — 6360000002 HC RX W HCPCS: Performed by: NURSE PRACTITIONER

## 2019-06-16 PROCEDURE — 82962 GLUCOSE BLOOD TEST: CPT

## 2019-06-16 PROCEDURE — 87486 CHLMYD PNEUM DNA AMP PROBE: CPT

## 2019-06-16 PROCEDURE — 96366 THER/PROPH/DIAG IV INF ADDON: CPT

## 2019-06-16 PROCEDURE — 99291 CRITICAL CARE FIRST HOUR: CPT

## 2019-06-16 PROCEDURE — 76937 US GUIDE VASCULAR ACCESS: CPT

## 2019-06-16 PROCEDURE — 87633 RESP VIRUS 12-25 TARGETS: CPT

## 2019-06-16 PROCEDURE — 71046 X-RAY EXAM CHEST 2 VIEWS: CPT

## 2019-06-16 PROCEDURE — 93010 ELECTROCARDIOGRAM REPORT: CPT | Performed by: INTERNAL MEDICINE

## 2019-06-16 PROCEDURE — 93005 ELECTROCARDIOGRAM TRACING: CPT | Performed by: PHYSICIAN ASSISTANT

## 2019-06-16 PROCEDURE — 84100 ASSAY OF PHOSPHORUS: CPT

## 2019-06-16 PROCEDURE — 6370000000 HC RX 637 (ALT 250 FOR IP): Performed by: INTERNAL MEDICINE

## 2019-06-16 PROCEDURE — 83880 ASSAY OF NATRIURETIC PEPTIDE: CPT

## 2019-06-16 PROCEDURE — 81001 URINALYSIS AUTO W/SCOPE: CPT

## 2019-06-16 PROCEDURE — 87798 DETECT AGENT NOS DNA AMP: CPT

## 2019-06-16 PROCEDURE — C1751 CATH, INF, PER/CENT/MIDLINE: HCPCS

## 2019-06-16 RX ORDER — IPRATROPIUM BROMIDE AND ALBUTEROL SULFATE 2.5; .5 MG/3ML; MG/3ML
1 SOLUTION RESPIRATORY (INHALATION) 4 TIMES DAILY
Status: DISCONTINUED | OUTPATIENT
Start: 2019-06-16 | End: 2019-06-16 | Stop reason: SDUPTHER

## 2019-06-16 RX ORDER — SIMVASTATIN 20 MG
20 TABLET ORAL NIGHTLY
Status: DISCONTINUED | OUTPATIENT
Start: 2019-06-16 | End: 2019-06-20 | Stop reason: HOSPADM

## 2019-06-16 RX ORDER — SODIUM CHLORIDE 0.9 % (FLUSH) 0.9 %
10 SYRINGE (ML) INJECTION EVERY 12 HOURS SCHEDULED
Status: DISCONTINUED | OUTPATIENT
Start: 2019-06-16 | End: 2019-06-20 | Stop reason: HOSPADM

## 2019-06-16 RX ORDER — OXYCODONE HYDROCHLORIDE AND ACETAMINOPHEN 5; 325 MG/1; MG/1
1 TABLET ORAL EVERY 4 HOURS PRN
Status: DISCONTINUED | OUTPATIENT
Start: 2019-06-16 | End: 2019-06-20

## 2019-06-16 RX ORDER — SODIUM CHLORIDE 0.9 % (FLUSH) 0.9 %
10 SYRINGE (ML) INJECTION EVERY 12 HOURS SCHEDULED
Status: DISCONTINUED | OUTPATIENT
Start: 2019-06-16 | End: 2019-06-16 | Stop reason: SDUPTHER

## 2019-06-16 RX ORDER — METOPROLOL SUCCINATE 50 MG/1
50 TABLET, EXTENDED RELEASE ORAL DAILY
Status: DISCONTINUED | OUTPATIENT
Start: 2019-06-16 | End: 2019-06-20 | Stop reason: HOSPADM

## 2019-06-16 RX ORDER — AMITRIPTYLINE HYDROCHLORIDE 50 MG/1
50 TABLET, FILM COATED ORAL NIGHTLY
Status: DISCONTINUED | OUTPATIENT
Start: 2019-06-16 | End: 2019-06-20 | Stop reason: HOSPADM

## 2019-06-16 RX ORDER — GUAIFENESIN 600 MG/1
1200 TABLET, EXTENDED RELEASE ORAL 2 TIMES DAILY
Status: DISCONTINUED | OUTPATIENT
Start: 2019-06-16 | End: 2019-06-20 | Stop reason: HOSPADM

## 2019-06-16 RX ORDER — SODIUM CHLORIDE 0.9 % (FLUSH) 0.9 %
10 SYRINGE (ML) INJECTION PRN
Status: DISCONTINUED | OUTPATIENT
Start: 2019-06-16 | End: 2019-06-16 | Stop reason: SDUPTHER

## 2019-06-16 RX ORDER — SODIUM CHLORIDE 0.9 % (FLUSH) 0.9 %
10 SYRINGE (ML) INJECTION PRN
Status: DISCONTINUED | OUTPATIENT
Start: 2019-06-16 | End: 2019-06-20 | Stop reason: HOSPADM

## 2019-06-16 RX ORDER — SODIUM CHLORIDE 9 MG/ML
INJECTION, SOLUTION INTRAVENOUS CONTINUOUS
Status: DISCONTINUED | OUTPATIENT
Start: 2019-06-16 | End: 2019-06-19

## 2019-06-16 RX ORDER — ALBUTEROL SULFATE 90 UG/1
2 AEROSOL, METERED RESPIRATORY (INHALATION) EVERY 6 HOURS PRN
Status: DISCONTINUED | OUTPATIENT
Start: 2019-06-16 | End: 2019-06-20 | Stop reason: HOSPADM

## 2019-06-16 RX ORDER — AMIODARONE HYDROCHLORIDE 200 MG/1
200 TABLET ORAL DAILY
Status: DISCONTINUED | OUTPATIENT
Start: 2019-06-16 | End: 2019-06-20 | Stop reason: HOSPADM

## 2019-06-16 RX ORDER — LOSARTAN POTASSIUM 25 MG/1
25 TABLET ORAL DAILY
Status: DISCONTINUED | OUTPATIENT
Start: 2019-06-16 | End: 2019-06-20 | Stop reason: HOSPADM

## 2019-06-16 RX ORDER — ALBUTEROL SULFATE 2.5 MG/3ML
2.5 SOLUTION RESPIRATORY (INHALATION) EVERY 6 HOURS PRN
Status: DISCONTINUED | OUTPATIENT
Start: 2019-06-16 | End: 2019-06-20 | Stop reason: HOSPADM

## 2019-06-16 RX ORDER — IPRATROPIUM BROMIDE AND ALBUTEROL SULFATE 2.5; .5 MG/3ML; MG/3ML
1 SOLUTION RESPIRATORY (INHALATION)
Status: DISCONTINUED | OUTPATIENT
Start: 2019-06-16 | End: 2019-06-20 | Stop reason: HOSPADM

## 2019-06-16 RX ORDER — BENZONATATE 100 MG/1
100 CAPSULE ORAL 3 TIMES DAILY PRN
Status: DISCONTINUED | OUTPATIENT
Start: 2019-06-16 | End: 2019-06-20 | Stop reason: HOSPADM

## 2019-06-16 RX ORDER — FAMOTIDINE 20 MG/1
20 TABLET, FILM COATED ORAL 2 TIMES DAILY
Status: DISCONTINUED | OUTPATIENT
Start: 2019-06-16 | End: 2019-06-20 | Stop reason: HOSPADM

## 2019-06-16 RX ORDER — ASPIRIN 81 MG/1
81 TABLET, CHEWABLE ORAL DAILY
Status: DISCONTINUED | OUTPATIENT
Start: 2019-06-16 | End: 2019-06-20 | Stop reason: HOSPADM

## 2019-06-16 RX ORDER — ACETAMINOPHEN 325 MG/1
650 TABLET ORAL EVERY 4 HOURS PRN
Status: DISCONTINUED | OUTPATIENT
Start: 2019-06-16 | End: 2019-06-20 | Stop reason: HOSPADM

## 2019-06-16 RX ORDER — ONDANSETRON 2 MG/ML
4 INJECTION INTRAMUSCULAR; INTRAVENOUS EVERY 6 HOURS PRN
Status: DISCONTINUED | OUTPATIENT
Start: 2019-06-16 | End: 2019-06-20 | Stop reason: HOSPADM

## 2019-06-16 RX ORDER — CLOPIDOGREL BISULFATE 75 MG/1
75 TABLET ORAL DAILY
Status: DISCONTINUED | OUTPATIENT
Start: 2019-06-16 | End: 2019-06-20 | Stop reason: HOSPADM

## 2019-06-16 RX ORDER — PREGABALIN 75 MG/1
150 CAPSULE ORAL 2 TIMES DAILY
Status: DISCONTINUED | OUTPATIENT
Start: 2019-06-16 | End: 2019-06-20 | Stop reason: HOSPADM

## 2019-06-16 RX ADMIN — SIMVASTATIN 20 MG: 20 TABLET, FILM COATED ORAL at 21:02

## 2019-06-16 RX ADMIN — MEROPENEM 1 G: 1 INJECTION, POWDER, FOR SOLUTION INTRAVENOUS at 18:33

## 2019-06-16 RX ADMIN — GUAIFENESIN 1200 MG: 600 TABLET, EXTENDED RELEASE ORAL at 21:03

## 2019-06-16 RX ADMIN — CEFEPIME 2 G: 2 INJECTION, POWDER, FOR SOLUTION INTRAVENOUS at 13:29

## 2019-06-16 RX ADMIN — PREGABALIN 150 MG: 75 CAPSULE ORAL at 21:02

## 2019-06-16 RX ADMIN — SERTRALINE HYDROCHLORIDE 50 MG: 50 TABLET ORAL at 21:03

## 2019-06-16 RX ADMIN — Medication 2 PUFF: at 21:28

## 2019-06-16 RX ADMIN — SODIUM CHLORIDE, PRESERVATIVE FREE 10 ML: 5 INJECTION INTRAVENOUS at 21:03

## 2019-06-16 RX ADMIN — OXYCODONE HYDROCHLORIDE AND ACETAMINOPHEN 1 TABLET: 5; 325 TABLET ORAL at 21:03

## 2019-06-16 RX ADMIN — SODIUM CHLORIDE: 9 INJECTION, SOLUTION INTRAVENOUS at 23:19

## 2019-06-16 RX ADMIN — MAGNESIUM OXIDE TAB 400 MG (241.3 MG ELEMENTAL MG) 400 MG: 400 (241.3 MG) TAB at 21:02

## 2019-06-16 RX ADMIN — FAMOTIDINE 20 MG: 20 TABLET, FILM COATED ORAL at 21:03

## 2019-06-16 RX ADMIN — VANCOMYCIN HYDROCHLORIDE 1500 MG: 5 INJECTION, POWDER, LYOPHILIZED, FOR SOLUTION INTRAVENOUS at 14:32

## 2019-06-16 RX ADMIN — IPRATROPIUM BROMIDE AND ALBUTEROL SULFATE 1 AMPULE: .5; 3 SOLUTION RESPIRATORY (INHALATION) at 21:28

## 2019-06-16 RX ADMIN — ENOXAPARIN SODIUM 40 MG: 100 INJECTION SUBCUTANEOUS at 18:34

## 2019-06-16 RX ADMIN — AMITRIPTYLINE HYDROCHLORIDE 50 MG: 50 TABLET, FILM COATED ORAL at 21:03

## 2019-06-16 ASSESSMENT — PAIN SCALES - GENERAL
PAINLEVEL_OUTOF10: 0
PAINLEVEL_OUTOF10: 9
PAINLEVEL_OUTOF10: 7
PAINLEVEL_OUTOF10: 2

## 2019-06-16 ASSESSMENT — PAIN DESCRIPTION - ORIENTATION
ORIENTATION: RIGHT;LEFT
ORIENTATION: RIGHT;LEFT

## 2019-06-16 ASSESSMENT — PAIN DESCRIPTION - ONSET
ONSET: PROGRESSIVE
ONSET: GRADUAL

## 2019-06-16 ASSESSMENT — PAIN DESCRIPTION - PAIN TYPE
TYPE: ACUTE PAIN;CHRONIC PAIN
TYPE: ACUTE PAIN
TYPE: CHRONIC PAIN

## 2019-06-16 ASSESSMENT — PAIN DESCRIPTION - LOCATION
LOCATION: LEG
LOCATION: LEG
LOCATION: BACK;BUTTOCKS

## 2019-06-16 ASSESSMENT — PAIN - FUNCTIONAL ASSESSMENT: PAIN_FUNCTIONAL_ASSESSMENT: PREVENTS OR INTERFERES SOME ACTIVE ACTIVITIES AND ADLS

## 2019-06-16 ASSESSMENT — PAIN DESCRIPTION - FREQUENCY
FREQUENCY: INTERMITTENT
FREQUENCY: INTERMITTENT

## 2019-06-16 ASSESSMENT — PAIN DESCRIPTION - PROGRESSION: CLINICAL_PROGRESSION: NOT CHANGED

## 2019-06-16 ASSESSMENT — PAIN DESCRIPTION - DESCRIPTORS
DESCRIPTORS: ACHING
DESCRIPTORS: ACHING

## 2019-06-16 NOTE — CARE COORDINATION
CM review of pt chart for readmission risk, last admission 5/26-29/19 dx UTI discharged home with Augmentin 14 days, [facility]. Pt returns to ER today for AMS. Spoke with Dr Sandro Gaytan ER provider, states  No alternative to admission pt Septic and PNE. PICC line placed per Chris Camacho RN    Pt lives at [facility] with Dtr/Rich  , uses a walker to ambulate, active with [facility]. Pt plans return home with Dtr/ Cam Fontaine, continue with [facility]. Pt had no other needs at this time other than warm blanket and some lunch states she can chew all food even though she has no dentures in and she is diabetic, message given to [employer], [address] [place] [address].  ARTRN/CM - - -

## 2019-06-16 NOTE — ED TRIAGE NOTES
Patient presents to the ED with EMS with complaints of back pain. EMS states caregiver was concerned about increasing weakness, diarrhea, and a new open pressure ulcer. Patient daughter called by this RN and stated the patient has been altered for \"about a week or week and a half\". Patient not answering questions appropriately at this time. DORIAN Varner at bedside.

## 2019-06-16 NOTE — CONSULTS
Consult completed. Procedure/rationale explained to pt including benefits vs potential risks/complications; questions answered & verbal consent obtained, as pt is too weak to sign, verified per Dr. Lisbeth Neves at bedside. #4Fr PowerMidLine initiated to RUE Brachial Vein using sterile, UltraSound-guided technique without difficulty/complications. Sterile dressing with SkinPrep, StatLock Securing Device, BioPatch, SwabCap, and Limb Precautions band applied. Pt tolerated well without anxiety/distress/pain noted and no further c/o or needs noted or reported. Evan Sesay, Primary RN, notified.

## 2019-06-16 NOTE — H&P
mL/hr at 06/16/19 1432, 1,500 mg at 06/16/19 1432    Current Outpatient Medications:     metoprolol succinate (TOPROL XL) 50 MG extended release tablet, Take 50 mg by mouth daily, Disp: , Rfl:     oxyCODONE-acetaminophen (PERCOCET) 7.5-325 MG per tablet, Take 1 tablet by mouth every 6 hours as needed for Pain (lupus pain). , Disp: , Rfl:     sertraline (ZOLOFT) 50 MG tablet, Take 50 mg by mouth nightly, Disp: , Rfl:     amiodarone (CORDARONE) 200 MG tablet, Take 1 tablet by mouth daily, Disp: 30 tablet, Rfl: 3    simvastatin (ZOCOR) 20 MG tablet, Take 1 tablet by mouth nightly, Disp: 30 tablet, Rfl: 3    magnesium oxide (MAG-OX) 400 (241.3 Mg) MG TABS tablet, Take 1 tablet by mouth 2 times daily, Disp: 30 tablet, Rfl: 0    fluconazole (DIFLUCAN) 150 MG tablet, , Disp: , Rfl:     LORazepam (ATIVAN) 2 MG tablet, , Disp: , Rfl:     losartan (COZAAR) 25 MG tablet, , Disp: , Rfl:     ipratropium-albuterol (DUONEB) 0.5-2.5 (3) MG/3ML SOLN nebulizer solution, Inhale 3 mLs into the lungs 4 times daily Dx: COPD J44.9, Disp: 360 mL, Rfl: 5    amitriptyline (ELAVIL) 50 MG tablet, Take 50 mg by mouth nightly, Disp: , Rfl:     OXYGEN, Inhale 2 L/min into the lungs as needed, Disp: , Rfl:     guaiFENesin (MUCINEX) 600 MG extended release tablet, Take 2 tablets by mouth 2 times daily, Disp: 120 tablet, Rfl: 5    clopidogrel (PLAVIX) 75 MG tablet, Take 75 mg by mouth daily, Disp: , Rfl:     famotidine (PEPCID) 20 MG tablet, Take 20 mg by mouth 2 times daily, Disp: , Rfl:     loperamide (IMODIUM) 2 MG capsule, Take 2 mg by mouth 4 times daily as needed for Diarrhea , Disp: , Rfl:     metFORMIN (GLUCOPHAGE) 500 MG tablet, Take 500 mg by mouth daily (with breakfast), Disp: , Rfl:     PROAIR  (90 Base) MCG/ACT inhaler, Inhale 2 puffs into the lungs every 6 hours as needed for Wheezing or Shortness of Breath , Disp: , Rfl:     budesonide-formoterol (SYMBICORT) 160-4.5 MCG/ACT AERO, Inhale 2 puffs into the lungs 2 times daily, Disp: 1 Inhaler, Rfl: 5    aspirin 81 MG chewable tablet, Take 1 tablet by mouth daily, Disp: 30 tablet, Rfl: 0    benzonatate (TESSALON) 100 MG capsule, Take 100 mg by mouth 3 times daily as needed for Cough , Disp: , Rfl:     pregabalin (LYRICA) 150 MG capsule, Take 150 mg by mouth 2 times daily, Disp: , Rfl:     albuterol (PROVENTIL) (2.5 MG/3ML) 0.083% nebulizer solution, Take 3 mLs by nebulization every 6 hours as needed for Wheezing, Disp: 120 each, Rfl: 3    Multiple Vitamins-Minerals (MULTIVITAMIN PO), Take  by mouth every morning. Over The Counter, Disp: , Rfl:     History of present illness     Chief Complaint: Fatigue (increasing weaknes per caregiver, recent UTI, new open pressure ulcer)      Benitez Parmar is a 80 y.o.  female  who presents with AMS. Patient was recently treated for UTI and completed the course of antibiotics. No family at bedside. Unable to get more info from patient due to AMS. Per ED reports, he has been falling often and more confused. Hx of DMII, RA, HTN, HLD, COPD, CAD, Restless leg, SLE, and anxiety. Review of Systems   Unable to obtain    Objective: Intake/Output Summary (Last 24 hours) at 6/16/2019 1528  Last data filed at 6/16/2019 1405  Gross per 24 hour   Intake 50 ml   Output --   Net 50 ml      Vitals:   Vitals:    06/16/19 1448   BP: (!) 115/97   Pulse: 77   Resp: 16   Temp:    SpO2:      Physical Exam:   Gen:  awake, alert, cooperative, no apparent distress. Ill appearing  EYES:Lids and lashes normal, pupils equal, round ,extra ocular muscles intact, sclera clear, conjunctiva normal  ENT:  Normocephalic, oral pharynx with moist mucus membranes  NECK:  Supple, symmetrical, trachea midline, no adenopathy,  LUNGS:  Diminished bilaterally, no rales ronchi or wheezing noted.   CARDIOVASCULAR:  regular rate and rhythm, normal S1 and S2,no murmur noted, peripheral pulses 2+, no pitting edema  ABDOMEN: Normal BS, Non tender, non distended, no HSM on respirator    Erythromycin      \"Stomach Pain\"    Motrin [Ibuprofen] Diarrhea    Tetracyclines & Related Nausea And Vomiting       FAM HX: family history includes Arthritis in her mother and sister; Depression in her mother and sister; Diabetes in her brother, mother, and sister; Early Death in her father; Heart Disease in her father, mother, and sister; High Blood Pressure in her father, mother, and sister; High Cholesterol in her mother and sister; Kidney Disease in her mother and sister; Learning Disabilities in her mother; Lupus in her sister; Mental Illness in her brother and mother; Other in her father; Stroke in her mother and sister; Vision Loss in her mother. Family history reviewed and is essentially negative unless as stated above. Soc HX:   Social History     Socioeconomic History    Marital status:       Spouse name: None    Number of children: None    Years of education: None    Highest education level: None   Occupational History    None   Social Needs    Financial resource strain: None    Food insecurity:     Worry: None     Inability: None    Transportation needs:     Medical: None     Non-medical: None   Tobacco Use    Smoking status: Current Every Day Smoker     Packs/day: 0.50     Years: 32.00     Pack years: 16.00     Types: Cigarettes     Start date: 1986    Smokeless tobacco: Never Used   Substance and Sexual Activity    Alcohol use: No     Alcohol/week: 0.0 oz    Drug use: No     Comment: states quit 1-2-14    Sexual activity: Not Currently   Lifestyle    Physical activity:     Days per week: None     Minutes per session: None    Stress: None   Relationships    Social connections:     Talks on phone: None     Gets together: None     Attends Mu-ism service: None     Active member of club or organization: None     Attends meetings of clubs or organizations: None     Relationship status: None    Intimate partner violence:     Fear of current or ex partner: None     Emotionally abused: None     Physically abused: None     Forced sexual activity: None   Other Topics Concern    None   Social History Narrative    None       Electronically signed by NORAH Perez CNP on 6/16/2019 at 3:28 PM

## 2019-06-16 NOTE — ED NOTES
Patient is resting at this time Mid Line placed and ready for use     Ck Rico, RN  06/16/19 [facility], [facility]  06/16/19 0135

## 2019-06-16 NOTE — PROGRESS NOTES
[address] [facility] [address] is a 80 y.o. female started on vancomycin for altered mental status 2/2 possible UTI vs pressure ulcer vs. HAP . Pharmacy consulted by Dr. Cherry Mack for monitoring and adjustment. Indication for treatment: Sepsis  Goal trough: 15-20 mcg/mL  Other antimicrobials: meropenem    Ht Readings from Last 1 Encounters:   06/16/19 5' 1\" (1.549 m)     Wt Readings from Last 3 Encounters:   06/16/19 185 lb (83.9 kg)   05/28/19 182 lb 12.8 oz (82.9 kg)   04/21/19 160 lb (72.6 kg)        Pertinent Laboratory Values:   Temp Readings from Last 3 Encounters:   06/16/19 97.7 °F (36.5 °C) (Oral)   05/29/19 98.1 °F (36.7 °C) (Oral)   04/25/19 97.8 °F (36.6 °C) (Oral)     Recent Labs     06/16/19  1145   WBC 7.4     Recent Labs     06/16/19  1145   BUN 9   CREATININE 0.6     Estimated Creatinine Clearance: 89 mL/min (based on SCr of 0.6 mg/dL). Intake/Output Summary (Last 24 hours) at 6/16/2019 1625  Last data filed at 6/16/2019 1405  Gross per 24 hour   Intake 50 ml   Output --   Net 50 ml       Pertinent Cultures:  Date    Source    Results  6/16   Blood                                      Sent      Previous vancomycin levels:  TROUGH:  No results for input(s): VANCOTROUGH in the last 72 hours. RANDOM:  No results for input(s): VANCORANDOM in the last 72 hours. Assessment/Plan:  · Ms. Lujan received vancomycin 1500 mg IVPB x 1 in the ED   · Will order vancomycin 1250 mg q12h IVPB   · Trough prior to fourth total dose   · Please re-consult pharmacy if vancomycin to continue upon admission     Thank you for the consult.   Marva Sorensen, PharmD, BCPS   6/16/2019 4:25 PM

## 2019-06-16 NOTE — ED NOTES
Patient is awake asking for food and she has been updated on the 14 Therese Morton RN  06/16/19 5928

## 2019-06-16 NOTE — PROGRESS NOTES
Rafy Woodard is a 80 y.o. female started on vancomycin for altered mental status 2/2 possible UTI vs pressure ulcer . Pharmacy consulted by Dr. Luis Bernstein for monitoring and adjustment. Indication for treatment: pressure ulcer infection  Goal trough: 10-15 mcg/mL  Other antimicrobials: cefepime     Ht Readings from Last 1 Encounters:   06/16/19 5' 1\" (1.549 m)     Wt Readings from Last 3 Encounters:   06/16/19 185 lb (83.9 kg)   05/28/19 182 lb 12.8 oz (82.9 kg)   04/21/19 160 lb (72.6 kg)        Pertinent Laboratory Values:   Temp Readings from Last 3 Encounters:   06/16/19 98 °F (36.7 °C) (Oral)   05/29/19 98.1 °F (36.7 °C) (Oral)   04/25/19 97.8 °F (36.6 °C) (Oral)     Recent Labs     06/16/19  1145   WBC 7.4     Recent Labs     06/16/19  1145   BUN 9   CREATININE 0.6     Estimated Creatinine Clearance: 89 mL/min (based on SCr of 0.6 mg/dL). No intake or output data in the 24 hours ending 06/16/19 1253    Pertinent Cultures:  Date    Source    Results  6/16   Blood                                      Sent      Previous vancomycin levels:  TROUGH:  No results for input(s): VANCOTROUGH in the last 72 hours. RANDOM:  No results for input(s): VANCORANDOM in the last 72 hours. Assessment/Plan:  · Will order vancomycin 1500 mg IVPB x 1 (18 mg/kg)  · Please re-consult pharmacy if vancomycin to continue upon admission     Thank you for the consult.   Jeanie Ca, PharmD, BCPS   6/16/2019 12:53 PM

## 2019-06-16 NOTE — ED PROVIDER NOTES
EMERGENCY DEPARTMENT ENCOUNTER        PCP: Huang Rodas DO    CHIEF COMPLAINT    Chief Complaint   Patient presents with    Fatigue     increasing weaknes per caregiver, recent UTI, new open pressure ulcer         Of note, this patient was also evaluated by the attending physician, Dr. General Han. HPI    Tano Andrade is a 80 y.o. female who presents with altered mental status, onset was 1.5 weeks ago. The duration has been constant since the onset. Patient is unable to provide history secondary to altered mental status. Patient brought in by EMS and per nursing report who called family member reports that patient was diagnosed with UTI recently and finished taking her antibiotic but continues to have altered mental status. They have noticed a new pressure ulcer on patient's buttocks. They report patient has been having more frequent falls than usual and seems in general more weak than usual.  Patient does live at home. Additional history unable to be obtained due to the fact that the patient has altered mental status. REVIEW OF SYSTEMS    Unable to obtain due to the fact that the patient has altered mental status  See HPI and nursing notes for additional information. PAST MEDICAL & SURGICAL HISTORY    Past Medical History:   Diagnosis Date    Anxiety     CAD (coronary artery disease)     Dr Lupe Goldberg CAD S/P percutaneous coronary angioplasty 2012    stent RCA; Ahmed    Chronic back pain     COPD (chronic obstructive pulmonary disease) ([facility] [address]     Sahra Lundberg Gout 2018    History of cerebral infarction 2000    right hemiparesis with full recovery    Hyperlipidemia     Hypertension     Lumbar spinal stenosis     Lung cancer ([facility] [address] 09/2014    Dr Derril Dancer; left upper lobectomy; non small cell CA    Obesity (BMI 30.0-34. 9)     Restless leg syndrome     Rheumatoid arthritis (HCC)     Smoker     Systemic lupus erythematosus (HCC) 1992    Dr Candace Sue, visiting physician    Type II diabetes mellitus ([facility] [address] 1969    Ventral hernia 06/2018    right ventral hernia seen on CT abd 6/2018     Past Surgical History:   Procedure Laterality Date    ABDOMINAL EXPLORATION SURGERY  Last Done 2005    2-3 times; repeated bowel obstructions;    ANKLE FRACTURE SURGERY      APPENDECTOMY  1972    CATARACT REMOVAL WITH IMPLANT Bilateral 2000's    CHOLECYSTECTOMY  1967    FEMUR FRACTURE SURGERY Right 2000's    Right Thigh, Plates, Rods, Screws    HUMERUS FRACTURE SURGERY Left 2000's    Broken Left Arm, Plates And Screws    LUNG REMOVAL, PARTIAL Left 09/30/2014    Lung CA, left; Robotic Assisted Left Thoracotomy, Left Lung Cancer; ROWDY lobectomy; Nonsmall Cell CA    OTHER SURGICAL HISTORY  2/20/15    excision of hydradenitis suppurative of cesilia anal area    REVISION TOTAL KNEE ARTHROPLASTY Right 2005    SKIN CANCER EXCISION Right 2012    Right Ankle    ANTONY AND BSO  1972    TOTAL KNEE ARTHROPLASTY Right 2003    Total Right Knee with revision       CURRENT MEDICATIONS    Current Outpatient Rx   Medication Sig Dispense Refill    metoprolol succinate (TOPROL XL) 50 MG extended release tablet Take 50 mg by mouth daily      oxyCODONE-acetaminophen (PERCOCET) 7.5-325 MG per tablet Take 1 tablet by mouth every 6 hours as needed for Pain (lupus pain).       sertraline (ZOLOFT) 50 MG tablet Take 50 mg by mouth nightly      amiodarone (CORDARONE) 200 MG tablet Take 1 tablet by mouth daily 30 tablet 3    simvastatin (ZOCOR) 20 MG tablet Take 1 tablet by mouth nightly 30 tablet 3    magnesium oxide (MAG-OX) 400 (241.3 Mg) MG TABS tablet Take 1 tablet by mouth 2 times daily 30 tablet 0    fluconazole (DIFLUCAN) 150 MG tablet       LORazepam (ATIVAN) 2 MG tablet       losartan (COZAAR) 25 MG tablet       ipratropium-albuterol (DUONEB) 0.5-2.5 (3) MG/3ML SOLN nebulizer solution Inhale 3 mLs into the lungs 4 times daily Dx: COPD J44.9 360 mL 5    amitriptyline (ELAVIL) 50 MG tablet Take 50 mg by mouth nightly      OXYGEN Inhale 2 L/min into the lungs as needed      guaiFENesin (MUCINEX) 600 MG extended release tablet Take 2 tablets by mouth 2 times daily 120 tablet 5    clopidogrel (PLAVIX) 75 MG tablet Take 75 mg by mouth daily      famotidine (PEPCID) 20 MG tablet Take 20 mg by mouth 2 times daily      loperamide (IMODIUM) 2 MG capsule Take 2 mg by mouth 4 times daily as needed for Diarrhea       metFORMIN (GLUCOPHAGE) 500 MG tablet Take 500 mg by mouth daily (with breakfast)      PROAIR  (90 Base) MCG/ACT inhaler Inhale 2 puffs into the lungs every 6 hours as needed for Wheezing or Shortness of Breath       budesonide-formoterol (SYMBICORT) 160-4.5 MCG/ACT AERO Inhale 2 puffs into the lungs 2 times daily 1 Inhaler 5    aspirin 81 MG chewable tablet Take 1 tablet by mouth daily 30 tablet 0    benzonatate (TESSALON) 100 MG capsule Take 100 mg by mouth 3 times daily as needed for Cough       pregabalin (LYRICA) 150 MG capsule Take 150 mg by mouth 2 times daily      albuterol (PROVENTIL) (2.5 MG/3ML) 0.083% nebulizer solution Take 3 mLs by nebulization every 6 hours as needed for Wheezing 120 each 3    Multiple Vitamins-Minerals (MULTIVITAMIN PO) Take  by mouth every morning. Over The Counter         ALLERGIES    Allergies   Allergen Reactions    Food Swelling     \"Rye, Whole grains, Barley\"    Trental [Pentoxifylline] Swelling    Tramadol      \"I Felt Shaky\"    Vistaril [Hydroxyzine Hcl] Other (See Comments)     Sedated and was placed on respirator    Erythromycin      \"Stomach Pain\"    Motrin [Ibuprofen] Diarrhea    Tetracyclines & Related Nausea And Vomiting       SOCIAL & FAMILY HISTORY    Social History     Socioeconomic History    Marital status:       Spouse name: None    Number of children: None    Years of education: None    Highest education level: None   Occupational History    None   Social Needs    Financial resource strain: None    Food insecurity:     Worry: None Inability: None    Transportation needs:     Medical: None     Non-medical: None   Tobacco Use    Smoking status: Current Every Day Smoker     Packs/day: 0.50     Years: 32.00     Pack years: 16.00     Types: Cigarettes     Start date: 12    Smokeless tobacco: Never Used   Substance and Sexual Activity    Alcohol use: No     Alcohol/week: 0.0 oz    Drug use: No     Comment: states quit 1-2-14    Sexual activity: Not Currently   Lifestyle    Physical activity:     Days per week: None     Minutes per session: None    Stress: None   Relationships    Social connections:     Talks on phone: None     Gets together: None     Attends Jainism service: None     Active member of club or organization: None     Attends meetings of clubs or organizations: None     Relationship status: None    Intimate partner violence:     Fear of current or ex partner: None     Emotionally abused: None     Physically abused: None     Forced sexual activity: None   Other Topics Concern    None   Social History Narrative    None     Family History   Problem Relation Age of Onset    Heart Disease Mother     Diabetes Mother     Arthritis Mother     Depression Mother     High Blood Pressure Mother     High Cholesterol Mother     Kidney Disease Mother         On Dialysis    Learning Disabilities Mother     Mental Illness Mother     Stroke Mother     Vision Loss Mother     Early Death Father         Brain Hemorrhage    Other Father         \"Bowel Problems\"    High Blood Pressure Father     Heart Disease Father     Mental Illness Brother         Schizophrenia , Paranoia    Diabetes Brother     Lupus Sister     Arthritis Sister     Depression Sister     Diabetes Sister     Heart Disease Sister     High Blood Pressure Sister     High Cholesterol Sister     Kidney Disease Sister     Stroke Sister        PHYSICAL EXAM    VITAL SIGNS: /86   Pulse 83   Temp 98 °F (36.7 °C) (Oral)   Resp 12   Ht 5' 1\" (1.549 m)   Wt 185 lb (83.9 kg)   SpO2 95%   BMI 34.96 kg/m²    Constitutional:  Well developed, well nourished, no acute distress. Eyes: Pupils equally round and reactive to light, sclera nonicteric  HENT:  Atraumatic, moist mucus membranes. Ear canals, TMs, oropharynx clear  Neck/Lymphatics: supple, no JVD, no swollen nodes  Respiratory:  Lungs without wheezing, rhonchi, rales. No accessory muscle usage. No retractions   Cardiovascular:  regular rate & rhythm, no murmurs  GI:  Soft, nontender, normal bowel sounds  Musculoskeletal:  No edema, no acute deformities  Integument:  Warm and dry skin, no petechiae. There is a pressure ulcer present at the superior gluteal cleft with epidermis/dermis skin breakdown that appears to have a barrier cream applied without surrounding erythema, induration, streaking erythema. No drainage from wound. No fluctuance. No petechiae, purpura, vesicles, bullae. Neurologic:    - Alert & oriented person, place only. No speech difficulties, but patient does not respond to questions appropriately  - No obvious gross motor deficits  - No facial droop. - Negative meningeal signs.  -Patient moves all extremities spontaneously  - Upper and lower extremity DTRs 2+ bilaterally.       Psych:    Affect normal, Mood normal.     Vascular: Radial and DP pulses 2+ equal bilaterally        Labs:  Results for orders placed or performed during the hospital encounter of 06/16/19   CBC Auto Differential   Result Value Ref Range    WBC 7.4 4.0 - 10.5 K/CU MM    RBC 3.83 (L) 4.2 - 5.4 M/CU MM    Hemoglobin 10.5 (L) 12.5 - 16.0 GM/DL    Hematocrit 35.9 (L) 37 - 47 %    MCV 93.7 78 - 100 FL    MCH 27.4 27 - 31 PG    MCHC 29.2 (L) 32.0 - 36.0 %    RDW 19.4 (H) 11.7 - 14.9 %    Platelets 881 647 - 892 K/CU MM    MPV 11.6 (H) 7.5 - 11.1 FL    Differential Type AUTOMATED DIFFERENTIAL     Segs Relative 51.0 36 - 66 %    Lymphocytes % 39.5 24 - 44 %    Monocytes % 7.2 (H) 0 - 4 %    Eosinophils % 1.8 0 - 3 % Basophils % 0.4 0 - 1 %    Segs Absolute 3.8 K/CU MM    Lymphocytes # 2.9 K/CU MM    Monocytes # 0.5 K/CU MM    Eosinophils # 0.1 K/CU MM    Basophils # 0.0 K/CU MM    Nucleated RBC % 0.0 %    Total Nucleated RBC 0.0 K/CU MM    Total Immature Neutrophil 0.01 K/CU MM    Immature Neutrophil % 0.1 0 - 0.43 %   Urinalysis   Result Value Ref Range    Color, UA RAH (A) YELLOW    Clarity, UA CLEAR CLEAR    Glucose, Urine NEGATIVE NEGATIVE MG/DL    Bilirubin Urine SMALL (A) NEGATIVE MG/DL    Ketones, Urine NEGATIVE NEGATIVE MG/DL    Specific Gravity, UA 1.018 1.001 - 1.035    Blood, Urine NEGATIVE NEGATIVE    pH, Urine 5.0 5.0 - 8.0    Protein, UA NEGATIVE NEGATIVE MG/DL    Urobilinogen, Urine NORMAL 0.2 - 1.0 MG/DL    Nitrite Urine, Quantitative NEGATIVE NEGATIVE    Leukocyte Esterase, Urine NEGATIVE NEGATIVE    RBC, UA <1 0 - 6 /HPF    WBC, UA 1 0 - 5 /HPF    Bacteria, UA NEGATIVE NEGATIVE /HPF    Squam Epithel, UA <1 /HPF    Mucus, UA RARE (A) NEGATIVE HPF    Trichomonas, UA NONE SEEN NONE SEEN /HPF   Troponin   Result Value Ref Range    Troponin T <0.010 <0.01 NG/ML   Ammonia Level   Result Value Ref Range    Ammonia 41 11 - 51 UMOL/L   Salicylate   Result Value Ref Range    Salicylate Lvl <1.7 (L) 15 - 30 MG/DL    DOSE AMOUNT       DOSE AMT. GIVEN - Unknown  DOSE AMT.  GIVEN -  patient denies ingestion      DOSE TIME       DOSE TIME GIVEN - Unknown  DOSE TIME GIVEN - patient denies ingestion     Urine Drug Screen   Result Value Ref Range    Cannabinoid Scrn, Ur NEGATIVE NEGATIVE    Amphetamines NEGATIVE NEGATIVE    Cocaine Metabolite NEGATIVE NEGATIVE    Benzodiazepine Screen, Urine NEGATIVE NEGATIVE    Barbiturate Screen, Ur NEGATIVE NEGATIVE    Opiates, Urine NEGATIVE NEGATIVE    Phencyclidine, Urine NEGATIVE NEGATIVE    Oxycodone (A) NEGATIVE     UNCONFIRMED POSITIVE          THRESHOLD CONCENTRATIONS (mg/dL)  AMPHT               1000  MIRELLA,OPIA             300  BZO,BAR              200  PCP 25  THC                   50  OXY                  100          IF POSITIVE, SPECIMEN WILL BE  DISCARDED AFTER 6 MONTHS. CALL LAB IF CONFIRMATION NEEDED. ALL NEGATIVE SPECIMENS WILL BE  DISCARDED AFTER ONE WEEK. * UNCONFIRMED POSITIVES MAY  NOT MEET FORENSIC REQUIREMENTS. Ethanol   Result Value Ref Range    Alcohol Scrn <0.01  THE VALUE IS BELOW OUR DETECTION LIMIT. <0.01 %WT/VOL   TSH without Reflex   Result Value Ref Range    TSH, High Sensitivity 0.435 0.270 - 4.20 uIu/ml   Acetaminophen Level   Result Value Ref Range    Acetaminophen Level <5.0 (L) 15 - 30 ug/ml    DOSE AMOUNT       DOSE AMT. GIVEN - Unknown  DOSE AMT.  GIVEN -  patient denies ingestion      DOSE TIME       DOSE TIME GIVEN - Unknown  DOSE TIME GIVEN - patient denies ingestion     Lipase   Result Value Ref Range    Lipase 4 (L) 13 - 60 IU/L   Lactate, Sepsis   Result Value Ref Range    Lactic Acid, Sepsis (HH) 0.5 - 1.9 mMOL/L     2.9  LACTI CALLED TO DORIAN SALAZAR @ [id number] [id number] [facility] MLT  RESULTS READ BACK     Lactate, Sepsis   Result Value Ref Range    Lactic Acid, Sepsis 2.0 (HH) 0.5 - 1.9 mMOL/L   Comprehensive Metabolic Panel   Result Value Ref Range    Sodium 138 135 - 145 MMOL/L    Potassium 3.6 3.5 - 5.1 MMOL/L    Chloride 108 99 - 110 mMol/L    CO2 22 21 - 32 MMOL/L    BUN 9 6 - 23 MG/DL    CREATININE 0.6 0.6 - 1.1 MG/DL    Glucose 86 70 - 99 MG/DL    Calcium 8.9 8.3 - 10.6 MG/DL    Alb 2.3 (L) 3.4 - 5.0 GM/DL    Total Protein 4.6 (L) 6.4 - 8.2 GM/DL    Total Bilirubin 0.4 0.0 - 1.0 MG/DL    ALT 17 10 - 40 U/L    AST 27 15 - 37 IU/L    Alkaline Phosphatase 84 40 - 128 IU/L    GFR Non-African American >60 >60 mL/min/1.73m2    GFR African American >60 >60 mL/min/1.73m2    Anion Gap 8 4 - 16   Brain Natriuretic Peptide   Result Value Ref Range    Pro-.4 <300 PG/ML   ICTOTEST, URINE   Result Value Ref Range    Ictotest NEGATIVE    Magnesium   Result Value Ref Range    Magnesium 1.5 (L) 1.8 - 2.4 mg/dl   Phosphorus Result Value Ref Range    Phosphorus 2.9 2.5 - 4.9 MG/DL   POCT Glucose   Result Value Ref Range    POC Glucose 71 70 - 99 MG/DL   EKG 12 Lead   Result Value Ref Range    Ventricular Rate 82 BPM    Atrial Rate 82 BPM    P-R Interval 136 ms    QRS Duration 104 ms    Q-T Interval 398 ms    QTc Calculation (Bazett) 464 ms    P Axis 34 degrees    R Axis -84 degrees    T Axis 28 degrees    Diagnosis       Normal sinus rhythm  Left axis deviation  Low voltage QRS  Possible Anterolateral infarct (cited on or before 20-APR-2018)  Abnormal ECG  When compared with ECG of 26-MAY-2019 18:15,  premature ventricular complexes are no longer present  Confirmed by KAEL Lagos ([id number]) on 6/16/2019 12:05:43 PM           EKG      EKG Interpretation  Please see ED physician's note for EKG interpretation - Dr. Gideon Ward      RADIOLOGY/PROCEDURES    XR CHEST STANDARD (2 VW)   Preliminary Result   Mild bilateral lower lobe airspace opacities may reflect pneumonia. CT Head WO Contrast   Final Result   No acute intracranial abnormality. ED COURSE & MEDICAL DECISION MAKING       Vital signs and nursing notes reviewed during ED course. Patient care and presentation staffed and seen in conjunction with supervising physician, Dr. Gideon Ward. Patient presents as above which prompted work up today. While in the ED today- labs, EKG, and imaging were obtained. For EKG interpretation-ED physician's note. Labs reveal Hgb of 10.5, elevated lactic of 2, and unconfirmed positive for oxycodone on UDS. Rest of labs without clinically significant derangements. CT head without intracranial abnormality. CXR reveals bilateral lower lobe opacities- I suspect HCAP. Patient started on IV cefepime and IV vancomycin. Midline had to be placed to obtain IV access. Suspect AMS secondary to pneumonia. Pt is hypoxic on room air and requires supplemental oxygen via NC. I suspect hypoxia is secondary to pneumonia.  I consulted hospitalist and we discussed the pt's case- hospitalist agrees to admit the pt at this time for further evaluation and treatment. All pertinent Lab data and radiographic results reviewed with patient at bedside. The patient and/or the family were informed of the results of any tests/labs/imaging, the treatment plan, and time was allotted to answer questions. Diagnosis, disposition, and treatment plan reviewed in detail with patient. Pt admitted. CRITICAL CARE NOTE:  There was a high probability of clinically significant life-threatening deterioration of the patient's condition requiring my urgent intervention due to hypoxia. CXR, supplemental oxygen, continuous pulse oximetry monitoring were performed to address this. Total critical care time is at least  30 minutes. This includes vital sign monitoring, pulse oximetry monitoring, telemetry monitoring, clinical response to the IV medications, reviewing the nursing notes, consultation time, dictation/documentation time, and interpretation of the lab work. This time excludes time spent performing procedures and separately billable procedures and family discussion time. Clinical  IMPRESSION    1. Pneumonia due to organism    2. General weakness    3. Altered mental status, unspecified altered mental status type    4. Elevated lactic acid level    5. Hypoxemia          Disposition:  Admitted    Comment: Please note this report has been produced using speech recognition software and may contain errors related to that system including errors in grammar, punctuation, and spelling, as well as words and phrases that may be inappropriate. If there are any questions or concerns please feel free to contact the dictating provider for clarification.         Ramu Zuleta PA-C  06/16/19 2009

## 2019-06-16 NOTE — ED NOTES
Stuck patient x4 no success, let RN and DORIAN lundy know     Raul Durán.  [facility]  06/16/19 1059

## 2019-06-16 NOTE — PROGRESS NOTES
Pt transferred to room 2017 from the ED. Two person skin assessment done by Callie Hernandez and HENRIQUE Elias. Pt has abrasions on bilat knees. 2 open areas on left upper buttock and 1 open areas on right upper buttock. Will get a consult for wound care. Pt states she has had several episodes of diarrhea at home. Pt oriented to room. Call light in reach. Will continue to closely monitorPt has no teeth. Glasses, pants, shirt, socks, undergarments, shoes, nightgown. 1 empty pill bottle.

## 2019-06-16 NOTE — ED NOTES
Bed: ED-19  Expected date:   Expected time:   Means of arrival:   Comments:  EMS     John Koch RN  06/16/19 1025

## 2019-06-16 NOTE — ED PROVIDER NOTES
I independently examined and evaluated Stanley Ibrahim. In brief their history revealed patient with concern for altered mental status. The report is that this is been going on for over a week. Patient is altered herself and a poor historian and no family at bedside. Per EMS report family member stated that the patient was diagnosed with a UTI recently and finished taking the antibiotic but continues to be altered. They also reported more falls at home. Their exam revealed patient is awake. She appears in no acute distress. She is oriented to where she is. Otherwise does not answer questions appropriately. . All diagnostic, treatment, and disposition decisions were made by myself in conjunction with the mid-level provider. Patient's vital signs are stable. She appears in no acute distress. Her lactic acid initially was 2.9 however we repeated this and it was 2.0. White blood cell count is not elevated. Her chest x-ray is concerning for pneumonia. We have discussed with the hospitalist who will admit at this time. No family has come to the emergency department    12 lead EKG:  Normal Sinus Rhythm 82  Axis is   Left axis deviation  QTc is  normal  There is no specific ST-T wave changes appreciated. There is no clear evidence of acute ischemia or infarction. It was compared against an EKG from 5/26/19. For all further details of the patient's emergency department visit, please see the mid-level practitioner's documentation.           Asif Harris MD  06/16/19 [id number]

## 2019-06-17 ENCOUNTER — APPOINTMENT (OUTPATIENT)
Dept: CT IMAGING | Age: 69
DRG: 871 | End: 2019-06-17
Payer: MEDICARE

## 2019-06-17 LAB
ADENOVIRUS DETECTION BY PCR: NOT DETECTED
ANION GAP SERPL CALCULATED.3IONS-SCNC: 7 MMOL/L (ref 4–16)
BORDETELLA PERTUSSIS PCR: NOT DETECTED
BUN BLDV-MCNC: 6 MG/DL (ref 6–23)
CALCIUM SERPL-MCNC: 8.7 MG/DL (ref 8.3–10.6)
CHLAMYDOPHILA PNEUMONIA PCR: NOT DETECTED
CHLORIDE BLD-SCNC: 111 MMOL/L (ref 99–110)
CO2: 23 MMOL/L (ref 21–32)
CORONAVIRUS 229E PCR: NOT DETECTED
CORONAVIRUS HKU1 PCR: NOT DETECTED
CORONAVIRUS NL63 PCR: NOT DETECTED
CORONAVIRUS OC43 PCR: NOT DETECTED
CREAT SERPL-MCNC: 0.5 MG/DL (ref 0.6–1.1)
GFR AFRICAN AMERICAN: >60 ML/MIN/1.73M2
GFR NON-AFRICAN AMERICAN: >60 ML/MIN/1.73M2
GLUCOSE BLD-MCNC: 109 MG/DL (ref 70–99)
GLUCOSE BLD-MCNC: 197 MG/DL (ref 70–99)
GLUCOSE BLD-MCNC: 54 MG/DL (ref 70–99)
GLUCOSE BLD-MCNC: 73 MG/DL (ref 70–99)
GLUCOSE BLD-MCNC: 76 MG/DL (ref 70–99)
GLUCOSE BLD-MCNC: 88 MG/DL (ref 70–99)
GLUCOSE BLD-MCNC: 90 MG/DL (ref 70–99)
GLUCOSE BLD-MCNC: 92 MG/DL (ref 70–99)
HCT VFR BLD CALC: 35.1 % (ref 37–47)
HEMOGLOBIN: 10.8 GM/DL (ref 12.5–16)
HUMAN METAPNEUMOVIRUS PCR: NOT DETECTED
INFLUENZA A BY PCR: NOT DETECTED
INFLUENZA A H1 (2009) PCR: NOT DETECTED
INFLUENZA A H1 PANDEMIC PCR: NOT DETECTED
INFLUENZA A H3 PCR: NOT DETECTED
INFLUENZA B BY PCR: NOT DETECTED
LACTATE: ABNORMAL MMOL/L (ref 0.4–2)
MCH RBC QN AUTO: 27.2 PG (ref 27–31)
MCHC RBC AUTO-ENTMCNC: 30.8 % (ref 32–36)
MCV RBC AUTO: 88.4 FL (ref 78–100)
MYCOPLASMA PNEUMONIAE PCR: NOT DETECTED
PARAINFLUENZA 1 PCR: NOT DETECTED
PARAINFLUENZA 2 PCR: NOT DETECTED
PARAINFLUENZA 3 PCR: NOT DETECTED
PARAINFLUENZA 4 PCR: NOT DETECTED
PDW BLD-RTO: 18.6 % (ref 11.7–14.9)
PLATELET # BLD: 213 K/CU MM (ref 140–440)
PMV BLD AUTO: 11.9 FL (ref 7.5–11.1)
POTASSIUM SERPL-SCNC: 3.7 MMOL/L (ref 3.5–5.1)
PROCALCITONIN: 0.06
RBC # BLD: 3.97 M/CU MM (ref 4.2–5.4)
RHINOVIRUS ENTEROVIRUS PCR: NOT DETECTED
RSV PCR: NOT DETECTED
SODIUM BLD-SCNC: 141 MMOL/L (ref 135–145)
WBC # BLD: 5.1 K/CU MM (ref 4–10.5)

## 2019-06-17 PROCEDURE — 83605 ASSAY OF LACTIC ACID: CPT

## 2019-06-17 PROCEDURE — 80048 BASIC METABOLIC PNL TOTAL CA: CPT

## 2019-06-17 PROCEDURE — 6370000000 HC RX 637 (ALT 250 FOR IP): Performed by: INTERNAL MEDICINE

## 2019-06-17 PROCEDURE — 2580000003 HC RX 258: Performed by: INTERNAL MEDICINE

## 2019-06-17 PROCEDURE — 71250 CT THORAX DX C-: CPT

## 2019-06-17 PROCEDURE — 82962 GLUCOSE BLOOD TEST: CPT

## 2019-06-17 PROCEDURE — 84145 PROCALCITONIN (PCT): CPT

## 2019-06-17 PROCEDURE — 6370000000 HC RX 637 (ALT 250 FOR IP): Performed by: NURSE PRACTITIONER

## 2019-06-17 PROCEDURE — 85027 COMPLETE CBC AUTOMATED: CPT

## 2019-06-17 PROCEDURE — 2580000003 HC RX 258: Performed by: NURSE PRACTITIONER

## 2019-06-17 PROCEDURE — 2700000000 HC OXYGEN THERAPY PER DAY

## 2019-06-17 PROCEDURE — 94761 N-INVAS EAR/PLS OXIMETRY MLT: CPT

## 2019-06-17 PROCEDURE — 6360000002 HC RX W HCPCS: Performed by: NURSE PRACTITIONER

## 2019-06-17 PROCEDURE — 36415 COLL VENOUS BLD VENIPUNCTURE: CPT

## 2019-06-17 PROCEDURE — 6360000002 HC RX W HCPCS: Performed by: INTERNAL MEDICINE

## 2019-06-17 PROCEDURE — 1200000000 HC SEMI PRIVATE

## 2019-06-17 PROCEDURE — 94640 AIRWAY INHALATION TREATMENT: CPT

## 2019-06-17 RX ORDER — NICOTINE POLACRILEX 4 MG
15 LOZENGE BUCCAL PRN
Status: DISCONTINUED | OUTPATIENT
Start: 2019-06-17 | End: 2019-06-20 | Stop reason: HOSPADM

## 2019-06-17 RX ORDER — LORAZEPAM 1 MG/1
2 TABLET ORAL EVERY 8 HOURS PRN
Status: DISCONTINUED | OUTPATIENT
Start: 2019-06-17 | End: 2019-06-17

## 2019-06-17 RX ORDER — DEXTROSE MONOHYDRATE 25 G/50ML
12.5 INJECTION, SOLUTION INTRAVENOUS PRN
Status: DISCONTINUED | OUTPATIENT
Start: 2019-06-17 | End: 2019-06-20 | Stop reason: HOSPADM

## 2019-06-17 RX ORDER — DEXTROSE MONOHYDRATE 50 MG/ML
100 INJECTION, SOLUTION INTRAVENOUS PRN
Status: DISCONTINUED | OUTPATIENT
Start: 2019-06-17 | End: 2019-06-20 | Stop reason: HOSPADM

## 2019-06-17 RX ORDER — LORAZEPAM 1 MG/1
1 TABLET ORAL EVERY 8 HOURS PRN
Status: DISCONTINUED | OUTPATIENT
Start: 2019-06-17 | End: 2019-06-20 | Stop reason: HOSPADM

## 2019-06-17 RX ORDER — OXYCODONE AND ACETAMINOPHEN 7.5; 325 MG/1; MG/1
1 TABLET ORAL EVERY 6 HOURS PRN
Status: DISCONTINUED | OUTPATIENT
Start: 2019-06-17 | End: 2019-06-20

## 2019-06-17 RX ADMIN — OXYCODONE HYDROCHLORIDE AND ACETAMINOPHEN 1 TABLET: 5; 325 TABLET ORAL at 18:24

## 2019-06-17 RX ADMIN — VANCOMYCIN HYDROCHLORIDE 1250 MG: 5 INJECTION, POWDER, LYOPHILIZED, FOR SOLUTION INTRAVENOUS at 18:24

## 2019-06-17 RX ADMIN — MAGNESIUM OXIDE TAB 400 MG (241.3 MG ELEMENTAL MG) 400 MG: 400 (241.3 MG) TAB at 09:54

## 2019-06-17 RX ADMIN — FAMOTIDINE 20 MG: 20 TABLET, FILM COATED ORAL at 09:54

## 2019-06-17 RX ADMIN — SODIUM CHLORIDE, PRESERVATIVE FREE 10 ML: 5 INJECTION INTRAVENOUS at 09:55

## 2019-06-17 RX ADMIN — LOSARTAN POTASSIUM 25 MG: 25 TABLET ORAL at 09:54

## 2019-06-17 RX ADMIN — VANCOMYCIN HYDROCHLORIDE 1250 MG: 5 INJECTION, POWDER, LYOPHILIZED, FOR SOLUTION INTRAVENOUS at 05:21

## 2019-06-17 RX ADMIN — Medication 2 PUFF: at 07:37

## 2019-06-17 RX ADMIN — GUAIFENESIN 1200 MG: 600 TABLET, EXTENDED RELEASE ORAL at 09:54

## 2019-06-17 RX ADMIN — IPRATROPIUM BROMIDE AND ALBUTEROL SULFATE 1 AMPULE: .5; 3 SOLUTION RESPIRATORY (INHALATION) at 07:35

## 2019-06-17 RX ADMIN — IPRATROPIUM BROMIDE AND ALBUTEROL SULFATE 1 AMPULE: .5; 3 SOLUTION RESPIRATORY (INHALATION) at 11:43

## 2019-06-17 RX ADMIN — PREGABALIN 150 MG: 75 CAPSULE ORAL at 09:54

## 2019-06-17 RX ADMIN — Medication 2 PUFF: at 20:03

## 2019-06-17 RX ADMIN — METOPROLOL SUCCINATE 50 MG: 50 TABLET, EXTENDED RELEASE ORAL at 09:54

## 2019-06-17 RX ADMIN — ASPIRIN 81 MG 81 MG: 81 TABLET ORAL at 09:54

## 2019-06-17 RX ADMIN — MEROPENEM 1 G: 1 INJECTION, POWDER, FOR SOLUTION INTRAVENOUS at 02:11

## 2019-06-17 RX ADMIN — IPRATROPIUM BROMIDE AND ALBUTEROL SULFATE 1 AMPULE: .5; 3 SOLUTION RESPIRATORY (INHALATION) at 20:02

## 2019-06-17 RX ADMIN — AMIODARONE HYDROCHLORIDE 200 MG: 200 TABLET ORAL at 09:54

## 2019-06-17 RX ADMIN — IPRATROPIUM BROMIDE AND ALBUTEROL SULFATE 1 AMPULE: .5; 3 SOLUTION RESPIRATORY (INHALATION) at 15:51

## 2019-06-17 RX ADMIN — MEROPENEM 1 G: 1 INJECTION, POWDER, FOR SOLUTION INTRAVENOUS at 09:54

## 2019-06-17 RX ADMIN — CLOPIDOGREL BISULFATE 75 MG: 75 TABLET ORAL at 09:54

## 2019-06-17 RX ADMIN — SODIUM CHLORIDE: 9 INJECTION, SOLUTION INTRAVENOUS at 15:23

## 2019-06-17 RX ADMIN — ENOXAPARIN SODIUM 40 MG: 100 INJECTION SUBCUTANEOUS at 09:54

## 2019-06-17 ASSESSMENT — PAIN SCALES - GENERAL
PAINLEVEL_OUTOF10: 9
PAINLEVEL_OUTOF10: 0

## 2019-06-17 NOTE — PROGRESS NOTES
Hospitalist Progress Note      Name:  Courtney Fernando /Age/Sex: 1950  (80 y.o. female)   MRN & CSN:  [id number] & [id number] Admission Date/Time: 2019 10:23 AM   Location:  -A PCP: [address] [place] [address] Day: 2    Assessment and Plan:   Courtney Fernando is a 80 y.o.  female  who presents with Fatigue (increasing weaknes per caregiver, recent UTI, new open pressure ulcer)    Possible Sepsis unknown source. PNA ruled out after CT chest. Elevated lactate, but afebrile with no leukocytosis. # Acute metabolic encephalopathy CT head negative. No focal deficit. CT chest: No focal airspace opacities to suggest pneumonia. -  Meropenem and Vanco IV. Previous cx resistant to Cefepime  -Hydration  -Pan cx -ve so far -PCR -ve  -Respiratory interventions: supplemental oxygen, continuous/ spot check pulse oximetry, and TCDB. -Continue routine inhalers and add prn bronchodilators, expectorants, and antitussives. -Procalcitonin level 0.06    -Avoid sedative agents    Frequent falls  PT/OT        HTN, normotensive  -Continue Metoprolol, Amiodarone,      Stage II pressure ulcer  -Position change q 2 and PRN  -Wound consult and eval     DMII with   A1c 5.6 (2019)  -Hold oral agents  -Monitor FSBS and start SSI     Other medical hx  -CAD  -Anxiety  -Gout  -HLD  -SLP  -Obesity class 1 with BMI 35.5: educated about lifestyle modifications       Chronic treatment continued per home medications unless  contraindicated by above plan and assessment. The above assessment/plan has been explained to the patient, who indicated understanding.       Diet DIET CARB CONTROL;   DVT Prophylaxis [x] Lovenox, []  Heparin, [] SCDs, []No VTE prophylaxis, patient ambulating   GI Prophylaxis [] PPI, [x] H2 Blocker, [] No GI prophylaxis, patient is receiving diet/Tube Feeds   Code Status Full Code             History of Present Illness:     Chief Complaint:   Courtney Fernando is a 80 y.o.  female  who presents with AMS. Patient was recently treated for UTI and completed the course of antibiotics. Per ED reports, he has been falling often and more confused. Hx of DMII, RA, HTN, HLD, COPD, CAD, Restless leg, SLE, and anxiety. Patient was seen and examined at bedside today. Patient sitting up comfortably in bed in NAD. Ten point ROS reviewed negative, unless as noted above    Objective: Intake/Output Summary (Last 24 hours) at 6/17/2019 1321  Last data filed at 6/16/2019 2200  Gross per 24 hour   Intake 290 ml   Output --   Net 290 ml      Vitals:   Vitals:    06/17/19 1203   BP: 99/71   Pulse:    Resp:    Temp: 97.6 °F (36.4 °C)   SpO2: 99%     Physical Exam:      GEN Awake female, sitting upright in bed in no apparent distress. Appears given age. EYES Pupils are equally round. No scleral erythema, discharge, or conjunctivitis. HENT Mucous membranes are moist.   RESP Clear to auscultation, no wheezes, rales or rhonchi. CARDIO/VASC S1/S2 auscultated. No murmurs  GI Abdomen is soft without significant tenderness, masses, or guarding. MSK No gross joint deformities. Spontaneous movement of all extremities  SKIN Normal coloration, warm, dry.   NEURO Grossly normal  PSYCH Awake, alert, oriented     Medications:   Medications:    amiodarone  200 mg Oral Daily    amitriptyline  50 mg Oral Nightly    aspirin  81 mg Oral Daily    mometasone-formoterol  2 puff Inhalation BID    simvastatin  20 mg Oral Nightly    sertraline  50 mg Oral Nightly    pregabalin  150 mg Oral BID    metoprolol succinate  50 mg Oral Daily    insulin lispro  0-6 Units Subcutaneous TID     insulin lispro  0-3 Units Subcutaneous Nightly    magnesium oxide  400 mg Oral BID    losartan  25 mg Oral Daily    guaiFENesin  1,200 mg Oral BID    clopidogrel  75 mg Oral Daily    famotidine  20 mg Oral BID    sodium chloride flush  10 mL Intravenous 2 times per day    enoxaparin  40 mg Subcutaneous Daily    ipratropium-albuterol  1 ampule Inhalation Q4H WA    meropenem  1 g Intravenous Q8H    vancomycin  1,250 mg Intravenous Q12H      Infusions:    dextrose      sodium chloride 100 mL/hr at 06/16/19 4139     PRN Meds:   glucose 15 g PRN   dextrose 12.5 g PRN   glucagon (rDNA) 1 mg PRN   dextrose 100 mL/hr PRN   albuterol 2.5 mg Q6H PRN   benzonatate 100 mg TID PRN   albuterol sulfate HFA 2 puff Q6H PRN   sodium chloride flush 10 mL PRN   magnesium hydroxide 30 mL Daily PRN   ondansetron 4 mg Q6H PRN   acetaminophen 650 mg Q4H PRN   oxyCODONE-acetaminophen 1 tablet Q4H PRN         Electronically signed by Ryan Aguilar MD on 6/17/2019 at 1:21 PM

## 2019-06-17 NOTE — PROGRESS NOTES
Nutrition Assessment (Low Risk)    Type and Reason for Visit: Initial, Positive Nutrition Screen    Nutrition Assessment: Pt assessed due to a positive screen for wounds. Pt has a stage 1 pressure injury. This does not require nutritional intervention at this time. Pt weight appears stable at this time.      Malnutrition Assessment:  · Malnutrition Status: No malnutrition    Nutrition Risk Level   Risk Level: Low    Nutrition Diagnosis:   · Problem: No nutrition diagnosis at this time    Nutrition Intervention:  Food and/or Delivery: Continue current diet  Nutrition Education/Counseling/Coordination of Care:  Continued Inpatient Monitoring, Education Not Indicated, Coordination of Care      Electronically signed by Natalie Hightower RD, KAITY on 5/87/58 at 7:38 AM    Contact Number: [phone]

## 2019-06-18 LAB
ANION GAP SERPL CALCULATED.3IONS-SCNC: 5 MMOL/L (ref 4–16)
BUN BLDV-MCNC: 5 MG/DL (ref 6–23)
CALCIUM SERPL-MCNC: 8.6 MG/DL (ref 8.3–10.6)
CHLORIDE BLD-SCNC: 114 MMOL/L (ref 99–110)
CO2: 26 MMOL/L (ref 21–32)
CREAT SERPL-MCNC: 0.5 MG/DL (ref 0.6–1.1)
DOSE AMOUNT: NORMAL
DOSE TIME: NORMAL
GFR AFRICAN AMERICAN: >60 ML/MIN/1.73M2
GFR NON-AFRICAN AMERICAN: >60 ML/MIN/1.73M2
GLUCOSE BLD-MCNC: 128 MG/DL (ref 70–99)
GLUCOSE BLD-MCNC: 140 MG/DL (ref 70–99)
GLUCOSE BLD-MCNC: 71 MG/DL (ref 70–99)
GLUCOSE BLD-MCNC: 85 MG/DL (ref 70–99)
GLUCOSE BLD-MCNC: 85 MG/DL (ref 70–99)
GLUCOSE BLD-MCNC: 88 MG/DL (ref 70–99)
HCT VFR BLD CALC: 29.5 % (ref 37–47)
HEMOGLOBIN: 9.2 GM/DL (ref 12.5–16)
LACTATE: 1.4 MMOL/L (ref 0.4–2)
MCH RBC QN AUTO: 27.3 PG (ref 27–31)
MCHC RBC AUTO-ENTMCNC: 31.2 % (ref 32–36)
MCV RBC AUTO: 87.5 FL (ref 78–100)
PDW BLD-RTO: 18.5 % (ref 11.7–14.9)
PLATELET # BLD: 192 K/CU MM (ref 140–440)
PMV BLD AUTO: 11.4 FL (ref 7.5–11.1)
POTASSIUM SERPL-SCNC: 3.6 MMOL/L (ref 3.5–5.1)
RBC # BLD: 3.37 M/CU MM (ref 4.2–5.4)
SODIUM BLD-SCNC: 145 MMOL/L (ref 135–145)
VANCOMYCIN TROUGH: 17.7 UG/ML (ref 10–20)
WBC # BLD: 6.6 K/CU MM (ref 4–10.5)

## 2019-06-18 PROCEDURE — 82962 GLUCOSE BLOOD TEST: CPT

## 2019-06-18 PROCEDURE — 94640 AIRWAY INHALATION TREATMENT: CPT

## 2019-06-18 PROCEDURE — 6370000000 HC RX 637 (ALT 250 FOR IP): Performed by: INTERNAL MEDICINE

## 2019-06-18 PROCEDURE — 80048 BASIC METABOLIC PNL TOTAL CA: CPT

## 2019-06-18 PROCEDURE — 6360000002 HC RX W HCPCS: Performed by: INTERNAL MEDICINE

## 2019-06-18 PROCEDURE — 2700000000 HC OXYGEN THERAPY PER DAY

## 2019-06-18 PROCEDURE — 6370000000 HC RX 637 (ALT 250 FOR IP): Performed by: NURSE PRACTITIONER

## 2019-06-18 PROCEDURE — 83605 ASSAY OF LACTIC ACID: CPT

## 2019-06-18 PROCEDURE — 2580000003 HC RX 258: Performed by: INTERNAL MEDICINE

## 2019-06-18 PROCEDURE — 6360000002 HC RX W HCPCS: Performed by: NURSE PRACTITIONER

## 2019-06-18 PROCEDURE — 85027 COMPLETE CBC AUTOMATED: CPT

## 2019-06-18 PROCEDURE — 2580000003 HC RX 258: Performed by: NURSE PRACTITIONER

## 2019-06-18 PROCEDURE — 94761 N-INVAS EAR/PLS OXIMETRY MLT: CPT

## 2019-06-18 PROCEDURE — 1200000000 HC SEMI PRIVATE

## 2019-06-18 PROCEDURE — 80202 ASSAY OF VANCOMYCIN: CPT

## 2019-06-18 RX ADMIN — OXYCODONE HYDROCHLORIDE AND ACETAMINOPHEN 1 TABLET: 5; 325 TABLET ORAL at 18:34

## 2019-06-18 RX ADMIN — INSULIN LISPRO 1 UNITS: 100 INJECTION, SOLUTION INTRAVENOUS; SUBCUTANEOUS at 00:37

## 2019-06-18 RX ADMIN — Medication 2 PUFF: at 19:47

## 2019-06-18 RX ADMIN — MEROPENEM 1 G: 1 INJECTION, POWDER, FOR SOLUTION INTRAVENOUS at 04:52

## 2019-06-18 RX ADMIN — SIMVASTATIN 20 MG: 20 TABLET, FILM COATED ORAL at 21:49

## 2019-06-18 RX ADMIN — SODIUM CHLORIDE: 9 INJECTION, SOLUTION INTRAVENOUS at 14:36

## 2019-06-18 RX ADMIN — SODIUM CHLORIDE, PRESERVATIVE FREE 10 ML: 5 INJECTION INTRAVENOUS at 00:30

## 2019-06-18 RX ADMIN — AMIODARONE HYDROCHLORIDE 200 MG: 200 TABLET ORAL at 09:52

## 2019-06-18 RX ADMIN — PREGABALIN 150 MG: 75 CAPSULE ORAL at 00:29

## 2019-06-18 RX ADMIN — IPRATROPIUM BROMIDE AND ALBUTEROL SULFATE 1 AMPULE: .5; 3 SOLUTION RESPIRATORY (INHALATION) at 15:55

## 2019-06-18 RX ADMIN — CLOPIDOGREL BISULFATE 75 MG: 75 TABLET ORAL at 09:52

## 2019-06-18 RX ADMIN — SODIUM CHLORIDE, PRESERVATIVE FREE 10 ML: 5 INJECTION INTRAVENOUS at 09:53

## 2019-06-18 RX ADMIN — IPRATROPIUM BROMIDE AND ALBUTEROL SULFATE 1 AMPULE: .5; 3 SOLUTION RESPIRATORY (INHALATION) at 09:08

## 2019-06-18 RX ADMIN — FAMOTIDINE 20 MG: 20 TABLET, FILM COATED ORAL at 21:49

## 2019-06-18 RX ADMIN — MAGNESIUM OXIDE TAB 400 MG (241.3 MG ELEMENTAL MG) 400 MG: 400 (241.3 MG) TAB at 21:49

## 2019-06-18 RX ADMIN — AMITRIPTYLINE HYDROCHLORIDE 50 MG: 50 TABLET, FILM COATED ORAL at 00:30

## 2019-06-18 RX ADMIN — LOSARTAN POTASSIUM 25 MG: 25 TABLET ORAL at 09:52

## 2019-06-18 RX ADMIN — GUAIFENESIN 1200 MG: 600 TABLET, EXTENDED RELEASE ORAL at 00:29

## 2019-06-18 RX ADMIN — IPRATROPIUM BROMIDE AND ALBUTEROL SULFATE 1 AMPULE: .5; 3 SOLUTION RESPIRATORY (INHALATION) at 19:45

## 2019-06-18 RX ADMIN — METOPROLOL SUCCINATE 50 MG: 50 TABLET, EXTENDED RELEASE ORAL at 09:51

## 2019-06-18 RX ADMIN — ENOXAPARIN SODIUM 40 MG: 100 INJECTION SUBCUTANEOUS at 09:53

## 2019-06-18 RX ADMIN — SIMVASTATIN 20 MG: 20 TABLET, FILM COATED ORAL at 00:29

## 2019-06-18 RX ADMIN — OXYCODONE HYDROCHLORIDE AND ACETAMINOPHEN 1 TABLET: 5; 325 TABLET ORAL at 00:29

## 2019-06-18 RX ADMIN — ASPIRIN 81 MG 81 MG: 81 TABLET ORAL at 09:52

## 2019-06-18 RX ADMIN — MEROPENEM 1 G: 1 INJECTION, POWDER, FOR SOLUTION INTRAVENOUS at 00:31

## 2019-06-18 RX ADMIN — SERTRALINE HYDROCHLORIDE 50 MG: 50 TABLET ORAL at 00:30

## 2019-06-18 RX ADMIN — SERTRALINE HYDROCHLORIDE 50 MG: 50 TABLET ORAL at 21:49

## 2019-06-18 RX ADMIN — GUAIFENESIN 1200 MG: 600 TABLET, EXTENDED RELEASE ORAL at 21:48

## 2019-06-18 RX ADMIN — Medication 2 PUFF: at 09:08

## 2019-06-18 RX ADMIN — LORAZEPAM 1 MG: 1 TABLET ORAL at 21:48

## 2019-06-18 RX ADMIN — VANCOMYCIN HYDROCHLORIDE 1250 MG: 5 INJECTION, POWDER, LYOPHILIZED, FOR SOLUTION INTRAVENOUS at 21:48

## 2019-06-18 RX ADMIN — MAGNESIUM OXIDE TAB 400 MG (241.3 MG ELEMENTAL MG) 400 MG: 400 (241.3 MG) TAB at 09:51

## 2019-06-18 RX ADMIN — FAMOTIDINE 20 MG: 20 TABLET, FILM COATED ORAL at 09:52

## 2019-06-18 RX ADMIN — AMITRIPTYLINE HYDROCHLORIDE 50 MG: 50 TABLET, FILM COATED ORAL at 21:49

## 2019-06-18 RX ADMIN — MAGNESIUM OXIDE TAB 400 MG (241.3 MG ELEMENTAL MG) 400 MG: 400 (241.3 MG) TAB at 00:30

## 2019-06-18 RX ADMIN — MEROPENEM 1 G: 1 INJECTION, POWDER, FOR SOLUTION INTRAVENOUS at 12:06

## 2019-06-18 RX ADMIN — PREGABALIN 150 MG: 75 CAPSULE ORAL at 21:48

## 2019-06-18 RX ADMIN — GUAIFENESIN 1200 MG: 600 TABLET, EXTENDED RELEASE ORAL at 09:52

## 2019-06-18 RX ADMIN — PREGABALIN 150 MG: 75 CAPSULE ORAL at 09:52

## 2019-06-18 RX ADMIN — VANCOMYCIN HYDROCHLORIDE 1250 MG: 5 INJECTION, POWDER, LYOPHILIZED, FOR SOLUTION INTRAVENOUS at 07:31

## 2019-06-18 RX ADMIN — FAMOTIDINE 20 MG: 20 TABLET, FILM COATED ORAL at 00:30

## 2019-06-18 ASSESSMENT — PAIN DESCRIPTION - DESCRIPTORS: DESCRIPTORS: ACHING

## 2019-06-18 ASSESSMENT — PAIN DESCRIPTION - ORIENTATION: ORIENTATION: LEFT

## 2019-06-18 ASSESSMENT — PAIN DESCRIPTION - PAIN TYPE: TYPE: ACUTE PAIN;CHRONIC PAIN

## 2019-06-18 ASSESSMENT — PAIN SCALES - GENERAL
PAINLEVEL_OUTOF10: 9
PAINLEVEL_OUTOF10: 2

## 2019-06-18 ASSESSMENT — PAIN DESCRIPTION - PROGRESSION
CLINICAL_PROGRESSION: NOT CHANGED

## 2019-06-18 ASSESSMENT — PAIN DESCRIPTION - FREQUENCY: FREQUENCY: INTERMITTENT

## 2019-06-18 ASSESSMENT — PAIN DESCRIPTION - LOCATION: LOCATION: LEG

## 2019-06-18 NOTE — PROGRESS NOTES
[address] [facility]  consulted by Dr. Lory Haile for monitoring and adjustment. Indication for treatment: Sepsis  Goal trough: 15-20 mcg/mL    Ht Readings from Last 1 Encounters:   06/16/19 5' 1\" (1.549 m)     Wt Readings from Last 3 Encounters:   06/18/19 170 lb 13.7 oz (77.5 kg)   05/28/19 182 lb 12.8 oz (82.9 kg)   04/21/19 160 lb (72.6 kg)        Pertinent Laboratory Values:   Temp Readings from Last 3 Encounters:   06/18/19 98.3 °F (36.8 °C) (Oral)   05/29/19 98.1 °F (36.7 °C) (Oral)   04/25/19 97.8 °F (36.6 °C) (Oral)     Recent Labs     06/16/19  1145 06/17/19  0600 06/17/19  1530 06/18/19  0430   WBC 7.4 5.1  --  6.6   LACTATE  --   --  2.4  LACT CALLED TO COLTON ROBB RN ON [id number] AT 1731 NLUCAS MLT  * 1.4     Recent Labs     06/16/19  1145 06/17/19  0600 06/18/19  0430   BUN 9 6 5*   CREATININE 0.6 0.5* 0.5*     Estimated Creatinine Clearance: 103 mL/min (A) (based on SCr of 0.5 mg/dL (L)). Intake/Output Summary (Last 24 hours) at 6/18/2019 1337  Last data filed at 6/18/2019 0458  Gross per 24 hour   Intake 2254 ml   Output 700 ml   Net 1554 ml       Pertinent Cultures:  Date    Source    Results  6/16   Blood                                      GPC 1/2 - pending  6/16   Viral PCR                                Negative      Previous vancomycin levels:  TROUGH:  No results for input(s): VANCOTROUGH in the last 72 hours. RANDOM:  No results for input(s): VANCORANDOM in the last 72 hours. Assessment:  · WBC and temperature: WBC WNL. Patient is afebrile. · SCr, BUN, and urine output: Stable, WNL. · Day(s) of therapy: #3  · Vancomycin level: To be collected      Plan:  · Renal labs are stable at this time. · Plan to continue patient on current dosing: vancomycin 1250 mg IVPB every 12 hours. · A trough level is ordered: 06-18-19 @ 1800. · Pharmacy will continue to follow and adjust vancomycin as appropriate.     Thank you for the consult,    Keke Molina, PharmD, RPh

## 2019-06-18 NOTE — PROGRESS NOTES
Hospitalist Progress Note      Name:  Jose Vance /Age/Sex: 1950  (80 y.o. female)   MRN & CSN:  [id number] & [id number] Admission Date/Time: 2019 10:23 AM   Location:  -A PCP: [address] [place] [address] Day: 3    Assessment and Plan:   Jose Vance is a 80 y.o.  female  who presents with Fatigue (increasing weaknes per caregiver, recent UTI, new open pressure ulcer)    Possible Sepsis unknown source. PNA ruled out after CT chest. Elevated lactate, but afebrile with no leukocytosis. Procalcitonin level 0.06    -lactate has improved today. BCx: GPC, await ID. On vanc and merrem. UA unremarkable. Acute metabolic encephalopathy  CT head negative. No focal deficit.    -may be due to infectious process. Appears to be improved today. Frequent falls  PT/OT     HTN, normotensive  -Continue Metoprolol      Stage II pressure ulcer  -Position change q 2 and PRN  -Wound consult and eval     DMII   A1c 5.6 (2019)  -Hold oral agents  -Monitor FSBS and start SSI     Other medical hx  -CAD  -Anxiety  -Gout  -HLD  -SLP  -Obesity class 1 with BMI 35.5: educated about lifestyle modifications     Chronic treatment continued per home medications unless  contraindicated by above plan and assessment. The above assessment/plan has been explained to the patient, who indicated understanding. Diet DIET CARB CONTROL;   DVT Prophylaxis [x] Lovenox, []  Heparin, [] SCDs, []No VTE prophylaxis, patient ambulating   GI Prophylaxis [] PPI, [x] H2 Blocker, [] No GI prophylaxis, patient is receiving diet/Tube Feeds   Code Status Full Code             History of Present Illness/subjective:     Chief Complaint:   Jose Vance is a 80 y.o.  female  who presents with AMS. Patient was recently treated for UTI and completed the course of antibiotics. Per ED reports, he has been falling often and more confused. Hx of DMII, RA, HTN, HLD, COPD, CAD, Restless leg, SLE, and anxiety.       Patient states she is feeling better, she feels confusion has improved. Her breathing is stable. She is not having diarrhea now but may have prior to admission. She also had some dysuria prior to admission. Ten point ROS reviewed negative, unless as noted above    Objective: Intake/Output Summary (Last 24 hours) at 6/18/2019 0943  Last data filed at 6/18/2019 0458  Gross per 24 hour   Intake 2254 ml   Output 700 ml   Net 1554 ml      Vitals:   Vitals:    06/18/19 0458   BP: (!) 119/59   Pulse: 74   Resp: 13   Temp: 97.6 °F (36.4 °C)   SpO2: 100%     Physical Exam:      GEN Awake female, laying in bed in no apparent distress. Appears given age. HENT NCAT  RESP Clear to auscultation, no wheezes, rales or rhonchi. CARDIO/VASC S1/S2 auscultated. No murmurs  GI Abdomen is soft without significant tenderness, masses, or guarding. MSK No gross joint deformities. Spontaneous movement of all extremities  SKIN Normal coloration, warm, dry.   NEURO Grossly normal  PSYCH Awake, alert, oriented to person, place, month     Medications:   Medications:    meropenem  1 g Intravenous Q8H    vancomycin  1,250 mg Intravenous Q12H    amiodarone  200 mg Oral Daily    amitriptyline  50 mg Oral Nightly    aspirin  81 mg Oral Daily    mometasone-formoterol  2 puff Inhalation BID    simvastatin  20 mg Oral Nightly    sertraline  50 mg Oral Nightly    pregabalin  150 mg Oral BID    metoprolol succinate  50 mg Oral Daily    insulin lispro  0-6 Units Subcutaneous TID WC    insulin lispro  0-3 Units Subcutaneous Nightly    magnesium oxide  400 mg Oral BID    losartan  25 mg Oral Daily    guaiFENesin  1,200 mg Oral BID    clopidogrel  75 mg Oral Daily    famotidine  20 mg Oral BID    sodium chloride flush  10 mL Intravenous 2 times per day    enoxaparin  40 mg Subcutaneous Daily    ipratropium-albuterol  1 ampule Inhalation Q4H WA      Infusions:    dextrose      sodium chloride 100 mL/hr at 06/17/19 1523     PRN Meds: glucose 15 g PRN   dextrose 12.5 g PRN   glucagon (rDNA) 1 mg PRN   dextrose 100 mL/hr PRN   oxyCODONE-acetaminophen 1 tablet Q6H PRN   LORazepam 1 mg Q8H PRN   albuterol 2.5 mg Q6H PRN   benzonatate 100 mg TID PRN   albuterol sulfate HFA 2 puff Q6H PRN   sodium chloride flush 10 mL PRN   magnesium hydroxide 30 mL Daily PRN   ondansetron 4 mg Q6H PRN   acetaminophen 650 mg Q4H PRN   oxyCODONE-acetaminophen 1 tablet Q4H PRN         Electronically signed by Juanjo Mercer MD on 6/18/2019 at 9:43 AM

## 2019-06-19 LAB
GLUCOSE BLD-MCNC: 110 MG/DL (ref 70–99)
GLUCOSE BLD-MCNC: 55 MG/DL (ref 70–99)
GLUCOSE BLD-MCNC: 75 MG/DL (ref 70–99)
GLUCOSE BLD-MCNC: 77 MG/DL (ref 70–99)
GLUCOSE BLD-MCNC: 82 MG/DL (ref 70–99)
GLUCOSE BLD-MCNC: 85 MG/DL (ref 70–99)
GLUCOSE BLD-MCNC: 99 MG/DL (ref 70–99)
HIGH SENSITIVE C-REACTIVE PROTEIN: 0.9 MG/L
LACTATE: 1.6 MMOL/L (ref 0.4–2)
MAGNESIUM: 1.7 MG/DL (ref 1.8–2.4)
PROCALCITONIN: 0.06

## 2019-06-19 PROCEDURE — 2580000003 HC RX 258: Performed by: NURSE PRACTITIONER

## 2019-06-19 PROCEDURE — 82962 GLUCOSE BLOOD TEST: CPT

## 2019-06-19 PROCEDURE — 1200000000 HC SEMI PRIVATE

## 2019-06-19 PROCEDURE — 94640 AIRWAY INHALATION TREATMENT: CPT

## 2019-06-19 PROCEDURE — 97530 THERAPEUTIC ACTIVITIES: CPT

## 2019-06-19 PROCEDURE — 83605 ASSAY OF LACTIC ACID: CPT

## 2019-06-19 PROCEDURE — 6370000000 HC RX 637 (ALT 250 FOR IP): Performed by: NURSE PRACTITIONER

## 2019-06-19 PROCEDURE — 6370000000 HC RX 637 (ALT 250 FOR IP): Performed by: INTERNAL MEDICINE

## 2019-06-19 PROCEDURE — 6360000002 HC RX W HCPCS: Performed by: INTERNAL MEDICINE

## 2019-06-19 PROCEDURE — 97162 PT EVAL MOD COMPLEX 30 MIN: CPT

## 2019-06-19 PROCEDURE — 83735 ASSAY OF MAGNESIUM: CPT

## 2019-06-19 PROCEDURE — 2580000003 HC RX 258: Performed by: INTERNAL MEDICINE

## 2019-06-19 PROCEDURE — 86141 C-REACTIVE PROTEIN HS: CPT

## 2019-06-19 PROCEDURE — 84145 PROCALCITONIN (PCT): CPT

## 2019-06-19 PROCEDURE — 6360000002 HC RX W HCPCS: Performed by: NURSE PRACTITIONER

## 2019-06-19 RX ADMIN — MEROPENEM 1 G: 1 INJECTION, POWDER, FOR SOLUTION INTRAVENOUS at 00:00

## 2019-06-19 RX ADMIN — GUAIFENESIN 1200 MG: 600 TABLET, EXTENDED RELEASE ORAL at 20:25

## 2019-06-19 RX ADMIN — FAMOTIDINE 20 MG: 20 TABLET, FILM COATED ORAL at 20:25

## 2019-06-19 RX ADMIN — SIMVASTATIN 20 MG: 20 TABLET, FILM COATED ORAL at 20:25

## 2019-06-19 RX ADMIN — ASPIRIN 81 MG 81 MG: 81 TABLET ORAL at 10:09

## 2019-06-19 RX ADMIN — MAGNESIUM OXIDE TAB 400 MG (241.3 MG ELEMENTAL MG) 400 MG: 400 (241.3 MG) TAB at 10:09

## 2019-06-19 RX ADMIN — IPRATROPIUM BROMIDE AND ALBUTEROL SULFATE 1 AMPULE: .5; 3 SOLUTION RESPIRATORY (INHALATION) at 20:37

## 2019-06-19 RX ADMIN — MAGNESIUM OXIDE TAB 400 MG (241.3 MG ELEMENTAL MG) 400 MG: 400 (241.3 MG) TAB at 20:25

## 2019-06-19 RX ADMIN — PREGABALIN 150 MG: 75 CAPSULE ORAL at 20:25

## 2019-06-19 RX ADMIN — AMITRIPTYLINE HYDROCHLORIDE 50 MG: 50 TABLET, FILM COATED ORAL at 20:25

## 2019-06-19 RX ADMIN — PREGABALIN 150 MG: 75 CAPSULE ORAL at 10:08

## 2019-06-19 RX ADMIN — SODIUM CHLORIDE: 9 INJECTION, SOLUTION INTRAVENOUS at 04:00

## 2019-06-19 RX ADMIN — SERTRALINE HYDROCHLORIDE 50 MG: 50 TABLET ORAL at 20:25

## 2019-06-19 RX ADMIN — CLOPIDOGREL BISULFATE 75 MG: 75 TABLET ORAL at 10:09

## 2019-06-19 RX ADMIN — LOSARTAN POTASSIUM 25 MG: 25 TABLET ORAL at 10:08

## 2019-06-19 RX ADMIN — GUAIFENESIN 1200 MG: 600 TABLET, EXTENDED RELEASE ORAL at 10:08

## 2019-06-19 RX ADMIN — Medication 2 PUFF: at 20:46

## 2019-06-19 RX ADMIN — OXYCODONE HYDROCHLORIDE AND ACETAMINOPHEN 1 TABLET: 5; 325 TABLET ORAL at 20:25

## 2019-06-19 RX ADMIN — SODIUM CHLORIDE, PRESERVATIVE FREE 10 ML: 5 INJECTION INTRAVENOUS at 10:10

## 2019-06-19 RX ADMIN — FAMOTIDINE 20 MG: 20 TABLET, FILM COATED ORAL at 10:09

## 2019-06-19 RX ADMIN — OXYCODONE HYDROCHLORIDE AND ACETAMINOPHEN 1 TABLET: 5; 325 TABLET ORAL at 15:55

## 2019-06-19 RX ADMIN — ENOXAPARIN SODIUM 40 MG: 100 INJECTION SUBCUTANEOUS at 10:10

## 2019-06-19 RX ADMIN — METOPROLOL SUCCINATE 50 MG: 50 TABLET, EXTENDED RELEASE ORAL at 10:09

## 2019-06-19 RX ADMIN — AMIODARONE HYDROCHLORIDE 200 MG: 200 TABLET ORAL at 10:09

## 2019-06-19 ASSESSMENT — PAIN SCALES - GENERAL
PAINLEVEL_OUTOF10: 6
PAINLEVEL_OUTOF10: 3
PAINLEVEL_OUTOF10: 7
PAINLEVEL_OUTOF10: 4

## 2019-06-19 ASSESSMENT — PAIN DESCRIPTION - PROGRESSION
CLINICAL_PROGRESSION: GRADUALLY WORSENING
CLINICAL_PROGRESSION: NOT CHANGED

## 2019-06-19 ASSESSMENT — PAIN DESCRIPTION - PAIN TYPE
TYPE: CHRONIC PAIN
TYPE: CHRONIC PAIN

## 2019-06-19 ASSESSMENT — PAIN DESCRIPTION - ONSET
ONSET: GRADUAL
ONSET: ON-GOING

## 2019-06-19 ASSESSMENT — PAIN DESCRIPTION - LOCATION
LOCATION: ABDOMEN;BACK;GENERALIZED
LOCATION: ABDOMEN;OTHER (COMMENT)

## 2019-06-19 ASSESSMENT — PAIN DESCRIPTION - DESCRIPTORS
DESCRIPTORS: ACHING;DISCOMFORT
DESCRIPTORS: ACHING;DISCOMFORT

## 2019-06-19 ASSESSMENT — PAIN - FUNCTIONAL ASSESSMENT: PAIN_FUNCTIONAL_ASSESSMENT: ACTIVITIES ARE NOT PREVENTED

## 2019-06-19 ASSESSMENT — PAIN DESCRIPTION - FREQUENCY
FREQUENCY: INTERMITTENT
FREQUENCY: INTERMITTENT

## 2019-06-19 ASSESSMENT — PAIN DESCRIPTION - ORIENTATION: ORIENTATION: LOWER

## 2019-06-19 NOTE — CONSULTS
[address] [facility], 1950, [id number]/[id number]-A, 6/19/2019    Discharge Recommendation: SNF    Equipment: defer    History  [place]:  The primary encounter diagnosis was Pneumonia due to organism. Diagnoses of General weakness, Altered mental status, unspecified altered mental status type, Elevated lactic acid level, and Hypoxemia were also pertinent to this visit. Subjective:  Patient states:  Agreeable to therapy eval. Reports uncertainty as to whether dtr will be able to assist at dc . Pain:  Coccyx wound, does not rate. Communication with other providers: RN, OT  Restrictions: fall risk, wound    Home Setup/Prior level of function    Lives With: Alone  Type of Home: Apartment  Home Layout: One level  Home Access: Level entry  Bathroom Shower/Tub: Tub/Shower unit  Bathroom Toilet: Handicap height  Bathroom Equipment: Tub transfer bench, Grab bars in shower, Grab bars around toilet  Bathroom Accessibility: Accessible  Home Equipment: Rolling walker, 4 wheeled walker, [address] Help From: Home health (daily per pt)  ADL Assistance: Independent  Homemaking Assistance: Needs assistance ([facility] aide manages)  Homemaking Responsibilities: No  Ambulation Assistance: Independent (mod I with manual w/c or RW)  Transfer Assistance: Independent, daughter assists PRN  Active : Yes (Last drove 1 month ago)  Occupation: Retired  Leisure & Hobbies: Watching TV, Listening to music, Computers     Unlike last admit, Dtr is no longer available for 24 h supervision. Pt reports dtr has bf now and is not around as much.       Examination of body systems (includes body structures/functions, activity/participation limitations):  · Observation:  Sitting in chair upon arrival. Dressing to coccyx wound  · Vision:  [facility] [place]  · Hearing:  [facility]  · Cardiopulmonary:  Vitals stable  · Cognition: decreased insight into deficits, overall WFL, see OT/SLP note for further

## 2019-06-19 NOTE — PROGRESS NOTES
Hospitalist Progress Note      Name:  Mason Cotrez /Age/Sex: 1950  (80 y.o. female)   MRN & CSN:  [id number] & [id number] Admission Date/Time: 2019 10:23 AM   Location:  -A PCP: [address] [place] [address] Day: 4    Assessment and Plan:   Mason Cortez is a 80 y.o.  female  who presents with Fatigue (increasing weaknes per caregiver, recent UTI, new open pressure ulcer)    Possible Sepsis unknown source. PNA ruled out after CT chest. Elevated lactate, but afebrile with no leukocytosis. Procalcitonin level 0.06    Lactate has improved. UA unremarkable. -BCx appears to be coag neg staph, likely just contamination. Stop vanc and merrem    Acute metabolic encephalopathy  CT head negative. No focal deficit. May be due to infectious process. Appears to be improved today.  -Do not appreciate a focal source of infection. Possible that confusion may be related to low blood sugars. Frequent falls  PT/OT     HTN, normotensive  Continue Metoprolol      Stage II pressure ulcer  Position change q 2 and PRN  Wound consult and eval     DMII   A1c 5.6 (2019)  Hold oral agents   -stop SSI since patient has been having hypoglycemia     Other medical hx  -CAD  -Anxiety  -Gout  -HLD  -SLP  -Obesity class 1 with BMI 35.5: educated about lifestyle modifications     Chronic treatment continued per home medications unless  contraindicated by above plan and assessment. The above assessment/plan has been explained to the patient, who indicated understanding. Diet DIET CARB CONTROL;   DVT Prophylaxis [x] Lovenox, []  Heparin, [] SCDs, []No VTE prophylaxis, patient ambulating   GI Prophylaxis [] PPI, [x] H2 Blocker, [] No GI prophylaxis, patient is receiving diet/Tube Feeds   Code Status Full Code   Dispo: Transfer to med/surg. Ambulate. Hopeful for dc in 1 day         History of Present Illness/subjective:     Chief Complaint:   Mason Cortez is a 80 y.o.  female  who presents with AMS. Patient was recently treated for UTI and completed the course of antibiotics. Per ED reports, he has been falling often and more confused. Hx of DMII, RA, HTN, HLD, COPD, CAD, Restless leg, SLE, and anxiety. She has no complaints at this time. She states she wears oxygen at home when she sleeps. No feeling short of breath otherwise. She is tolerating her diet. Nurse states she is occasionally getting hypoglycemic. Ten point ROS reviewed negative, unless as noted above    Objective: Intake/Output Summary (Last 24 hours) at 6/19/2019 0919  Last data filed at 6/19/2019 0821  Gross per 24 hour   Intake 4898.67 ml   Output 950 ml   Net 3948.67 ml      Vitals:   Vitals:    06/19/19 0347   BP: 124/63   Pulse: 73   Resp: 16   Temp: 97.3 °F (36.3 °C)   SpO2:      Physical Exam:      GEN Awake female, laying in bed in no apparent distress. Appears given age. relaxed  HENT NCAT  RESP Clear to auscultation, no wheezes, rales or rhonchi. CARDIO/VASC S1/S2 auscultated. No murmurs  GI Abdomen is soft without significant tenderness, masses, or guarding. MSK No gross joint deformities. Spontaneous movement of all extremities  SKIN Normal coloration, warm, dry.   NEURO Grossly normal  PSYCH Awake, alert, oriented to person, place, month     Medications:   Medications:    amiodarone  200 mg Oral Daily    amitriptyline  50 mg Oral Nightly    aspirin  81 mg Oral Daily    mometasone-formoterol  2 puff Inhalation BID    simvastatin  20 mg Oral Nightly    sertraline  50 mg Oral Nightly    pregabalin  150 mg Oral BID    metoprolol succinate  50 mg Oral Daily    magnesium oxide  400 mg Oral BID    losartan  25 mg Oral Daily    guaiFENesin  1,200 mg Oral BID    clopidogrel  75 mg Oral Daily    famotidine  20 mg Oral BID    sodium chloride flush  10 mL Intravenous 2 times per day    enoxaparin  40 mg Subcutaneous Daily    ipratropium-albuterol  1 ampule Inhalation Q4H WA      Infusions:    dextrose PRN Meds:     glucose 15 g PRN   dextrose 12.5 g PRN   glucagon (rDNA) 1 mg PRN   dextrose 100 mL/hr PRN   oxyCODONE-acetaminophen 1 tablet Q6H PRN   LORazepam 1 mg Q8H PRN   albuterol 2.5 mg Q6H PRN   benzonatate 100 mg TID PRN   albuterol sulfate HFA 2 puff Q6H PRN   sodium chloride flush 10 mL PRN   magnesium hydroxide 30 mL Daily PRN   ondansetron 4 mg Q6H PRN   acetaminophen 650 mg Q4H PRN   oxyCODONE-acetaminophen 1 tablet Q4H PRN         Electronically signed by Devaughn Abdul MD on 6/19/2019 at 9:19 AM

## 2019-06-19 NOTE — PLAN OF CARE
Problem: Falls - Risk of:  Goal: Will remain free from falls  Outcome: Ongoing  Goal: Absence of physical injury  Outcome: Ongoing     Problem: Pain:  Goal: Pain level will decrease  Outcome: Ongoing  Goal: Control of acute pain  Outcome: Ongoing  Goal: Control of chronic pain  Outcome: Ongoing     Problem: Risk for Impaired Skin Integrity  Goal: Tissue integrity - skin and mucous membranes  Outcome: Ongoing

## 2019-06-19 NOTE — PLAN OF CARE
Problem: Falls - Risk of:  Goal: Will remain free from falls  Description  Will remain free from falls  6/19/2019 1209 by Zane Sharma RN  Outcome: Ongoing  6/19/2019 0434 by Jagruti Doe RN  Outcome: Ongoing  Goal: Absence of physical injury  Description  Absence of physical injury  6/19/2019 1209 by Zane Sharma RN  Outcome: Ongoing  6/19/2019 0434 by Jagruti Doe RN  Outcome: Ongoing     Problem: Pain:  Goal: Pain level will decrease  Description  Pain level will decrease  6/19/2019 1209 by Zane Sharma RN  Outcome: Ongoing  6/19/2019 0434 by Jagruti Doe RN  Outcome: Ongoing  Goal: Control of acute pain  Description  Control of acute pain  6/19/2019 1209 by Zane Sharma RN  Outcome: Ongoing  6/19/2019 0434 by Jagruti Doe RN  Outcome: Ongoing  Goal: Control of chronic pain  Description  Control of chronic pain  6/19/2019 1209 by Zane Sharma RN  Outcome: Ongoing  6/19/2019 0434 by Jagruti Doe RN  Outcome: Ongoing     Problem: Risk for Impaired Skin Integrity  Goal: Tissue integrity - skin and mucous membranes  Description  Structural intactness and normal physiological function of skin and  mucous membranes.   6/19/2019 1209 by Zane Sharma RN  Outcome: Ongoing  6/19/2019 0434 by Jagruti Doe RN  Outcome: Ongoing   Continue to progress towards the goal.

## 2019-06-19 NOTE — PROGRESS NOTES
Pt was sitting up with legs part way out of bed, pt stated that she needed to go back to the hospital, pt was reoriented and placed back in bed. Repositioned to  L side.

## 2019-06-19 NOTE — PROGRESS NOTES
Bedside report received from [facility]. Patient in bed alert and oriented. Skin wamr, dry, and intact. Patient blood sugar checked was 55. Given patient one container or orange juice and an apple sauce. Skin assessment complete it with [facility]. Patient has a stage 2 on her coccyx, her heels soft no openings elevated on the pillow. Patient turned and reposition with pillow support.

## 2019-06-19 NOTE — PROGRESS NOTES
Pt blood sugar was rechecked at 99.   Pt currently eating breakfast and talking on phone with family

## 2019-06-19 NOTE — PROGRESS NOTES
Pt transferred by wheelchair with all belongings to [facility]. Nurse Martha in room and tech when pt transferred from wheelchair to chair in room.

## 2019-06-19 NOTE — PROGRESS NOTES
Report called to Martha DUENAS. In the report included that the patient has no IV site and that the doctor is alright with it. All belongings was sent with the patient. Transfer patient to the floor on telemetry. Explained to the nurse that patient is a every 2 hours turn and that the patient has a stage area on coccyx. All questions answers. No further concerns verbalized at this time. Will transfer patient upstairs in wheelchair.

## 2019-06-19 NOTE — PROGRESS NOTES
Checked pt coccyx with nurse, pt has a pea size full thickness pressure sore and mepilex applied. Pt repositioned to R side.

## 2019-06-20 VITALS
TEMPERATURE: 98.5 F | RESPIRATION RATE: 16 BRPM | SYSTOLIC BLOOD PRESSURE: 102 MMHG | OXYGEN SATURATION: 96 % | HEART RATE: 56 BPM | HEIGHT: 61 IN | WEIGHT: 167.6 LBS | BODY MASS INDEX: 31.64 KG/M2 | DIASTOLIC BLOOD PRESSURE: 68 MMHG

## 2019-06-20 LAB
CULTURE: ABNORMAL
CULTURE: ABNORMAL
GLUCOSE BLD-MCNC: 126 MG/DL (ref 70–99)
GLUCOSE BLD-MCNC: 82 MG/DL (ref 70–99)
GLUCOSE BLD-MCNC: 90 MG/DL (ref 70–99)
Lab: ABNORMAL
SPECIMEN: ABNORMAL

## 2019-06-20 PROCEDURE — 97530 THERAPEUTIC ACTIVITIES: CPT

## 2019-06-20 PROCEDURE — 97116 GAIT TRAINING THERAPY: CPT

## 2019-06-20 PROCEDURE — 94761 N-INVAS EAR/PLS OXIMETRY MLT: CPT

## 2019-06-20 PROCEDURE — 97167 OT EVAL HIGH COMPLEX 60 MIN: CPT

## 2019-06-20 PROCEDURE — 6370000000 HC RX 637 (ALT 250 FOR IP): Performed by: INTERNAL MEDICINE

## 2019-06-20 PROCEDURE — 6370000000 HC RX 637 (ALT 250 FOR IP): Performed by: NURSE PRACTITIONER

## 2019-06-20 PROCEDURE — 82962 GLUCOSE BLOOD TEST: CPT

## 2019-06-20 PROCEDURE — 6360000002 HC RX W HCPCS: Performed by: NURSE PRACTITIONER

## 2019-06-20 PROCEDURE — 99211 OFF/OP EST MAY X REQ PHY/QHP: CPT

## 2019-06-20 PROCEDURE — 94640 AIRWAY INHALATION TREATMENT: CPT

## 2019-06-20 PROCEDURE — 97535 SELF CARE MNGMENT TRAINING: CPT

## 2019-06-20 RX ORDER — OXYCODONE HYDROCHLORIDE AND ACETAMINOPHEN 5; 325 MG/1; MG/1
1 TABLET ORAL EVERY 6 HOURS PRN
Status: DISCONTINUED | OUTPATIENT
Start: 2019-06-20 | End: 2019-06-20 | Stop reason: HOSPADM

## 2019-06-20 RX ORDER — OXYCODONE AND ACETAMINOPHEN 7.5; 325 MG/1; MG/1
1 TABLET ORAL EVERY 6 HOURS PRN
Status: DISCONTINUED | OUTPATIENT
Start: 2019-06-20 | End: 2019-06-20 | Stop reason: HOSPADM

## 2019-06-20 RX ADMIN — ENOXAPARIN SODIUM 40 MG: 100 INJECTION SUBCUTANEOUS at 08:29

## 2019-06-20 RX ADMIN — AMIODARONE HYDROCHLORIDE 200 MG: 200 TABLET ORAL at 08:29

## 2019-06-20 RX ADMIN — CLOPIDOGREL BISULFATE 75 MG: 75 TABLET ORAL at 08:29

## 2019-06-20 RX ADMIN — METOPROLOL SUCCINATE 50 MG: 50 TABLET, EXTENDED RELEASE ORAL at 08:29

## 2019-06-20 RX ADMIN — MAGNESIUM OXIDE TAB 400 MG (241.3 MG ELEMENTAL MG) 400 MG: 400 (241.3 MG) TAB at 08:29

## 2019-06-20 RX ADMIN — ASPIRIN 81 MG 81 MG: 81 TABLET ORAL at 08:29

## 2019-06-20 RX ADMIN — PREGABALIN 150 MG: 75 CAPSULE ORAL at 08:28

## 2019-06-20 RX ADMIN — IPRATROPIUM BROMIDE AND ALBUTEROL SULFATE 1 AMPULE: .5; 3 SOLUTION RESPIRATORY (INHALATION) at 09:20

## 2019-06-20 RX ADMIN — FAMOTIDINE 20 MG: 20 TABLET, FILM COATED ORAL at 08:28

## 2019-06-20 RX ADMIN — OXYCODONE HYDROCHLORIDE AND ACETAMINOPHEN 1 TABLET: 5; 325 TABLET ORAL at 09:55

## 2019-06-20 RX ADMIN — GUAIFENESIN 1200 MG: 600 TABLET, EXTENDED RELEASE ORAL at 08:28

## 2019-06-20 ASSESSMENT — PAIN DESCRIPTION - ORIENTATION
ORIENTATION: LOWER
ORIENTATION: LOWER

## 2019-06-20 ASSESSMENT — PAIN DESCRIPTION - PAIN TYPE
TYPE: CHRONIC PAIN
TYPE: CHRONIC PAIN

## 2019-06-20 ASSESSMENT — PAIN DESCRIPTION - DESCRIPTORS
DESCRIPTORS: DISCOMFORT
DESCRIPTORS: ACHING

## 2019-06-20 ASSESSMENT — PAIN SCALES - GENERAL
PAINLEVEL_OUTOF10: 9
PAINLEVEL_OUTOF10: 7
PAINLEVEL_OUTOF10: 7
PAINLEVEL_OUTOF10: 0
PAINLEVEL_OUTOF10: 7
PAINLEVEL_OUTOF10: 7

## 2019-06-20 ASSESSMENT — PAIN DESCRIPTION - ONSET: ONSET: ON-GOING

## 2019-06-20 ASSESSMENT — PAIN DESCRIPTION - FREQUENCY
FREQUENCY: INTERMITTENT
FREQUENCY: INTERMITTENT

## 2019-06-20 ASSESSMENT — PAIN DESCRIPTION - LOCATION
LOCATION: ABDOMEN
LOCATION: ABDOMEN

## 2019-06-20 ASSESSMENT — PAIN DESCRIPTION - PROGRESSION: CLINICAL_PROGRESSION: GRADUALLY WORSENING

## 2019-06-20 NOTE — CARE COORDINATION
Met c pt to continue discharge planning. Therapy is recommending SNF. However pt states she will not go- she needs to return home and will [facility] and will have help from dghtr. Unfortunately, each time pt is admitted SNF is rec'd and pt typically refuses. I did get her to agree to go on a recent admission but pt did not stay and left the same day she got there. At this time pt declining SNF but CM will continue to revisit in case she changes her mind.

## 2019-06-20 NOTE — PROGRESS NOTES
Percocet 5mg given at [phone], patient wanting something else for pain. she has Percocet 7.5mg ordered as well, Dr. Parmjit Huitron asked if I can I give now?

## 2019-06-20 NOTE — DISCHARGE INSTR - COC
Mobility/ADLs:  Walking   {CHP DME JVLL:507586264}  Transfer  {CHP DME OLXY:134908590}  Bathing  {CHP DME XMKV:565836140}  Dressing  {CHP DME GOEE:574372514}  Toileting  {CHP DME BBUX:633787222}  Feeding  {CHP DME JGPB:136291075}  Med Admin  {CHP DME UCWM:345170978}  Med Delivery   {[facility] MED Delivery:459755095}    Wound Care Documentation and Therapy:  Wound 05/05/18 Abrasion(s) Leg Lower; Anterior wound from fall @ home (Active)   Number of days: 411       Wound 05/07/18 Left medial knee (Active)   Number of days: 408       Wound 05/07/18 Left lateral knee (Active)   Number of days: 408       Wound 04/20/19 Coccyx Inner;Mid;Medial stage 2 (Active)   Number of days: 61       Wound 05/27/19 Right Buttock (Active)   Wound Pressure Stage  2 6/17/2019  8:40 PM   Dressing Status Clean; Intact;Dry 6/20/2019 10:41 AM   Dressing Changed Changed/New 6/20/2019 10:41 AM   Dressing/Treatment Other (comment) 6/20/2019  8:27 AM   Wound Cleansed Rinsed/Irrigated with saline 6/20/2019 10:41 AM   Dressing Change Due 06/22/19 6/20/2019  8:27 AM   Wound Length (cm) 2 cm 6/20/2019 10:41 AM   Wound Width (cm) 2.1 cm 6/20/2019 10:41 AM   Wound Depth (cm) 0.1 cm 6/20/2019 10:41 AM   Wound Surface Area (cm^2) 4.2 cm^2 6/20/2019 10:41 AM   Change in Wound Size % (l*w) -1580 6/20/2019 10:41 AM   Wound Volume (cm^3) 0.42 cm^3 6/20/2019 10:41 AM   Wound Healing % -2000 6/20/2019 10:41 AM   Distance Tunneling (cm) 0 cm 6/20/2019 10:41 AM   Tunneling Position ___ O'Clock 0 6/20/2019 10:41 AM   Undermining Starts ___ O'Clock 0 6/20/2019 10:41 AM   Undermining Ends___ O'Clock 0 6/20/2019 10:41 AM   Undermining Maxium Distance (cm) 0 6/20/2019 10:41 AM   Wound Assessment Pink 6/20/2019 10:41 AM   Drainage Amount Scant 6/20/2019 10:41 AM   Drainage Description Serosanguinous 6/20/2019 10:41 AM   Odor None 6/20/2019 10:41 AM   Margins Defined edges 6/20/2019 10:41 AM   Jana-wound Assessment Intact 6/20/2019 10:41 AM   Non-staged Wound Description Full thickness 6/20/2019 10:41 AM   [place]%Wound Bed 100 6/20/2019 10:41 AM   Red%Wound Bed 0 6/20/2019 10:41 AM   Yellow%Wound Bed 0 6/20/2019 10:41 AM   Black%Wound Bed 0 6/20/2019 10:41 AM   Purple%Wound Bed 0 6/20/2019 10:41 AM   Other%Wound Bed 0 6/20/2019 10:41 AM   Culture Taken No 6/16/2019 11:00 PM   Number of days: 24       Wound 06/20/19 Left Buttock (Active)   Dressing Status Clean;Dry; Intact 6/20/2019 10:41 AM   Dressing Changed Changed/New 6/20/2019 10:41 AM   Wound Cleansed Rinsed/Irrigated with saline 6/20/2019 10:41 AM   Wound Length (cm) 0.5 cm 6/20/2019 10:41 AM   Wound Width (cm) 0.5 cm 6/20/2019 10:41 AM   Wound Depth (cm) 0.1 cm 6/20/2019 10:41 AM   Wound Surface Area (cm^2) 0.25 cm^2 6/20/2019 10:41 AM   Wound Volume (cm^3) 0.02 cm^3 6/20/2019 10:41 AM   Distance Tunneling (cm) 0 cm 6/20/2019 10:41 AM   Tunneling Position ___ O'Clock 0 6/20/2019 10:41 AM   Undermining Starts ___ O'Clock 0 6/20/2019 10:41 AM   Undermining Ends___ O'Clock 0 6/20/2019 10:41 AM   Undermining Maxium Distance (cm) 0 6/20/2019 10:41 AM   Wound Assessment Pink 6/20/2019 10:41 AM   Drainage Amount Small 6/20/2019 10:41 AM   Drainage Description Serosanguinous 6/20/2019 10:41 AM   Odor None 6/20/2019 10:41 AM   Margins Defined edges 6/20/2019 10:41 AM   Jana-wound Assessment Intact 6/20/2019 10:41 AM   Non-staged Wound Description Full thickness 6/20/2019 10:41 AM   [place]%Wound Bed 100 6/20/2019 10:41 AM   Red%Wound Bed 0 6/20/2019 10:41 AM   Yellow%Wound Bed 0 6/20/2019 10:41 AM   Black%Wound Bed 0 6/20/2019 10:41 AM   Purple%Wound Bed 0 6/20/2019 10:41 AM   Other%Wound Bed 0 6/20/2019 10:41 AM   Number of days: 0        Elimination:  Continence:   · Bowel: {YES / OE:12814}  · Bladder: {YES / LM:09975}  Urinary Catheter: {Urinary Catheter:818687568}   Colostomy/Ileostomy/Ileal Conduit: {YES / EJ:62127}       Date of Last BM: ***    Intake/Output Summary (Last 24 hours) at 6/20/2019 1507  Last data filed at 6/20/2019 0110  Gross per 24 hour   Intake 180 ml   Output --   Net 180 ml     I/O last 3 completed shifts:   In: 180 [P.O.:180]  Out: -     Safety Concerns:     [address] Morales MIRELLA Safety Concerns:594331010}    Impairments/Disabilities:      [address] Morales [facility] Impairments/Disabilities:057507073}    Nutrition Therapy:  Current Nutrition Therapy:   [address] Morales [facility] Diet List:872141008}    Routes of Feeding: {CHP DME Other Feedings:080152162}  Liquids: {Slp liquid thickness:60739}  Daily Fluid Restriction: {CHP DME Yes amt example:404612793}  Last Modified Barium Swallow with Video (Video Swallowing Test): {Done Not Done JOQF:482246371}    Treatments at the Time of Hospital Discharge:   Respiratory Treatments: ***  Oxygen Therapy:  {Therapy; copd oxygen:13285}  Ventilator:    {MH CC Vent MWRD:554763531}    Rehab Therapies: {THERAPEUTIC INTERVENTION:5711932825}  Weight Bearing Status/Restrictions: [address] [facility] Weight Bearin}  Other Medical Equipment (for information only, NOT a DME order):  {EQUIPMENT:933854690}  Other Treatments: ***    Patient's personal belongings (please select all that are sent with patient):  {[facility] DME Belongings:223704571}    RN SIGNATURE:  {Esignature:717649138}    CASE MANAGEMENT/SOCIAL WORK SECTION    Inpatient Status Date: ***    Readmission Risk Assessment Score:  Readmission Risk              Risk of Unplanned Readmission:        27           Discharging to Facility/ Agency   · Name:   · Address:  · Phone:  · Fax:    Dialysis Facility (if applicable)   · Name:  · Address:  · Dialysis Schedule:  · Phone:  · Fax:    / signature: {Esignature:947795319}    PHYSICIAN SECTION    Prognosis: {Prognosis:3945006333}    Condition at Discharge: [address] Morales Patient Condition:915416021}    Rehab Potential (if transferring to Rehab): {Prognosis:7057987437}    Recommended Labs or Other Treatments After Discharge: ***    Physician Certification: I certify the above information and transfer of Malcolm Barry  is necessary for the continuing treatment of the diagnosis listed and that she requires {Admit to Appropriate Level of Care:87501} for {GREATER/LESS:486823958} 30 days.      Update Admission H&P: {[facility]:099[facility]1902}    PHYSICIAN SIGNATURE:  {Esignature:694104313}

## 2019-06-20 NOTE — CONSULTS
[address] [address] Continence Nurse  Consult Note       Malcolm Barry  AGE: 80 y.o. GENDER: female  : 1950  TODAY'S DATE:  2019    Subjective:     Reason for Evaluation and Assessment: wound eval      Malcolm Barry is a 80 y.o. female referred by:   [x] Physician  [] Nursing  [] Other:     Wound Identification:  Wound Type: pressure  Contributing Factors: chronic pressure, decreased mobility and obesity        PAST MEDICAL HISTORY        Diagnosis Date    Anxiety     CAD (coronary artery disease)     Dr Francisco Jackson CAD S/P percutaneous coronary angioplasty 2012    stent RCA; Ahmed    Chronic back pain     COPD (chronic obstructive pulmonary disease) ([facility] [address]     Effie Arianna Gout     History of cerebral infarction     right hemiparesis with full recovery    Hyperlipidemia     Hypertension     Lumbar spinal stenosis     Lung cancer ([facility] 2014    Dr Vinny Gregorio; left upper lobectomy; non small cell CA    Obesity (BMI 30.0-34. 9)     Restless leg syndrome     Rheumatoid arthritis (HCC)     Smoker     Systemic lupus erythematosus (HCC)     Dr Tania Urias, visiting physician    Type II diabetes mellitus ([facility] 1969    Ventral hernia 2018    right ventral hernia seen on CT abd 2018       PAST SURGICAL HISTORY    Past Surgical History:   Procedure Laterality Date    ABDOMINAL EXPLORATION SURGERY  Last Done     2-3 times; repeated bowel obstructions;    ANKLE FRACTURE SURGERY      APPENDECTOMY      CATARACT REMOVAL WITH IMPLANT Bilateral     CHOLECYSTECTOMY      FEMUR FRACTURE SURGERY Right     Right Thigh, Plates, Rods, Screws    HUMERUS FRACTURE SURGERY Left     Broken Left Arm, Plates And Screws    LUNG REMOVAL, PARTIAL Left 2014    Lung CA, left; Robotic Assisted Left Thoracotomy, Left Lung Cancer; ROWDY lobectomy;  Nonsmall Cell CA    OTHER SURGICAL HISTORY  2/20/15    excision of hydradenitis suppurative of cesilia anal area    REVISION TOTAL KNEE ARTHROPLASTY Right 2005    SKIN CANCER EXCISION Right 2012    Right Ankle    ANTONY AND BSO  1972    TOTAL KNEE ARTHROPLASTY Right 2003    Total Right Knee with revision       FAMILY HISTORY    Family History   Problem Relation Age of Onset    Heart Disease Mother     Diabetes Mother    Erle Lawrence Arthritis Mother     Depression Mother     High Blood Pressure Mother     High Cholesterol Mother     Kidney Disease Mother         On Dialysis    Learning Disabilities Mother     Mental Illness Mother     Stroke Mother     Vision Loss Mother     Early Death Father         Brain Hemorrhage    Other Father         \"Bowel Problems\"    High Blood Pressure Father     Heart Disease Father     Mental Illness Brother         Schizophrenia , Paranoia    Diabetes Brother     Lupus Sister     Arthritis Sister     Depression Sister     Diabetes Sister     Heart Disease Sister     High Blood Pressure Sister     High Cholesterol Sister     Kidney Disease Sister     Stroke Sister        SOCIAL HISTORY    Social History     Tobacco Use    Smoking status: Current Every Day Smoker     Packs/day: 0.50     Years: 32.00     Pack years: 16.00     Types: Cigarettes     Start date: 1986    Smokeless tobacco: Never Used   Substance Use Topics    Alcohol use: No     Alcohol/week: 0.0 oz    Drug use: No     Comment: states quit 1-2-14       ALLERGIES    Allergies   Allergen Reactions    Food Swelling     \"Rye, Whole grains, Barley\"    Trental [Pentoxifylline] Swelling    Tramadol      \"I Felt Shaky\"    Vistaril [Hydroxyzine Hcl] Other (See Comments)     Sedated and was placed on respirator    Erythromycin      \"Stomach Pain\"    Motrin [Ibuprofen] Diarrhea    Tetracyclines & Related Nausea And Vomiting       MEDICATIONS    No current facility-administered medications on file prior to encounter.       Current Outpatient Medications on File Prior to Encounter   Medication Sig Dispense Refill    metoprolol succinate (Dustin Emery XL) 50 MG extended release tablet Take 50 mg by mouth daily      oxyCODONE-acetaminophen (PERCOCET) 7.5-325 MG per tablet Take 1 tablet by mouth every 6 hours as needed for Pain (lupus pain).       sertraline (ZOLOFT) 50 MG tablet Take 50 mg by mouth nightly      amiodarone (CORDARONE) 200 MG tablet Take 1 tablet by mouth daily 30 tablet 3    simvastatin (ZOCOR) 20 MG tablet Take 1 tablet by mouth nightly 30 tablet 3    magnesium oxide (MAG-OX) 400 (241.3 Mg) MG TABS tablet Take 1 tablet by mouth 2 times daily 30 tablet 0    fluconazole (DIFLUCAN) 150 MG tablet       LORazepam (ATIVAN) 2 MG tablet       losartan (COZAAR) 25 MG tablet       ipratropium-albuterol (DUONEB) 0.5-2.5 (3) MG/3ML SOLN nebulizer solution Inhale 3 mLs into the lungs 4 times daily Dx: COPD J44.9 360 mL 5    amitriptyline (ELAVIL) 50 MG tablet Take 50 mg by mouth nightly      OXYGEN Inhale 2 L/min into the lungs as needed      guaiFENesin (MUCINEX) 600 MG extended release tablet Take 2 tablets by mouth 2 times daily 120 tablet 5    clopidogrel (PLAVIX) 75 MG tablet Take 75 mg by mouth daily      famotidine (PEPCID) 20 MG tablet Take 20 mg by mouth 2 times daily      loperamide (IMODIUM) 2 MG capsule Take 2 mg by mouth 4 times daily as needed for Diarrhea       metFORMIN (GLUCOPHAGE) 500 MG tablet Take 500 mg by mouth daily (with breakfast)      PROAIR  (90 Base) MCG/ACT inhaler Inhale 2 puffs into the lungs every 6 hours as needed for Wheezing or Shortness of Breath       budesonide-formoterol (SYMBICORT) 160-4.5 MCG/ACT AERO Inhale 2 puffs into the lungs 2 times daily 1 Inhaler 5    aspirin 81 MG chewable tablet Take 1 tablet by mouth daily 30 tablet 0    benzonatate (TESSALON) 100 MG capsule Take 100 mg by mouth 3 times daily as needed for Cough       pregabalin (LYRICA) 150 MG capsule Take 150 mg by mouth 2 times daily      albuterol (PROVENTIL) (2.5 MG/3ML) 0.083% nebulizer solution Take 3 mLs by nebulization ([facility]    Syncope and collapse    Hypomagnesemia syndrome    Moderate malnutrition ([facility]    Sepsis ([facility]       Measurements:  Wound 05/05/18 Abrasion(s) Leg Lower; Anterior wound from fall @ home (Active)   Number of days: 411       Wound 05/07/18 Left medial knee (Active)   Number of days: 408       Wound 05/07/18 Left lateral knee (Active)   Number of days: 408       Wound 04/20/19 Coccyx Inner;Mid;Medial stage 2 (Active)   Number of days: 61       Wound 05/27/19 Right Buttock (Active)   Wound Pressure Stage  2 6/17/2019  8:40 PM   Dressing Status Clean; Intact;Dry 6/20/2019 10:41 AM   Dressing Changed Changed/New 6/20/2019 10:41 AM   Dressing/Treatment Other (comment) 6/20/2019  8:27 AM   Wound Cleansed Rinsed/Irrigated with saline 6/20/2019 10:41 AM   Dressing Change Due 06/22/19 6/20/2019  8:27 AM   Wound Length (cm) 2 cm 6/20/2019 10:41 AM   Wound Width (cm) 2.1 cm 6/20/2019 10:41 AM   Wound Depth (cm) 0.1 cm 6/20/2019 10:41 AM   Wound Surface Area (cm^2) 4.2 cm^2 6/20/2019 10:41 AM   Change in Wound Size % (l*w) -1580 6/20/2019 10:41 AM   Wound Volume (cm^3) 0.42 cm^3 6/20/2019 10:41 AM   Wound Healing % -2000 6/20/2019 10:41 AM   Distance Tunneling (cm) 0 cm 6/20/2019 10:41 AM   Tunneling Position ___ O'Clock 0 6/20/2019 10:41 AM   Undermining Starts ___ O'Clock 0 6/20/2019 10:41 AM   Undermining Ends___ O'Clock 0 6/20/2019 10:41 AM   Undermining Maxium Distance (cm) 0 6/20/2019 10:41 AM   Wound Assessment Pink 6/20/2019 10:41 AM   Drainage Amount Scant 6/20/2019 10:41 AM   Drainage Description Serosanguinous 6/20/2019 10:41 AM   Odor None 6/20/2019 10:41 AM   Margins Defined edges 6/20/2019 10:41 AM   Jana-wound Assessment Intact 6/20/2019 10:41 AM   Non-staged Wound Description Full thickness 6/20/2019 10:41 AM   [place]%Wound Bed 100 6/20/2019 10:41 AM   Red%Wound Bed 0 6/20/2019 10:41 AM   Yellow%Wound Bed 0 6/20/2019 10:41 AM   Black%Wound Bed 0 6/20/2019 10:41 AM   Purple%Wound Bed 0 6/20/2019 10:41 AM Other%Wound Bed 0 6/20/2019 10:41 AM   Culture Taken No 6/16/2019 11:00 PM   Number of days: 24       Wound 06/20/19 Left Buttock (Active)   Dressing Status Clean;Dry; Intact 6/20/2019 10:41 AM   Dressing Changed Changed/New 6/20/2019 10:41 AM   Wound Cleansed Rinsed/Irrigated with saline 6/20/2019 10:41 AM   Wound Length (cm) 0.5 cm 6/20/2019 10:41 AM   Wound Width (cm) 0.5 cm 6/20/2019 10:41 AM   Wound Depth (cm) 0.1 cm 6/20/2019 10:41 AM   Wound Surface Area (cm^2) 0.25 cm^2 6/20/2019 10:41 AM   Wound Volume (cm^3) 0.02 cm^3 6/20/2019 10:41 AM   Distance Tunneling (cm) 0 cm 6/20/2019 10:41 AM   Tunneling Position ___ O'Clock 0 6/20/2019 10:41 AM   Undermining Starts ___ O'Clock 0 6/20/2019 10:41 AM   Undermining Ends___ O'Clock 0 6/20/2019 10:41 AM   Undermining Maxium Distance (cm) 0 6/20/2019 10:41 AM   Wound Assessment Pink 6/20/2019 10:41 AM   Drainage Amount Small 6/20/2019 10:41 AM   Drainage Description Serosanguinous 6/20/2019 10:41 AM   Odor None 6/20/2019 10:41 AM   Margins Defined edges 6/20/2019 10:41 AM   Jana-wound Assessment Intact 6/20/2019 10:41 AM   Non-staged Wound Description Full thickness 6/20/2019 10:41 AM   [place]%Wound Bed 100 6/20/2019 10:41 AM   Red%Wound Bed 0 6/20/2019 10:41 AM   Yellow%Wound Bed 0 6/20/2019 10:41 AM   Black%Wound Bed 0 6/20/2019 10:41 AM   Purple%Wound Bed 0 6/20/2019 10:41 AM   Other%Wound Bed 0 6/20/2019 10:41 AM   Number of days: 0       Response to treatment:  Well tolerated by patient. Pain Assessment:  Severity:    Quality of pain:   Wound Pain Timing/Severity:   Premedicated:     Plan:     Plan of Care: Wound 05/27/19 Right Buttock-Dressing/Treatment: (fibracol, mepilex border)  Wound 06/20/19 Left Buttock-Dressing/Treatment: (fibracol, mepilex border)    Heels intact, old blister noted to left heel. Buttocks wounds dressed with fibracol and sacral border. Atmos air placed to bed and patient positioned on left side.     Specialty Bed Required : yes  [] Low Air Loss   [x] Pressure Redistribution  [] Fluid Immersion  [] Bariatric  [] Total Pressure Relief  [] Other:     Discharge Plan:  Placement for patient upon discharge: tbd  Hospice Care: no  Patient appropriate for Outpatient [address] [place] [address] Street: [facility]    Patient/Caregiver Teaching:  Level of patient/caregiver understanding able to:   Verbalized understanding       Electronically signed by Hoang Coleman LPN, on 4/71/8107 at 10:44 AM

## 2019-06-20 NOTE — PROGRESS NOTES
Magaly Ayers from lab called stating 3 techs have tried to obtain blood sample today and was not effective therefore they are unable to attempt again for 24hours

## 2019-06-20 NOTE — PROGRESS NOTES
Physical Therapy Treatment Note  Name: Beatrice Olivares MRN: [id number] :   1950   Date:  2019   Admission Date: 2019 [facility]   Restrictions/Precautions:        fall risk, Contact Plus  Communication with other providers: OT  Subjective:  Patient states: feels about the same. Says she talked to dtr. Thinks she will continue to provide support at home bc pt has been giving dtr money frequently. Pain:   Location, Type, Intensity (0/10 to 10/10): None at rest.  Objective:    Observation:  In bed upon arrival, coccyx wound offloaded  Treatment, including education/measures:  Therapeutic Activity Training:   Therapeutic activity training was instructed today. Cues were given for safety, sequence, UE/LE placement, awareness, and balance. Activities performed today included bed mobility training, sup-sit, sit-stand, SPT. Min A for bed mob and seated scooting. Utilized log roll to reduce shearing to coccyx wound     CGA for STS training-- very slow and effortful, fwd flexed, poor device mgt through pivoting. VC's to correct     Gait Training:  Cues were given for safety, sequence, device management, balance, posture, awareness, path. CGA with RW 40 ft x 3. Slow, increased hip rotation on stance limb to facilitate contralateral swing. Fwd flexed and downward gaze. Frequent cues for posture and fwd gaze. Pt fatigued upon completion, not SOB but aerobic capacity appears most limiting. Assessment / Impression:    Pt is doing fair. Ambulating increased distances with RW. All functional mobility is very effortful, limited in distance and duration. Grossly Mike to CGA. D/c Rec remains SNF, though she continues to insist on home. Questionable family support.       Patient's tolerance of treatment:  good   Adverse Reaction: none  Significant change in status and impact:  none  Barriers to improvement:  none  Plan for Next Session:    Progress gait distances and standing duration  Time in: 1215  Time out:  1244  Timed treatment minutes:  29  Total treatment time:  29    Previously filed items:        Electronically signed by:    Timbo Conway, LATISHA  6/20/2019, 1:12 PM

## 2019-06-20 NOTE — PROGRESS NOTES
Progress Note( Dr. Andra Dakins)  6/20/2019  Subjective:   Admit Date: 6/16/2019  PCP: Alber Maya DO    Admitted For : Altered mental state and was noted to be hypoglycemic    Consulted For: Control of blood glucose    Interval History: Feels much better    Denies any chest pains,   Denies SOB . Denies nausea or vomiting. No new bowel or bladder symptoms. Intake/Output Summary (Last 24 hours) at 6/20/2019 8043  Last data filed at 6/20/2019 0110  Gross per 24 hour   Intake 540 ml   Output 300 ml   Net 240 ml       DATA    CBC:   Recent Labs     06/18/19  0430   WBC 6.6   HGB 9.2*       CMP:  Recent Labs     06/18/19  0430      K 3.6   *   CO2 26   BUN 5*   CREATININE 0.5*   CALCIUM 8.6     Lipids:   Lab Results   Component Value Date    CHOL 73 04/25/2019    HDL 28 04/25/2019    TRIG 82 04/25/2019     Glucose:  Recent Labs     06/19/19  1707 06/19/19  2023 06/20/19  0213   POCGLU 85 110* 126*     [id number]:  Lab Results   Component Value Date    LABA1C 5.6 05/27/2019     High Sensitivity TSH:   Lab Results   Component Value Date    TSHHS 0.435 06/16/2019     Free T3:   Lab Results   Component Value Date    FT3 2.0 08/11/2012     Free T4:  Lab Results   Component Value Date    T4FREE 1.14 01/07/2017       Xr Chest Standard (2 Vw)    Result Date: 6/17/2019  EXAMINATION: TWO XRAY VIEWS OF THE CHEST 6/16/2019 11:09 am COMPARISON: Chest radiograph 05/26/2019 HISTORY: ORDERING SYSTEM PROVIDED HISTORY: AMS TECHNOLOGIST PROVIDED HISTORY: Reason for exam:->AMS Ordering Physician Provided Reason for Exam: ams FINDINGS: Normal heart size. Normal pulmonary vascularity. Mild bilateral lower lobe airspace opacities. No evidence of pleural effusion or pneumothorax. No acute osseous abnormality. Mild bilateral lower lobe airspace opacities may reflect pneumonia.      Ct Head Wo Contrast    Result Date: 6/16/2019  EXAMINATION: CT OF THE HEAD WITHOUT CONTRAST  6/16/2019 11:02 am TECHNIQUE: CT of the head was performed without the administration of intravenous contrast. Dose modulation, iterative reconstruction, and/or weight based adjustment of the mA/kV was utilized to reduce the radiation dose to as low as reasonably achievable. COMPARISON: Noncontrast CT head 05/26/2019 HISTORY: ORDERING SYSTEM PROVIDED HISTORY: AMS TECHNOLOGIST PROVIDED HISTORY: Has a \"code stroke\" or \"stroke alert\" been called? ->No Ordering Physician Provided Reason for Exam: AMS Acuity: Acute Type of Exam: Initial Additional signs and symptoms: fatigue Relevant Medical/Surgical History: none FINDINGS: BRAIN/VENTRICLES: There is no acute intracranial hemorrhage, mass effect or midline shift. No abnormal extra-axial fluid collection. The gray-white differentiation is maintained without evidence of an acute infarct. There is no evidence of hydrocephalus. Basal cisterns are patent. ORBITS: The visualized portion of the orbits demonstrate no acute abnormality. SINUSES: The visualized paranasal sinuses and mastoid air cells demonstrate no acute abnormality. SOFT TISSUES/SKULL:  No acute abnormality of the visualized skull or soft tissues. No acute intracranial abnormality. Ct Chest Wo Contrast    Result Date: 6/17/2019  EXAMINATION: CT OF THE CHEST WITHOUT CONTRAST 6/17/2019 9:04 am TECHNIQUE: CT of the chest was performed without the administration of intravenous contrast. Multiplanar reformatted images are provided for review. Dose modulation, iterative reconstruction, and/or weight based adjustment of the mA/kV was utilized to reduce the radiation dose to as low as reasonably achievable.  COMPARISON: CT 05/27/2019, radiographs 06/16/2019 HISTORY: ORDERING SYSTEM PROVIDED HISTORY: abnormal XR TECHNOLOGIST PROVIDED HISTORY: Ordering Physician Provided Reason for Exam: abnormal XR Acuity: Acute Type of Exam: Initial Additional signs and symptoms: patient having difficulty laying flat Relevant Medical/Surgical History: CAD; lung cancer FINDINGS: Mediastinum: Thoracic aortic atherosclerotic disease. Thoracic aorta has normal contour and caliber. Heart size is normal.  Coronary artery disease. No pericardial effusion. No grossly enlarged mediastinal or hilar lymph nodes. Foodstuff within the esophagus, mostly in the proximal half. Lungs/pleura: Moderate emphysema, most pronounced in the left upper lobe. Right lung dependent atelectasis. No focal airspace consolidation, significant pleural effusion, or pneumothorax. There is low-density mass within the right main bronchus measuring 8 mm, probably mucus. Upper Abdomen: Included portions of the upper abdomen are without acute abnormality. Diffuse hepatic low attenuation. Soft Tissues/Bones: Soft tissues are unremarkable. No destructive osseous lesions. 1. Moderate upper lung emphysema, worst in the left upper lobe. No focal airspace opacities to suggest pneumonia. 2. Right hemidiaphragm elevation and right lung dependent atelectasis. 3. Low-density mass in the right main bronchus measuring 8 mm probably represents mucus/secretions. Attention on any follow-up imaging is recommended. 4. Foodstuff in the proximal half of the esophagus.        Scheduled Medicines   Medications:    insulin lispro  0-6 Units Subcutaneous TID WC    insulin lispro  0-3 Units Subcutaneous 2 times per day    amiodarone  200 mg Oral Daily    amitriptyline  50 mg Oral Nightly    aspirin  81 mg Oral Daily    mometasone-formoterol  2 puff Inhalation BID    simvastatin  20 mg Oral Nightly    sertraline  50 mg Oral Nightly    pregabalin  150 mg Oral BID    metoprolol succinate  50 mg Oral Daily    magnesium oxide  400 mg Oral BID    losartan  25 mg Oral Daily    guaiFENesin  1,200 mg Oral BID    clopidogrel  75 mg Oral Daily    famotidine  20 mg Oral BID    sodium chloride flush  10 mL Intravenous 2 times per day    enoxaparin  40 mg Subcutaneous Daily    ipratropium-albuterol  1 ampule Inhalation Q4H WA      Infusions:    dextrose           Objective:   Vitals: /66   Pulse 51   Temp 97.6 °F (36.4 °C) (Oral)   Resp 17   Ht 5' 1\" (1.549 m)   Wt 167 lb 9.6 oz (76 kg)   SpO2 91%   BMI 31.67 kg/m²   General appearance: alert and cooperative with exam  Neck: no JVD or bruit  Thyroid : Normal lobes   Lungs: Has Vesicular Breath sounds   Heart:  regular rate and rhythm  Abdomen: soft, non-tender; bowel sounds normal; no masses,  no organomegaly  Musculoskeletal: Normal  Extremities: extremities normal, , no edema  Neurologic:  Awake, alert, oriented to name, place and time. Cranial nerves II-XII are grossly intact. Motor is  intact. Sensory i neuropathy. ,  and gait is unstable. Assessment:     Patient Active Problem List:     Lung cancer ([facility] [address]     Hidradenitis suppurativa     Controlled type 2 diabetes mellitus with diabetic polyneuropathy, without long-term current use of insulin (HCC)     CAD (coronary artery disease)     Anxiety disorder     Lupus (HCC)     Chronic obstructive pulmonary disease (HCC)     CAD S/P percutaneous coronary angioplasty     Gout     History of lung cancer     Smoker     Chronic low back pain with bilateral sciatica     Obesity (BMI 30.0-34. 9)     Essential hypertension     Chronic respiratory failure (HCC)     Syncope and collapse     Hypomagnesemia syndrome     Moderate malnutrition ([address]     Sepsis ([address]      Plan:     1. Reviewed POC blood glucose . Labs and X ray results   2. Reviewed Current Medicines   3. On  Correction bolus Humalog nsulin regime   4. Monitor Blood glucose frequently   5. Modified  the dose of Insulin/ other medicines as needed   6. Will follow     .      Kendra Cardenas MD

## 2019-06-20 NOTE — CONSULTS
Endocrinology   Consult Note  Dear Doctor Mathew Snyder    Thank You for the Consult     Pt. Was Admitted for : Altered mental state and was noted to be hypoglycemia    Reason for Consult: Better control of blood glucose      History Obtained From:  Patient/ EMR       HISTORY OF PRESENT ILLNESS:                The patient is a 80 y.o. female with significant past medical history of UTI large, history of hypertension hyperlipidemia COPD CAD restless leg syndrome history of SLE rheumatoid arthritis mellitus. She was admitted with altered mental state and was found to be having metabolic encephalopathy. She developed hypoglycemia but in the hospital.  I was  consulted for better control of blood glucose. ROS:   Pt's ROS done in detail. Abnormal ROS are noted in Medical and Surgical History Section below: Other Medical History:        Diagnosis Date    Anxiety     CAD (coronary artery disease)     Dr Dorothea Acosta CAD S/P percutaneous coronary angioplasty 2012    stent RCA; Ahmed    Chronic back pain     COPD (chronic obstructive pulmonary disease) ([facility] [address]     Norm Fang Gout 2018    History of cerebral infarction 2000    right hemiparesis with full recovery    Hyperlipidemia     Hypertension     Lumbar spinal stenosis     Lung cancer ([facility] [address] 09/2014    Dr Sarah oGnzalez; left upper lobectomy; non small cell CA    Obesity (BMI 30.0-34. 9)     Restless leg syndrome     Rheumatoid arthritis (HCC)     Smoker     Systemic lupus erythematosus (HCC) 1992    Dr Gege Riley, visiting physician    Type II diabetes mellitus ([facility] [address] 1969    Ventral hernia 06/2018    right ventral hernia seen on CT abd 6/2018     Surgical History:        Procedure Laterality Date    ABDOMINAL EXPLORATION SURGERY  Last Done 2005    2-3 times; repeated bowel obstructions;    ANKLE FRACTURE SURGERY      APPENDECTOMY  1972    CATARACT REMOVAL WITH IMPLANT Bilateral 2000's   [address] Right 2000's    Right Thigh, Plates, Rods, Screws    HUMERUS FRACTURE SURGERY Left 2000's    Broken Left Arm, Plates And Screws    LUNG REMOVAL, PARTIAL Left 09/30/2014    Lung CA, left; Robotic Assisted Left Thoracotomy, Left Lung Cancer; ROWDY lobectomy; Nonsmall Cell CA    OTHER SURGICAL HISTORY  2/20/15    excision of hydradenitis suppurative of cesilia anal area    REVISION TOTAL KNEE ARTHROPLASTY Right 2005    SKIN CANCER EXCISION Right 2012    Right Ankle    ANTONY AND BSO  1972    TOTAL KNEE ARTHROPLASTY Right 2003    Total Right Knee with revision       Allergies:  Food; Trental [pentoxifylline]; Tramadol; Vistaril [hydroxyzine hcl]; Erythromycin; Motrin [ibuprofen]; and Tetracyclines & related    Family History:       Problem Relation Age of Onset    Heart Disease Mother     Diabetes Mother    Love Arthritis Mother     Depression Mother     High Blood Pressure Mother     High Cholesterol Mother     Kidney Disease Mother         On Dialysis    Learning Disabilities Mother     Mental Illness Mother     Stroke Mother     Vision Loss Mother     Early Death Father         Brain Hemorrhage    Other Father         \"Bowel Problems\"    High Blood Pressure Father     Heart Disease Father     Mental Illness Brother         Schizophrenia , Paranoia    Diabetes Brother     Lupus Sister     Arthritis Sister     Depression Sister     Diabetes Sister     Heart Disease Sister     High Blood Pressure Sister     High Cholesterol Sister     Kidney Disease Sister     Stroke Sister      REVIEW OF SYSTEMS:  Review of System Done as noted above     PHYSICAL EXAM:      Vitals:    /70   Pulse 67   Temp 97.6 °F (36.4 °C)   Resp 18   Ht 5' 1\" (1.549 m)   Wt 170 lb 13.7 oz (77.5 kg)   SpO2 97%   BMI 32.28 kg/m²     CONSTITUTIONAL:  awake, alert, cooperative, appears stated age   EYES:  vision intact Fundoscopic Exam not performed   ENT:Normal  NECK:  Supple, No JVD.    Thyroid Exam:Normal   LUNGS:  Has Vesicular Breath Sounds,   CARDIOVASCULAR:  Normal apical impulse, regular rate and rhythm, normal S1 and S2, no S3 or S4, and has no  murmur   ABDOMEN:  No scars, normal bowel sounds, soft, non-distended, non-tender, no masses palpated, no hepatolienomegaly  Musculoskeletal: Normal  Extremities: Normal, peripheral pulses normal, , has no edema   NEUROLOGIC:  Awake, alert, oriented to name, place and time. Cranial nerves II-XII are grossly intact. Motor is  intact. Sensory is neuropathy. ,  and gait is unstable  DATA:    CBC:   Recent Labs     06/17/19  0600 06/18/19  0430   WBC 5.1 6.6   HGB 10.8* 9.2*    192    CMP:  Recent Labs     06/17/19  0600 06/18/19  0430    145   K 3.7 3.6   * 114*   CO2 23 26   BUN 6 5*   CREATININE 0.5* 0.5*   CALCIUM 8.7 8.6     Lipids:   Lab Results   Component Value Date    CHOL 73 04/25/2019    HDL 28 04/25/2019    TRIG 82 04/25/2019     Glucose:   Recent Labs     06/19/19  1211 06/19/19  1707 06/19/19  2023   POCGLU 82 85 110*     Hemoglobin A1C:   Lab Results   Component Value Date    LABA1C 5.6 05/27/2019     Free T4:   Lab Results   Component Value Date    T4FREE 1.14 01/07/2017     Free T3:   Lab Results   Component Value Date    FT3 2.0 08/11/2012     TSH High Sensitivity:   Lab Results   Component Value Date    TSHHS 0.435 06/16/2019       Xr Chest Standard (2 Vw)    Result Date: 6/17/2019  EXAMINATION: TWO XRAY VIEWS OF THE CHEST 6/16/2019 11:09 am COMPARISON: Chest radiograph 05/26/2019 HISTORY: ORDERING SYSTEM PROVIDED HISTORY: AMS TECHNOLOGIST PROVIDED HISTORY: Reason for exam:->AMS Ordering Physician Provided Reason for Exam: ams FINDINGS: Normal heart size. Normal pulmonary vascularity. Mild bilateral lower lobe airspace opacities. No evidence of pleural effusion or pneumothorax. No acute osseous abnormality. Mild bilateral lower lobe airspace opacities may reflect pneumonia.      Ct Head Wo Contrast    Result Date: 6/16/2019  EXAMINATION: CT OF THE HEAD WITHOUT CONTRAST  6/16/2019 11:02 am TECHNIQUE: CT of the head was performed without the administration of intravenous contrast. Dose modulation, iterative reconstruction, and/or weight based adjustment of the mA/kV was utilized to reduce the radiation dose to as low as reasonably achievable. COMPARISON: Noncontrast CT head 05/26/2019 HISTORY: ORDERING SYSTEM PROVIDED HISTORY: AMS TECHNOLOGIST PROVIDED HISTORY: Has a \"code stroke\" or \"stroke alert\" been called? ->No Ordering Physician Provided Reason for Exam: AMS Acuity: Acute Type of Exam: Initial Additional signs and symptoms: fatigue Relevant Medical/Surgical History: none FINDINGS: BRAIN/VENTRICLES: There is no acute intracranial hemorrhage, mass effect or midline shift. No abnormal extra-axial fluid collection. The gray-white differentiation is maintained without evidence of an acute infarct. There is no evidence of hydrocephalus. Basal cisterns are patent. ORBITS: The visualized portion of the orbits demonstrate no acute abnormality. SINUSES: The visualized paranasal sinuses and mastoid air cells demonstrate no acute abnormality. SOFT TISSUES/SKULL:  No acute abnormality of the visualized skull or soft tissues. No acute intracranial abnormality. Ct Chest Wo Contrast    Result Date: 6/17/2019  EXAMINATION: CT OF THE CHEST WITHOUT CONTRAST 6/17/2019 9:04 am TECHNIQUE: CT of the chest was performed without the administration of intravenous contrast. Multiplanar reformatted images are provided for review. Dose modulation, iterative reconstruction, and/or weight based adjustment of the mA/kV was utilized to reduce the radiation dose to as low as reasonably achievable.  COMPARISON: CT 05/27/2019, radiographs 06/16/2019 HISTORY: ORDERING SYSTEM PROVIDED HISTORY: abnormal XR TECHNOLOGIST PROVIDED HISTORY: Ordering Physician Provided Reason for Exam: abnormal XR Acuity: Acute Type of Exam: Initial Additional signs and symptoms: patient having difficulty laying flat Relevant Medical/Surgical History: CAD; lung cancer FINDINGS: Mediastinum: Thoracic aortic atherosclerotic disease. Thoracic aorta has normal contour and caliber. Heart size is normal.  Coronary artery disease. No pericardial effusion. No grossly enlarged mediastinal or hilar lymph nodes. Foodstuff within the esophagus, mostly in the proximal half. Lungs/pleura: Moderate emphysema, most pronounced in the left upper lobe. Right lung dependent atelectasis. No focal airspace consolidation, significant pleural effusion, or pneumothorax. There is low-density mass within the right main bronchus measuring 8 mm, probably mucus. Upper Abdomen: Included portions of the upper abdomen are without acute abnormality. Diffuse hepatic low attenuation. Soft Tissues/Bones: Soft tissues are unremarkable. No destructive osseous lesions. 1. Moderate upper lung emphysema, worst in the left upper lobe. No focal airspace opacities to suggest pneumonia. 2. Right hemidiaphragm elevation and right lung dependent atelectasis. 3. Low-density mass in the right main bronchus measuring 8 mm probably represents mucus/secretions. Attention on any follow-up imaging is recommended. 4. Foodstuff in the proximal half of the esophagus.        Scheduled Medicines   Medications:    [START ON 6/20/2019] insulin lispro  0-6 Units Subcutaneous TID WC    [START ON 6/20/2019] insulin lispro  0-3 Units Subcutaneous 2 times per day    amiodarone  200 mg Oral Daily    amitriptyline  50 mg Oral Nightly    aspirin  81 mg Oral Daily    mometasone-formoterol  2 puff Inhalation BID    simvastatin  20 mg Oral Nightly    sertraline  50 mg Oral Nightly    pregabalin  150 mg Oral BID    metoprolol succinate  50 mg Oral Daily    magnesium oxide  400 mg Oral BID    losartan  25 mg Oral Daily    guaiFENesin  1,200 mg Oral BID    clopidogrel  75 mg Oral Daily    famotidine  20 mg Oral BID    sodium chloride flush  10 mL Intravenous 2 times per day

## 2019-06-20 NOTE — DISCHARGE SUMMARY
Hospital Medicine  DISCHARGE SUMMARY  Date: 6/20/2019  Name: Raina Prader  MRN: [id number]  YOB: 1950     Patient's PCP: Lorena Nieves DO  Admit Date: 6/16/2019   Discharge Date: 6/20/2019  Admitting Physician: Shaunna Madera MD  Discharge Physician: Lea Combs MD      Discharge Diagnoses:  Acute metabolic encephalopathy  Possible Sepsis  Frequent falls  HTN, normotensive  Stage II pressure ulcer  DMII   Hypoglycemia       Other medical hx  -CAD  -Anxiety  -Gout  -HLD  -SLP  -Obesity class 1 with BMI 35.5: educated about lifestyle modifications        HPI:    Chief Complaint   Patient presents with    Fatigue     increasing weaknes per caregiver, recent UTI, new open pressure ulcer     Raina Prader is a 80 y.o.  female  who presents with AMS. Patient was recently treated for UTI and completed the course of antibiotics. No family at bedside. Unable to get more info from patient due to AMS. Per ED reports, he has been falling often and more confused. Hx of DMII, RA, HTN, HLD, COPD, CAD, Restless leg, SLE, and anxiety. Hospital Course  Patient came in for altered mental status. Initially was thought that it may have been due to an infection. Patient had recently been treated for UTI prior to admission. Chest x-ray suggested possible pneumonia. Patient was placed on antibiotics. Patient had an elevated lactate. Patient was afebrile and there was no leukocytosis. CT of the chest did not show pneumonia. Procalcitonin was not elevated. Lactate improved with IV fluids. UA was unremarkable. One blood culture tested positive for coag negative staph. This was likely contamination. Antibiotics were stopped because there was no focal source of infection. In regards to the confusion, CT of the head was negative. Confusion may have been due to infectious process, however there was no focal source of found. Patient did improve during the course of her stay while on antibiotics.   It was possible that confusion may have been related to low blood sugars. Patient did have occasional hypoglycemia where the blood sugar dropped into the 50s. Blood sugars seem to improve during the course of her stay. Patient seem to be eating her meals. Patient had a stage II pressure ulcer. She was seen by wound care. Patient had diabetes type 2. Her hemoglobin A1c in May 2019 was 5.6. Her oral agents were held. Her sliding scale insulin was also stopped because she was having low blood sugars. Blood sugars seem to improve during the course of her stay. Patient was stable and she was discharged to home with home health. Physical Exam:  /68   Pulse 56   Temp 98.5 °F (36.9 °C) (Oral)   Resp 16   Ht 5' 1\" (1.549 m)   Wt 167 lb 9.6 oz (76 kg)   SpO2 96%   BMI 31.67 kg/m²   GEN    Awake female, laying in bed in no apparent distress. Appears given age. relaxed  HENT  NCAT  RESP  Clear to auscultation, no wheezes, rales or rhonchi. CARDIO/VASC           S1/S2 auscultated. No murmurs  GI        Abdomen is soft without significant tenderness, masses, or guarding. MSK    No gross joint deformities. Spontaneous movement of all extremities  SKIN    Normal coloration, warm, dry. NEURO           Grossly normal  PSYCH            Awake, alert, oriented to person, place, month and year        Consultations  IP CONSULT TO IV TEAM  PHARMACY TO DOSE VANCOMYCIN  IP CONSULT TO HOSPITALIST  IP CONSULT TO HOME CARE NEEDS  IP CONSULT TO ENDOCRINOLOGY  IP CONSULT TO CASE MANAGEMENT    Invasive procedures:   none    Significant Diagnostic Studies:    Imaging  Xr Chest Standard (2 Vw)  Result Date: 6/17/2019  Mild bilateral lower lobe airspace opacities may reflect pneumonia. Ct Head Wo Contrast  Result Date: 6/16/2019  No acute intracranial abnormality. Ct Chest Wo Contrast  Result Date: 6/17/2019  1. Moderate upper lung emphysema, worst in the left upper lobe.   No focal airspace opacities to suggest pneumonia. 2. Right hemidiaphragm elevation and right lung dependent atelectasis. 3. Low-density mass in the right main bronchus measuring 8 mm probably represents mucus/secretions. Attention on any follow-up imaging is recommended. 4. Foodstuff in the proximal half of the esophagus.        Code Status:  Full Code      Discharge Medications:     Medication List      CONTINUE taking these medications    * albuterol (2.5 MG/3ML) 0.083% nebulizer solution  Commonly known as:  PROVENTIL  Take 3 mLs by nebulization every 6 hours as needed for Wheezing     * PROAIR  (90 Base) MCG/ACT inhaler  Generic drug:  albuterol sulfate HFA     amiodarone 200 MG tablet  Commonly known as:  CORDARONE  Take 1 tablet by mouth daily     amitriptyline 50 MG tablet  Commonly known as:  ELAVIL     aspirin 81 MG chewable tablet  Take 1 tablet by mouth daily     benzonatate 100 MG capsule  Commonly known as:  TESSALON     budesonide-formoterol 160-4.5 MCG/ACT Aero  Commonly known as:  SYMBICORT  Inhale 2 puffs into the lungs 2 times daily     clopidogrel 75 MG tablet  Commonly known as:  PLAVIX     famotidine 20 MG tablet  Commonly known as:  PEPCID     guaiFENesin 600 MG extended release tablet  Commonly known as:  MUCINEX  Take 2 tablets by mouth 2 times daily     ipratropium-albuterol 0.5-2.5 (3) MG/3ML Soln nebulizer solution  Commonly known as:  DUONEB  Inhale 3 mLs into the lungs 4 times daily Dx: COPD J44.9     LORazepam 2 MG tablet  Commonly known as:  ATIVAN     losartan 25 MG tablet  Commonly known as:  COZAAR     magnesium oxide 400 (241.3 Mg) MG Tabs tablet  Commonly known as:  MAG-OX  Take 1 tablet by mouth 2 times daily     metoprolol succinate 50 MG extended release tablet  Commonly known as:  TOPROL XL     MULTIVITAMIN PO     oxyCODONE-acetaminophen 7.5-325 MG per tablet  Commonly known as:  PERCOCET     OXYGEN     pregabalin 150 MG capsule  Commonly known as:  LYRICA     sertraline 50 MG tablet  Commonly known as:

## 2019-06-20 NOTE — PROGRESS NOTES
Rosalind from [facility] aware of patients discharge. Patient will be transported at 1545 via med trans.  Pt aware

## 2019-06-20 NOTE — PROGRESS NOTES
Layout: One level  Home Access: Level entry; elevator to 3rd floor  Bathroom Shower/Tub: Tub/Shower unit  Bathroom Toilet: Handicap height  Bathroom Equipment: Tub transfer bench, Grab bars in shower, Grab bars around toilet  Bathroom Accessibility: Accessible  Home Equipment: Rolling walker, 4 wheeled walker, Wheelchair-manual, Life alert  Receives Help From: Home health (daily per pt, M-F 5.5. Hrs/day, 6 hrs Sa/Sun)  ADL Assistance: [facility]: Needs assistance ([facility] aide manages or dtr; pt states she shares in meal prep)  Ambulation Assistance: Independent (mod I with manual w/c or RW)  Transfer Assistance: Independent, daughter assists PRN  Active : Yes (Last drove 1 month ago)  Occupation: Retired  Leisure & Hobbies: Watching TV, Listening to music, Computers      Unlike last admit, Dtr is no longer available for 24 h supervision. Pt reports dtr has bf now and is not around as much. Pt reports 3 falls in past 3 months d/t weakness and dizziness; states she has needed to come to hosp d/t falling.           Objective   Vision: Impaired  Vision Exceptions: Wears glasses at all times  Hearing: Within functional limits    Orientation  Overall Orientation Status: (not formally assessed)  Observation/Palpation  Posture: Poor(progressive decline from fair to poor w/ambulation d/t increased trunk and hip flexion during ambulation and standing as pt fatigued)  Edema: mild sierra sierra LEs  Balance  Sitting Balance: Stand by assistance(light dynamic on commode w/toileting)  Standing Balance: Contact guard assistance(to min A w/completion of toileting hygiene and grooming tasks (face/hand hygiene) at sink)  Standing Balance  Time: 8 min  Activity: completion of toileting hygiene by OT in standing, face/hand hygiene at sink, amb to sink then back to chair     Tone RUE  RUE Tone: Normotonic  Tone LUE  LUE Tone: Normotonic     Bed mobility  Comment: n/t, as pt seated in chair upon therapy's arrival and returned to chair at end of session  Transfers  Stand Pivot Transfers: Minimal assistance(to toilet, chair w/RW; poor safety w/RW use w/transfers - places to side, and w/hand placement, does not reach back to sit down to chair, just plops back; significant trunk and hip flexion during transfers as fatigue progressed)  Sit to stand: Contact guard assistance(from chair, mod A from toilet w/use of grab bar)  Stand to sit: Minimal assistance(to toilet (decreased safety, had stepped away from RW, sat down on side of commode), mod A to chair (pt plops down, does not reach back to sit down))  Transfer Comments: amb progressing from min A initially to min-mod A w/RW in room for ADL completion, w/pt needing increased A as fatigue progressed. Forward trunk and hip flexion during ambulation, significantly decreasing pt's safety and increasing risk of falls. Cognition  Overall Cognitive Status: Exceptions  Arousal/Alertness: Appropriate responses to stimuli  Following Commands:  Follows one step commands consistently(occas repetiton)  Attention Span: Appears intact  Memory: Appears intact  Safety Judgement: Decreased awareness of need for assistance;Decreased awareness of need for safety  Problem Solving: Decreased awareness of errors  Insights: Decreased awareness of deficits  Initiation: Requires cues for some        Sensation  Overall Sensation Status: Impaired(numbness sierra hands/feet)        LUE AROM (degrees)  LUE AROM : WFL  RUE AROM (degrees)  RUE AROM : WFL  LUE Strength  Gross LUE Strength: WFL  RUE Strength  Gross RUE Strength: WFL     Hand Dominance  Hand Dominance: Right             Plan   Plan  Times per week: 2x+  Times per day: Daily  Current Treatment Recommendations: Strengthening, Endurance Training, Neuromuscular Re-education, Patient/Caregiver Education & Training, Equipment Evaluation, Education, & procurement, Self-Care / ADL, Balance Training, Functional Mobility Training, Safety Education & Training, ROM    G-Code     OutComes Score                                                  AM-PAC Score  AM-PAC 6 click short form for inpatient daily activity:   How much help from another person does the patient currently need. .. Unable  Dep A Lot  Max A A Lot   Mod A A Little  Min A A Little   CGA  SBA None   Mod I  Indep  Sup   1. Putting on and taking off regular lower body clothing? [] 1    [] 2   [] 2   [x] 3   [] 3   [] 4      2. Bathing (including washing, rinsing, drying)? [] 1   [] 2   [] 2 [x] 3 [] 3 [] 4   3. Toileting, which includes using toilet, bedpan, or urinal? [] 1    [] 2   [] 2   [x] 3   [] 3   [] 4     4. Putting on and taking off regular upper body clothing? [] 1   [] 2   [] 2   [] 3   [x] 3    [] 4      5. Taking care of personal grooming such as brushing teeth? [] 1   [] 2    [] 2 [x] 3    [] 3   [] 4      6. Eating meals? [] 1   [] 2   [] 2   [] 3   [] 3   [] 4      Raw Score:  19    [24=0% impaired(CH), 23=1-19%(CI), 20-22=20-39%(CJ), 15-19=40-59%(CK), 10-14=60-79%(CL), 7-9=80-99%(CM), 6=100%(CN)]               Goals  Short term goals  Time Frame for Short term goals: until pt discharged from hosp or goals met  Short term goal 1: Pt will complete bed mobility at [facility] indep. Short term goal 2: Pt will complete transfers and amb w/RW at [facility] for ADL completion. Short term goal 3: Pt will complete sp bath v. showering w/use of seat at s/u and sup. Short term goal 4: Pt will complete toileting at [facility] indep. Short term goal 5: Pt will complete grooming standing at sink x 5 min at mod indep.        Therapy Time   Individual Concurrent Group Co-treatment   Time In 8560         Time Out 1350         Minutes 35         Timed Code Treatment Minutes: 25 Minutes       Margaret L Brian, OT/L

## 2019-06-21 LAB
CULTURE: NORMAL
EKG ATRIAL RATE: 82 BPM
EKG DIAGNOSIS: NORMAL
EKG P AXIS: 34 DEGREES
EKG P-R INTERVAL: 136 MS
EKG Q-T INTERVAL: 398 MS
EKG QRS DURATION: 104 MS
EKG QTC CALCULATION (BAZETT): 464 MS
EKG R AXIS: -84 DEGREES
EKG T AXIS: 28 DEGREES
EKG VENTRICULAR RATE: 82 BPM
Lab: NORMAL
SPECIMEN: NORMAL

## 2019-07-02 ENCOUNTER — HOSPITAL ENCOUNTER (OUTPATIENT)
Age: 69
Setting detail: OBSERVATION
Discharge: NURSING, REHAB OR LONG-TERM CARE | End: 2019-07-04
Attending: EMERGENCY MEDICINE | Admitting: HOSPITALIST
Payer: MEDICARE

## 2019-07-02 ENCOUNTER — APPOINTMENT (OUTPATIENT)
Dept: CT IMAGING | Age: 69
End: 2019-07-02
Payer: MEDICARE

## 2019-07-02 DIAGNOSIS — F41.1 GENERALIZED ANXIETY DISORDER: Primary | ICD-10-CM

## 2019-07-02 DIAGNOSIS — R53.1 GENERALIZED WEAKNESS: ICD-10-CM

## 2019-07-02 PROBLEM — M62.81 GENERALIZED MUSCLE WEAKNESS: Status: ACTIVE | Noted: 2019-07-02

## 2019-07-02 PROBLEM — E87.5 ACUTE HYPERKALEMIA: Status: ACTIVE | Noted: 2019-07-02

## 2019-07-02 PROBLEM — R56.9 SEIZURE (HCC): Status: ACTIVE | Noted: 2019-07-02

## 2019-07-02 LAB
ALBUMIN SERPL-MCNC: 2.5 GM/DL (ref 3.4–5)
ALP BLD-CCNC: 99 IU/L (ref 40–129)
ALT SERPL-CCNC: 20 U/L (ref 10–40)
ANION GAP SERPL CALCULATED.3IONS-SCNC: 6 MMOL/L (ref 4–16)
AST SERPL-CCNC: 41 IU/L (ref 15–37)
BACTERIA: NEGATIVE /HPF
BASOPHILS ABSOLUTE: 0 K/CU MM
BASOPHILS RELATIVE PERCENT: 0.2 % (ref 0–1)
BILIRUB SERPL-MCNC: 0.5 MG/DL (ref 0–1)
BILIRUBIN URINE: NEGATIVE MG/DL
BLOOD, URINE: NEGATIVE
BUN BLDV-MCNC: 10 MG/DL (ref 6–23)
CALCIUM SERPL-MCNC: 9 MG/DL (ref 8.3–10.6)
CHLORIDE BLD-SCNC: 110 MMOL/L (ref 99–110)
CHP ED QC CHECK: YES
CHP ED QC CHECK: YES
CLARITY: CLEAR
CO2: 26 MMOL/L (ref 21–32)
COLOR: YELLOW
CREAT SERPL-MCNC: 0.6 MG/DL (ref 0.6–1.1)
DIFFERENTIAL TYPE: ABNORMAL
EOSINOPHILS ABSOLUTE: 0.1 K/CU MM
EOSINOPHILS RELATIVE PERCENT: 0.6 % (ref 0–3)
GFR AFRICAN AMERICAN: >60 ML/MIN/1.73M2
GFR NON-AFRICAN AMERICAN: >60 ML/MIN/1.73M2
GLUCOSE BLD-MCNC: 101 MG/DL
GLUCOSE BLD-MCNC: 101 MG/DL (ref 70–99)
GLUCOSE BLD-MCNC: 88 MG/DL
GLUCOSE BLD-MCNC: 88 MG/DL (ref 70–99)
GLUCOSE BLD-MCNC: 98 MG/DL (ref 70–99)
GLUCOSE, URINE: NEGATIVE MG/DL
HCT VFR BLD CALC: 39.6 % (ref 37–47)
HEMOGLOBIN: 12.3 GM/DL (ref 12.5–16)
IMMATURE NEUTROPHIL %: 0.5 % (ref 0–0.43)
KETONES, URINE: NEGATIVE MG/DL
LEUKOCYTE ESTERASE, URINE: NEGATIVE
LYMPHOCYTES ABSOLUTE: 2.1 K/CU MM
LYMPHOCYTES RELATIVE PERCENT: 19.4 % (ref 24–44)
MCH RBC QN AUTO: 27.8 PG (ref 27–31)
MCHC RBC AUTO-ENTMCNC: 31.1 % (ref 32–36)
MCV RBC AUTO: 89.4 FL (ref 78–100)
MONOCYTES ABSOLUTE: 0.5 K/CU MM
MONOCYTES RELATIVE PERCENT: 4.6 % (ref 0–4)
NITRITE URINE, QUANTITATIVE: NEGATIVE
NUCLEATED RBC %: 0 %
PDW BLD-RTO: 17.4 % (ref 11.7–14.9)
PH, URINE: 5 (ref 5–8)
PLATELET # BLD: 240 K/CU MM (ref 140–440)
PMV BLD AUTO: 11.8 FL (ref 7.5–11.1)
POTASSIUM SERPL-SCNC: 5.2 MMOL/L (ref 3.5–5.1)
PROTEIN UA: NEGATIVE MG/DL
RBC # BLD: 4.43 M/CU MM (ref 4.2–5.4)
RBC URINE: <1 /HPF (ref 0–6)
REASON FOR REJECTION: NORMAL
REASON FOR REJECTION: NORMAL
REJECTED TEST: NORMAL
SEGMENTED NEUTROPHILS ABSOLUTE COUNT: 7.9 K/CU MM
SEGMENTED NEUTROPHILS RELATIVE PERCENT: 74.7 % (ref 36–66)
SODIUM BLD-SCNC: 142 MMOL/L (ref 135–145)
SPECIFIC GRAVITY UA: 1.01 (ref 1–1.03)
SQUAMOUS EPITHELIAL: <1 /HPF
TOTAL IMMATURE NEUTOROPHIL: 0.05 K/CU MM
TOTAL NUCLEATED RBC: 0 K/CU MM
TOTAL PROTEIN: 5.1 GM/DL (ref 6.4–8.2)
TOTAL RETICULOCYTE COUNT: 0.12 K/CU MM
TRICHOMONAS: NORMAL /HPF
TROPONIN T: <0.01 NG/ML
TSH HIGH SENSITIVITY: 0.51 UIU/ML (ref 0.27–4.2)
UROBILINOGEN, URINE: 1 MG/DL (ref 0.2–1)
WBC # BLD: 10.6 K/CU MM (ref 4–10.5)
WBC UA: <1 /HPF (ref 0–5)
YEAST: NORMAL /HPF

## 2019-07-02 PROCEDURE — 93010 ELECTROCARDIOGRAM REPORT: CPT | Performed by: INTERNAL MEDICINE

## 2019-07-02 PROCEDURE — 6370000000 HC RX 637 (ALT 250 FOR IP): Performed by: NURSE PRACTITIONER

## 2019-07-02 PROCEDURE — 81001 URINALYSIS AUTO W/SCOPE: CPT

## 2019-07-02 PROCEDURE — 82962 GLUCOSE BLOOD TEST: CPT

## 2019-07-02 PROCEDURE — G0378 HOSPITAL OBSERVATION PER HR: HCPCS

## 2019-07-02 PROCEDURE — 70450 CT HEAD/BRAIN W/O DYE: CPT

## 2019-07-02 PROCEDURE — 94640 AIRWAY INHALATION TREATMENT: CPT

## 2019-07-02 PROCEDURE — 85025 COMPLETE CBC W/AUTO DIFF WBC: CPT

## 2019-07-02 PROCEDURE — 87633 RESP VIRUS 12-25 TARGETS: CPT

## 2019-07-02 PROCEDURE — 36410 VNPNXR 3YR/> PHY/QHP DX/THER: CPT

## 2019-07-02 PROCEDURE — 87798 DETECT AGENT NOS DNA AMP: CPT

## 2019-07-02 PROCEDURE — 84443 ASSAY THYROID STIM HORMONE: CPT

## 2019-07-02 PROCEDURE — 93005 ELECTROCARDIOGRAM TRACING: CPT | Performed by: EMERGENCY MEDICINE

## 2019-07-02 PROCEDURE — 94761 N-INVAS EAR/PLS OXIMETRY MLT: CPT

## 2019-07-02 PROCEDURE — 80053 COMPREHEN METABOLIC PANEL: CPT

## 2019-07-02 PROCEDURE — 76937 US GUIDE VASCULAR ACCESS: CPT

## 2019-07-02 PROCEDURE — 87486 CHLMYD PNEUM DNA AMP PROBE: CPT

## 2019-07-02 PROCEDURE — 87581 M.PNEUMON DNA AMP PROBE: CPT

## 2019-07-02 PROCEDURE — 84484 ASSAY OF TROPONIN QUANT: CPT

## 2019-07-02 PROCEDURE — C1751 CATH, INF, PER/CENT/MIDLINE: HCPCS

## 2019-07-02 PROCEDURE — 99285 EMERGENCY DEPT VISIT HI MDM: CPT

## 2019-07-02 RX ORDER — CLOPIDOGREL BISULFATE 75 MG/1
75 TABLET ORAL DAILY
Status: DISCONTINUED | OUTPATIENT
Start: 2019-07-02 | End: 2019-07-04 | Stop reason: HOSPADM

## 2019-07-02 RX ORDER — CALCIUM CARB/VITAMIN D3/VIT K1 500-500-40
1 TABLET,CHEWABLE ORAL EVERY MORNING
Status: DISCONTINUED | OUTPATIENT
Start: 2019-07-03 | End: 2019-07-02 | Stop reason: CLARIF

## 2019-07-02 RX ORDER — FAMOTIDINE 20 MG/1
20 TABLET, FILM COATED ORAL 2 TIMES DAILY
Status: DISCONTINUED | OUTPATIENT
Start: 2019-07-02 | End: 2019-07-04 | Stop reason: HOSPADM

## 2019-07-02 RX ORDER — SODIUM POLYSTYRENE SULFONATE 15 G/60ML
15 SUSPENSION ORAL; RECTAL ONCE
Status: DISCONTINUED | OUTPATIENT
Start: 2019-07-02 | End: 2019-07-04 | Stop reason: HOSPADM

## 2019-07-02 RX ORDER — IPRATROPIUM BROMIDE AND ALBUTEROL SULFATE 2.5; .5 MG/3ML; MG/3ML
1 SOLUTION RESPIRATORY (INHALATION) 4 TIMES DAILY
Status: DISCONTINUED | OUTPATIENT
Start: 2019-07-02 | End: 2019-07-04 | Stop reason: HOSPADM

## 2019-07-02 RX ORDER — SODIUM CHLORIDE 0.9 % (FLUSH) 0.9 %
10 SYRINGE (ML) INJECTION EVERY 12 HOURS SCHEDULED
Status: DISCONTINUED | OUTPATIENT
Start: 2019-07-02 | End: 2019-07-04 | Stop reason: HOSPADM

## 2019-07-02 RX ORDER — GUAIFENESIN 600 MG/1
1200 TABLET, EXTENDED RELEASE ORAL 2 TIMES DAILY
Status: DISCONTINUED | OUTPATIENT
Start: 2019-07-02 | End: 2019-07-04 | Stop reason: HOSPADM

## 2019-07-02 RX ORDER — ALBUTEROL SULFATE 90 UG/1
2 AEROSOL, METERED RESPIRATORY (INHALATION) EVERY 6 HOURS PRN
Status: DISCONTINUED | OUTPATIENT
Start: 2019-07-02 | End: 2019-07-04 | Stop reason: HOSPADM

## 2019-07-02 RX ORDER — POTASSIUM CHLORIDE 7.45 MG/ML
10 INJECTION INTRAVENOUS PRN
Status: DISCONTINUED | OUTPATIENT
Start: 2019-07-02 | End: 2019-07-04 | Stop reason: HOSPADM

## 2019-07-02 RX ORDER — ONDANSETRON 2 MG/ML
4 INJECTION INTRAMUSCULAR; INTRAVENOUS EVERY 6 HOURS PRN
Status: DISCONTINUED | OUTPATIENT
Start: 2019-07-02 | End: 2019-07-04 | Stop reason: HOSPADM

## 2019-07-02 RX ORDER — POTASSIUM CHLORIDE 1.5 G/1.77G
40 POWDER, FOR SOLUTION ORAL PRN
Status: DISCONTINUED | OUTPATIENT
Start: 2019-07-02 | End: 2019-07-04 | Stop reason: HOSPADM

## 2019-07-02 RX ORDER — LORAZEPAM 1 MG/1
1 TABLET ORAL EVERY 4 HOURS PRN
Status: DISCONTINUED | OUTPATIENT
Start: 2019-07-02 | End: 2019-07-02

## 2019-07-02 RX ORDER — AMITRIPTYLINE HYDROCHLORIDE 50 MG/1
50 TABLET, FILM COATED ORAL NIGHTLY
Status: DISCONTINUED | OUTPATIENT
Start: 2019-07-02 | End: 2019-07-04 | Stop reason: HOSPADM

## 2019-07-02 RX ORDER — FLUCONAZOLE 100 MG/1
150 TABLET ORAL DAILY
Status: DISCONTINUED | OUTPATIENT
Start: 2019-07-02 | End: 2019-07-04 | Stop reason: HOSPADM

## 2019-07-02 RX ORDER — NICOTINE 21 MG/24HR
1 PATCH, TRANSDERMAL 24 HOURS TRANSDERMAL DAILY
Status: DISCONTINUED | OUTPATIENT
Start: 2019-07-02 | End: 2019-07-04 | Stop reason: HOSPADM

## 2019-07-02 RX ORDER — SODIUM CHLORIDE 0.9 % (FLUSH) 0.9 %
10 SYRINGE (ML) INJECTION PRN
Status: DISCONTINUED | OUTPATIENT
Start: 2019-07-02 | End: 2019-07-04 | Stop reason: HOSPADM

## 2019-07-02 RX ORDER — BENZONATATE 100 MG/1
100 CAPSULE ORAL 3 TIMES DAILY PRN
Status: DISCONTINUED | OUTPATIENT
Start: 2019-07-02 | End: 2019-07-04 | Stop reason: HOSPADM

## 2019-07-02 RX ORDER — LOSARTAN POTASSIUM 25 MG/1
25 TABLET ORAL DAILY
Status: CANCELLED | OUTPATIENT
Start: 2019-07-02

## 2019-07-02 RX ORDER — ALBUTEROL SULFATE 2.5 MG/3ML
2.5 SOLUTION RESPIRATORY (INHALATION) EVERY 6 HOURS PRN
Status: DISCONTINUED | OUTPATIENT
Start: 2019-07-02 | End: 2019-07-04 | Stop reason: HOSPADM

## 2019-07-02 RX ORDER — POTASSIUM CHLORIDE 20 MEQ/1
40 TABLET, EXTENDED RELEASE ORAL PRN
Status: DISCONTINUED | OUTPATIENT
Start: 2019-07-02 | End: 2019-07-04 | Stop reason: HOSPADM

## 2019-07-02 RX ORDER — METOPROLOL SUCCINATE 50 MG/1
50 TABLET, EXTENDED RELEASE ORAL DAILY
Status: DISCONTINUED | OUTPATIENT
Start: 2019-07-02 | End: 2019-07-04 | Stop reason: HOSPADM

## 2019-07-02 RX ORDER — MULTIVIT-MIN/FERROUS GLUCONATE 9 MG/15 ML
15 LIQUID (ML) ORAL DAILY
Status: DISCONTINUED | OUTPATIENT
Start: 2019-07-03 | End: 2019-07-04 | Stop reason: HOSPADM

## 2019-07-02 RX ORDER — OXYCODONE AND ACETAMINOPHEN 7.5; 325 MG/1; MG/1
1 TABLET ORAL EVERY 6 HOURS PRN
Status: CANCELLED | OUTPATIENT
Start: 2019-07-02

## 2019-07-02 RX ORDER — LORAZEPAM 1 MG/1
1 TABLET ORAL EVERY 4 HOURS PRN
Status: DISCONTINUED | OUTPATIENT
Start: 2019-07-02 | End: 2019-07-04 | Stop reason: HOSPADM

## 2019-07-02 RX ORDER — PREGABALIN 75 MG/1
150 CAPSULE ORAL 2 TIMES DAILY
Status: DISCONTINUED | OUTPATIENT
Start: 2019-07-02 | End: 2019-07-04 | Stop reason: HOSPADM

## 2019-07-02 RX ORDER — BUDESONIDE AND FORMOTEROL FUMARATE DIHYDRATE 160; 4.5 UG/1; UG/1
2 AEROSOL RESPIRATORY (INHALATION) 2 TIMES DAILY
Status: DISCONTINUED | OUTPATIENT
Start: 2019-07-02 | End: 2019-07-02 | Stop reason: CLARIF

## 2019-07-02 RX ORDER — ASPIRIN 81 MG/1
81 TABLET, CHEWABLE ORAL DAILY
Status: DISCONTINUED | OUTPATIENT
Start: 2019-07-02 | End: 2019-07-04 | Stop reason: HOSPADM

## 2019-07-02 RX ORDER — AMIODARONE HYDROCHLORIDE 200 MG/1
200 TABLET ORAL DAILY
Status: DISCONTINUED | OUTPATIENT
Start: 2019-07-02 | End: 2019-07-04 | Stop reason: HOSPADM

## 2019-07-02 RX ORDER — SIMVASTATIN 20 MG
20 TABLET ORAL NIGHTLY
Status: DISCONTINUED | OUTPATIENT
Start: 2019-07-02 | End: 2019-07-04 | Stop reason: HOSPADM

## 2019-07-02 RX ADMIN — AMITRIPTYLINE HYDROCHLORIDE 50 MG: 50 TABLET, FILM COATED ORAL at 23:13

## 2019-07-02 RX ADMIN — FLUCONAZOLE 150 MG: 100 TABLET ORAL at 23:13

## 2019-07-02 RX ADMIN — SIMVASTATIN 20 MG: 20 TABLET, FILM COATED ORAL at 23:13

## 2019-07-02 RX ADMIN — Medication 2 PUFF: at 22:17

## 2019-07-02 RX ADMIN — IPRATROPIUM BROMIDE AND ALBUTEROL SULFATE 3 ML: .5; 3 SOLUTION RESPIRATORY (INHALATION) at 22:17

## 2019-07-02 RX ADMIN — GUAIFENESIN 1200 MG: 600 TABLET, EXTENDED RELEASE ORAL at 23:12

## 2019-07-02 RX ADMIN — AMIODARONE HYDROCHLORIDE 200 MG: 200 TABLET ORAL at 23:14

## 2019-07-02 RX ADMIN — CLOPIDOGREL BISULFATE 75 MG: 75 TABLET ORAL at 23:12

## 2019-07-02 RX ADMIN — SERTRALINE HYDROCHLORIDE 50 MG: 50 TABLET ORAL at 23:12

## 2019-07-02 RX ADMIN — MAGNESIUM OXIDE TAB 400 MG (241.3 MG ELEMENTAL MG) 400 MG: 400 (241.3 MG) TAB at 23:12

## 2019-07-02 RX ADMIN — ASPIRIN 81 MG 81 MG: 81 TABLET ORAL at 23:13

## 2019-07-02 RX ADMIN — PREGABALIN 150 MG: 75 CAPSULE ORAL at 23:14

## 2019-07-02 RX ADMIN — FAMOTIDINE 20 MG: 20 TABLET ORAL at 23:12

## 2019-07-02 ASSESSMENT — PAIN - FUNCTIONAL ASSESSMENT: PAIN_FUNCTIONAL_ASSESSMENT: PREVENTS OR INTERFERES SOME ACTIVE ACTIVITIES AND ADLS

## 2019-07-02 ASSESSMENT — PAIN DESCRIPTION - PROGRESSION
CLINICAL_PROGRESSION: NOT CHANGED

## 2019-07-02 ASSESSMENT — PAIN DESCRIPTION - LOCATION
LOCATION: LEG;ABDOMEN
LOCATION: GENERALIZED

## 2019-07-02 ASSESSMENT — PAIN DESCRIPTION - PAIN TYPE
TYPE: ACUTE PAIN
TYPE: ACUTE PAIN

## 2019-07-02 ASSESSMENT — PAIN DESCRIPTION - ONSET: ONSET: ON-GOING

## 2019-07-02 ASSESSMENT — PAIN SCALES - WONG BAKER: WONGBAKER_NUMERICALRESPONSE: 4

## 2019-07-02 ASSESSMENT — PAIN DESCRIPTION - ORIENTATION: ORIENTATION: RIGHT;LEFT;MID;LOWER

## 2019-07-02 ASSESSMENT — PAIN SCALES - GENERAL
PAINLEVEL_OUTOF10: 4
PAINLEVEL_OUTOF10: 9

## 2019-07-02 ASSESSMENT — PAIN DESCRIPTION - FREQUENCY: FREQUENCY: CONTINUOUS

## 2019-07-02 ASSESSMENT — PAIN DESCRIPTION - DESCRIPTORS: DESCRIPTORS: ACHING

## 2019-07-02 NOTE — ED PROVIDER NOTES
Result Value Ref Range    Sodium 143 135 - 145 MMOL/L    Potassium 4.0 3.5 - 5.1 MMOL/L    Chloride 111 (H) 99 - 110 mMol/L    CO2 24 21 - 32 MMOL/L    Anion Gap 8 4 - 16    BUN 10 6 - 23 MG/DL    CREATININE 0.6 0.6 - 1.1 MG/DL    Glucose 97 70 - 99 MG/DL    Calcium 9.1 8.3 - 10.6 MG/DL    GFR Non-African American >60 >60 mL/min/1.73m2    GFR African American >60 >60 mL/min/1.73m2   CBC auto differential   Result Value Ref Range    WBC 5.6 4.0 - 10.5 K/CU MM    RBC 3.85 (L) 4.2 - 5.4 M/CU MM    Hemoglobin 10.7 (L) 12.5 - 16.0 GM/DL    Hematocrit 34.0 (L) 37 - 47 %    MCV 88.3 78 - 100 FL    MCH 27.8 27 - 31 PG    MCHC 31.5 (L) 32.0 - 36.0 %    RDW 17.1 (H) 11.7 - 14.9 %    Platelets 101 890 - 444 K/CU MM    MPV 11.7 (H) 7.5 - 11.1 FL    Differential Type AUTOMATED DIFFERENTIAL     Segs Relative 56.6 36 - 66 %    Lymphocytes % 32.8 24 - 44 %    Monocytes % 8.2 (H) 0 - 4 %    Eosinophils % 1.8 0 - 3 %    Basophils % 0.4 0 - 1 %    Segs Absolute 3.2 K/CU MM    Lymphocytes # 1.8 K/CU MM    Monocytes # 0.5 K/CU MM    Eosinophils # 0.1 K/CU MM    Basophils # 0.0 K/CU MM    Nucleated RBC % 0.0 %    Total Nucleated RBC 0.0 K/CU MM    Total Immature Neutrophil 0.01 K/CU MM    Immature Neutrophil % 0.2 0 - 0.43 %   POC Blood Glucose   Result Value Ref Range    Glucose 88 mg/dL    QC OK? yes    POCT Glucose   Result Value Ref Range    POC Glucose 88 70 - 99 MG/DL   POC Blood Glucose   Result Value Ref Range    Glucose 101 mg/dL    QC OK?  yes    POCT Glucose   Result Value Ref Range    POC Glucose 101 (H) 70 - 99 MG/DL   EKG 12 Lead   Result Value Ref Range    Ventricular Rate 102 BPM    Atrial Rate 102 BPM    P-R Interval 144 ms    QRS Duration 88 ms    Q-T Interval 336 ms    QTc Calculation (Bazett) 437 ms    P Axis 58 degrees    R Axis -83 degrees    T Axis 54 degrees    Diagnosis         Sinus tachycardia with fusion complexes  Left axis deviation  Low voltage QRS  Septal infarct (cited on or before 28-FEB-2018)  Possible

## 2019-07-02 NOTE — H&P
History and Physical  Ada Robledo [facility]   Internal Medicine Hospitalist        Name:  Pako Figueredo /Age/Sex: 1950  (77 y.o. female)   MRN & CSN:  [id number] & [id number] Admission Date/Time: 2019  6:47 AM   Location:  [facility]/01TR-01 PCP: Ophelia Choi, [address] [place] [address] Day: 1      Supervising Physician: Dr. Desiderio Hashimoto    Chief Complaint: Fatigue     Assessment and Plan:   Pako Figueredo is a 77 y.o.  female who presents with Generalized muscle weakness     Generalized muscle weakness           - admit for obs, telemetry monitoring           - case management consulted for possible PT/OT home service         - PT/OT eval and treat         - fall precautions         - check lab works in AM     Acute hyperkalemia - potassium level 5.2 in ED.     - cont telemetry monitoring     - kayexalate, once        - recheck BMP in AM      H/o Hypertension - with soft BP in ED 98/72, 104/69, hold losartan and narcotics for now, cont BP monitoring.  Tobacco abuse - on Nicotine patch, tobacco cessation education, cessation advised.  Chronic Illnesses: will continue current home medications unless contraindicated by above plan and assessment. - CAD - c/w ASA, Plavix, statin, BB.         - COPD - not in exacerbation, breathing stable at room air w/ O2 sat >95%, continue home breathing treatment. - DM type 2         - hyperlipidemia - c/w Zocor.         - RLS         - ESBL, urine - contact isolation     Current diagnosis and plan of management discussed with the patient at the time of admission in lay language who agree to the above plan and disposition of admission for further care. All concerns and questions addressed.       Patient assessment and plan in conjunction with supervising physician - Dr. Martha London, carb control   DVT Prophylaxis [x] Lovenox, []  Heparin, [] SCDs, [] Ambulation  [] Long term AC   GI Prophylaxis [x] PPI,  [] H2 Blocker,  [] Lumbar spinal stenosis     Lung cancer ([facility] 09/2014    Dr Kevin Pereira; left upper lobectomy; non small cell CA    Obesity (BMI 30.0-34. 9)     Restless leg syndrome     Rheumatoid arthritis (HCC)     Smoker     Systemic lupus erythematosus (HCC) 1992    Dr Misty Moreno, visiting physician    Type II diabetes mellitus ([facility] 1969    Ventral hernia 06/2018    right ventral hernia seen on CT abd 6/2018     Past Surgery History:  Patient  has a past surgical history that includes Skin cancer excision (Right, 2012); Appendectomy (1972); Cholecystectomy (2069); other surgical history (2/20/15); Total knee arthroplasty (Right, 2003); Revision Total Knee Arthroplasty (Right, 2005); aurora and bso (cervix removed) (1972); Humerus fracture surgery (Left, 2000's); Femur fracture surgery (Right, 2000's); Abdominal exploration surgery (Last Done 2005); Cataract removal with implant (Bilateral, 2000's); Lung removal, partial (Left, 09/30/2014); and Ankle fracture surgery. Social History:    FAM HX: Assessed: family history includes Arthritis in her mother and sister; Depression in her mother and sister; Diabetes in her brother, mother, and sister; Early Death in her father; Heart Disease in her father, mother, and sister; High Blood Pressure in her father, mother, and sister; High Cholesterol in her mother and sister; Kidney Disease in her mother and sister; Learning Disabilities in her mother; Lupus in her sister; Mental Illness in her brother and mother; Other in her father; Stroke in her mother and sister; Vision Loss in her mother. Soc HX:   Social History     Socioeconomic History    Marital status:       Spouse name: None    Number of children: None    Years of education: None    Highest education level: None   Occupational History    None   Social Needs    Financial resource strain: None    Food insecurity:     Worry: None     Inability: None    Transportation needs:     Medical: None     Non-medical: None 7.5-325 MG per tablet Take 1 tablet by mouth every 6 hours as needed for Pain (lupus pain).     Historical Provider, MD   sertraline (ZOLOFT) 50 MG tablet Take 50 mg by mouth nightly    Historical Provider, MD   amiodarone (CORDARONE) 200 MG tablet Take 1 tablet by mouth daily 4/26/19   Roberta Rey MD   simvastatin (ZOCOR) 20 MG tablet Take 1 tablet by mouth nightly 4/25/19   Roberta Rey MD   magnesium oxide (MAG-OX) 400 (241.3 Mg) MG TABS tablet Take 1 tablet by mouth 2 times daily 4/25/19   Roberta Rey MD   fluconazole (DIFLUCAN) 150 MG tablet  4/17/19   Historical Provider, MD   LORazepam (ATIVAN) 2 MG tablet  4/2/19   Historical Provider, MD   losartan (COZAAR) 25 MG tablet  2/5/19   Historical Provider, MD   ipratropium-albuterol (Rubye Dice) 0.5-2.5 (3) MG/3ML SOLN nebulizer solution Inhale 3 mLs into the lungs 4 times daily Dx: COPD J44.9 2/12/19   Beatriz Sanchez MD   amitriptyline (ELAVIL) 50 MG tablet Take 50 mg by mouth nightly    Historical Provider, MD   OXYGEN Inhale 2 L/min into the lungs as needed    Historical Provider, MD   guaiFENesin (MUCINEX) 600 MG extended release tablet Take 2 tablets by mouth 2 times daily 7/31/18   Beatriz Sanchez MD   clopidogrel (PLAVIX) 75 MG tablet Take 75 mg by mouth daily 3/20/18   Historical Provider, MD   famotidine (PEPCID) 20 MG tablet Take 20 mg by mouth 2 times daily 3/20/18   Historical Provider, MD   PROAIR  (90 Base) MCG/ACT inhaler Inhale 2 puffs into the lungs every 6 hours as needed for Wheezing or Shortness of Breath  3/20/18   Historical Provider, MD   budesonide-formoterol (SYMBICORT) 160-4.5 MCG/ACT AERO Inhale 2 puffs into the lungs 2 times daily 2/1/18   Beatriz Sanchez MD   aspirin 81 MG chewable tablet Take 1 tablet by mouth daily 1/10/17   Leroy Joaquin MD   benzonatate (TESSALON) 100 MG capsule Take 100 mg by mouth 3 times daily as needed for Cough     Historical Provider, MD   pregabalin (LYRICA) 150 MG capsule Take 150 mg by mouth 2 times the proximal half of the esophagus. Fl Modified Barium Swallow W Video    Result Date: 6/11/2019  EXAMINATION: MODIFIED BARIUM SWALLOW WAS PERFORMED IN CONJUNCTION WITH SPEECH PATHOLOGY SERVICES TECHNIQUE: Fluoroscopic evaluation of the swallowing mechanism was performed with multiple consistency of barium product. FLUOROSCOPY DOSE AND TYPE OR TIME AND EXPOSURES: 4 images, 1.0 minutes COMPARISON: None HISTORY: ORDERING SYSTEM PROVIDED HISTORY: Problems with swallowing and mastication TECHNOLOGIST PROVIDED HISTORY: Ordering Physician Provided Reason for Exam: Problems with swallowing and mastication Initial encounter FINDINGS: There is lingual pumping with prolonged mastication and delayed bolus transit during the oral phase of swallowing. There is pharyngeal delay. Laryngeal penetration under the epiglottis with thin liquids. No aspiration identified. Swallowing mechanism grossly within normal limits without evidence of aspiration. Please see separate speech pathology report for full discussion of findings and recommendations. EKG this visit:   EKG: Poor data quality, interpretation may be adversely affected. Sinus tachycardia with fusion complexes, rate 102.    Left axis deviation   Low voltage QRS   Septal infarct (cited on or before 28-FEB-2018)   Possible Lateral infarct (cited on or before 20-APR-2018)   Abnormal ECG   When compared with ECG of 16-JUN-2019 10:46,   fusion complexes are now present   Questionable change in initial forces of Septal leads    Current Treatment Team:  Treatment Team: Attending Provider: Misael Rios MD; Registered Nurse: HENRIQUE Narvaez APRN-BC [facility]  7/2/2019 5:00 PM      Electronically signed by NORAH Maravilla CNP on 7/2/2019 at 5:00 PM

## 2019-07-03 LAB
ADENOVIRUS DETECTION BY PCR: NOT DETECTED
ANION GAP SERPL CALCULATED.3IONS-SCNC: 8 MMOL/L (ref 4–16)
BASOPHILS ABSOLUTE: 0 K/CU MM
BASOPHILS RELATIVE PERCENT: 0.4 % (ref 0–1)
BORDETELLA PERTUSSIS PCR: NOT DETECTED
BUN BLDV-MCNC: 10 MG/DL (ref 6–23)
CALCIUM SERPL-MCNC: 9.1 MG/DL (ref 8.3–10.6)
CHLAMYDOPHILA PNEUMONIA PCR: NOT DETECTED
CHLORIDE BLD-SCNC: 111 MMOL/L (ref 99–110)
CO2: 24 MMOL/L (ref 21–32)
CORONAVIRUS 229E PCR: NOT DETECTED
CORONAVIRUS HKU1 PCR: NOT DETECTED
CORONAVIRUS NL63 PCR: NOT DETECTED
CORONAVIRUS OC43 PCR: NOT DETECTED
CREAT SERPL-MCNC: 0.6 MG/DL (ref 0.6–1.1)
DIFFERENTIAL TYPE: ABNORMAL
EOSINOPHILS ABSOLUTE: 0.1 K/CU MM
EOSINOPHILS RELATIVE PERCENT: 1.8 % (ref 0–3)
GFR AFRICAN AMERICAN: >60 ML/MIN/1.73M2
GFR NON-AFRICAN AMERICAN: >60 ML/MIN/1.73M2
GLUCOSE BLD-MCNC: 97 MG/DL (ref 70–99)
HCT VFR BLD CALC: 34 % (ref 37–47)
HEMOGLOBIN: 10.7 GM/DL (ref 12.5–16)
HUMAN METAPNEUMOVIRUS PCR: NOT DETECTED
IMMATURE NEUTROPHIL %: 0.2 % (ref 0–0.43)
INFLUENZA A BY PCR: NOT DETECTED
INFLUENZA A H1 (2009) PCR: NOT DETECTED
INFLUENZA A H1 PANDEMIC PCR: NOT DETECTED
INFLUENZA A H3 PCR: NOT DETECTED
INFLUENZA B BY PCR: NOT DETECTED
LYMPHOCYTES ABSOLUTE: 1.8 K/CU MM
LYMPHOCYTES RELATIVE PERCENT: 32.8 % (ref 24–44)
MCH RBC QN AUTO: 27.8 PG (ref 27–31)
MCHC RBC AUTO-ENTMCNC: 31.5 % (ref 32–36)
MCV RBC AUTO: 88.3 FL (ref 78–100)
MONOCYTES ABSOLUTE: 0.5 K/CU MM
MONOCYTES RELATIVE PERCENT: 8.2 % (ref 0–4)
MYCOPLASMA PNEUMONIAE PCR: NOT DETECTED
NUCLEATED RBC %: 0 %
PARAINFLUENZA 1 PCR: NOT DETECTED
PARAINFLUENZA 2 PCR: NOT DETECTED
PARAINFLUENZA 3 PCR: NOT DETECTED
PARAINFLUENZA 4 PCR: NOT DETECTED
PDW BLD-RTO: 17.1 % (ref 11.7–14.9)
PLATELET # BLD: 262 K/CU MM (ref 140–440)
PMV BLD AUTO: 11.7 FL (ref 7.5–11.1)
POTASSIUM SERPL-SCNC: 4 MMOL/L (ref 3.5–5.1)
RBC # BLD: 3.85 M/CU MM (ref 4.2–5.4)
RHINOVIRUS ENTEROVIRUS PCR: NOT DETECTED
RSV PCR: NOT DETECTED
SEGMENTED NEUTROPHILS ABSOLUTE COUNT: 3.2 K/CU MM
SEGMENTED NEUTROPHILS RELATIVE PERCENT: 56.6 % (ref 36–66)
SODIUM BLD-SCNC: 143 MMOL/L (ref 135–145)
TOTAL IMMATURE NEUTOROPHIL: 0.01 K/CU MM
TOTAL NUCLEATED RBC: 0 K/CU MM
WBC # BLD: 5.6 K/CU MM (ref 4–10.5)

## 2019-07-03 PROCEDURE — G0378 HOSPITAL OBSERVATION PER HR: HCPCS

## 2019-07-03 PROCEDURE — 6360000002 HC RX W HCPCS: Performed by: NURSE PRACTITIONER

## 2019-07-03 PROCEDURE — 94761 N-INVAS EAR/PLS OXIMETRY MLT: CPT

## 2019-07-03 PROCEDURE — 36415 COLL VENOUS BLD VENIPUNCTURE: CPT

## 2019-07-03 PROCEDURE — 96372 THER/PROPH/DIAG INJ SC/IM: CPT

## 2019-07-03 PROCEDURE — 6370000000 HC RX 637 (ALT 250 FOR IP): Performed by: NURSE PRACTITIONER

## 2019-07-03 PROCEDURE — 94640 AIRWAY INHALATION TREATMENT: CPT

## 2019-07-03 PROCEDURE — 85025 COMPLETE CBC W/AUTO DIFF WBC: CPT

## 2019-07-03 PROCEDURE — 80048 BASIC METABOLIC PNL TOTAL CA: CPT

## 2019-07-03 PROCEDURE — 97166 OT EVAL MOD COMPLEX 45 MIN: CPT

## 2019-07-03 PROCEDURE — 97530 THERAPEUTIC ACTIVITIES: CPT

## 2019-07-03 RX ORDER — LORAZEPAM 2 MG/1
2 TABLET ORAL EVERY 6 HOURS PRN
Qty: 15 TABLET | Refills: 0 | Status: SHIPPED | OUTPATIENT
Start: 2019-07-03 | End: 2019-07-08

## 2019-07-03 RX ADMIN — AMITRIPTYLINE HYDROCHLORIDE 50 MG: 50 TABLET, FILM COATED ORAL at 20:49

## 2019-07-03 RX ADMIN — IPRATROPIUM BROMIDE AND ALBUTEROL SULFATE 3 ML: .5; 3 SOLUTION RESPIRATORY (INHALATION) at 12:01

## 2019-07-03 RX ADMIN — PREGABALIN 150 MG: 75 CAPSULE ORAL at 10:12

## 2019-07-03 RX ADMIN — METOPROLOL SUCCINATE 50 MG: 50 TABLET, EXTENDED RELEASE ORAL at 10:11

## 2019-07-03 RX ADMIN — ASPIRIN 81 MG 81 MG: 81 TABLET ORAL at 10:12

## 2019-07-03 RX ADMIN — CLOPIDOGREL BISULFATE 75 MG: 75 TABLET ORAL at 10:12

## 2019-07-03 RX ADMIN — ENOXAPARIN SODIUM 30 MG: 30 INJECTION SUBCUTANEOUS at 10:13

## 2019-07-03 RX ADMIN — GUAIFENESIN 1200 MG: 600 TABLET, EXTENDED RELEASE ORAL at 10:13

## 2019-07-03 RX ADMIN — FLUCONAZOLE 150 MG: 100 TABLET ORAL at 10:12

## 2019-07-03 RX ADMIN — GUAIFENESIN 1200 MG: 600 TABLET, EXTENDED RELEASE ORAL at 20:49

## 2019-07-03 RX ADMIN — AMIODARONE HYDROCHLORIDE 200 MG: 200 TABLET ORAL at 10:12

## 2019-07-03 RX ADMIN — FAMOTIDINE 20 MG: 20 TABLET ORAL at 20:49

## 2019-07-03 RX ADMIN — FAMOTIDINE 20 MG: 20 TABLET ORAL at 10:12

## 2019-07-03 RX ADMIN — MULTIVITAMIN 15 ML: LIQUID ORAL at 10:13

## 2019-07-03 RX ADMIN — PREGABALIN 150 MG: 75 CAPSULE ORAL at 20:49

## 2019-07-03 RX ADMIN — LORAZEPAM 1 MG: 1 TABLET ORAL at 15:47

## 2019-07-03 RX ADMIN — MAGNESIUM OXIDE TAB 400 MG (241.3 MG ELEMENTAL MG) 400 MG: 400 (241.3 MG) TAB at 20:49

## 2019-07-03 RX ADMIN — SERTRALINE HYDROCHLORIDE 50 MG: 50 TABLET ORAL at 20:49

## 2019-07-03 RX ADMIN — MAGNESIUM OXIDE TAB 400 MG (241.3 MG ELEMENTAL MG) 400 MG: 400 (241.3 MG) TAB at 10:12

## 2019-07-03 RX ADMIN — IPRATROPIUM BROMIDE AND ALBUTEROL SULFATE 3 ML: .5; 3 SOLUTION RESPIRATORY (INHALATION) at 15:35

## 2019-07-03 RX ADMIN — SIMVASTATIN 20 MG: 20 TABLET, FILM COATED ORAL at 20:49

## 2019-07-03 RX ADMIN — Medication 2 PUFF: at 12:01

## 2019-07-03 ASSESSMENT — PAIN DESCRIPTION - ORIENTATION: ORIENTATION: MID;LOWER

## 2019-07-03 ASSESSMENT — PAIN DESCRIPTION - PROGRESSION

## 2019-07-03 ASSESSMENT — PAIN SCALES - WONG BAKER
WONGBAKER_NUMERICALRESPONSE: 4

## 2019-07-03 ASSESSMENT — PAIN DESCRIPTION - DESCRIPTORS
DESCRIPTORS: CRAMPING;SHARP
DESCRIPTORS: ACHING

## 2019-07-03 ASSESSMENT — PAIN SCALES - GENERAL
PAINLEVEL_OUTOF10: 9
PAINLEVEL_OUTOF10: 3
PAINLEVEL_OUTOF10: 3

## 2019-07-03 ASSESSMENT — PAIN - FUNCTIONAL ASSESSMENT: PAIN_FUNCTIONAL_ASSESSMENT: PREVENTS OR INTERFERES SOME ACTIVE ACTIVITIES AND ADLS

## 2019-07-03 ASSESSMENT — PAIN DESCRIPTION - ONSET: ONSET: ON-GOING

## 2019-07-03 ASSESSMENT — PAIN DESCRIPTION - PAIN TYPE
TYPE: ACUTE PAIN
TYPE: ACUTE PAIN

## 2019-07-03 ASSESSMENT — PAIN DESCRIPTION - LOCATION: LOCATION: ABDOMEN

## 2019-07-03 ASSESSMENT — PAIN DESCRIPTION - FREQUENCY: FREQUENCY: CONTINUOUS

## 2019-07-03 NOTE — DISCHARGE SUMMARY
Objective Findings at Discharge:   /71   Pulse 111   Temp 98.1 °F (36.7 °C) (Oral)   Resp 17   Ht 5' 1\" (1.549 m)   Wt 156 lb 11.2 oz (71.1 kg)   SpO2 93%   BMI 29.61 kg/m²            PHYSICAL EXAM   GEN Awake female, sitting upright in bed in no apparent distress. Appears given age. EYES Pupils are equally round. No scleral erythema, discharge, or conjunctivitis. HENT Mucous membranes are moist. Oral pharynx without exudates, no evidence of thrush. NECK Supple, no apparent thyromegaly or masses. RESP Clear to auscultation, no wheezes, rales or rhonchi. Symmetric chest movement while on room air. CARDIO/VASC S1/S2 auscultated. Regular rate without appreciable murmurs, rubs, or gallops. No JVD or carotid bruits. Peripheral pulses equal bilaterally and palpable. No peripheral edema. GI Abdomen is soft without significant tenderness, masses, or guarding. Bowel sounds are normoactive. Rectal exam deferred. HEME/LYMPH No palpable cervical lymphadenopathy and no hepatosplenomegaly. No petechiae or ecchymoses. MSK No gross joint deformities. SKIN Normal coloration, warm, dry. NEURO Cranial nerves appear grossly intact, normal speech, no lateralizing weakness. PSYCH Awake, alert, oriented x 4. Affect appropriate.     BMP/CBC  Recent Labs     07/02/19  0833 07/02/19  1125 07/03/19  0633   NA  --  142 143   K  --  5.2* 4.0   CL  --  110 111*   CO2  --  26 24   BUN  --  10 10   CREATININE  --  0.6 0.6   WBC 10.6*  --  5.6   HCT 39.6  --  34.0*     --  262         Discharge Time of 35 minutes    Electronically signed by Kailyn Wu MD on 7/3/2019 at 12:39 PM

## 2019-07-03 NOTE — DISCHARGE INSTR - COC
0 7/3/2019 10:15 AM   Black%Wound Bed 0 7/3/2019 10:15 AM   Purple%Wound Bed 0 7/3/2019 10:15 AM   Other%Wound Bed 0 6/20/2019 10:41 AM   Culture Taken No 6/16/2019 11:00 PM   Number of days: 37       Wound 06/20/19 Left Buttock (Active)   Dressing Status Clean;Dry; Intact 6/20/2019 10:41 AM   Dressing Changed Changed/New 6/20/2019 10:41 AM   Wound Cleansed Rinsed/Irrigated with saline 6/20/2019 10:41 AM   Wound Length (cm) 0.5 cm 6/20/2019 10:41 AM   Wound Width (cm) 0.5 cm 6/20/2019 10:41 AM   Wound Depth (cm) 0.1 cm 6/20/2019 10:41 AM   Wound Surface Area (cm^2) 0.25 cm^2 6/20/2019 10:41 AM   Wound Volume (cm^3) 0.02 cm^3 6/20/2019 10:41 AM   Distance Tunneling (cm) 0 cm 6/20/2019 10:41 AM   Tunneling Position ___ O'Clock 0 6/20/2019 10:41 AM   Undermining Starts ___ O'Clock 0 6/20/2019 10:41 AM   Undermining Ends___ O'Clock 0 6/20/2019 10:41 AM   Undermining Maxium Distance (cm) 0 6/20/2019 10:41 AM   Wound Assessment Pink 6/20/2019 10:41 AM   Drainage Amount Small 6/20/2019 10:41 AM   Drainage Description Serosanguinous 6/20/2019 10:41 AM   Odor None 6/20/2019 10:41 AM   Margins Defined edges 6/20/2019 10:41 AM   Jana-wound Assessment Intact 6/20/2019 10:41 AM   Non-staged Wound Description Full thickness 6/20/2019 10:41 AM   [place]%Wound Bed 100 6/20/2019 10:41 AM   Red%Wound Bed 0 6/20/2019 10:41 AM   Yellow%Wound Bed 0 6/20/2019 10:41 AM   Black%Wound Bed 0 6/20/2019 10:41 AM   Purple%Wound Bed 0 6/20/2019 10:41 AM   Other%Wound Bed 0 6/20/2019 10:41 AM   Number of days: 12        Elimination:  Continence:   · Bowel: Yes  · Bladder: Yes  Urinary Catheter: None   Colostomy/Ileostomy/Ileal Conduit: No       Date of Last BM: 7/4/19    Intake/Output Summary (Last 24 hours) at 7/3/2019 1027  Last data filed at 7/3/2019 0935  Gross per 24 hour   Intake 300 ml   Output --   Net 300 ml     I/O last 3 completed shifts:   In: 61 [P.O.:60]  Out: -     Safety Concerns:     None    Impairments/Disabilities:

## 2019-07-03 NOTE — PROGRESS NOTES
Rolling walker, 4 wheeled walker, Wheelchair-manual, Life alert; pt reports dtr has ordered her a hosp bed since last admission  Receives Help From: Home health (daily per pt, M-F 5.5. Hrs/day, 6 hrs Sa/Sun)  ADL Assistance: [address]: Needs assistance ([place] aide manages or dtr; pt states she shares in meal prep)  Ambulation Assistance: Independent (mod I with manual w/c or RW)  Transfer Assistance: Independent, daughter assists PRN  Active : Yes (Last drove 1 month ago)  Occupation: Retired  Leisure & Hobbies: Watching TV, Listening to music, Computers   Last admission, Dtr was no longer available for 24 hr supervision. Pt reported dtr has bf now and is not around as much. Pt has a hx of falling d/t weakness and dizziness but does not recall if any falls since last hosp admission. SNF was recommended prior admission (see by this OT on 6/20/19 as noted above) but pt refused and returned home, which was NOT recommended by OT, as pt was at high risk for falls and readmission. Objective   Vision: Impaired  Vision Exceptions: Wears glasses at all times  Hearing: Within functional limits    Orientation  Overall Orientation Status: Impaired  Orientation Level: Disoriented to time;Oriented to place;Oriented to person     Balance  Sitting Balance: Stand by assistance(static at EOB)  Standing Balance: Minimal assistance(w/UE support on RW)     Tone RUE  RUE Tone: Normotonic  Tone LUE  LUE Tone: Normotonic     Bed mobility  Supine to Sit: Minimal assistance; Moderate assistance(verbal and tactile cues for UE/LE placement and use of bed features)  Scooting: Stand by assistance;Maximal assistance(pt requiring ongoing prompting to scoot forward to EOB and ultimately needing max A to come fully to EOB)  Transfers  Stand Pivot Transfers: Minimal assistance(bed to chair w/RW.  Pt w/poor safety awareness w/transfer, setting RW to side and not reaching back for chair when sitting down despite cueing)  Sit to stand: Moderate assistance(from bed, mod-max A from chair w/cues for hand placement)  Stand to sit: Minimal assistance(to chair w/cues for safe positioning and hand placement)     Cognition  Overall Cognitive Status: Exceptions  Arousal/Alertness: Appropriate responses to stimuli  Following Commands: Inconsistently follows commands  Attention Span: Appears intact  Memory: Decreased recall of precautions;Decreased recall of recent events;Decreased short term memory;Decreased long term memory  Safety Judgement: (decreased for mobility tasks)  Problem Solving: Decreased awareness of errors;Assistance required to identify errors made;Assistance required to generate solutions;Assistance required to implement solutions;Assistance required to correct errors made  Insights: Decreased awareness of deficits  Initiation: Requires cues for some        Sensation  Overall Sensation Status: Impaired(numbness sierra feet)        LUE AROM (degrees)  LUE AROM : WFL  RUE AROM (degrees)  RUE AROM : WFL(R shd flex/abd combination decreased slightly from L at 80* v. 95*)  LUE Strength  Gross LUE Strength: (4-/5 shd, 4/5 elbow and distally)  RUE Strength  Gross RUE Strength: (3+ to 4-/5 shd, 4/5 elbow and distally)     Hand Dominance  Hand Dominance: Right             Plan   Plan  Times per week: 2x+  Times per day: Daily  Current Treatment Recommendations: Strengthening, ROM, Balance Training, Functional Mobility Training, Safety Education & Training, Cognitive Reorientation, Equipment Evaluation, Education, & procurement, Patient/Caregiver Education & Training, Neuromuscular Re-education, Endurance Training, Self-Care / ADL    G-Code     OutComes Score                                                  AM-PAC Score  AM-PAC 6 click short form for inpatient daily activity:   How much help from another person does the patient currently need. ..  Unable  Dep A Lot  Max A A Lot   Mod A A Little  Min A A Little   CGA  SBA None   Mod I  Indep  Sup   1. Putting on and taking off regular lower body clothing? [] 1    [] 2   [x] 2   [] 3   [] 3   [] 4      2. Bathing (including washing, rinsing, drying)? [] 1   [] 2   [x] 2 [] 3 [] 3 [] 4   3. Toileting, which includes using toilet, bedpan, or urinal? [] 1    [] 2   [x] 2   [] 3   [] 3   [] 4     4. Putting on and taking off regular upper body clothing? [] 1   [] 2   [] 2   [x] 3   [] 3    [] 4      5. Taking care of personal grooming such as brushing teeth? [] 1   [] 2    [] 2 [] 3    [x] 3   [] 4      6. Eating meals? [] 1   [] 2   [] 2   [] 3   [] 3   [x] 4      Raw Score:  16    [24=0% impaired(CH), 23=1-19%(CI), 20-22=20-39%(CJ), 15-19=40-59%(CK), 10-14=60-79%(CL), 7-9=80-99%(CM), 6=100%(CN)]               Goals  Short term goals  Time Frame for Short term goals: until pt discharged from [facility] or goals met  Short term goal 1: Pt will complete bed mobility at [facility]. Short term goal 2: Pt will complete transfers and amb w/RW at [facility] for ADL completion. Short term goal 3: Pt will complete toileting at [facility]. Short term goal 4: Pt will complete sp bath at s/u and SBA UB, min A LB, use of A/E prn. Short term goal 5: Pt will complete AROM and resistive UE ther ex at sup w/min vcues to increase act tolerance and strength.         Therapy Time   Individual Concurrent Group Co-treatment   Time In 6335         Time Out 0905         Minutes 30         Timed Code Treatment Minutes: 15 Minutes       Margaret Torres, OT/L

## 2019-07-04 VITALS
BODY MASS INDEX: 29.59 KG/M2 | OXYGEN SATURATION: 94 % | RESPIRATION RATE: 17 BRPM | DIASTOLIC BLOOD PRESSURE: 81 MMHG | HEART RATE: 96 BPM | TEMPERATURE: 97.5 F | SYSTOLIC BLOOD PRESSURE: 121 MMHG | WEIGHT: 156.7 LBS | HEIGHT: 61 IN

## 2019-07-04 PROCEDURE — 2580000003 HC RX 258: Performed by: NURSE PRACTITIONER

## 2019-07-04 PROCEDURE — 96372 THER/PROPH/DIAG INJ SC/IM: CPT

## 2019-07-04 PROCEDURE — G0378 HOSPITAL OBSERVATION PER HR: HCPCS

## 2019-07-04 PROCEDURE — 94761 N-INVAS EAR/PLS OXIMETRY MLT: CPT

## 2019-07-04 PROCEDURE — 94640 AIRWAY INHALATION TREATMENT: CPT

## 2019-07-04 PROCEDURE — 6370000000 HC RX 637 (ALT 250 FOR IP): Performed by: NURSE PRACTITIONER

## 2019-07-04 PROCEDURE — 6360000002 HC RX W HCPCS: Performed by: NURSE PRACTITIONER

## 2019-07-04 RX ADMIN — MAGNESIUM OXIDE TAB 400 MG (241.3 MG ELEMENTAL MG) 400 MG: 400 (241.3 MG) TAB at 08:53

## 2019-07-04 RX ADMIN — IPRATROPIUM BROMIDE AND ALBUTEROL SULFATE 3 ML: .5; 3 SOLUTION RESPIRATORY (INHALATION) at 11:14

## 2019-07-04 RX ADMIN — MULTIVITAMIN 15 ML: LIQUID ORAL at 08:53

## 2019-07-04 RX ADMIN — FAMOTIDINE 20 MG: 20 TABLET ORAL at 08:53

## 2019-07-04 RX ADMIN — SODIUM CHLORIDE, PRESERVATIVE FREE 10 ML: 5 INJECTION INTRAVENOUS at 08:59

## 2019-07-04 RX ADMIN — CLOPIDOGREL BISULFATE 75 MG: 75 TABLET ORAL at 08:53

## 2019-07-04 RX ADMIN — Medication 2 PUFF: at 07:57

## 2019-07-04 RX ADMIN — GUAIFENESIN 1200 MG: 600 TABLET, EXTENDED RELEASE ORAL at 08:53

## 2019-07-04 RX ADMIN — IPRATROPIUM BROMIDE AND ALBUTEROL SULFATE 3 ML: .5; 3 SOLUTION RESPIRATORY (INHALATION) at 07:57

## 2019-07-04 RX ADMIN — AMIODARONE HYDROCHLORIDE 200 MG: 200 TABLET ORAL at 08:53

## 2019-07-04 RX ADMIN — ASPIRIN 81 MG 81 MG: 81 TABLET ORAL at 08:53

## 2019-07-04 RX ADMIN — PREGABALIN 150 MG: 75 CAPSULE ORAL at 08:53

## 2019-07-04 RX ADMIN — ENOXAPARIN SODIUM 30 MG: 30 INJECTION SUBCUTANEOUS at 08:53

## 2019-07-04 RX ADMIN — IPRATROPIUM BROMIDE AND ALBUTEROL SULFATE 3 ML: .5; 3 SOLUTION RESPIRATORY (INHALATION) at 15:29

## 2019-07-04 RX ADMIN — METOPROLOL SUCCINATE 50 MG: 50 TABLET, EXTENDED RELEASE ORAL at 08:53

## 2019-07-04 RX ADMIN — FLUCONAZOLE 150 MG: 100 TABLET ORAL at 08:53

## 2019-07-04 ASSESSMENT — PAIN SCALES - GENERAL: PAINLEVEL_OUTOF10: 0

## 2019-07-05 LAB
EKG ATRIAL RATE: 102 BPM
EKG DIAGNOSIS: NORMAL
EKG P AXIS: 58 DEGREES
EKG P-R INTERVAL: 144 MS
EKG Q-T INTERVAL: 336 MS
EKG QRS DURATION: 88 MS
EKG QTC CALCULATION (BAZETT): 437 MS
EKG R AXIS: -83 DEGREES
EKG T AXIS: 54 DEGREES
EKG VENTRICULAR RATE: 102 BPM

## 2019-07-20 ENCOUNTER — APPOINTMENT (OUTPATIENT)
Dept: GENERAL RADIOLOGY | Age: 69
DRG: 871 | End: 2019-07-20
Payer: MEDICARE

## 2019-07-20 ENCOUNTER — APPOINTMENT (OUTPATIENT)
Dept: CT IMAGING | Age: 69
DRG: 871 | End: 2019-07-20
Payer: MEDICARE

## 2019-07-20 ENCOUNTER — HOSPITAL ENCOUNTER (INPATIENT)
Age: 69
LOS: 18 days | Discharge: NURSING, REHAB OR LONG-TERM CARE | DRG: 871 | End: 2019-08-07
Attending: EMERGENCY MEDICINE | Admitting: INTERNAL MEDICINE
Payer: MEDICARE

## 2019-07-20 DIAGNOSIS — J18.9 HCAP (HEALTHCARE-ASSOCIATED PNEUMONIA): ICD-10-CM

## 2019-07-20 DIAGNOSIS — R65.21 SEPTIC SHOCK (HCC): Primary | ICD-10-CM

## 2019-07-20 DIAGNOSIS — M54.42 CHRONIC LOW BACK PAIN WITH BILATERAL SCIATICA, UNSPECIFIED BACK PAIN LATERALITY: ICD-10-CM

## 2019-07-20 DIAGNOSIS — A41.9 SEPTIC SHOCK (HCC): Primary | ICD-10-CM

## 2019-07-20 DIAGNOSIS — G89.29 CHRONIC LOW BACK PAIN WITH BILATERAL SCIATICA, UNSPECIFIED BACK PAIN LATERALITY: ICD-10-CM

## 2019-07-20 DIAGNOSIS — N30.00 ACUTE CYSTITIS WITHOUT HEMATURIA: ICD-10-CM

## 2019-07-20 DIAGNOSIS — M54.41 CHRONIC LOW BACK PAIN WITH BILATERAL SCIATICA, UNSPECIFIED BACK PAIN LATERALITY: ICD-10-CM

## 2019-07-20 DIAGNOSIS — J96.01 ACUTE RESPIRATORY FAILURE WITH HYPOXEMIA (HCC): ICD-10-CM

## 2019-07-20 LAB
ALBUMIN SERPL-MCNC: 2.3 GM/DL (ref 3.4–5)
ALP BLD-CCNC: 120 IU/L (ref 40–128)
ALT SERPL-CCNC: 38 U/L (ref 10–40)
ANION GAP SERPL CALCULATED.3IONS-SCNC: 10 MMOL/L (ref 4–16)
AST SERPL-CCNC: 33 IU/L (ref 15–37)
BACTERIA: NEGATIVE /HPF
BASE EXCESS: ABNORMAL (ref 0–2.4)
BASOPHILS ABSOLUTE: 0 K/CU MM
BASOPHILS RELATIVE PERCENT: 0.4 % (ref 0–1)
BILIRUB SERPL-MCNC: 1.1 MG/DL (ref 0–1)
BILIRUBIN URINE: ABNORMAL MG/DL
BLOOD, URINE: NEGATIVE
BUN BLDV-MCNC: 17 MG/DL (ref 6–23)
CALCIUM OXALATE CRYSTALS: ABNORMAL /HPF
CALCIUM SERPL-MCNC: 9.2 MG/DL (ref 8.3–10.6)
CARBON MONOXIDE, BLOOD: 1.8 % (ref 0–5)
CARBON MONOXIDE, BLOOD: 2.7 % (ref 0–5)
CHLORIDE BLD-SCNC: 111 MMOL/L (ref 99–110)
CLARITY: CLEAR
CO2 CONTENT: 21 MMOL/L (ref 19–24)
CO2 CONTENT: 21.9 MMOL/L (ref 19–24)
CO2: 21 MMOL/L (ref 21–32)
COLOR: ABNORMAL
COMMENT: ABNORMAL
CREAT SERPL-MCNC: 0.9 MG/DL (ref 0.6–1.1)
DIFFERENTIAL TYPE: ABNORMAL
EOSINOPHILS ABSOLUTE: 0.1 K/CU MM
EOSINOPHILS RELATIVE PERCENT: 1.2 % (ref 0–3)
GFR AFRICAN AMERICAN: >60 ML/MIN/1.73M2
GFR NON-AFRICAN AMERICAN: >60 ML/MIN/1.73M2
GLUCOSE BLD-MCNC: 168 MG/DL (ref 70–99)
GLUCOSE BLD-MCNC: 236 MG/DL (ref 70–99)
GLUCOSE, URINE: NEGATIVE MG/DL
HCO3 ARTERIAL: 20 MMOL/L (ref 18–23)
HCO3 ARTERIAL: 20.6 MMOL/L (ref 18–23)
HCO3 VENOUS: 22.9 MMOL/L (ref 19–25)
HCT VFR BLD CALC: 33.7 % (ref 37–47)
HEMOGLOBIN: 11.1 GM/DL (ref 12.5–16)
HYALINE CASTS: 2 /LPF
ICTOTEST: NEGATIVE
IMMATURE NEUTROPHIL %: 0.4 % (ref 0–0.43)
KETONES, URINE: NEGATIVE MG/DL
LACTATE: 1.6 MMOL/L (ref 0.4–2)
LACTATE: ABNORMAL MMOL/L (ref 0.4–2)
LEUKOCYTE ESTERASE, URINE: NEGATIVE
LYMPHOCYTES ABSOLUTE: 1.2 K/CU MM
LYMPHOCYTES RELATIVE PERCENT: 13.6 % (ref 24–44)
MAGNESIUM: 1.8 MG/DL (ref 1.8–2.4)
MCH RBC QN AUTO: 27.5 PG (ref 27–31)
MCHC RBC AUTO-ENTMCNC: 32.9 % (ref 32–36)
MCV RBC AUTO: 83.4 FL (ref 78–100)
METHEMOGLOBIN ARTERIAL: 1.4 %
METHEMOGLOBIN ARTERIAL: 2.1 %
MONOCYTES ABSOLUTE: 0.7 K/CU MM
MONOCYTES RELATIVE PERCENT: 7.7 % (ref 0–4)
NITRITE URINE, QUANTITATIVE: POSITIVE
NUCLEATED RBC %: 0.2 %
O2 SAT, VEN: 93.6 % (ref 50–70)
O2 SATURATION: 93.8 % (ref 96–97)
O2 SATURATION: 94.7 % (ref 96–97)
PCO2 ARTERIAL: 33 MMHG (ref 32–45)
PCO2 ARTERIAL: 41 MMHG (ref 32–45)
PCO2, VEN: 51 MMHG (ref 38–52)
PDW BLD-RTO: 16 % (ref 11.7–14.9)
PH BLOOD: 7.31 (ref 7.34–7.45)
PH BLOOD: 7.39 (ref 7.34–7.45)
PH VENOUS: 7.26 (ref 7.32–7.42)
PH, URINE: 5 (ref 5–8)
PLATELET # BLD: 204 K/CU MM (ref 140–440)
PMV BLD AUTO: 11.8 FL (ref 7.5–11.1)
PO2 ARTERIAL: 455 MMHG (ref 75–100)
PO2 ARTERIAL: 89 MMHG (ref 75–100)
PO2, VEN: 145 MMHG (ref 28–48)
POTASSIUM SERPL-SCNC: 4.1 MMOL/L (ref 3.5–5.1)
PRO-BNP: 664.1 PG/ML
PROTEIN UA: NEGATIVE MG/DL
RBC # BLD: 4.04 M/CU MM (ref 4.2–5.4)
RBC URINE: 7 /HPF (ref 0–6)
SEGMENTED NEUTROPHILS ABSOLUTE COUNT: 6.9 K/CU MM
SEGMENTED NEUTROPHILS RELATIVE PERCENT: 76.7 % (ref 36–66)
SODIUM BLD-SCNC: 142 MMOL/L (ref 135–145)
SPECIFIC GRAVITY UA: 1.02 (ref 1–1.03)
TOTAL IMMATURE NEUTOROPHIL: 0.04 K/CU MM
TOTAL NUCLEATED RBC: 0 K/CU MM
TOTAL PROTEIN: 4.6 GM/DL (ref 6.4–8.2)
TRICHOMONAS: ABNORMAL /HPF
TROPONIN T: <0.01 NG/ML
URIC ACID CRYSTALS: ABNORMAL /HPF
UROBILINOGEN, URINE: 1 MG/DL (ref 0.2–1)
WBC # BLD: 9 K/CU MM (ref 4–10.5)
WBC UA: 2 /HPF (ref 0–5)

## 2019-07-20 PROCEDURE — 99285 EMERGENCY DEPT VISIT HI MDM: CPT

## 2019-07-20 PROCEDURE — 93010 ELECTROCARDIOGRAM REPORT: CPT | Performed by: INTERNAL MEDICINE

## 2019-07-20 PROCEDURE — 87899 AGENT NOS ASSAY W/OPTIC: CPT

## 2019-07-20 PROCEDURE — 99222 1ST HOSP IP/OBS MODERATE 55: CPT | Performed by: SURGERY

## 2019-07-20 PROCEDURE — 2500000003 HC RX 250 WO HCPCS: Performed by: EMERGENCY MEDICINE

## 2019-07-20 PROCEDURE — 2000000000 HC ICU R&B

## 2019-07-20 PROCEDURE — 71045 X-RAY EXAM CHEST 1 VIEW: CPT

## 2019-07-20 PROCEDURE — 85025 COMPLETE CBC W/AUTO DIFF WBC: CPT

## 2019-07-20 PROCEDURE — 96365 THER/PROPH/DIAG IV INF INIT: CPT

## 2019-07-20 PROCEDURE — 4500000029 HC MAJOR PROCEDURE

## 2019-07-20 PROCEDURE — 71275 CT ANGIOGRAPHY CHEST: CPT

## 2019-07-20 PROCEDURE — 36600 WITHDRAWAL OF ARTERIAL BLOOD: CPT

## 2019-07-20 PROCEDURE — 6370000000 HC RX 637 (ALT 250 FOR IP): Performed by: FAMILY MEDICINE

## 2019-07-20 PROCEDURE — C9113 INJ PANTOPRAZOLE SODIUM, VIA: HCPCS | Performed by: NURSE PRACTITIONER

## 2019-07-20 PROCEDURE — 96375 TX/PRO/DX INJ NEW DRUG ADDON: CPT

## 2019-07-20 PROCEDURE — 87205 SMEAR GRAM STAIN: CPT

## 2019-07-20 PROCEDURE — 2580000003 HC RX 258

## 2019-07-20 PROCEDURE — 36592 COLLECT BLOOD FROM PICC: CPT

## 2019-07-20 PROCEDURE — 94750 HC PULMONARY COMPLIANCE STUDY: CPT

## 2019-07-20 PROCEDURE — 87040 BLOOD CULTURE FOR BACTERIA: CPT

## 2019-07-20 PROCEDURE — 87070 CULTURE OTHR SPECIMN AEROBIC: CPT

## 2019-07-20 PROCEDURE — 80053 COMPREHEN METABOLIC PANEL: CPT

## 2019-07-20 PROCEDURE — 94640 AIRWAY INHALATION TREATMENT: CPT

## 2019-07-20 PROCEDURE — 2580000003 HC RX 258: Performed by: EMERGENCY MEDICINE

## 2019-07-20 PROCEDURE — 2580000003 HC RX 258: Performed by: FAMILY MEDICINE

## 2019-07-20 PROCEDURE — 94002 VENT MGMT INPAT INIT DAY: CPT

## 2019-07-20 PROCEDURE — 87186 SC STD MICRODIL/AGAR DIL: CPT

## 2019-07-20 PROCEDURE — 6360000002 HC RX W HCPCS: Performed by: EMERGENCY MEDICINE

## 2019-07-20 PROCEDURE — 6360000004 HC RX CONTRAST MEDICATION: Performed by: EMERGENCY MEDICINE

## 2019-07-20 PROCEDURE — 6370000000 HC RX 637 (ALT 250 FOR IP): Performed by: SURGERY

## 2019-07-20 PROCEDURE — 93005 ELECTROCARDIOGRAM TRACING: CPT | Performed by: PHYSICIAN ASSISTANT

## 2019-07-20 PROCEDURE — 83880 ASSAY OF NATRIURETIC PEPTIDE: CPT

## 2019-07-20 PROCEDURE — 87081 CULTURE SCREEN ONLY: CPT

## 2019-07-20 PROCEDURE — 0D9670Z DRAINAGE OF STOMACH WITH DRAINAGE DEVICE, VIA NATURAL OR ARTIFICIAL OPENING: ICD-10-PCS | Performed by: INTERNAL MEDICINE

## 2019-07-20 PROCEDURE — 2580000003 HC RX 258: Performed by: NURSE PRACTITIONER

## 2019-07-20 PROCEDURE — 82805 BLOOD GASES W/O2 SATURATION: CPT

## 2019-07-20 PROCEDURE — 87449 NOS EACH ORGANISM AG IA: CPT

## 2019-07-20 PROCEDURE — 74177 CT ABD & PELVIS W/CONTRAST: CPT

## 2019-07-20 PROCEDURE — 2700000000 HC OXYGEN THERAPY PER DAY

## 2019-07-20 PROCEDURE — 6360000002 HC RX W HCPCS: Performed by: INTERNAL MEDICINE

## 2019-07-20 PROCEDURE — 87086 URINE CULTURE/COLONY COUNT: CPT

## 2019-07-20 PROCEDURE — 6370000000 HC RX 637 (ALT 250 FOR IP): Performed by: NURSE PRACTITIONER

## 2019-07-20 PROCEDURE — 6360000002 HC RX W HCPCS: Performed by: FAMILY MEDICINE

## 2019-07-20 PROCEDURE — 84484 ASSAY OF TROPONIN QUANT: CPT

## 2019-07-20 PROCEDURE — 02HV33Z INSERTION OF INFUSION DEVICE INTO SUPERIOR VENA CAVA, PERCUTANEOUS APPROACH: ICD-10-PCS | Performed by: INTERNAL MEDICINE

## 2019-07-20 PROCEDURE — 6370000000 HC RX 637 (ALT 250 FOR IP): Performed by: INTERNAL MEDICINE

## 2019-07-20 PROCEDURE — 5A1945Z RESPIRATORY VENTILATION, 24-96 CONSECUTIVE HOURS: ICD-10-PCS | Performed by: INTERNAL MEDICINE

## 2019-07-20 PROCEDURE — 83605 ASSAY OF LACTIC ACID: CPT

## 2019-07-20 PROCEDURE — 87077 CULTURE AEROBIC IDENTIFY: CPT

## 2019-07-20 PROCEDURE — 82803 BLOOD GASES ANY COMBINATION: CPT

## 2019-07-20 PROCEDURE — 0BH17EZ INSERTION OF ENDOTRACHEAL AIRWAY INTO TRACHEA, VIA NATURAL OR ARTIFICIAL OPENING: ICD-10-PCS | Performed by: INTERNAL MEDICINE

## 2019-07-20 PROCEDURE — 2500000003 HC RX 250 WO HCPCS

## 2019-07-20 PROCEDURE — 82962 GLUCOSE BLOOD TEST: CPT

## 2019-07-20 PROCEDURE — 94761 N-INVAS EAR/PLS OXIMETRY MLT: CPT

## 2019-07-20 PROCEDURE — 81001 URINALYSIS AUTO W/SCOPE: CPT

## 2019-07-20 PROCEDURE — 83735 ASSAY OF MAGNESIUM: CPT

## 2019-07-20 PROCEDURE — 31500 INSERT EMERGENCY AIRWAY: CPT

## 2019-07-20 PROCEDURE — 70450 CT HEAD/BRAIN W/O DYE: CPT

## 2019-07-20 PROCEDURE — 89220 SPUTUM SPECIMEN COLLECTION: CPT

## 2019-07-20 PROCEDURE — 6360000002 HC RX W HCPCS: Performed by: NURSE PRACTITIONER

## 2019-07-20 RX ORDER — LEVOFLOXACIN 5 MG/ML
500 INJECTION, SOLUTION INTRAVENOUS EVERY 24 HOURS
Status: DISCONTINUED | OUTPATIENT
Start: 2019-07-20 | End: 2019-07-20

## 2019-07-20 RX ORDER — SODIUM CHLORIDE 0.9 % (FLUSH) 0.9 %
10 SYRINGE (ML) INJECTION EVERY 12 HOURS SCHEDULED
Status: DISCONTINUED | OUTPATIENT
Start: 2019-07-20 | End: 2019-08-05 | Stop reason: SDUPTHER

## 2019-07-20 RX ORDER — CHLORHEXIDINE GLUCONATE 0.12 MG/ML
15 RINSE ORAL 2 TIMES DAILY
Status: DISCONTINUED | OUTPATIENT
Start: 2019-07-20 | End: 2019-07-24

## 2019-07-20 RX ORDER — LOSARTAN POTASSIUM 25 MG/1
25 TABLET ORAL DAILY
Status: DISCONTINUED | OUTPATIENT
Start: 2019-07-20 | End: 2019-07-25

## 2019-07-20 RX ORDER — ACETAMINOPHEN 650 MG/1
650 SUPPOSITORY RECTAL EVERY 4 HOURS PRN
Status: DISCONTINUED | OUTPATIENT
Start: 2019-07-20 | End: 2019-08-07 | Stop reason: HOSPADM

## 2019-07-20 RX ORDER — PANTOPRAZOLE SODIUM 40 MG/10ML
40 INJECTION, POWDER, LYOPHILIZED, FOR SOLUTION INTRAVENOUS DAILY
Status: DISCONTINUED | OUTPATIENT
Start: 2019-07-20 | End: 2019-07-20 | Stop reason: SDUPTHER

## 2019-07-20 RX ORDER — ROCURONIUM BROMIDE 10 MG/ML
INJECTION, SOLUTION INTRAVENOUS DAILY PRN
Status: COMPLETED | OUTPATIENT
Start: 2019-07-20 | End: 2019-07-20

## 2019-07-20 RX ORDER — PANTOPRAZOLE SODIUM 40 MG/10ML
40 INJECTION, POWDER, LYOPHILIZED, FOR SOLUTION INTRAVENOUS DAILY
Status: DISCONTINUED | OUTPATIENT
Start: 2019-07-20 | End: 2019-07-24

## 2019-07-20 RX ORDER — PREGABALIN 75 MG/1
150 CAPSULE ORAL 2 TIMES DAILY
Status: DISCONTINUED | OUTPATIENT
Start: 2019-07-20 | End: 2019-08-07 | Stop reason: HOSPADM

## 2019-07-20 RX ORDER — DOCUSATE SODIUM 100 MG/1
100 CAPSULE, LIQUID FILLED ORAL 2 TIMES DAILY
Status: DISCONTINUED | OUTPATIENT
Start: 2019-07-20 | End: 2019-08-07 | Stop reason: HOSPADM

## 2019-07-20 RX ORDER — AMITRIPTYLINE HYDROCHLORIDE 50 MG/1
50 TABLET, FILM COATED ORAL NIGHTLY
Status: DISCONTINUED | OUTPATIENT
Start: 2019-07-20 | End: 2019-08-07 | Stop reason: HOSPADM

## 2019-07-20 RX ORDER — AMIODARONE HYDROCHLORIDE 200 MG/1
200 TABLET ORAL DAILY
Status: DISCONTINUED | OUTPATIENT
Start: 2019-07-20 | End: 2019-08-07 | Stop reason: HOSPADM

## 2019-07-20 RX ORDER — METOPROLOL SUCCINATE 50 MG/1
50 TABLET, EXTENDED RELEASE ORAL DAILY
Status: DISCONTINUED | OUTPATIENT
Start: 2019-07-20 | End: 2019-07-23

## 2019-07-20 RX ORDER — IPRATROPIUM BROMIDE AND ALBUTEROL SULFATE 2.5; .5 MG/3ML; MG/3ML
1 SOLUTION RESPIRATORY (INHALATION) EVERY 4 HOURS
Status: DISCONTINUED | OUTPATIENT
Start: 2019-07-20 | End: 2019-07-23

## 2019-07-20 RX ORDER — 0.9 % SODIUM CHLORIDE 0.9 %
10 VIAL (ML) INJECTION DAILY
Status: DISCONTINUED | OUTPATIENT
Start: 2019-07-20 | End: 2019-07-24

## 2019-07-20 RX ORDER — METHYLPREDNISOLONE SODIUM SUCCINATE 40 MG/ML
40 INJECTION, POWDER, LYOPHILIZED, FOR SOLUTION INTRAMUSCULAR; INTRAVENOUS EVERY 6 HOURS
Status: DISCONTINUED | OUTPATIENT
Start: 2019-07-20 | End: 2019-07-23

## 2019-07-20 RX ORDER — ACETAMINOPHEN 650 MG/1
650 SUPPOSITORY RECTAL EVERY 4 HOURS PRN
Status: DISCONTINUED | OUTPATIENT
Start: 2019-07-20 | End: 2019-08-05 | Stop reason: SDUPTHER

## 2019-07-20 RX ORDER — 0.9 % SODIUM CHLORIDE 0.9 %
1000 INTRAVENOUS SOLUTION INTRAVENOUS ONCE
Status: COMPLETED | OUTPATIENT
Start: 2019-07-20 | End: 2019-07-20

## 2019-07-20 RX ORDER — ASPIRIN 81 MG/1
81 TABLET, CHEWABLE ORAL DAILY
Status: DISCONTINUED | OUTPATIENT
Start: 2019-07-20 | End: 2019-08-01

## 2019-07-20 RX ORDER — ALBUTEROL SULFATE 2.5 MG/3ML
2.5 SOLUTION RESPIRATORY (INHALATION) EVERY 6 HOURS PRN
Status: DISCONTINUED | OUTPATIENT
Start: 2019-07-20 | End: 2019-08-07 | Stop reason: HOSPADM

## 2019-07-20 RX ORDER — PROPOFOL 10 MG/ML
10 INJECTION, EMULSION INTRAVENOUS CONTINUOUS
Status: DISCONTINUED | OUTPATIENT
Start: 2019-07-20 | End: 2019-07-23

## 2019-07-20 RX ORDER — CLOPIDOGREL BISULFATE 75 MG/1
75 TABLET ORAL DAILY
Status: DISCONTINUED | OUTPATIENT
Start: 2019-07-20 | End: 2019-08-01

## 2019-07-20 RX ORDER — BISACODYL 10 MG
10 SUPPOSITORY, RECTAL RECTAL DAILY PRN
Status: DISCONTINUED | OUTPATIENT
Start: 2019-07-20 | End: 2019-08-07 | Stop reason: HOSPADM

## 2019-07-20 RX ORDER — SODIUM CHLORIDE 9 MG/ML
INJECTION, SOLUTION INTRAVENOUS CONTINUOUS
Status: DISCONTINUED | OUTPATIENT
Start: 2019-07-20 | End: 2019-07-24

## 2019-07-20 RX ORDER — ONDANSETRON 2 MG/ML
4 INJECTION INTRAMUSCULAR; INTRAVENOUS EVERY 6 HOURS PRN
Status: DISCONTINUED | OUTPATIENT
Start: 2019-07-20 | End: 2019-08-07 | Stop reason: HOSPADM

## 2019-07-20 RX ORDER — SODIUM CHLORIDE 0.9 % (FLUSH) 0.9 %
10 SYRINGE (ML) INJECTION PRN
Status: DISCONTINUED | OUTPATIENT
Start: 2019-07-20 | End: 2019-08-05 | Stop reason: SDUPTHER

## 2019-07-20 RX ORDER — SIMVASTATIN 20 MG
20 TABLET ORAL NIGHTLY
Status: DISCONTINUED | OUTPATIENT
Start: 2019-07-20 | End: 2019-08-07 | Stop reason: HOSPADM

## 2019-07-20 RX ORDER — ETOMIDATE 2 MG/ML
INJECTION INTRAVENOUS DAILY PRN
Status: COMPLETED | OUTPATIENT
Start: 2019-07-20 | End: 2019-07-20

## 2019-07-20 RX ADMIN — PANTOPRAZOLE SODIUM 40 MG: 40 INJECTION, POWDER, FOR SOLUTION INTRAVENOUS at 09:27

## 2019-07-20 RX ADMIN — AMITRIPTYLINE HYDROCHLORIDE 50 MG: 50 TABLET, FILM COATED ORAL at 22:42

## 2019-07-20 RX ADMIN — METHYLPREDNISOLONE SODIUM SUCCINATE 40 MG: 40 INJECTION, POWDER, LYOPHILIZED, FOR SOLUTION INTRAMUSCULAR; INTRAVENOUS at 14:35

## 2019-07-20 RX ADMIN — AMIODARONE HYDROCHLORIDE 200 MG: 200 TABLET ORAL at 16:05

## 2019-07-20 RX ADMIN — SODIUM CHLORIDE: 9 INJECTION, SOLUTION INTRAVENOUS at 22:59

## 2019-07-20 RX ADMIN — IPRATROPIUM BROMIDE AND ALBUTEROL SULFATE 1 AMPULE: .5; 3 SOLUTION RESPIRATORY (INHALATION) at 08:50

## 2019-07-20 RX ADMIN — SODIUM CHLORIDE 1000 ML: 9 INJECTION, SOLUTION INTRAVENOUS at 02:37

## 2019-07-20 RX ADMIN — ETOMIDATE 20 MG: 2 INJECTION, SOLUTION INTRAVENOUS at 00:27

## 2019-07-20 RX ADMIN — CEFEPIME HYDROCHLORIDE 2 G: 2 INJECTION, POWDER, FOR SOLUTION INTRAVENOUS at 13:24

## 2019-07-20 RX ADMIN — DOCUSATE SODIUM 100 MG: 100 CAPSULE, LIQUID FILLED ORAL at 22:42

## 2019-07-20 RX ADMIN — ASPIRIN 81 MG 81 MG: 81 TABLET ORAL at 16:06

## 2019-07-20 RX ADMIN — CLOPIDOGREL BISULFATE 75 MG: 75 TABLET ORAL at 16:05

## 2019-07-20 RX ADMIN — METOPROLOL SUCCINATE 50 MG: 50 TABLET, EXTENDED RELEASE ORAL at 16:05

## 2019-07-20 RX ADMIN — SERTRALINE HYDROCHLORIDE 50 MG: 50 TABLET ORAL at 22:42

## 2019-07-20 RX ADMIN — METHYLPREDNISOLONE SODIUM SUCCINATE 40 MG: 40 INJECTION, POWDER, LYOPHILIZED, FOR SOLUTION INTRAMUSCULAR; INTRAVENOUS at 22:48

## 2019-07-20 RX ADMIN — LEVOFLOXACIN 500 MG: 5 INJECTION, SOLUTION INTRAVENOUS at 07:06

## 2019-07-20 RX ADMIN — CHLORHEXIDINE GLUCONATE 0.12% ORAL RINSE 15 ML: 1.2 LIQUID ORAL at 22:43

## 2019-07-20 RX ADMIN — ENOXAPARIN SODIUM 30 MG: 30 INJECTION SUBCUTANEOUS at 16:22

## 2019-07-20 RX ADMIN — ROCURONIUM BROMIDE 100 MG: 10 SOLUTION INTRAVENOUS at 00:30

## 2019-07-20 RX ADMIN — IOPAMIDOL 100 ML: 755 INJECTION, SOLUTION INTRAVENOUS at 03:02

## 2019-07-20 RX ADMIN — PREGABALIN 150 MG: 75 CAPSULE ORAL at 22:43

## 2019-07-20 RX ADMIN — VANCOMYCIN HYDROCHLORIDE 1500 MG: 5 INJECTION, POWDER, LYOPHILIZED, FOR SOLUTION INTRAVENOUS at 07:02

## 2019-07-20 RX ADMIN — METHYLPREDNISOLONE SODIUM SUCCINATE 40 MG: 40 INJECTION, POWDER, LYOPHILIZED, FOR SOLUTION INTRAMUSCULAR; INTRAVENOUS at 09:26

## 2019-07-20 RX ADMIN — Medication 75 MCG/HR: at 01:05

## 2019-07-20 RX ADMIN — NOREPINEPHRINE BITARTRATE 2 MCG/MIN: 1 INJECTION INTRAVENOUS at 01:25

## 2019-07-20 RX ADMIN — SODIUM CHLORIDE: 9 INJECTION, SOLUTION INTRAVENOUS at 04:33

## 2019-07-20 RX ADMIN — ACETAMINOPHEN 650 MG: 650 SUPPOSITORY RECTAL at 14:19

## 2019-07-20 RX ADMIN — PIPERACILLIN SODIUM,TAZOBACTAM SODIUM 3.38 G: 3; .375 INJECTION, POWDER, FOR SOLUTION INTRAVENOUS at 03:15

## 2019-07-20 RX ADMIN — IPRATROPIUM BROMIDE AND ALBUTEROL SULFATE 1 AMPULE: .5; 3 SOLUTION RESPIRATORY (INHALATION) at 20:50

## 2019-07-20 RX ADMIN — IPRATROPIUM BROMIDE AND ALBUTEROL SULFATE 1 AMPULE: .5; 3 SOLUTION RESPIRATORY (INHALATION) at 16:01

## 2019-07-20 RX ADMIN — SODIUM CHLORIDE: 9 INJECTION, SOLUTION INTRAVENOUS at 14:07

## 2019-07-20 RX ADMIN — SIMVASTATIN 20 MG: 20 TABLET, FILM COATED ORAL at 22:42

## 2019-07-20 RX ADMIN — IPRATROPIUM BROMIDE AND ALBUTEROL SULFATE 1 AMPULE: .5; 3 SOLUTION RESPIRATORY (INHALATION) at 11:31

## 2019-07-20 RX ADMIN — MIDAZOLAM HYDROCHLORIDE 1 MG/HR: 5 INJECTION, SOLUTION INTRAMUSCULAR; INTRAVENOUS at 01:05

## 2019-07-20 RX ADMIN — SODIUM CHLORIDE, PRESERVATIVE FREE 10 ML: 5 INJECTION INTRAVENOUS at 22:43

## 2019-07-20 ASSESSMENT — PULMONARY FUNCTION TESTS
PIF_VALUE: 32
PIF_VALUE: 33
PIF_VALUE: 32
PIF_VALUE: 33
PIF_VALUE: 32
PIF_VALUE: 30
PIF_VALUE: 17
PIF_VALUE: 21
PIF_VALUE: 28
PIF_VALUE: 19
PIF_VALUE: 33

## 2019-07-20 ASSESSMENT — PAIN SCALES - GENERAL: PAINLEVEL_OUTOF10: 1

## 2019-07-20 NOTE — ED NOTES
Narrative   EXAMINATION:   CT OF THE HEAD WITHOUT CONTRAST  7/20/2019 3:01 am       TECHNIQUE:   CT of the head was performed without the administration of intravenous   contrast. Dose modulation, iterative reconstruction, and/or weight based   adjustment of the mA/kV was utilized to reduce the radiation dose to as low   as reasonably achievable.       COMPARISON:   07/02/2019       HISTORY:   ORDERING SYSTEM PROVIDED HISTORY: AMS   TECHNOLOGIST PROVIDED HISTORY:   Has a \"code stroke\" or \"stroke alert\" been called? ->No   Reason for Exam: UNRESPONSIVE       FINDINGS:   BRAIN/VENTRICLES: There is no acute intracranial hemorrhage, mass effect or   midline shift.  No abnormal extra-axial fluid collection.  No evidence of   recent territorial infarct.  There are nonspecific areas of hypoattenuation   in the periventricular white matter and centrum semiovale that are likely   related to chronic small vessel ischemic disease.   The ventricles and   cisternal spaces are prominent consistent with cerebral atrophy. Kayli Ruts is no   evidence of hydrocephalus. There is intracranial atherosclerosis.       ORBITS: The visualized portion of the orbits demonstrate no acute abnormality.       SINUSES: The visualized paranasal sinuses and mastoid air cells demonstrate   no acute abnormality.       SOFT TISSUES/SKULL:  No acute abnormality of the visualized skull or soft   tissues.           Impression   Stable exam with no acute intracranial abnormality.         Meri Mcnamara RN  07/20/19 8494

## 2019-07-20 NOTE — ED PROVIDER NOTES
Screws    LUNG REMOVAL, PARTIAL Left 09/30/2014    Lung CA, left; Robotic Assisted Left Thoracotomy, Left Lung Cancer; ROWDY lobectomy; Nonsmall Cell CA    OTHER SURGICAL HISTORY  2/20/15    excision of hydradenitis suppurative of cesilia anal area    REVISION TOTAL KNEE ARTHROPLASTY Right 2005    SKIN CANCER EXCISION Right 2012    Right Ankle    ANTONY AND BSO  1972    TOTAL KNEE ARTHROPLASTY Right 2003    Total Right Knee with revision     Family History   Problem Relation Age of Onset    Heart Disease Mother     Diabetes Mother    [place] Arthritis Mother     Depression Mother     High Blood Pressure Mother     High Cholesterol Mother     Kidney Disease Mother         On Dialysis    Learning Disabilities Mother     Mental Illness Mother     Stroke Mother     Vision Loss Mother     Early Death Father         Brain Hemorrhage    Other Father         \"Bowel Problems\"    High Blood Pressure Father     Heart Disease Father     Mental Illness Brother         Schizophrenia , Paranoia    Diabetes Brother     Lupus Sister     Arthritis Sister     Depression Sister     Diabetes Sister     Heart Disease Sister     High Blood Pressure Sister     High Cholesterol Sister     Kidney Disease Sister     Stroke Sister      Social History     Socioeconomic History    Marital status:      Spouse name: Not on file    Number of children: Not on file    Years of education: Not on file    Highest education level: Not on file   Occupational History    Not on file   Social Needs    Financial resource strain: Not on file    Food insecurity:     Worry: Not on file     Inability: Not on file    Transportation needs:     Medical: Not on file     Non-medical: Not on file   Tobacco Use    Smoking status: Current Every Day Smoker     Packs/day: 0.50     Years: 32.00     Pack years: 16.00     Types: Cigarettes     Start date: 1986    Smokeless tobacco: Never Used   Substance and Sexual Activity    Alcohol use:  No Subsequent/Follow-up FINDINGS: The patient is markedly rotated to the right on this supine portable study. The lung apices are cut off on this study. With this reservation, the lungs are grossly clear. There is an endotracheal tube in satisfactory position. Heart size is grossly normal.  Colon in the upper abdomen appears moderately distended. Endotracheal tube is in satisfactory position. The lungs are grossly clear with the reservation that the lung apices were excluded from this examination. Colon in the upper abdomen appears moderately distended. Procedure Note - Intubation: The benefits, risks, and alternatives of intubation were NOT discussed with the patient and consent was implied given critical condition and current mental status for the procedure. Pre-oxygenation was administered and the appropriate equipment and staff were made available at the bedside. Ilir Osborn was sedated/paralyzed; please see the chart for the drugs and dosages administered. A Taylor 3 laryngoscope blade was used for a grade 1 view and a 7.5mm endotracheal tube was viewed to pass through the cords on the first attempt. The tube was secured at 23 cm to the lip. Tracheal position was confirmed using a colorimetric end-tidal CO2 detector, tube condensation and chest auscultation/visualization. Respiratory therapy is at the bedside and is assisting with ventilatory management. Procedure Note - Central Line:  The benefits, risks, and alternatives of central venous access were not discussed with the patient given her critical condition and current mental status and consent was implied for the procedure. Ilir Osborn was prepped and draped in standard bedside fashion in the [address] area. Physical landmarks with ultrasound guidance was used to locate the central vein. Local anesthesia with 3ml of 1% lidocaine was injected. Seldinger technique was used for placement of the CVC in a non-pulsatile vasculature.  All ports mauri medicine. CRITICAL CARE NOTE:  There was a high probability of clinically significant life-threatening deterioration of the patient's condition requiring my urgent intervention due to septic shock. Aggressive resuscitation as above throughout prolonged ED course of several hours given delay of CT imaging was performed to address this. Total critical care time is AT LEAST 75 minutes. This includes vital sign monitoring, pulse oximetry monitoring, telemetry monitoring, clinical response to the IV medications, reviewing the nursing notes, consultation time, dictation/documentation time, and interpretation of the lab work. This time excludes time spent performing procedures and separately billable procedures and family discussion time. Clinical Impression:  1. Septic shock ([facility]    2. HCAP (healthcare-associated pneumonia)    3. Acute cystitis without hematuria    4. [facility] failure [facility] ([facility]      Disposition referral (if applicable):  Edgardo Montiel DO  [address] [place] [address]  [phone]          Disposition medications (if applicable):  Current Discharge Medication List          Comment: Please note this report has been produced using speech recognition software and may contain errors related to that system including errors in grammar, punctuation, and spelling, as well as words and phrases that may be inappropriate. If there are any questions or concerns please feel free to contact the dictating provider for clarification.        Greer Covington MD  07/21/19 5768

## 2019-07-20 NOTE — ED NOTES
Pt transported to CT with this nurse and David Mckeon from [place] [address], [address] [facility]  07/20/19 5982

## 2019-07-20 NOTE — CONSULTS
Lanelle Olszewski, MD        ipratropium-albuterol (DUONEB) nebulizer solution 1 ampule  1 ampule Inhalation Q4H Nadiya Nunez MD   1 ampule at 07/20/19 0850    methylPREDNISolone sodium (SOLU-MEDROL) injection 40 mg  40 mg Intravenous Q6H Nadiya Nunez MD   40 mg at 07/20/19 6557    bisacodyl (DULCOLAX) suppository 10 mg  10 mg Rectal Daily PRN Serenity Fofana MD        docusate sodium (COLACE) capsule 100 mg  100 mg Oral BID Serenity Fofana MD           Allergies:  Food; Trental [pentoxifylline]; Tramadol; Vistaril [hydroxyzine hcl]; Erythromycin; Motrin [ibuprofen]; and Tetracyclines & related    Social History:   Social History     Socioeconomic History    Marital status:       Spouse name: None    Number of children: None    Years of education: None    Highest education level: None   Occupational History    None   Social Needs    Financial resource strain: None    Food insecurity:     Worry: None     Inability: None    Transportation needs:     Medical: None     Non-medical: None   Tobacco Use    Smoking status: Current Every Day Smoker     Packs/day: 0.50     Years: 32.00     Pack years: 16.00     Types: Cigarettes     Start date: 1986    Smokeless tobacco: Never Used   Substance and Sexual Activity    Alcohol use: No     Alcohol/week: 0.0 standard drinks    Drug use: No     Comment: states quit 1-2-14    Sexual activity: Not Currently   Lifestyle    Physical activity:     Days per week: None     Minutes per session: None    Stress: None   Relationships    Social connections:     Talks on phone: None     Gets together: None     Attends Tenri[employer] service: None     Active member of club or organization: None     Attends meetings of clubs or organizations: None     Relationship status: None    Intimate partner violence:     Fear of current or ex partner: None     Emotionally abused: None     Physically abused: None     Forced sexual activity: None   Other Topics Concern stool burden, and gastric bypass anatomy    Patient Active Problem List    Diagnosis Date Noted    Acute respiratory failure with hypoxia ([facility] [address] 07/20/2019    Generalized muscle weakness 07/02/2019    Acute hyperkalemia 07/02/2019    Sepsis ([facility] [address] 06/16/2019    Moderate malnutrition (HCC) 04/25/2019    Hypomagnesemia syndrome 04/18/2019    Syncope and collapse 01/25/2019    Chronic respiratory failure ([address] 11/26/2018    Essential hypertension 11/06/2018    Chronic low back pain with bilateral sciatica 11/05/2018    CAD S/P percutaneous coronary angioplasty     Smoker     Obesity (BMI 30.0-34. 9)     Gout 01/01/2018    Chronic obstructive pulmonary disease ([facility] 04/10/2017    Lupus ([email or web address] 10/21/2016    Anxiety disorder 08/07/2016    Controlled type 2 diabetes mellitus with diabetic polyneuropathy, without long-term current use of insulin ([facility] [address] 05/28/2016    CAD (coronary artery disease) 05/28/2016    Hidradenitis suppurativa 02/20/2015    Lung cancer ([email or web address] 10/28/2014    History of lung cancer 09/01/2014     Visit Diagnoses:  1. Septic shock ([facility]    2. HCAP (healthcare-associated pneumonia)    3. Acute cystitis without hematuria    4. Acute respiratory failure with hypoxemia (HCC)      PLAN:  · She has a benign abdominal exam at this time. Continue NG tube while intubated. Would not recommend advancing the NG tube due to her gastric bypass anatomy. No operative plans at this time. · Unclear when her last bowel movement was due to her inability to give history. Will monitor for bowel function, and give suppositories and bowel regimen.   · Thank you for the consultation and the opportunity to care for Julia Do    Electronically signed by Blanche Zimmer MD, 7/20/2019, 9:40 AM

## 2019-07-20 NOTE — H&P
HUMERUS FRACTURE SURGERY Left 2000's    Broken Left Arm, Plates And Screws    LUNG REMOVAL, PARTIAL Left 09/30/2014    Lung CA, left; Robotic Assisted Left Thoracotomy, Left Lung Cancer; ROWDY lobectomy; Nonsmall Cell CA    OTHER SURGICAL HISTORY  2/20/15    excision of hydradenitis suppurative of cesilia anal area    REVISION TOTAL KNEE ARTHROPLASTY Right 2005    SKIN CANCER EXCISION Right 2012    Right Ankle    ANTONY AND BSO  1972    TOTAL KNEE ARTHROPLASTY Right 2003    Total Right Knee with revision     Family History   Problem Relation Age of Onset    Heart Disease Mother     Diabetes Mother    [facility] Arthritis Mother     Depression Mother     High Blood Pressure Mother     High Cholesterol Mother     Kidney Disease Mother         On Dialysis    Learning Disabilities Mother     Mental Illness Mother     Stroke Mother     Vision Loss Mother     Early Death Father         Brain Hemorrhage    Other Father         \"Bowel Problems\"    High Blood Pressure Father     Heart Disease Father     Mental Illness Brother         Schizophrenia , Paranoia    Diabetes Brother     Lupus Sister     Arthritis Sister     Depression Sister     Diabetes Sister     Heart Disease Sister     High Blood Pressure Sister     High Cholesterol Sister     Kidney Disease Sister     Stroke Sister      Family Hx of HTN  Family Hx as reviewed above, otherwise non-contributory  Social History     Socioeconomic History    Marital status:       Spouse name: None    Number of children: None    Years of education: None    Highest education level: None   Occupational History    None   Social Needs    Financial resource strain: None    Food insecurity:     Worry: None     Inability: None    Transportation needs:     Medical: None     Non-medical: None   Tobacco Use    Smoking status: Current Every Day Smoker     Packs/day: 0.50     Years: 32.00     Pack years: 16.00     Types: Cigarettes     Start date: [address] [place] [address] Smokeless tobacco: Never Used   Substance and Sexual Activity    Alcohol use: No     Alcohol/week: 0.0 standard drinks    Drug use: No     Comment: states quit 1-2-14    Sexual activity: Not Currently   Lifestyle    Physical activity:     Days per week: None     Minutes per session: None    Stress: None   Relationships    Social connections:     Talks on phone: None     Gets together: None     Attends Sikh service: None     Active member of club or organization: None     Attends meetings of clubs or organizations: None     Relationship status: None    Intimate partner violence:     Fear of current or ex partner: None     Emotionally abused: None     Physically abused: None     Forced sexual activity: None   Other Topics Concern    None   Social History Narrative    None       MEDICATIONS   Medications Prior to Admission  No current facility-administered medications on file prior to encounter.       Current Outpatient Medications on File Prior to Encounter   Medication Sig Dispense Refill    metoprolol succinate (TOPROL XL) 50 MG extended release tablet Take 50 mg by mouth daily      sertraline (ZOLOFT) 50 MG tablet Take 50 mg by mouth nightly      amiodarone (CORDARONE) 200 MG tablet Take 1 tablet by mouth daily 30 tablet 3    simvastatin (ZOCOR) 20 MG tablet Take 1 tablet by mouth nightly 30 tablet 3    magnesium oxide (MAG-OX) 400 (241.3 Mg) MG TABS tablet Take 1 tablet by mouth 2 times daily 30 tablet 0    fluconazole (DIFLUCAN) 150 MG tablet       losartan (COZAAR) 25 MG tablet       ipratropium-albuterol (DUONEB) 0.5-2.5 (3) MG/3ML SOLN nebulizer solution Inhale 3 mLs into the lungs 4 times daily Dx: COPD J44.9 360 mL 5    amitriptyline (ELAVIL) 50 MG tablet Take 50 mg by mouth nightly      OXYGEN Inhale 2 L/min into the lungs as needed      guaiFENesin (MUCINEX) 600 MG extended release tablet Take 2 tablets by mouth 2 times daily 120 tablet 5    clopidogrel (PLAVIX) 75 MG tablet Take respirator    Erythromycin      \"Stomach Pain\"    Motrin [Ibuprofen] Diarrhea    Tetracyclines & Related Nausea And Vomiting       REVIEW OF SYSTEMS   Unable to obtain ROS because the patient is vented and sedated. PHYSICAL EXAM     Wt Readings from Last 3 Encounters:   07/20/19 156 lb (70.8 kg)   07/03/19 156 lb 11.2 oz (71.1 kg)   06/20/19 167 lb 9.6 oz (76 kg)       Blood pressure 101/75, pulse 88, temperature 97.6 °F (36.4 °C), temperature source Axillary, resp. rate 12, height 5' 1\" (1.549 m), weight 156 lb (70.8 kg), SpO2 99 %, not currently breastfeeding. General - Patient intubated and sedated. Psych - Unable to assess  Eyes - Eye lids intact. No scleral icterus  ENT - Lips wnl. External ear clear/dry/intact. No thyromegaly on inspection  Neuro - Pupils equal at 3mm  Heart -  RRR. S1 and S2 present. No elevated JVD appreciated. 1+pedal edema  Lung - Patient intubated  GI - Soft. No guarding/rigidity. No hepatosplenomegaly/ascites. BS+   - No CVA/suprapubic tenderness or palpable bladder distension  Skin -No rash/petechiae/ecchymosis.  Warm extremities  MSK -  No joint swellings      LABS AND IMAGING   CBC  [unfilled]    Last 3 Hemoglobin  Lab Results   Component Value Date    HGB 11.1 07/20/2019    HGB 10.7 07/03/2019    HGB 12.3 07/02/2019     Last 3 WBC/ANC  Lab Results   Component Value Date    WBC 9.0 07/20/2019    WBC 5.6 07/03/2019    WBC 10.6 07/02/2019     No components found for: GRNLOCTYABS  Last 3 Platelets  No results found for: PLATELET  Chemistry  [unfilled]  [unfilled]  No results found for: LDH  Coagulation Studies  Lab Results   Component Value Date    INR 1.17 04/20/2019     Liver Function Studies  Lab Results   Component Value Date    ALT 38 07/20/2019    AST 33 07/20/2019    ALKPHOS 120 07/20/2019       Recent Imaging  CT ABDOMEN PELVIS W IV CONTRAST Additional Contrast? None [[id number]] Collected: 07/20/19 0410      Order Status: Completed Updated: 07/20/19 4626     Narrative:       EXAMINATION:  CT OF THE ABDOMEN AND PELVIS WITH CONTRAST 7/20/2019 2:30 am    TECHNIQUE:  CT of the abdomen and pelvis was performed with the administration of  intravenous contrast. Multiplanar reformatted images are provided for review. Dose modulation, iterative reconstruction, and/or weight based adjustment of  the mA/kV was utilized to reduce the radiation dose to as low as reasonably  achievable. COMPARISON:  CT abdomen and pelvis without contrast 06/12/2018. HISTORY:  ORDERING SYSTEM PROVIDED HISTORY: ?colon distended on CXR  TECHNOLOGIST PROVIDED HISTORY:  Additional Contrast?->None  Reason for Exam: kristina d-dimer/unresponsive    FINDINGS:  Lower Chest: Mild focal scarring/atelectasis in the lung bases.  In addition,  there is mild patchy consolidation in the left lower lobe. Organs:    Marked hepatic steatosis.  Unchanged extrahepatic biliary ductal dilation. The gallbladder is poorly visualized.  Artifact related to patient arm  positioning.  There is no splenomegaly or focal splenic lesion. The adrenal  glands are unremarkable without evidence of mass. The pancreas is  unremarkable. The kidneys are unremarkable. Bishnu Logan is no hydronephrosis, hydroureter, or  evidence of urolithiasis. Urinary bladder has a Carlos catheter.  Incomplete  imaging of the pelvic structures despite repeat scanning. GI/Bowel: Large colonic stool burden.  Colonic distension, similar to  slightly increased from June 2018. Bishnu Logan is a right lateral abdominal wall  hernia containing a short segment of colon, present on previous exam.  Terminal ileum is unremarkable.  The appendix is not identified.  No  secondary findings of acute appendicitis.  Findings related to gastric  bypass.  The gastric drain terminates near the gastroesophageal junction.   There is small bowel distension near the gastrojejunostomy site.  However, no  discrete transition point is identified.  No focal wall thickening of

## 2019-07-21 ENCOUNTER — APPOINTMENT (OUTPATIENT)
Dept: GENERAL RADIOLOGY | Age: 69
DRG: 871 | End: 2019-07-21
Payer: MEDICARE

## 2019-07-21 LAB
ANION GAP SERPL CALCULATED.3IONS-SCNC: 8 MMOL/L (ref 4–16)
ANISOCYTOSIS: ABNORMAL
BANDED NEUTROPHILS ABSOLUTE COUNT: 2.65 K/CU MM
BANDED NEUTROPHILS RELATIVE PERCENT: 26 % (ref 5–11)
BASE EXCESS: ABNORMAL (ref 0–2.4)
BUN BLDV-MCNC: 22 MG/DL (ref 6–23)
CALCIUM SERPL-MCNC: 8.3 MG/DL (ref 8.3–10.6)
CARBON MONOXIDE, BLOOD: 3.1 % (ref 0–5)
CHLORIDE BLD-SCNC: 110 MMOL/L (ref 99–110)
CO2 CONTENT: 18.7 MMOL/L (ref 19–24)
CO2: 21 MMOL/L (ref 21–32)
COMMENT: ABNORMAL
CREAT SERPL-MCNC: 0.7 MG/DL (ref 0.6–1.1)
CULTURE: ABNORMAL
CULTURE: ABNORMAL
DIFFERENTIAL TYPE: ABNORMAL
GFR AFRICAN AMERICAN: >60 ML/MIN/1.73M2
GFR NON-AFRICAN AMERICAN: >60 ML/MIN/1.73M2
GLUCOSE BLD-MCNC: 227 MG/DL (ref 70–99)
HCO3 ARTERIAL: 17.9 MMOL/L (ref 18–23)
HCT VFR BLD CALC: 32.6 % (ref 37–47)
HEMOGLOBIN: 10.9 GM/DL (ref 12.5–16)
LEGIONELLA URINARY AG: NEGATIVE
LYMPHOCYTES ABSOLUTE: 1 K/CU MM
LYMPHOCYTES RELATIVE PERCENT: 10 % (ref 24–44)
Lab: ABNORMAL
MACROCYTES: ABNORMAL
MCH RBC QN AUTO: 27.1 PG (ref 27–31)
MCHC RBC AUTO-ENTMCNC: 33.4 % (ref 32–36)
MCV RBC AUTO: 81.1 FL (ref 78–100)
METAMYELOCYTES ABSOLUTE COUNT: 0.31 K/CU MM
METAMYELOCYTES PERCENT: 3 %
METHEMOGLOBIN ARTERIAL: 1.8 %
MONOCYTES ABSOLUTE: 0.2 K/CU MM
MONOCYTES RELATIVE PERCENT: 2 % (ref 0–4)
MYELOCYTE PERCENT: 1 %
MYELOCYTES ABSOLUTE COUNT: 0.1 K/CU MM
O2 SATURATION: 94.5 % (ref 96–97)
PCO2 ARTERIAL: 27 MMHG (ref 32–45)
PDW BLD-RTO: 16.1 % (ref 11.7–14.9)
PH BLOOD: 7.43 (ref 7.34–7.45)
PLATELET # BLD: 199 K/CU MM (ref 140–440)
PMV BLD AUTO: 12.8 FL (ref 7.5–11.1)
PO2 ARTERIAL: 162 MMHG (ref 75–100)
POTASSIUM SERPL-SCNC: 4 MMOL/L (ref 3.5–5.1)
RBC # BLD: 4.02 M/CU MM (ref 4.2–5.4)
SEGMENTED NEUTROPHILS ABSOLUTE COUNT: 5.9 K/CU MM
SEGMENTED NEUTROPHILS RELATIVE PERCENT: 58 % (ref 36–66)
SODIUM BLD-SCNC: 139 MMOL/L (ref 135–145)
SPECIMEN: ABNORMAL
STREP PNEUMONIAE ANTIGEN: NORMAL
TARGET CELLS: ABNORMAL
WBC # BLD: 10.2 K/CU MM (ref 4–10.5)

## 2019-07-21 PROCEDURE — 36600 WITHDRAWAL OF ARTERIAL BLOOD: CPT

## 2019-07-21 PROCEDURE — 82803 BLOOD GASES ANY COMBINATION: CPT

## 2019-07-21 PROCEDURE — 2580000003 HC RX 258: Performed by: NURSE PRACTITIONER

## 2019-07-21 PROCEDURE — 2000000000 HC ICU R&B

## 2019-07-21 PROCEDURE — 2580000003 HC RX 258: Performed by: FAMILY MEDICINE

## 2019-07-21 PROCEDURE — 6360000002 HC RX W HCPCS: Performed by: NURSE PRACTITIONER

## 2019-07-21 PROCEDURE — 6370000000 HC RX 637 (ALT 250 FOR IP): Performed by: NURSE PRACTITIONER

## 2019-07-21 PROCEDURE — 80048 BASIC METABOLIC PNL TOTAL CA: CPT

## 2019-07-21 PROCEDURE — C9113 INJ PANTOPRAZOLE SODIUM, VIA: HCPCS | Performed by: NURSE PRACTITIONER

## 2019-07-21 PROCEDURE — 85007 BL SMEAR W/DIFF WBC COUNT: CPT

## 2019-07-21 PROCEDURE — 6360000002 HC RX W HCPCS: Performed by: FAMILY MEDICINE

## 2019-07-21 PROCEDURE — 36592 COLLECT BLOOD FROM PICC: CPT

## 2019-07-21 PROCEDURE — 2700000000 HC OXYGEN THERAPY PER DAY

## 2019-07-21 PROCEDURE — 85027 COMPLETE CBC AUTOMATED: CPT

## 2019-07-21 PROCEDURE — 71045 X-RAY EXAM CHEST 1 VIEW: CPT

## 2019-07-21 PROCEDURE — 6360000002 HC RX W HCPCS: Performed by: INTERNAL MEDICINE

## 2019-07-21 PROCEDURE — 6370000000 HC RX 637 (ALT 250 FOR IP): Performed by: FAMILY MEDICINE

## 2019-07-21 PROCEDURE — 87077 CULTURE AEROBIC IDENTIFY: CPT

## 2019-07-21 PROCEDURE — 2580000003 HC RX 258: Performed by: INTERNAL MEDICINE

## 2019-07-21 PROCEDURE — 6370000000 HC RX 637 (ALT 250 FOR IP): Performed by: SURGERY

## 2019-07-21 PROCEDURE — 6370000000 HC RX 637 (ALT 250 FOR IP): Performed by: INTERNAL MEDICINE

## 2019-07-21 PROCEDURE — 87070 CULTURE OTHR SPECIMN AEROBIC: CPT

## 2019-07-21 PROCEDURE — 2500000003 HC RX 250 WO HCPCS: Performed by: NURSE PRACTITIONER

## 2019-07-21 PROCEDURE — 31720 CLEARANCE OF AIRWAYS: CPT

## 2019-07-21 PROCEDURE — 94761 N-INVAS EAR/PLS OXIMETRY MLT: CPT

## 2019-07-21 PROCEDURE — 87186 SC STD MICRODIL/AGAR DIL: CPT

## 2019-07-21 PROCEDURE — 94750 HC PULMONARY COMPLIANCE STUDY: CPT

## 2019-07-21 PROCEDURE — 87205 SMEAR GRAM STAIN: CPT

## 2019-07-21 PROCEDURE — 94003 VENT MGMT INPAT SUBQ DAY: CPT

## 2019-07-21 PROCEDURE — 87147 CULTURE TYPE IMMUNOLOGIC: CPT

## 2019-07-21 PROCEDURE — 99232 SBSQ HOSP IP/OBS MODERATE 35: CPT | Performed by: SURGERY

## 2019-07-21 PROCEDURE — 94640 AIRWAY INHALATION TREATMENT: CPT

## 2019-07-21 PROCEDURE — 89220 SPUTUM SPECIMEN COLLECTION: CPT

## 2019-07-21 RX ORDER — ACETAMINOPHEN 80 MG
TABLET,CHEWABLE ORAL
Status: COMPLETED
Start: 2019-07-21 | End: 2019-07-21

## 2019-07-21 RX ORDER — MIDODRINE HYDROCHLORIDE 5 MG/1
10 TABLET ORAL
Status: DISCONTINUED | OUTPATIENT
Start: 2019-07-21 | End: 2019-08-07 | Stop reason: HOSPADM

## 2019-07-21 RX ADMIN — METHYLPREDNISOLONE SODIUM SUCCINATE 40 MG: 40 INJECTION, POWDER, LYOPHILIZED, FOR SOLUTION INTRAMUSCULAR; INTRAVENOUS at 14:09

## 2019-07-21 RX ADMIN — CEFEPIME HYDROCHLORIDE 2 G: 2 INJECTION, POWDER, FOR SOLUTION INTRAVENOUS at 00:50

## 2019-07-21 RX ADMIN — NOREPINEPHRINE BITARTRATE 11 MCG/MIN: 1 INJECTION INTRAVENOUS at 05:16

## 2019-07-21 RX ADMIN — CLOPIDOGREL BISULFATE 75 MG: 75 TABLET ORAL at 09:04

## 2019-07-21 RX ADMIN — PREGABALIN 150 MG: 75 CAPSULE ORAL at 09:04

## 2019-07-21 RX ADMIN — CHLORHEXIDINE GLUCONATE 0.12% ORAL RINSE 15 ML: 1.2 LIQUID ORAL at 20:39

## 2019-07-21 RX ADMIN — CHLORHEXIDINE GLUCONATE 0.12% ORAL RINSE 15 ML: 1.2 LIQUID ORAL at 09:04

## 2019-07-21 RX ADMIN — SODIUM CHLORIDE: 9 INJECTION, SOLUTION INTRAVENOUS at 09:04

## 2019-07-21 RX ADMIN — IPRATROPIUM BROMIDE AND ALBUTEROL SULFATE 1 AMPULE: .5; 3 SOLUTION RESPIRATORY (INHALATION) at 05:00

## 2019-07-21 RX ADMIN — AMIODARONE HYDROCHLORIDE 200 MG: 200 TABLET ORAL at 09:04

## 2019-07-21 RX ADMIN — VANCOMYCIN HYDROCHLORIDE 1250 MG: 5 INJECTION, POWDER, LYOPHILIZED, FOR SOLUTION INTRAVENOUS at 05:12

## 2019-07-21 RX ADMIN — Medication: at 09:41

## 2019-07-21 RX ADMIN — SERTRALINE HYDROCHLORIDE 50 MG: 50 TABLET ORAL at 20:40

## 2019-07-21 RX ADMIN — ACETAMINOPHEN 650 MG: 650 SUPPOSITORY RECTAL at 02:26

## 2019-07-21 RX ADMIN — AMITRIPTYLINE HYDROCHLORIDE 50 MG: 50 TABLET, FILM COATED ORAL at 20:39

## 2019-07-21 RX ADMIN — Medication 10 ML: at 10:56

## 2019-07-21 RX ADMIN — SODIUM CHLORIDE, PRESERVATIVE FREE 10 ML: 5 INJECTION INTRAVENOUS at 20:42

## 2019-07-21 RX ADMIN — ASPIRIN 81 MG 81 MG: 81 TABLET ORAL at 09:05

## 2019-07-21 RX ADMIN — DOCUSATE SODIUM 100 MG: 100 CAPSULE, LIQUID FILLED ORAL at 20:40

## 2019-07-21 RX ADMIN — IPRATROPIUM BROMIDE AND ALBUTEROL SULFATE 1 AMPULE: .5; 3 SOLUTION RESPIRATORY (INHALATION) at 00:05

## 2019-07-21 RX ADMIN — PREGABALIN 150 MG: 75 CAPSULE ORAL at 20:39

## 2019-07-21 RX ADMIN — PANTOPRAZOLE SODIUM 40 MG: 40 INJECTION, POWDER, FOR SOLUTION INTRAVENOUS at 10:55

## 2019-07-21 RX ADMIN — MIDODRINE HYDROCHLORIDE 10 MG: 5 TABLET ORAL at 14:09

## 2019-07-21 RX ADMIN — METOPROLOL SUCCINATE 50 MG: 50 TABLET, EXTENDED RELEASE ORAL at 09:04

## 2019-07-21 RX ADMIN — SIMVASTATIN 20 MG: 20 TABLET, FILM COATED ORAL at 20:39

## 2019-07-21 RX ADMIN — CEFEPIME HYDROCHLORIDE 2 G: 2 INJECTION, POWDER, FOR SOLUTION INTRAVENOUS at 14:40

## 2019-07-21 RX ADMIN — IPRATROPIUM BROMIDE AND ALBUTEROL SULFATE 1 AMPULE: .5; 3 SOLUTION RESPIRATORY (INHALATION) at 11:36

## 2019-07-21 RX ADMIN — IPRATROPIUM BROMIDE AND ALBUTEROL SULFATE 1 AMPULE: .5; 3 SOLUTION RESPIRATORY (INHALATION) at 15:46

## 2019-07-21 RX ADMIN — METHYLPREDNISOLONE SODIUM SUCCINATE 40 MG: 40 INJECTION, POWDER, LYOPHILIZED, FOR SOLUTION INTRAMUSCULAR; INTRAVENOUS at 09:04

## 2019-07-21 RX ADMIN — METHYLPREDNISOLONE SODIUM SUCCINATE 40 MG: 40 INJECTION, POWDER, LYOPHILIZED, FOR SOLUTION INTRAMUSCULAR; INTRAVENOUS at 02:22

## 2019-07-21 RX ADMIN — IPRATROPIUM BROMIDE AND ALBUTEROL SULFATE 1 AMPULE: .5; 3 SOLUTION RESPIRATORY (INHALATION) at 07:28

## 2019-07-21 RX ADMIN — SODIUM CHLORIDE: 9 INJECTION, SOLUTION INTRAVENOUS at 20:49

## 2019-07-21 RX ADMIN — MIDODRINE HYDROCHLORIDE 10 MG: 5 TABLET ORAL at 17:13

## 2019-07-21 RX ADMIN — ENOXAPARIN SODIUM 30 MG: 30 INJECTION SUBCUTANEOUS at 09:05

## 2019-07-21 RX ADMIN — METHYLPREDNISOLONE SODIUM SUCCINATE 40 MG: 40 INJECTION, POWDER, LYOPHILIZED, FOR SOLUTION INTRAMUSCULAR; INTRAVENOUS at 20:39

## 2019-07-21 RX ADMIN — IPRATROPIUM BROMIDE AND ALBUTEROL SULFATE 1 AMPULE: .5; 3 SOLUTION RESPIRATORY (INHALATION) at 19:50

## 2019-07-21 ASSESSMENT — PULMONARY FUNCTION TESTS
PIF_VALUE: 15
PIF_VALUE: 16
PIF_VALUE: 15
PIF_VALUE: 17
PIF_VALUE: 18
PIF_VALUE: 17
PIF_VALUE: 19
PIF_VALUE: 18
PIF_VALUE: 19
PIF_VALUE: 16
PIF_VALUE: 16
PIF_VALUE: 17
PIF_VALUE: 21
PIF_VALUE: 16
PIF_VALUE: 16
PIF_VALUE: 18

## 2019-07-21 NOTE — PROGRESS NOTES
Intravenous, Daily **AND** sodium chloride (PF), 10 mL, Intravenous, Daily    vancomycin, 1,250 mg, Intravenous, Q24H    ipratropium-albuterol, 1 ampule, Inhalation, Q4H    methylPREDNISolone, 40 mg, Intravenous, Q6H    docusate sodium, 100 mg, Oral, BID    cefepime, 2 g, Intravenous, Q12H    enoxaparin, 30 mg, Subcutaneous, Daily    Physical Exam:  General Appearance: On vent, no distress    Head:   Normocephalic, atraumatic    Lungs:    Equal chest rise, respirations unlabored on vent   Heart:   Regular rate and rhythm    Abdomen:    Soft, non-tender, no rebound or guarding    Extremities:  No cyanosis or edema         Labs/Imaging Results:   Recent Results (from the past 24 hour(s))   Strep Pneumoniae Antigen    Collection Time: 07/20/19  4:30 PM   Result Value Ref Range    Strep pneumo Ag URINE NEGATIVE  Presumptive negative for        Strep pneumo Ag pneumococcal pneumonia,     Strep pneumo Ag suggesting no current or recent     Strep pneumo Ag pneumococcal infection.  Infection     Strep pneumo Ag due to S pneumonia cannot be ruled     Strep pneumo Ag out if the antigen present in the     Strep pneumo Ag sample is below the detection     Strep pneumo Ag limit of the test.     Strep pneumo Ag (NOTE)  REFERENCE RANGE: NEGATIVE      Legionella antigen, urine    Collection Time: 07/20/19  4:30 PM   Result Value Ref Range    Legionella Urinary Ag NEGATIVE NEGATIVE   POCT Glucose    Collection Time: 07/20/19 10:00 PM   Result Value Ref Range    POC Glucose 236 (H) 70 - 99 MG/DL   Basic Metabolic Panel w/ Reflex to MG    Collection Time: 07/21/19  5:50 AM   Result Value Ref Range    Sodium 139 135 - 145 MMOL/L    Potassium 4.0 3.5 - 5.1 MMOL/L    Chloride 110 99 - 110 mMol/L    CO2 21 21 - 32 MMOL/L    Anion Gap 8 4 - 16    BUN 22 6 - 23 MG/DL    CREATININE 0.7 0.6 - 1.1 MG/DL    Glucose 227 (H) 70 - 99 MG/DL    Calcium 8.3 8.3 - 10.6 MG/DL    GFR Non-African American >60 >60 mL/min/1.73m2    GFR African

## 2019-07-21 NOTE — PROGRESS NOTES
chloride flush 10 mL PRN   magnesium hydroxide 30 mL Daily PRN   ondansetron 4 mg Q6H PRN   acetaminophen 650 mg Q4H PRN   bisacodyl 10 mg Daily PRN   acetaminophen 650 mg Q4H PRN     Subjective: Intubated, grimaces when checking eyes (good response)    Objective: Intake/Output Summary (Last 24 hours) at 7/21/2019 1204  Last data filed at 7/21/2019 0602  Gross per 24 hour   Intake 3711 ml   Output 825 ml   Net 2886 ml      Vitals:   Vitals:    07/21/19 1139   BP:    Pulse: 87   Resp: 15   Temp:    SpO2:      Physical Exam:   Gen:  intubated  Head/Eyes:  Normocephalic atraumatic, eyes closed  NECK:   symmetrical, trachea midline  LUNGS: Normal Effort   CARDIOVASCULAR:  Normal rate, trace pedal edema  ABDOMEN:  non distended, no HSM noted. MUSCULOSKELETAL:  ROM limited  NEUROLOGIC: Alert ,  Cranial nerves II-XII are grossly intact.    SKIN:  no bruising or bleeding, normal skin color,  no redness      Data:       CBC   Recent Labs     07/20/19  0121 07/21/19  0550   WBC 9.0 10.2   HGB 11.1* 10.9*   HCT 33.7* 32.6*    199      BMP Recent Labs     07/20/19  0121 07/21/19  0550    139   K 4.1 4.0   * 110   CO2 21 21   BUN 17 22   CREATININE 0.9 0.7         Electronically signed by Sissy Carpio MD on 7/21/2019 at 12:04 PM

## 2019-07-21 NOTE — PROGRESS NOTES
[address] [facility]  consulted by Dr. Mary Jo Austin for monitoring and adjustment. Indication for treatment: Sepsis 2/2 pneumonia/UTI   Goal trough: 15-20 mcg/mL     Pertinent Laboratory Values:   Temp Readings from Last 3 Encounters:   07/21/19 97.9 °F (36.6 °C) (Rectal)   07/04/19 97.5 °F (36.4 °C) (Oral)   06/20/19 98.5 °F (36.9 °C) (Oral)     Recent Labs     07/20/19  0121 07/20/19  0650 07/21/19  0550   WBC 9.0  --  10.2   LACTATE 2.3  LACT CALLED TO LUIS ALARCON AT 0154, KYOUNG MLS  RESULTS READ BACK  * 1.6  --      Recent Labs     07/20/19  0121 07/21/19  0550   BUN 17 22   CREATININE 0.9 0.7     Estimated Creatinine Clearance: 73 mL/min (based on SCr of 0.7 mg/dL). Intake/Output Summary (Last 24 hours) at 7/21/2019 1358  Last data filed at 7/21/2019 0602  Gross per 24 hour   Intake 3711 ml   Output 825 ml   Net 2886 ml     Pertinent Cultures:  Date    Source    Results  7/20   MRSA screen   Positive  7/20   Urine    Coliform  7/20   Blood    NGTD    Vancomycin level:   TROUGH:  No results for input(s): VANCOTROUGH in the last 72 hours. RANDOM:  No results for input(s): VANCORANDOM in the last 72 hours. Assessment:  · WBC and temperature: WNL  · SCr, BUN, and urine output: trending down  · Day(s) of therapy: 2   · Vancomycin level: To be collected      Plan:  · Dosing comments: Continue vancomycin 1250mg q24h   · Trough to be drawn 7/22 @ 0430  · Pharmacy will continue to monitor patient and adjust therapy as indicated    Thank you for the consult.   Meri NgD, [facility]  7/21/2019 1:58 PM

## 2019-07-22 ENCOUNTER — APPOINTMENT (OUTPATIENT)
Dept: GENERAL RADIOLOGY | Age: 69
DRG: 871 | End: 2019-07-22
Payer: MEDICARE

## 2019-07-22 LAB
ANION GAP SERPL CALCULATED.3IONS-SCNC: 10 MMOL/L (ref 4–16)
BASE EXCESS: ABNORMAL (ref 0–2.4)
BUN BLDV-MCNC: 21 MG/DL (ref 6–23)
CALCIUM SERPL-MCNC: 8.6 MG/DL (ref 8.3–10.6)
CARBON MONOXIDE, BLOOD: 2.5 % (ref 0–5)
CHLORIDE BLD-SCNC: 111 MMOL/L (ref 99–110)
CO2 CONTENT: 18.9 MMOL/L (ref 19–24)
CO2: 19 MMOL/L (ref 21–32)
COMMENT: ABNORMAL
CREAT SERPL-MCNC: 0.6 MG/DL (ref 0.6–1.1)
CULTURE: ABNORMAL
CULTURE: ABNORMAL
DOSE AMOUNT: ABNORMAL
DOSE TIME: ABNORMAL
GFR AFRICAN AMERICAN: >60 ML/MIN/1.73M2
GFR NON-AFRICAN AMERICAN: >60 ML/MIN/1.73M2
GLUCOSE BLD-MCNC: 222 MG/DL (ref 70–99)
HCO3 ARTERIAL: 17.9 MMOL/L (ref 18–23)
HCT VFR BLD CALC: 32.2 % (ref 37–47)
HEMOGLOBIN: 10.7 GM/DL (ref 12.5–16)
Lab: ABNORMAL
MCH RBC QN AUTO: 27.3 PG (ref 27–31)
MCHC RBC AUTO-ENTMCNC: 33.2 % (ref 32–36)
MCV RBC AUTO: 82.1 FL (ref 78–100)
METHEMOGLOBIN ARTERIAL: 1.3 %
O2 SATURATION: 94.8 % (ref 96–97)
PCO2 ARTERIAL: 31 MMHG (ref 32–45)
PDW BLD-RTO: 16.3 % (ref 11.7–14.9)
PH BLOOD: 7.37 (ref 7.34–7.45)
PLATELET # BLD: 171 K/CU MM (ref 140–440)
PMV BLD AUTO: 12.5 FL (ref 7.5–11.1)
PO2 ARTERIAL: 126 MMHG (ref 75–100)
POTASSIUM SERPL-SCNC: 4 MMOL/L (ref 3.5–5.1)
RBC # BLD: 3.92 M/CU MM (ref 4.2–5.4)
SODIUM BLD-SCNC: 140 MMOL/L (ref 135–145)
SPECIMEN: ABNORMAL
TOTAL COLONY COUNT: ABNORMAL
VANCOMYCIN TROUGH: 8.8 UG/ML (ref 10–20)
WBC # BLD: 13.1 K/CU MM (ref 4–10.5)

## 2019-07-22 PROCEDURE — 6370000000 HC RX 637 (ALT 250 FOR IP): Performed by: FAMILY MEDICINE

## 2019-07-22 PROCEDURE — 2580000003 HC RX 258: Performed by: NURSE PRACTITIONER

## 2019-07-22 PROCEDURE — 2700000000 HC OXYGEN THERAPY PER DAY

## 2019-07-22 PROCEDURE — 82803 BLOOD GASES ANY COMBINATION: CPT

## 2019-07-22 PROCEDURE — 6360000002 HC RX W HCPCS: Performed by: INTERNAL MEDICINE

## 2019-07-22 PROCEDURE — C9113 INJ PANTOPRAZOLE SODIUM, VIA: HCPCS | Performed by: NURSE PRACTITIONER

## 2019-07-22 PROCEDURE — 51702 INSERT TEMP BLADDER CATH: CPT

## 2019-07-22 PROCEDURE — 94640 AIRWAY INHALATION TREATMENT: CPT

## 2019-07-22 PROCEDURE — 89220 SPUTUM SPECIMEN COLLECTION: CPT

## 2019-07-22 PROCEDURE — 6360000002 HC RX W HCPCS: Performed by: NURSE PRACTITIONER

## 2019-07-22 PROCEDURE — 99232 SBSQ HOSP IP/OBS MODERATE 35: CPT | Performed by: SURGERY

## 2019-07-22 PROCEDURE — 2580000003 HC RX 258: Performed by: FAMILY MEDICINE

## 2019-07-22 PROCEDURE — 36600 WITHDRAWAL OF ARTERIAL BLOOD: CPT

## 2019-07-22 PROCEDURE — 36592 COLLECT BLOOD FROM PICC: CPT

## 2019-07-22 PROCEDURE — 82565 ASSAY OF CREATININE: CPT

## 2019-07-22 PROCEDURE — 94003 VENT MGMT INPAT SUBQ DAY: CPT

## 2019-07-22 PROCEDURE — 71045 X-RAY EXAM CHEST 1 VIEW: CPT

## 2019-07-22 PROCEDURE — 6360000002 HC RX W HCPCS: Performed by: FAMILY MEDICINE

## 2019-07-22 PROCEDURE — 2580000003 HC RX 258: Performed by: INTERNAL MEDICINE

## 2019-07-22 PROCEDURE — 94750 HC PULMONARY COMPLIANCE STUDY: CPT

## 2019-07-22 PROCEDURE — 80048 BASIC METABOLIC PNL TOTAL CA: CPT

## 2019-07-22 PROCEDURE — 6370000000 HC RX 637 (ALT 250 FOR IP): Performed by: NURSE PRACTITIONER

## 2019-07-22 PROCEDURE — 31720 CLEARANCE OF AIRWAYS: CPT

## 2019-07-22 PROCEDURE — 36556 INSERT NON-TUNNEL CV CATH: CPT

## 2019-07-22 PROCEDURE — 80202 ASSAY OF VANCOMYCIN: CPT

## 2019-07-22 PROCEDURE — 2000000000 HC ICU R&B

## 2019-07-22 PROCEDURE — 6370000000 HC RX 637 (ALT 250 FOR IP): Performed by: INTERNAL MEDICINE

## 2019-07-22 PROCEDURE — 85027 COMPLETE CBC AUTOMATED: CPT

## 2019-07-22 PROCEDURE — 99211 OFF/OP EST MAY X REQ PHY/QHP: CPT

## 2019-07-22 PROCEDURE — 6370000000 HC RX 637 (ALT 250 FOR IP): Performed by: SURGERY

## 2019-07-22 PROCEDURE — 94761 N-INVAS EAR/PLS OXIMETRY MLT: CPT

## 2019-07-22 RX ADMIN — MIDODRINE HYDROCHLORIDE 10 MG: 5 TABLET ORAL at 09:29

## 2019-07-22 RX ADMIN — PREGABALIN 150 MG: 75 CAPSULE ORAL at 20:09

## 2019-07-22 RX ADMIN — PANTOPRAZOLE SODIUM 40 MG: 40 INJECTION, POWDER, FOR SOLUTION INTRAVENOUS at 05:58

## 2019-07-22 RX ADMIN — MIDODRINE HYDROCHLORIDE 10 MG: 5 TABLET ORAL at 12:25

## 2019-07-22 RX ADMIN — MIDODRINE HYDROCHLORIDE 10 MG: 5 TABLET ORAL at 17:02

## 2019-07-22 RX ADMIN — DOCUSATE SODIUM 100 MG: 100 CAPSULE, LIQUID FILLED ORAL at 20:09

## 2019-07-22 RX ADMIN — IPRATROPIUM BROMIDE AND ALBUTEROL SULFATE 1 AMPULE: .5; 3 SOLUTION RESPIRATORY (INHALATION) at 20:21

## 2019-07-22 RX ADMIN — VANCOMYCIN HYDROCHLORIDE 1250 MG: 5 INJECTION, POWDER, LYOPHILIZED, FOR SOLUTION INTRAVENOUS at 05:58

## 2019-07-22 RX ADMIN — MEROPENEM 1 G: 1 INJECTION, POWDER, FOR SOLUTION INTRAVENOUS at 12:25

## 2019-07-22 RX ADMIN — Medication 10 ML: at 05:59

## 2019-07-22 RX ADMIN — CHLORHEXIDINE GLUCONATE 0.12% ORAL RINSE 15 ML: 1.2 LIQUID ORAL at 09:30

## 2019-07-22 RX ADMIN — SODIUM CHLORIDE, PRESERVATIVE FREE 10 ML: 5 INJECTION INTRAVENOUS at 09:31

## 2019-07-22 RX ADMIN — SIMVASTATIN 20 MG: 20 TABLET, FILM COATED ORAL at 20:09

## 2019-07-22 RX ADMIN — AMIODARONE HYDROCHLORIDE 200 MG: 200 TABLET ORAL at 09:29

## 2019-07-22 RX ADMIN — CLOPIDOGREL BISULFATE 75 MG: 75 TABLET ORAL at 09:29

## 2019-07-22 RX ADMIN — IPRATROPIUM BROMIDE AND ALBUTEROL SULFATE 1 AMPULE: .5; 3 SOLUTION RESPIRATORY (INHALATION) at 12:07

## 2019-07-22 RX ADMIN — VANCOMYCIN HYDROCHLORIDE 1250 MG: 5 INJECTION, POWDER, LYOPHILIZED, FOR SOLUTION INTRAVENOUS at 19:08

## 2019-07-22 RX ADMIN — SODIUM CHLORIDE, PRESERVATIVE FREE 10 ML: 5 INJECTION INTRAVENOUS at 20:09

## 2019-07-22 RX ADMIN — METHYLPREDNISOLONE SODIUM SUCCINATE 40 MG: 40 INJECTION, POWDER, LYOPHILIZED, FOR SOLUTION INTRAMUSCULAR; INTRAVENOUS at 02:26

## 2019-07-22 RX ADMIN — ENOXAPARIN SODIUM 30 MG: 30 INJECTION SUBCUTANEOUS at 09:30

## 2019-07-22 RX ADMIN — CEFEPIME HYDROCHLORIDE 2 G: 2 INJECTION, POWDER, FOR SOLUTION INTRAVENOUS at 01:29

## 2019-07-22 RX ADMIN — METHYLPREDNISOLONE SODIUM SUCCINATE 40 MG: 40 INJECTION, POWDER, LYOPHILIZED, FOR SOLUTION INTRAMUSCULAR; INTRAVENOUS at 14:59

## 2019-07-22 RX ADMIN — METHYLPREDNISOLONE SODIUM SUCCINATE 40 MG: 40 INJECTION, POWDER, LYOPHILIZED, FOR SOLUTION INTRAMUSCULAR; INTRAVENOUS at 20:09

## 2019-07-22 RX ADMIN — CHLORHEXIDINE GLUCONATE 0.12% ORAL RINSE 15 ML: 1.2 LIQUID ORAL at 20:09

## 2019-07-22 RX ADMIN — SODIUM CHLORIDE: 9 INJECTION, SOLUTION INTRAVENOUS at 06:06

## 2019-07-22 RX ADMIN — IPRATROPIUM BROMIDE AND ALBUTEROL SULFATE 1 AMPULE: .5; 3 SOLUTION RESPIRATORY (INHALATION) at 15:52

## 2019-07-22 RX ADMIN — METHYLPREDNISOLONE SODIUM SUCCINATE 40 MG: 40 INJECTION, POWDER, LYOPHILIZED, FOR SOLUTION INTRAMUSCULAR; INTRAVENOUS at 09:30

## 2019-07-22 RX ADMIN — IPRATROPIUM BROMIDE AND ALBUTEROL SULFATE 1 AMPULE: .5; 3 SOLUTION RESPIRATORY (INHALATION) at 00:10

## 2019-07-22 RX ADMIN — ASPIRIN 81 MG 81 MG: 81 TABLET ORAL at 09:29

## 2019-07-22 RX ADMIN — IPRATROPIUM BROMIDE AND ALBUTEROL SULFATE 1 AMPULE: .5; 3 SOLUTION RESPIRATORY (INHALATION) at 04:20

## 2019-07-22 RX ADMIN — MEROPENEM 1 G: 1 INJECTION, POWDER, FOR SOLUTION INTRAVENOUS at 20:08

## 2019-07-22 RX ADMIN — SODIUM CHLORIDE: 9 INJECTION, SOLUTION INTRAVENOUS at 14:59

## 2019-07-22 RX ADMIN — PREGABALIN 150 MG: 75 CAPSULE ORAL at 09:29

## 2019-07-22 RX ADMIN — AMITRIPTYLINE HYDROCHLORIDE 50 MG: 50 TABLET, FILM COATED ORAL at 20:09

## 2019-07-22 RX ADMIN — SERTRALINE HYDROCHLORIDE 50 MG: 50 TABLET ORAL at 20:10

## 2019-07-22 RX ADMIN — IPRATROPIUM BROMIDE AND ALBUTEROL SULFATE 1 AMPULE: .5; 3 SOLUTION RESPIRATORY (INHALATION) at 08:25

## 2019-07-22 ASSESSMENT — PULMONARY FUNCTION TESTS
PIF_VALUE: 17
PIF_VALUE: 16
PIF_VALUE: 17
PIF_VALUE: 15
PIF_VALUE: 17
PIF_VALUE: 16
PIF_VALUE: 19
PIF_VALUE: 17
PIF_VALUE: 16
PIF_VALUE: 14
PIF_VALUE: 17
PIF_VALUE: 20
PIF_VALUE: 14
PIF_VALUE: 17
PIF_VALUE: 15
PIF_VALUE: 14
PIF_VALUE: 16
PIF_VALUE: 18
PIF_VALUE: 16
PIF_VALUE: 15

## 2019-07-22 ASSESSMENT — PAIN SCALES - GENERAL
PAINLEVEL_OUTOF10: 0

## 2019-07-22 NOTE — PROGRESS NOTES
GENERAL SURGERY INPATIENT PROGRESS NOTE  [facility]) Physicians    PATIENT: She Saucedo, 77 y.o., female, MRN: [id number]    Hospital Day:  LOS: 2 days     She Saucedo is a 77 y.o. female with respiratory failure, COPD exacerbation, and pneumonia, and UTI. Also with imaging showing ileus, gastric bypass anatomy. Subjective:  Chief Complaint: (on vent)  Pain: -/10  BM: none since admission   Diet: DIET TUBE FEED CONTINUOUS/CYCLIC NPO; Semi-elemental (Vital AF); Nasogastric; Continuous; 25; 50; 24  Activity: bedrest    No changes overnight. Still on levophed but coming down, afebrile. No BM. Minimal NG output.       Objective:    Vitals: BP (!) 78/58   Pulse 92   Temp 98.6 °F (37 °C) (Rectal)   Resp 17   Ht 5' 1\" (1.549 m)   Wt 177 lb 4 oz (80.4 kg)   SpO2 100%   BMI 33.49 kg/m²   Vital Signs (Last 24 Hours)  Temp  Av.1 °F (36.7 °C)  Min: 97.3 °F (36.3 °C)  Max: 98.6 °F (37 °C)  Pulse  Av.8  Min: 87  Max: 104  BP  Min: 78/58  Max: 118/84  Resp  Av.2  Min: 12  Max: 24  SpO2  Av.4 %  Min: 95 %  Max: 100 %  Wt Readings from Last 3 Encounters:   19 177 lb 4 oz (80.4 kg)   19 156 lb 11.2 oz (71.1 kg)   19 167 lb 9.6 oz (76 kg)       I/O:  1501 -  1500  In:  [I.V.:2720]  Out: 800 [Urine:800]    IV Fluids: norepinephrine Last Rate: 1 mcg/min (19 1447)    sodium chloride Last Rate: 125 mL/hr at 19 1459    propofol    Scheduled Meds:   vancomycin, 1,250 mg, Intravenous, Q12H    meropenem, 1 g, Intravenous, Q8H    midodrine, 10 mg, Per NG tube, TID WC    amiodarone, 200 mg, Oral, Daily    amitriptyline, 50 mg, Oral, Nightly    aspirin, 81 mg, Oral, Daily    clopidogrel, 75 mg, Oral, Daily    [Held by provider] losartan, 25 mg, Oral, Daily    [Held by provider] metoprolol succinate, 50 mg, Oral, Daily    pregabalin, 150 mg, Oral, BID    sertraline, 50 mg, Oral, Nightly    simvastatin, 20 mg, Oral, Nightly    sodium chloride

## 2019-07-22 NOTE — PROGRESS NOTES
mL/hr at 07/22/19 0606    propofol       PRN Meds:   albuterol 2.5 mg Q6H PRN   sodium chloride flush 10 mL PRN   magnesium hydroxide 30 mL Daily PRN   ondansetron 4 mg Q6H PRN   acetaminophen 650 mg Q4H PRN   bisacodyl 10 mg Daily PRN   acetaminophen 650 mg Q4H PRN     Subjective:     doing well, awake and alert on vent  Objective: Intake/Output Summary (Last 24 hours) at 7/22/2019 1100  Last data filed at 7/22/2019 0602  Gross per 24 hour   Intake 2770 ml   Output 800 ml   Net 1970 ml      Vitals:   Vitals:    07/22/19 1003   BP: 106/74   Pulse: 96   Resp: 17   Temp:    SpO2:      Physical Exam:   Gen:  awake, alert, cooperative, no apparent distress  Head/Eyes:  Normocephalic atraumatic, EOMI   NECK:   symmetrical, trachea midline  LUNGS: Normal Effort on vent  CARDIOVASCULAR:  Normal rate  ABDOMEN: Non tender, non distended, no HSM noted. MUSCULOSKELETAL:  ROM WNL  NEUROLOGIC: Alert and Oriented,  Cranial nerves II-XII are grossly intact.    SKIN:  no bruising or bleeding, normal skin color,  no redness      Data:       CBC   Recent Labs     07/20/19  0121 07/21/19  0550 07/22/19  0435   WBC 9.0 10.2 13.1*   HGB 11.1* 10.9* 10.7*   HCT 33.7* 32.6* 32.2*    199 171      BMP Recent Labs     07/20/19  0121 07/21/19  0550 07/22/19  0435    139 140   K 4.1 4.0 4.0   * 110 111*   CO2 21 21 19*   BUN 17 22 21   CREATININE 0.9 0.7 0.6         Electronically signed by Chirag Nicolas MD on 7/22/2019 at 11:00 AM

## 2019-07-22 NOTE — CONSULTS
ARTHROPLASTY Right 2005    SKIN CANCER EXCISION Right 2012    Right Ankle    ANTONY AND BSO  1972    TOTAL KNEE ARTHROPLASTY Right 2003    Total Right Knee with revision       FAMILY HISTORY    Family History   Problem Relation Age of Onset    Heart Disease Mother     Diabetes Mother    Yaquelin Stambaugh Arthritis Mother     Depression Mother     High Blood Pressure Mother     High Cholesterol Mother     Kidney Disease Mother         On Dialysis    Learning Disabilities Mother     Mental Illness Mother     Stroke Mother     Vision Loss Mother     Early Death Father         Brain Hemorrhage    Other Father         \"Bowel Problems\"    High Blood Pressure Father     Heart Disease Father     Mental Illness Brother         Schizophrenia , Paranoia    Diabetes Brother     Lupus Sister     Arthritis Sister     Depression Sister     Diabetes Sister     Heart Disease Sister     High Blood Pressure Sister     High Cholesterol Sister     Kidney Disease Sister     Stroke Sister        SOCIAL HISTORY    Social History     Tobacco Use    Smoking status: Current Every Day Smoker     Packs/day: 0.50     Years: 32.00     Pack years: 16.00     Types: Cigarettes     Start date: 1986    Smokeless tobacco: Never Used   Substance Use Topics    Alcohol use: No     Alcohol/week: 0.0 standard drinks    Drug use: No     Comment: states quit 1-2-14       ALLERGIES    Allergies   Allergen Reactions    Food Swelling     \"Rye, Whole grains, Barley\"    Trental [Pentoxifylline] Swelling    Tramadol      \"I Felt Shaky\"    Vistaril [Hydroxyzine Hcl] Other (See Comments)     Sedated and was placed on respirator    Erythromycin      \"Stomach Pain\"    Motrin [Ibuprofen] Diarrhea    Tetracyclines & Related Nausea And Vomiting       MEDICATIONS    No current facility-administered medications on file prior to encounter.       Current Outpatient Medications on File Prior to Encounter   Medication Sig Dispense Refill    metoprolol succinate (TOPROL XL) 50 MG extended release tablet Take 50 mg by mouth daily      sertraline (ZOLOFT) 50 MG tablet Take 50 mg by mouth nightly      amiodarone (CORDARONE) 200 MG tablet Take 1 tablet by mouth daily 30 tablet 3    simvastatin (ZOCOR) 20 MG tablet Take 1 tablet by mouth nightly 30 tablet 3    magnesium oxide (MAG-OX) 400 (241.3 Mg) MG TABS tablet Take 1 tablet by mouth 2 times daily 30 tablet 0    fluconazole (DIFLUCAN) 150 MG tablet       losartan (COZAAR) 25 MG tablet       ipratropium-albuterol (DUONEB) 0.5-2.5 (3) MG/3ML SOLN nebulizer solution Inhale 3 mLs into the lungs 4 times daily Dx: COPD J44.9 360 mL 5    amitriptyline (ELAVIL) 50 MG tablet Take 50 mg by mouth nightly      OXYGEN Inhale 2 L/min into the lungs as needed      guaiFENesin (MUCINEX) 600 MG extended release tablet Take 2 tablets by mouth 2 times daily 120 tablet 5    clopidogrel (PLAVIX) 75 MG tablet Take 75 mg by mouth daily      famotidine (PEPCID) 20 MG tablet Take 20 mg by mouth 2 times daily      PROAIR  (90 Base) MCG/ACT inhaler Inhale 2 puffs into the lungs every 6 hours as needed for Wheezing or Shortness of Breath       budesonide-formoterol (SYMBICORT) 160-4.5 MCG/ACT AERO Inhale 2 puffs into the lungs 2 times daily 1 Inhaler 5    aspirin 81 MG chewable tablet Take 1 tablet by mouth daily 30 tablet 0    benzonatate (TESSALON) 100 MG capsule Take 100 mg by mouth 3 times daily as needed for Cough       pregabalin (LYRICA) 150 MG capsule Take 150 mg by mouth 2 times daily      albuterol (PROVENTIL) (2.5 MG/3ML) 0.083% nebulizer solution Take 3 mLs by nebulization every 6 hours as needed for Wheezing 120 each 3    Multiple Vitamins-Minerals (MULTIVITAMIN PO) Take  by mouth every morning.  Over The Counter           Objective:      BP (!) 82/57   Pulse 87   Temp 98.4 °F (36.9 °C) (Rectal)   Resp 15   Ht 5' 1\" (1.549 m)   Wt 177 lb 4 oz (80.4 kg)   SpO2 100%   BMI 33.49 kg/m²   Devendra Risk 10:41 AM   Wound Cleansed Rinsed/Irrigated with saline 6/20/2019 10:41 AM   Wound Length (cm) 0.5 cm 6/20/2019 10:41 AM   Wound Width (cm) 0.5 cm 6/20/2019 10:41 AM   Wound Depth (cm) 0.1 cm 6/20/2019 10:41 AM   Wound Surface Area (cm^2) 0.25 cm^2 6/20/2019 10:41 AM   Wound Volume (cm^3) 0.02 cm^3 6/20/2019 10:41 AM   Distance Tunneling (cm) 0 cm 6/20/2019 10:41 AM   Tunneling Position ___ O'Clock 0 6/20/2019 10:41 AM   Undermining Starts ___ O'Clock 0 6/20/2019 10:41 AM   Undermining Ends___ O'Clock 0 6/20/2019 10:41 AM   Undermining Maxium Distance (cm) 0 6/20/2019 10:41 AM   Wound Assessment Pink 6/20/2019 10:41 AM   Drainage Amount Small 6/20/2019 10:41 AM   Drainage Description Serosanguinous 6/20/2019 10:41 AM   Odor None 6/20/2019 10:41 AM   Margins Defined edges 6/20/2019 10:41 AM   Jana-wound Assessment Intact 6/20/2019 10:41 AM   Non-staged Wound Description Full thickness 6/20/2019 10:41 AM   [place]%Wound Bed 100 6/20/2019 10:41 AM   Red%Wound Bed 0 6/20/2019 10:41 AM   Yellow%Wound Bed 0 6/20/2019 10:41 AM   Black%Wound Bed 0 6/20/2019 10:41 AM   Purple%Wound Bed 0 6/20/2019 10:41 AM   Other%Wound Bed 0 6/20/2019 10:41 AM   Number of days: 32       Wound 07/20/19 Coccyx Mid coccyx (Active)   Wound Pressure Stage  2 7/22/2019  5:00 PM   Dressing Status Clean;Dry; Intact 7/22/2019  5:00 PM   Dressing Changed Changed/New 7/22/2019  5:00 PM   Wound Cleansed Not Cleansed 7/22/2019  5:00 PM   Wound Length (cm) 0.5 cm 7/22/2019  5:00 PM   Wound Width (cm) 0.6 cm 7/22/2019  5:00 PM   Wound Depth (cm) 0.1 cm 7/22/2019  5:00 PM   Wound Surface Area (cm^2) 0.3 cm^2 7/22/2019  5:00 PM   Change in Wound Size % (l*w) -20 7/22/2019  5:00 PM   Wound Volume (cm^3) 0.03 cm^3 7/22/2019  5:00 PM   Distance Tunneling (cm) 0 cm 7/22/2019  5:00 PM   Tunneling Position ___ O'Clock 0 7/22/2019  5:00 PM   Undermining Starts ___ O'Clock 0 7/22/2019  5:00 PM   Undermining Ends___ O'Clock 0 7/22/2019  5:00 PM   Undermining Maxium

## 2019-07-23 ENCOUNTER — APPOINTMENT (OUTPATIENT)
Dept: GENERAL RADIOLOGY | Age: 69
DRG: 871 | End: 2019-07-23
Payer: MEDICARE

## 2019-07-23 ENCOUNTER — APPOINTMENT (OUTPATIENT)
Dept: ULTRASOUND IMAGING | Age: 69
DRG: 871 | End: 2019-07-23
Payer: MEDICARE

## 2019-07-23 LAB
ANION GAP SERPL CALCULATED.3IONS-SCNC: 13 MMOL/L (ref 4–16)
ANION GAP SERPL CALCULATED.3IONS-SCNC: 7 MMOL/L (ref 4–16)
ANION GAP SERPL CALCULATED.3IONS-SCNC: 9 MMOL/L (ref 4–16)
BASE EXCESS: ABNORMAL (ref 0–2.4)
BUN BLDV-MCNC: 13 MG/DL (ref 6–23)
BUN BLDV-MCNC: 16 MG/DL (ref 6–23)
BUN BLDV-MCNC: 18 MG/DL (ref 6–23)
CALCIUM SERPL-MCNC: 8.6 MG/DL (ref 8.3–10.6)
CALCIUM SERPL-MCNC: 8.6 MG/DL (ref 8.3–10.6)
CALCIUM SERPL-MCNC: 8.9 MG/DL (ref 8.3–10.6)
CARBON MONOXIDE, BLOOD: 3.1 % (ref 0–5)
CHLORIDE BLD-SCNC: 109 MMOL/L (ref 99–110)
CHLORIDE BLD-SCNC: 111 MMOL/L (ref 99–110)
CHLORIDE BLD-SCNC: 111 MMOL/L (ref 99–110)
CO2 CONTENT: 20 MMOL/L (ref 19–24)
CO2: 18 MMOL/L (ref 21–32)
CO2: 19 MMOL/L (ref 21–32)
CO2: 21 MMOL/L (ref 21–32)
COMMENT: ABNORMAL
CREAT SERPL-MCNC: 0.5 MG/DL (ref 0.6–1.1)
CREAT SERPL-MCNC: 0.6 MG/DL (ref 0.6–1.1)
CREAT SERPL-MCNC: 0.6 MG/DL (ref 0.6–1.1)
CULTURE: ABNORMAL
CULTURE: ABNORMAL
DOSE AMOUNT: ABNORMAL
DOSE TIME: ABNORMAL
GFR AFRICAN AMERICAN: >60 ML/MIN/1.73M2
GFR NON-AFRICAN AMERICAN: >60 ML/MIN/1.73M2
GLUCOSE BLD-MCNC: 226 MG/DL (ref 70–99)
GLUCOSE BLD-MCNC: 231 MG/DL (ref 70–99)
GLUCOSE BLD-MCNC: 263 MG/DL (ref 70–99)
GRAM SMEAR: ABNORMAL
HCO3 ARTERIAL: 18.9 MMOL/L (ref 18–23)
HCT VFR BLD CALC: 27.6 % (ref 37–47)
HEMOGLOBIN: 9.2 GM/DL (ref 12.5–16)
Lab: ABNORMAL
MAGNESIUM: 1.8 MG/DL (ref 1.8–2.4)
MAGNESIUM: 1.9 MG/DL (ref 1.8–2.4)
MCH RBC QN AUTO: 27.3 PG (ref 27–31)
MCHC RBC AUTO-ENTMCNC: 33.3 % (ref 32–36)
MCV RBC AUTO: 81.9 FL (ref 78–100)
METHEMOGLOBIN ARTERIAL: 1.7 %
O2 SATURATION: 71.9 % (ref 96–97)
PCO2 ARTERIAL: 35 MMHG (ref 32–45)
PDW BLD-RTO: 16.4 % (ref 11.7–14.9)
PH BLOOD: 7.34 (ref 7.34–7.45)
PHOSPHORUS: 1.7 MG/DL (ref 2.5–4.9)
PHOSPHORUS: 1.7 MG/DL (ref 2.5–4.9)
PLATELET # BLD: 132 K/CU MM (ref 140–440)
PMV BLD AUTO: 12.8 FL (ref 7.5–11.1)
PO2 ARTERIAL: 41 MMHG (ref 75–100)
POTASSIUM SERPL-SCNC: 3.5 MMOL/L (ref 3.5–5.1)
POTASSIUM SERPL-SCNC: 3.7 MMOL/L (ref 3.5–5.1)
POTASSIUM SERPL-SCNC: 3.7 MMOL/L (ref 3.5–5.1)
RBC # BLD: 3.37 M/CU MM (ref 4.2–5.4)
SODIUM BLD-SCNC: 137 MMOL/L (ref 135–145)
SODIUM BLD-SCNC: 139 MMOL/L (ref 135–145)
SODIUM BLD-SCNC: 142 MMOL/L (ref 135–145)
SPECIMEN: ABNORMAL
VANCOMYCIN TROUGH: 20.6 UG/ML (ref 10–20)
WBC # BLD: 13.8 K/CU MM (ref 4–10.5)

## 2019-07-23 PROCEDURE — 99232 SBSQ HOSP IP/OBS MODERATE 35: CPT | Performed by: SURGERY

## 2019-07-23 PROCEDURE — 85027 COMPLETE CBC AUTOMATED: CPT

## 2019-07-23 PROCEDURE — 82803 BLOOD GASES ANY COMBINATION: CPT

## 2019-07-23 PROCEDURE — 2580000003 HC RX 258: Performed by: FAMILY MEDICINE

## 2019-07-23 PROCEDURE — 89220 SPUTUM SPECIMEN COLLECTION: CPT

## 2019-07-23 PROCEDURE — 6360000002 HC RX W HCPCS: Performed by: INTERNAL MEDICINE

## 2019-07-23 PROCEDURE — 2000000000 HC ICU R&B

## 2019-07-23 PROCEDURE — 2580000003 HC RX 258: Performed by: NURSE PRACTITIONER

## 2019-07-23 PROCEDURE — 71045 X-RAY EXAM CHEST 1 VIEW: CPT

## 2019-07-23 PROCEDURE — 6370000000 HC RX 637 (ALT 250 FOR IP): Performed by: INTERNAL MEDICINE

## 2019-07-23 PROCEDURE — 2580000003 HC RX 258: Performed by: HOSPITALIST

## 2019-07-23 PROCEDURE — 2580000003 HC RX 258: Performed by: INTERNAL MEDICINE

## 2019-07-23 PROCEDURE — 94003 VENT MGMT INPAT SUBQ DAY: CPT

## 2019-07-23 PROCEDURE — 6370000000 HC RX 637 (ALT 250 FOR IP): Performed by: FAMILY MEDICINE

## 2019-07-23 PROCEDURE — 36592 COLLECT BLOOD FROM PICC: CPT

## 2019-07-23 PROCEDURE — 83735 ASSAY OF MAGNESIUM: CPT

## 2019-07-23 PROCEDURE — 6360000002 HC RX W HCPCS: Performed by: NURSE PRACTITIONER

## 2019-07-23 PROCEDURE — 6370000000 HC RX 637 (ALT 250 FOR IP): Performed by: NURSE PRACTITIONER

## 2019-07-23 PROCEDURE — 82565 ASSAY OF CREATININE: CPT

## 2019-07-23 PROCEDURE — 6370000000 HC RX 637 (ALT 250 FOR IP): Performed by: SURGERY

## 2019-07-23 PROCEDURE — 2500000003 HC RX 250 WO HCPCS: Performed by: HOSPITALIST

## 2019-07-23 PROCEDURE — 36600 WITHDRAWAL OF ARTERIAL BLOOD: CPT

## 2019-07-23 PROCEDURE — 94640 AIRWAY INHALATION TREATMENT: CPT

## 2019-07-23 PROCEDURE — 94760 N-INVAS EAR/PLS OXIMETRY 1: CPT

## 2019-07-23 PROCEDURE — 80202 ASSAY OF VANCOMYCIN: CPT

## 2019-07-23 PROCEDURE — 92610 EVALUATE SWALLOWING FUNCTION: CPT

## 2019-07-23 PROCEDURE — C9113 INJ PANTOPRAZOLE SODIUM, VIA: HCPCS | Performed by: NURSE PRACTITIONER

## 2019-07-23 PROCEDURE — 84100 ASSAY OF PHOSPHORUS: CPT

## 2019-07-23 PROCEDURE — 6360000002 HC RX W HCPCS: Performed by: FAMILY MEDICINE

## 2019-07-23 PROCEDURE — 2500000003 HC RX 250 WO HCPCS

## 2019-07-23 PROCEDURE — 80048 BASIC METABOLIC PNL TOTAL CA: CPT

## 2019-07-23 PROCEDURE — 93970 EXTREMITY STUDY: CPT

## 2019-07-23 PROCEDURE — 2700000000 HC OXYGEN THERAPY PER DAY

## 2019-07-23 RX ORDER — METHYLPREDNISOLONE SODIUM SUCCINATE 40 MG/ML
40 INJECTION, POWDER, LYOPHILIZED, FOR SOLUTION INTRAMUSCULAR; INTRAVENOUS EVERY 8 HOURS
Status: DISCONTINUED | OUTPATIENT
Start: 2019-07-23 | End: 2019-07-24

## 2019-07-23 RX ORDER — SODIUM CHLORIDE 0.9 % (FLUSH) 0.9 %
10 SYRINGE (ML) INJECTION PRN
Status: DISCONTINUED | OUTPATIENT
Start: 2019-07-23 | End: 2019-08-05 | Stop reason: SDUPTHER

## 2019-07-23 RX ORDER — FUROSEMIDE 10 MG/ML
40 INJECTION INTRAMUSCULAR; INTRAVENOUS ONCE
Status: COMPLETED | OUTPATIENT
Start: 2019-07-23 | End: 2019-07-23

## 2019-07-23 RX ORDER — METOPROLOL TARTRATE 5 MG/5ML
5 INJECTION INTRAVENOUS ONCE
Status: COMPLETED | OUTPATIENT
Start: 2019-07-23 | End: 2019-07-23

## 2019-07-23 RX ORDER — SODIUM CHLORIDE 0.9 % (FLUSH) 0.9 %
10 SYRINGE (ML) INJECTION EVERY 12 HOURS SCHEDULED
Status: DISCONTINUED | OUTPATIENT
Start: 2019-07-23 | End: 2019-08-05 | Stop reason: SDUPTHER

## 2019-07-23 RX ORDER — IPRATROPIUM BROMIDE AND ALBUTEROL SULFATE 2.5; .5 MG/3ML; MG/3ML
1 SOLUTION RESPIRATORY (INHALATION)
Status: DISCONTINUED | OUTPATIENT
Start: 2019-07-23 | End: 2019-08-07 | Stop reason: HOSPADM

## 2019-07-23 RX ORDER — MAGNESIUM SULFATE 1 G/100ML
1 INJECTION INTRAVENOUS PRN
Status: DISCONTINUED | OUTPATIENT
Start: 2019-07-23 | End: 2019-08-07 | Stop reason: HOSPADM

## 2019-07-23 RX ORDER — MAGNESIUM SULFATE IN WATER 40 MG/ML
2 INJECTION, SOLUTION INTRAVENOUS ONCE
Status: COMPLETED | OUTPATIENT
Start: 2019-07-23 | End: 2019-07-23

## 2019-07-23 RX ORDER — VANCOMYCIN HYDROCHLORIDE 1 G/200ML
1000 INJECTION, SOLUTION INTRAVENOUS EVERY 12 HOURS
Status: DISCONTINUED | OUTPATIENT
Start: 2019-07-23 | End: 2019-07-25

## 2019-07-23 RX ORDER — METOPROLOL TARTRATE 5 MG/5ML
INJECTION INTRAVENOUS
Status: COMPLETED
Start: 2019-07-23 | End: 2019-07-23

## 2019-07-23 RX ORDER — LIDOCAINE HYDROCHLORIDE 10 MG/ML
5 INJECTION, SOLUTION EPIDURAL; INFILTRATION; INTRACAUDAL; PERINEURAL ONCE
Status: DISCONTINUED | OUTPATIENT
Start: 2019-07-23 | End: 2019-07-30

## 2019-07-23 RX ADMIN — IPRATROPIUM BROMIDE AND ALBUTEROL SULFATE 1 AMPULE: .5; 3 SOLUTION RESPIRATORY (INHALATION) at 19:35

## 2019-07-23 RX ADMIN — IPRATROPIUM BROMIDE AND ALBUTEROL SULFATE 1 AMPULE: .5; 3 SOLUTION RESPIRATORY (INHALATION) at 03:53

## 2019-07-23 RX ADMIN — VANCOMYCIN HYDROCHLORIDE 1000 MG: 1 INJECTION, SOLUTION INTRAVENOUS at 19:06

## 2019-07-23 RX ADMIN — SODIUM CHLORIDE: 9 INJECTION, SOLUTION INTRAVENOUS at 00:10

## 2019-07-23 RX ADMIN — METHYLPREDNISOLONE SODIUM SUCCINATE 40 MG: 40 INJECTION, POWDER, LYOPHILIZED, FOR SOLUTION INTRAMUSCULAR; INTRAVENOUS at 08:19

## 2019-07-23 RX ADMIN — IPRATROPIUM BROMIDE AND ALBUTEROL SULFATE 1 AMPULE: .5; 3 SOLUTION RESPIRATORY (INHALATION) at 00:17

## 2019-07-23 RX ADMIN — SODIUM CHLORIDE, PRESERVATIVE FREE 10 ML: 5 INJECTION INTRAVENOUS at 08:22

## 2019-07-23 RX ADMIN — SIMVASTATIN 20 MG: 20 TABLET, FILM COATED ORAL at 19:54

## 2019-07-23 RX ADMIN — METOPROLOL TARTRATE 5 MG: 5 INJECTION INTRAVENOUS at 16:33

## 2019-07-23 RX ADMIN — CHLORHEXIDINE GLUCONATE 0.12% ORAL RINSE 15 ML: 1.2 LIQUID ORAL at 08:19

## 2019-07-23 RX ADMIN — FUROSEMIDE 40 MG: 10 INJECTION, SOLUTION INTRAMUSCULAR; INTRAVENOUS at 18:33

## 2019-07-23 RX ADMIN — MEROPENEM 1 G: 1 INJECTION, POWDER, FOR SOLUTION INTRAVENOUS at 18:29

## 2019-07-23 RX ADMIN — METOPROLOL TARTRATE 25 MG: 25 TABLET ORAL at 19:54

## 2019-07-23 RX ADMIN — MAGNESIUM SULFATE HEPTAHYDRATE 2 G: 40 INJECTION, SOLUTION INTRAVENOUS at 19:12

## 2019-07-23 RX ADMIN — SODIUM CHLORIDE, PRESERVATIVE FREE 10 ML: 5 INJECTION INTRAVENOUS at 19:57

## 2019-07-23 RX ADMIN — SODIUM CHLORIDE: 9 INJECTION, SOLUTION INTRAVENOUS at 21:25

## 2019-07-23 RX ADMIN — DOCUSATE SODIUM 100 MG: 100 CAPSULE, LIQUID FILLED ORAL at 19:54

## 2019-07-23 RX ADMIN — METHYLPREDNISOLONE SODIUM SUCCINATE 40 MG: 40 INJECTION, POWDER, LYOPHILIZED, FOR SOLUTION INTRAMUSCULAR; INTRAVENOUS at 01:41

## 2019-07-23 RX ADMIN — MIDODRINE HYDROCHLORIDE 10 MG: 5 TABLET ORAL at 08:20

## 2019-07-23 RX ADMIN — SODIUM CHLORIDE: 9 INJECTION, SOLUTION INTRAVENOUS at 10:12

## 2019-07-23 RX ADMIN — SODIUM PHOSPHATE, MONOBASIC, MONOHYDRATE 12.87 MMOL: 276; 142 INJECTION, SOLUTION INTRAVENOUS at 19:54

## 2019-07-23 RX ADMIN — SERTRALINE HYDROCHLORIDE 50 MG: 50 TABLET ORAL at 19:54

## 2019-07-23 RX ADMIN — ENOXAPARIN SODIUM 30 MG: 30 INJECTION SUBCUTANEOUS at 08:21

## 2019-07-23 RX ADMIN — VANCOMYCIN HYDROCHLORIDE 1250 MG: 5 INJECTION, POWDER, LYOPHILIZED, FOR SOLUTION INTRAVENOUS at 05:35

## 2019-07-23 RX ADMIN — IPRATROPIUM BROMIDE AND ALBUTEROL SULFATE 1 AMPULE: .5; 3 SOLUTION RESPIRATORY (INHALATION) at 08:43

## 2019-07-23 RX ADMIN — PREGABALIN 150 MG: 75 CAPSULE ORAL at 08:20

## 2019-07-23 RX ADMIN — PREGABALIN 150 MG: 75 CAPSULE ORAL at 19:54

## 2019-07-23 RX ADMIN — AMITRIPTYLINE HYDROCHLORIDE 50 MG: 50 TABLET, FILM COATED ORAL at 19:54

## 2019-07-23 RX ADMIN — METHYLPREDNISOLONE SODIUM SUCCINATE 40 MG: 40 INJECTION, POWDER, LYOPHILIZED, FOR SOLUTION INTRAMUSCULAR; INTRAVENOUS at 23:53

## 2019-07-23 RX ADMIN — MEROPENEM 1 G: 1 INJECTION, POWDER, FOR SOLUTION INTRAVENOUS at 02:43

## 2019-07-23 RX ADMIN — ASPIRIN 81 MG 81 MG: 81 TABLET ORAL at 08:20

## 2019-07-23 RX ADMIN — PANTOPRAZOLE SODIUM 40 MG: 40 INJECTION, POWDER, FOR SOLUTION INTRAVENOUS at 06:05

## 2019-07-23 RX ADMIN — IPRATROPIUM BROMIDE AND ALBUTEROL SULFATE 1 AMPULE: .5; 3 SOLUTION RESPIRATORY (INHALATION) at 16:04

## 2019-07-23 RX ADMIN — Medication 10 ML: at 06:05

## 2019-07-23 RX ADMIN — SODIUM PHOSPHATE, MONOBASIC, MONOHYDRATE 12.87 MMOL: 276; 142 INJECTION, SOLUTION INTRAVENOUS at 02:22

## 2019-07-23 RX ADMIN — ALBUTEROL SULFATE 2.5 MG: 2.5 SOLUTION RESPIRATORY (INHALATION) at 10:16

## 2019-07-23 RX ADMIN — METHYLPREDNISOLONE SODIUM SUCCINATE 40 MG: 40 INJECTION, POWDER, LYOPHILIZED, FOR SOLUTION INTRAMUSCULAR; INTRAVENOUS at 16:52

## 2019-07-23 RX ADMIN — MIDODRINE HYDROCHLORIDE 10 MG: 5 TABLET ORAL at 18:47

## 2019-07-23 RX ADMIN — CLOPIDOGREL BISULFATE 75 MG: 75 TABLET ORAL at 08:20

## 2019-07-23 RX ADMIN — AMIODARONE HYDROCHLORIDE 200 MG: 200 TABLET ORAL at 08:20

## 2019-07-23 RX ADMIN — MEROPENEM 1 G: 1 INJECTION, POWDER, FOR SOLUTION INTRAVENOUS at 14:35

## 2019-07-23 ASSESSMENT — PULMONARY FUNCTION TESTS
PIF_VALUE: 16
PIF_VALUE: 16
PIF_VALUE: 21
PIF_VALUE: 15
PIF_VALUE: 21
PIF_VALUE: 16
PIF_VALUE: 16
PIF_VALUE: 21
PIF_VALUE: 16
PIF_VALUE: 21
PIF_VALUE: 19

## 2019-07-23 ASSESSMENT — PAIN SCALES - GENERAL
PAINLEVEL_OUTOF10: 0

## 2019-07-23 NOTE — PROGRESS NOTES
mg, Oral, Nightly    aspirin, 81 mg, Oral, Daily    clopidogrel, 75 mg, Oral, Daily    [Held by provider] losartan, 25 mg, Oral, Daily    pregabalin, 150 mg, Oral, BID    sertraline, 50 mg, Oral, Nightly    simvastatin, 20 mg, Oral, Nightly    sodium chloride flush, 10 mL, Intravenous, 2 times per day    chlorhexidine, 15 mL, Mouth/Throat, BID    pantoprazole, 40 mg, Intravenous, Daily **AND** sodium chloride (PF), 10 mL, Intravenous, Daily    docusate sodium, 100 mg, Oral, BID    enoxaparin, 30 mg, Subcutaneous, Daily    Physical Exam:  General Appearance:   alert, no distress    Head:   Normocephalic, atraumatic    Lungs:    Equal chest rise, respirations unlabored   Heart:   Regular rate and rhythm    Abdomen:    Soft, non-tender, no rebound or guarding    Extremities:  No cyanosis or edema         Labs/Imaging Results:   Recent Results (from the past 24 hour(s))   Critical Care Panel    Collection Time: 07/23/19 12:30 AM   Result Value Ref Range    Sodium 139 135 - 145 MMOL/L    Potassium 3.7 3.5 - 5.1 MMOL/L    Chloride 111 (H) 99 - 110 mMol/L    CO2 19 (L) 21 - 32 MMOL/L    Anion Gap 9 4 - 16    BUN 18 6 - 23 MG/DL    CREATININE 0.6 0.6 - 1.1 MG/DL    Glucose 231 (H) 70 - 99 MG/DL    Calcium 8.6 8.3 - 10.6 MG/DL    GFR Non-African American >60 >60 mL/min/1.73m2    GFR African American >60 >60 mL/min/1.73m2    Phosphorus 1.7 (L) 2.5 - 4.9 MG/DL    Magnesium 1.9 1.8 - 2.4 mg/dl   Basic Metabolic Panel w/ Reflex to MG    Collection Time: 07/23/19  5:00 AM   Result Value Ref Range    Sodium 142 135 - 145 MMOL/L    Potassium 3.7 3.5 - 5.1 MMOL/L    Chloride 111 (H) 99 - 110 mMol/L    CO2 18 (L) 21 - 32 MMOL/L    Anion Gap 13 4 - 16    BUN 16 6 - 23 MG/DL    CREATININE 0.6 0.6 - 1.1 MG/DL    Glucose 263 (H) 70 - 99 MG/DL    Calcium 8.6 8.3 - 10.6 MG/DL    GFR Non-African American >60 >60 mL/min/1.73m2    GFR African American >60 >60 mL/min/1.73m2   CBC    Collection Time: 07/23/19  5:00 AM   Result Value

## 2019-07-23 NOTE — PROGRESS NOTES
Thin  Recommended Form of Meds: Meds in puree  Recommendations: Total feed  Therapeutic Interventions: Diet tolerance monitoring; Therapeutic PO trials with SLP;Patient/Family education    Compensatory Swallowing Strategies  Compensatory Swallowing Strategies: Upright as possible for all oral intake; Total feed    Treatment/Goals  Short-term Goals  Timeframe for Short-term Goals: length of admission  Goal 1: Pt will tolerate puree/thin liquid diet with adequate oral manipulation and no s/s aspiration. Goal 2: Pt will tolerate trials of advanced textures with adequate oral manipulation for possible diet advancement. Goal 3: Pt/caregivers will indicate understanding of all recommendations. General  Chart Reviewed: Yes  Behavior/Cognition: Alert;Confused; Cooperative; Requires cueing  Respiratory Status: O2 via nasual cannula  O2 Device: Nasal cannula  Communication Observation: (cognitive communication impairment)  Follows Directions: Simple  Dentition: Edentulous  Patient Positioning: Upright in bed  Baseline Vocal Quality: Weak;Aphonic;Dysphonic(inconsistent)  Prior Dysphagia History: hx MBS and difficulty with solids; baseline diet soft solids/thin liquids  Consistencies Administered: Soft solid;Puree; Thin - straw; Ice Chips; Thin - teaspoon           Vision/Hearing  Vision  Vision: Within Functional Limits  Hearing  Hearing: Within functional limits    Oral Motor Deficits  Oral/Motor  Oral Motor: Exceptions to [facility]    Oral Phase Dysfunction  Oral Phase  Oral Phase: Exceptions     Indicators of Pharyngeal Phase Dysfunction   Pharyngeal Phase  Pharyngeal Phase: Exceptions    Prognosis  Prognosis  Prognosis for safe diet advancement: fair  Individuals consulted  Consulted and agree with results and recommendations: Patient;RN    Education  Patient Education: recommendations  Patient Education Response: Verbalizes understanding;Needs reinforcement  Safety Devices in place: Yes  Type of devices:  All fall risk precautions in place           Therapy Time  SLP Individual Minutes  Time In: 7989  Time Out: 3458  Minutes: [address] [facility], [address] [facility]-SLP  7/23/2019 2:48 PM

## 2019-07-24 ENCOUNTER — APPOINTMENT (OUTPATIENT)
Dept: GENERAL RADIOLOGY | Age: 69
DRG: 871 | End: 2019-07-24
Payer: MEDICARE

## 2019-07-24 ENCOUNTER — APPOINTMENT (OUTPATIENT)
Dept: INTERVENTIONAL RADIOLOGY/VASCULAR | Age: 69
DRG: 871 | End: 2019-07-24
Payer: MEDICARE

## 2019-07-24 LAB
ANION GAP SERPL CALCULATED.3IONS-SCNC: 9 MMOL/L (ref 4–16)
BASE EXCESS MIXED: ABNORMAL (ref 0–2.3)
BUN BLDV-MCNC: 11 MG/DL (ref 6–23)
CALCIUM SERPL-MCNC: 8.6 MG/DL (ref 8.3–10.6)
CARBON MONOXIDE, BLOOD: 2.7 % (ref 0–5)
CHLORIDE BLD-SCNC: 109 MMOL/L (ref 99–110)
CO2 CONTENT: 26.7 MMOL/L (ref 19–24)
CO2: 22 MMOL/L (ref 21–32)
COMMENT: ABNORMAL
CREAT SERPL-MCNC: 0.5 MG/DL (ref 0.6–1.1)
GFR AFRICAN AMERICAN: >60 ML/MIN/1.73M2
GFR NON-AFRICAN AMERICAN: >60 ML/MIN/1.73M2
GLUCOSE BLD-MCNC: 244 MG/DL (ref 70–99)
HCO3 ARTERIAL: 25.4 MMOL/L (ref 18–23)
HCT VFR BLD CALC: 26.8 % (ref 37–47)
HEMOGLOBIN: 8.7 GM/DL (ref 12.5–16)
MAGNESIUM: 1.9 MG/DL (ref 1.8–2.4)
MCH RBC QN AUTO: 27 PG (ref 27–31)
MCHC RBC AUTO-ENTMCNC: 32.5 % (ref 32–36)
MCV RBC AUTO: 83.2 FL (ref 78–100)
METHEMOGLOBIN ARTERIAL: 1.1 %
O2 SATURATION: 83 % (ref 96–97)
PCO2 ARTERIAL: 42 MMHG (ref 32–45)
PDW BLD-RTO: 16.3 % (ref 11.7–14.9)
PH BLOOD: 7.39 (ref 7.34–7.45)
PHOSPHORUS: 2.2 MG/DL (ref 2.5–4.9)
PLATELET # BLD: 114 K/CU MM (ref 140–440)
PMV BLD AUTO: 12.5 FL (ref 7.5–11.1)
PO2 ARTERIAL: 50 MMHG (ref 75–100)
POTASSIUM SERPL-SCNC: 3.5 MMOL/L (ref 3.5–5.1)
PRO-BNP: 2501 PG/ML
RBC # BLD: 3.22 M/CU MM (ref 4.2–5.4)
SODIUM BLD-SCNC: 140 MMOL/L (ref 135–145)
WBC # BLD: 15.3 K/CU MM (ref 4–10.5)

## 2019-07-24 PROCEDURE — 97167 OT EVAL HIGH COMPLEX 60 MIN: CPT

## 2019-07-24 PROCEDURE — 6370000000 HC RX 637 (ALT 250 FOR IP): Performed by: NURSE PRACTITIONER

## 2019-07-24 PROCEDURE — 2580000003 HC RX 258: Performed by: FAMILY MEDICINE

## 2019-07-24 PROCEDURE — 6360000002 HC RX W HCPCS: Performed by: INTERNAL MEDICINE

## 2019-07-24 PROCEDURE — 83735 ASSAY OF MAGNESIUM: CPT

## 2019-07-24 PROCEDURE — 6370000000 HC RX 637 (ALT 250 FOR IP): Performed by: INTERNAL MEDICINE

## 2019-07-24 PROCEDURE — 80048 BASIC METABOLIC PNL TOTAL CA: CPT

## 2019-07-24 PROCEDURE — 82803 BLOOD GASES ANY COMBINATION: CPT

## 2019-07-24 PROCEDURE — 6360000002 HC RX W HCPCS: Performed by: NURSE PRACTITIONER

## 2019-07-24 PROCEDURE — 83880 ASSAY OF NATRIURETIC PEPTIDE: CPT

## 2019-07-24 PROCEDURE — 2700000000 HC OXYGEN THERAPY PER DAY

## 2019-07-24 PROCEDURE — 36600 WITHDRAWAL OF ARTERIAL BLOOD: CPT

## 2019-07-24 PROCEDURE — 2709999900 HC NON-CHARGEABLE SUPPLY

## 2019-07-24 PROCEDURE — C1894 INTRO/SHEATH, NON-LASER: HCPCS

## 2019-07-24 PROCEDURE — 36573 INSJ PICC RS&I 5 YR+: CPT

## 2019-07-24 PROCEDURE — 84100 ASSAY OF PHOSPHORUS: CPT

## 2019-07-24 PROCEDURE — 92526 ORAL FUNCTION THERAPY: CPT

## 2019-07-24 PROCEDURE — 6360000002 HC RX W HCPCS: Performed by: FAMILY MEDICINE

## 2019-07-24 PROCEDURE — 99232 SBSQ HOSP IP/OBS MODERATE 35: CPT | Performed by: SURGERY

## 2019-07-24 PROCEDURE — 71045 X-RAY EXAM CHEST 1 VIEW: CPT

## 2019-07-24 PROCEDURE — 94640 AIRWAY INHALATION TREATMENT: CPT

## 2019-07-24 PROCEDURE — 85027 COMPLETE CBC AUTOMATED: CPT

## 2019-07-24 PROCEDURE — C1751 CATH, INF, PER/CENT/MIDLINE: HCPCS

## 2019-07-24 PROCEDURE — 94761 N-INVAS EAR/PLS OXIMETRY MLT: CPT

## 2019-07-24 PROCEDURE — 2500000003 HC RX 250 WO HCPCS: Performed by: FAMILY MEDICINE

## 2019-07-24 PROCEDURE — 97110 THERAPEUTIC EXERCISES: CPT

## 2019-07-24 PROCEDURE — 2000000000 HC ICU R&B

## 2019-07-24 PROCEDURE — 6370000000 HC RX 637 (ALT 250 FOR IP): Performed by: SURGERY

## 2019-07-24 PROCEDURE — 36592 COLLECT BLOOD FROM PICC: CPT

## 2019-07-24 PROCEDURE — 94664 DEMO&/EVAL PT USE INHALER: CPT

## 2019-07-24 PROCEDURE — 97163 PT EVAL HIGH COMPLEX 45 MIN: CPT

## 2019-07-24 PROCEDURE — 2580000003 HC RX 258: Performed by: NURSE PRACTITIONER

## 2019-07-24 PROCEDURE — 97530 THERAPEUTIC ACTIVITIES: CPT

## 2019-07-24 PROCEDURE — 6370000000 HC RX 637 (ALT 250 FOR IP): Performed by: FAMILY MEDICINE

## 2019-07-24 RX ORDER — METHYLPREDNISOLONE SODIUM SUCCINATE 40 MG/ML
40 INJECTION, POWDER, LYOPHILIZED, FOR SOLUTION INTRAMUSCULAR; INTRAVENOUS EVERY 12 HOURS
Status: DISCONTINUED | OUTPATIENT
Start: 2019-07-24 | End: 2019-07-26

## 2019-07-24 RX ORDER — MAGNESIUM SULFATE IN WATER 40 MG/ML
2 INJECTION, SOLUTION INTRAVENOUS ONCE
Status: COMPLETED | OUTPATIENT
Start: 2019-07-24 | End: 2019-07-24

## 2019-07-24 RX ORDER — FUROSEMIDE 10 MG/ML
20 INJECTION INTRAMUSCULAR; INTRAVENOUS 2 TIMES DAILY
Status: DISCONTINUED | OUTPATIENT
Start: 2019-07-24 | End: 2019-07-24

## 2019-07-24 RX ORDER — FUROSEMIDE 10 MG/ML
20 INJECTION INTRAMUSCULAR; INTRAVENOUS ONCE
Status: COMPLETED | OUTPATIENT
Start: 2019-07-24 | End: 2019-07-24

## 2019-07-24 RX ORDER — POTASSIUM CHLORIDE 29.8 MG/ML
40 INJECTION INTRAVENOUS ONCE
Status: COMPLETED | OUTPATIENT
Start: 2019-07-24 | End: 2019-07-24

## 2019-07-24 RX ADMIN — SODIUM CHLORIDE, PRESERVATIVE FREE 10 ML: 5 INJECTION INTRAVENOUS at 09:00

## 2019-07-24 RX ADMIN — FUROSEMIDE 20 MG: 10 INJECTION, SOLUTION INTRAVENOUS at 15:13

## 2019-07-24 RX ADMIN — ONDANSETRON 4 MG: 2 INJECTION INTRAMUSCULAR; INTRAVENOUS at 09:39

## 2019-07-24 RX ADMIN — SERTRALINE HYDROCHLORIDE 50 MG: 50 TABLET ORAL at 20:28

## 2019-07-24 RX ADMIN — AMITRIPTYLINE HYDROCHLORIDE 50 MG: 50 TABLET, FILM COATED ORAL at 20:28

## 2019-07-24 RX ADMIN — SODIUM CHLORIDE, PRESERVATIVE FREE 10 ML: 5 INJECTION INTRAVENOUS at 20:28

## 2019-07-24 RX ADMIN — SIMVASTATIN 20 MG: 20 TABLET, FILM COATED ORAL at 20:28

## 2019-07-24 RX ADMIN — PREGABALIN 150 MG: 75 CAPSULE ORAL at 09:48

## 2019-07-24 RX ADMIN — ASPIRIN 81 MG 81 MG: 81 TABLET ORAL at 09:48

## 2019-07-24 RX ADMIN — METOPROLOL TARTRATE 12.5 MG: 25 TABLET ORAL at 22:58

## 2019-07-24 RX ADMIN — ENOXAPARIN SODIUM 30 MG: 30 INJECTION SUBCUTANEOUS at 09:46

## 2019-07-24 RX ADMIN — MAGNESIUM SULFATE HEPTAHYDRATE 2 G: 40 INJECTION, SOLUTION INTRAVENOUS at 12:53

## 2019-07-24 RX ADMIN — CLOPIDOGREL BISULFATE 75 MG: 75 TABLET ORAL at 09:47

## 2019-07-24 RX ADMIN — AMIODARONE HYDROCHLORIDE 200 MG: 200 TABLET ORAL at 09:48

## 2019-07-24 RX ADMIN — MIDODRINE HYDROCHLORIDE 10 MG: 5 TABLET ORAL at 12:41

## 2019-07-24 RX ADMIN — SODIUM PHOSPHATE, MONOBASIC, MONOHYDRATE 20 MMOL: 276; 142 INJECTION, SOLUTION INTRAVENOUS at 15:22

## 2019-07-24 RX ADMIN — METOPROLOL TARTRATE 25 MG: 25 TABLET ORAL at 09:48

## 2019-07-24 RX ADMIN — PREGABALIN 150 MG: 75 CAPSULE ORAL at 20:28

## 2019-07-24 RX ADMIN — IPRATROPIUM BROMIDE AND ALBUTEROL SULFATE 1 AMPULE: .5; 3 SOLUTION RESPIRATORY (INHALATION) at 15:32

## 2019-07-24 RX ADMIN — DOCUSATE SODIUM 100 MG: 100 CAPSULE, LIQUID FILLED ORAL at 09:48

## 2019-07-24 RX ADMIN — IPRATROPIUM BROMIDE AND ALBUTEROL SULFATE 1 AMPULE: .5; 3 SOLUTION RESPIRATORY (INHALATION) at 12:25

## 2019-07-24 RX ADMIN — SODIUM CHLORIDE, PRESERVATIVE FREE 10 ML: 5 INJECTION INTRAVENOUS at 20:29

## 2019-07-24 RX ADMIN — MIDODRINE HYDROCHLORIDE 10 MG: 5 TABLET ORAL at 09:47

## 2019-07-24 RX ADMIN — POTASSIUM CHLORIDE 40 MEQ: 400 INJECTION, SOLUTION INTRAVENOUS at 12:14

## 2019-07-24 RX ADMIN — MEROPENEM 1 G: 1 INJECTION, POWDER, FOR SOLUTION INTRAVENOUS at 20:27

## 2019-07-24 RX ADMIN — VANCOMYCIN HYDROCHLORIDE 1000 MG: 1 INJECTION, SOLUTION INTRAVENOUS at 09:39

## 2019-07-24 RX ADMIN — METHYLPREDNISOLONE SODIUM SUCCINATE 40 MG: 40 INJECTION, POWDER, LYOPHILIZED, FOR SOLUTION INTRAMUSCULAR; INTRAVENOUS at 12:41

## 2019-07-24 RX ADMIN — MIDODRINE HYDROCHLORIDE 10 MG: 5 TABLET ORAL at 18:27

## 2019-07-24 RX ADMIN — MEROPENEM 1 G: 1 INJECTION, POWDER, FOR SOLUTION INTRAVENOUS at 12:03

## 2019-07-24 RX ADMIN — IPRATROPIUM BROMIDE AND ALBUTEROL SULFATE 1 AMPULE: .5; 3 SOLUTION RESPIRATORY (INHALATION) at 07:51

## 2019-07-24 RX ADMIN — IPRATROPIUM BROMIDE AND ALBUTEROL SULFATE 1 AMPULE: .5; 3 SOLUTION RESPIRATORY (INHALATION) at 19:12

## 2019-07-24 RX ADMIN — MEROPENEM 1 G: 1 INJECTION, POWDER, FOR SOLUTION INTRAVENOUS at 04:21

## 2019-07-24 RX ADMIN — VANCOMYCIN HYDROCHLORIDE 1000 MG: 1 INJECTION, SOLUTION INTRAVENOUS at 20:27

## 2019-07-24 ASSESSMENT — PAIN DESCRIPTION - PROGRESSION
CLINICAL_PROGRESSION: NOT CHANGED

## 2019-07-24 ASSESSMENT — PAIN DESCRIPTION - LOCATION
LOCATION: ABDOMEN

## 2019-07-24 ASSESSMENT — PAIN DESCRIPTION - FREQUENCY: FREQUENCY: CONTINUOUS

## 2019-07-24 ASSESSMENT — PAIN - FUNCTIONAL ASSESSMENT: PAIN_FUNCTIONAL_ASSESSMENT: PREVENTS OR INTERFERES SOME ACTIVE ACTIVITIES AND ADLS

## 2019-07-24 ASSESSMENT — PAIN SCALES - GENERAL
PAINLEVEL_OUTOF10: 0
PAINLEVEL_OUTOF10: 0

## 2019-07-24 ASSESSMENT — PAIN DESCRIPTION - ONSET: ONSET: ON-GOING

## 2019-07-24 ASSESSMENT — PAIN DESCRIPTION - PAIN TYPE: TYPE: ACUTE PAIN

## 2019-07-24 ASSESSMENT — PAIN DESCRIPTION - ORIENTATION: ORIENTATION: MID;LOWER

## 2019-07-24 NOTE — PROGRESS NOTES
Dominance: Right             Plan   Plan  Times per week: 2x+  Times per day: Daily  Current Treatment Recommendations: Strengthening, Endurance Training, Neuromuscular Re-education, Patient/Caregiver Education & Training, ROM, Cognitive Reorientation, Equipment Evaluation, Education, & procurement, Self-Care / ADL, Functional Mobility Training, Safety Education & Training    G-Code     OutComes Score                                                  AM-PAC Score  AM-PAC 6 click short form for inpatient daily activity:   How much help from another person does the patient currently need. .. Unable  Dep A Lot  Max A A Lot   Mod A A Little  Min A A Little   CGA  SBA None   Mod I  Indep  Sup   1. Putting on and taking off regular lower body clothing? [x] 1    [] 2   [] 2   [] 3   [] 3   [] 4      2. Bathing (including washing, rinsing, drying)? [x] 1   [] 2   [] 2 [] 3 [] 3 [] 4   3. Toileting, which includes using toilet, bedpan, or urinal? [x] 1    [] 2   [] 2   [] 3   [] 3   [] 4     4. Putting on and taking off regular upper body clothing? [x] 1   [] 2   [] 2   [] 3   [] 3    [] 4      5. Taking care of personal grooming such as brushing teeth? [x] 1   [] 2    [] 2 [] 3    [] 3   [] 4      6. Eating meals? [x] 1   [] 2   [] 2   [] 3   [] 3   [] 4      Raw Score:  6   [24=0% impaired(CH), 23=1-19%(CI), 20-22=20-39%(CJ), 15-19=40-59%(CK), 10-14=60-79%(CL), 7-9=80-99%(CM), 6=100%(CN)]               Goals  Short term goals  Time Frame for Short term goals: until pt discharged from hosp or goals met  Short term goal 1: Pt will complete rolling and sup<>sit at max A x 1. Short term goal 2: Pt will participate in sierra UE P/AA/AROM ther ex to progress UE ROM/strength by 1/2 to 1 muscle grade to facilitate UE use in ADLs and mobility tasks. Short term goal 3: Pt will complete face hygiene at mod A. Short term goal 4: Pt will tolerate sitting to EOB x 5 min when appropriate w/max A or less for sitting balance.    Short

## 2019-07-24 NOTE — PROGRESS NOTES
pregabalin, 150 mg, Oral, BID    sertraline, 50 mg, Oral, Nightly    simvastatin, 20 mg, Oral, Nightly    sodium chloride flush, 10 mL, Intravenous, 2 times per day    docusate sodium, 100 mg, Oral, BID    enoxaparin, 30 mg, Subcutaneous, Daily    Physical Exam:  General Appearance:   alert, no distress    Head:   Normocephalic, atraumatic    Lungs:    Equal chest rise, respirations unlabored   Heart:   Regular rate and rhythm    Abdomen:    Soft, non-tender, no rebound or guarding    Extremities:  No cyanosis or edema         Labs/Imaging Results:   Recent Results (from the past 24 hour(s))   Vancomycin, trough    Collection Time: 07/23/19  4:20 PM   Result Value Ref Range    Vancomycin Tr 20.6 (H) 10 - 20 UG/ML    DOSE AMOUNT DOSE AMT.  GIVEN - 1,250 MG     DOSE TIME DOSE TIME GIVEN - 0600 / 1800    Magnesium    Collection Time: 07/23/19  4:20 PM   Result Value Ref Range    Magnesium 1.8 1.8 - 2.4 mg/dl   Basic metabolic panel    Collection Time: 07/23/19  4:20 PM   Result Value Ref Range    Sodium 137 135 - 145 MMOL/L    Potassium 3.5 3.5 - 5.1 MMOL/L    Chloride 109 99 - 110 mMol/L    CO2 21 21 - 32 MMOL/L    Anion Gap 7 4 - 16    BUN 13 6 - 23 MG/DL    CREATININE 0.5 (L) 0.6 - 1.1 MG/DL    Glucose 226 (H) 70 - 99 MG/DL    Calcium 8.9 8.3 - 10.6 MG/DL    GFR Non-African American >60 >60 mL/min/1.73m2    GFR African American >60 >60 mL/min/1.73m2   Phosphorus    Collection Time: 07/23/19  4:20 PM   Result Value Ref Range    Phosphorus 1.7 (L) 2.5 - 4.9 MG/DL   Basic Metabolic Panel w/ Reflex to MG    Collection Time: 07/24/19  4:15 AM   Result Value Ref Range    Sodium 140 135 - 145 MMOL/L    Potassium 3.5 3.5 - 5.1 MMOL/L    Chloride 109 99 - 110 mMol/L    CO2 22 21 - 32 MMOL/L    Anion Gap 9 4 - 16    BUN 11 6 - 23 MG/DL    CREATININE 0.5 (L) 0.6 - 1.1 MG/DL    Glucose 244 (H) 70 - 99 MG/DL    Calcium 8.6 8.3 - 10.6 MG/DL    GFR Non-African American >60 >60 mL/min/1.73m2    GFR African American >60 >60

## 2019-07-24 NOTE — CONSULTS
Gege Blue MD        metoprolol tartrate (LOPRESSOR) tablet 25 mg  25 mg Oral BID Wing Granado MD   25 mg at 07/24/19 0948    vancomycin (VANCOCIN) 1000 mg in dextrose 5% 200 mL IVPB  1,000 mg Intravenous Q12H Wing Granado  mL/hr at 07/24/19 0939 1,000 mg at 07/24/19 0939    meropenem (MERREM) 1 g in sodium chloride 0.9 % 100 mL IVPB (mini-bag)  1 g Intravenous Q8H Wing Granado MD   Stopped at 07/24/19 0517    midodrine (PROAMATINE) tablet 10 mg  10 mg Per NG tube TID WC Wing Granado MD   10 mg at 07/24/19 0947    0.9 % sodium chloride infusion   Intravenous Continuous Wing Granado MD 75 mL/hr at 07/23/19 2125      albuterol (PROVENTIL) nebulizer solution 2.5 mg  2.5 mg Nebulization Q6H PRN Garciaga Genevieve, APRN - CNP   2.5 mg at 07/23/19 1016    amiodarone (CORDARONE) tablet 200 mg  200 mg Oral Daily Michelle Dapilah, APRN - CNP   200 mg at 07/24/19 0948    amitriptyline (ELAVIL) tablet 50 mg  50 mg Oral Nightly Michelle Dapilah, APRN - CNP   50 mg at 07/23/19 1954    aspirin chewable tablet 81 mg  81 mg Oral Daily Michelle Dapilah, APRN - CNP   81 mg at 07/24/19 0948    clopidogrel (PLAVIX) tablet 75 mg  75 mg Oral Daily Michelle Dapilah, APRN - CNP   75 mg at 07/24/19 0947    [Held by provider] losartan (COZAAR) tablet 25 mg  25 mg Oral Daily Michelle Dapilah, APRN - CNP        pregabalin (LYRICA) capsule 150 mg  150 mg Oral BID Michelle Dapilah, APRN - CNP   150 mg at 07/24/19 0948    sertraline (ZOLOFT) tablet 50 mg  50 mg Oral Nightly Michelle Dapilah, APRN - CNP   50 mg at 07/23/19 1954    simvastatin (ZOCOR) tablet 20 mg  20 mg Oral Nightly Michelle Dapilah, APRN - CNP   20 mg at 07/23/19 1954    sodium chloride flush 0.9 % injection 10 mL  10 mL Intravenous 2 times per day NORAH Martinez - CNP   10 mL at 07/24/19 0900    sodium chloride flush 0.9 % injection 10 mL  10 mL Intravenous PRN NORAH Martinez - CNP        magnesium hydroxide (MILK OF MAGNESIA) 400 MG/5ML

## 2019-07-24 NOTE — PROGRESS NOTES
Hospitalist Progress Note      Name:  Nadege Isidro /Age/Sex: 1950  (77 y.o. female)   MRN & CSN:  [id number] & [id number] Admission Date/Time: 2019 12:11 AM   Location:  -A PCP: Lakshmi Lange DO       Nadege Isidro is a 77 y.o.  female  who presents with Respiratory Distress      Assessment and Plan:   Acute Resp Failure 2/2 to COPD Exacerbation and PNA  - intubated   - extubated   - steroids tapered now  - nebs  -  sputum cx +MRSA MDR  - CTA chest: neg PE  - pulm consulted for vent mx     Septic Shock 2/2 to CAP and UTI  - levo gtt to MAP >65, wean as tolerated to goal   - IV Vanc + Cefepime Day #2, d/c cefepime and start IV Merrem x 10 days for E.COLI ESBL, will need 10 days of IV Vanc as well.  Place PICC line, PICC team unable to due to poor veins, will consult IR   - IVF decreased  - check urine strep + legionella: neg  - check blood cx NTD  - urine cx: + e.coli, ESBL  - added Midodrine 10 TID  - hold losartan  - resume lopressor now for mild tachy    PVC's  - cardio consulted     Ileus  - NGT out now and on oral diet  - Surgery following, conservative mx     Hypophosphatemia  - replace    CAD  COPD  HTN  HLD     SLP eval     Pt/ot            Diet DIET GENERAL; Dysphagia I Pureed   Code Status Full Code     Medications:   Medications:    methylPREDNISolone  40 mg Intravenous Q12H    potassium chloride  40 mEq Intravenous Once    ipratropium-albuterol  1 ampule Inhalation Q4H WA    lidocaine PF  5 mL Intradermal Once    sodium chloride flush  10 mL Intravenous 2 times per day    metoprolol tartrate  25 mg Oral BID    vancomycin  1,000 mg Intravenous Q12H    meropenem  1 g Intravenous Q8H    midodrine  10 mg Per NG tube TID WC    amiodarone  200 mg Oral Daily    amitriptyline  50 mg Oral Nightly    aspirin  81 mg Oral Daily    clopidogrel  75 mg Oral Daily    [Held by provider] losartan  25 mg Oral Daily    pregabalin  150 mg Oral BID    sertraline  50 mg Oral Nightly    simvastatin  20 mg Oral Nightly    sodium chloride flush  10 mL Intravenous 2 times per day    docusate sodium  100 mg Oral BID    enoxaparin  30 mg Subcutaneous Daily      Infusions:    sodium chloride 75 mL/hr at 07/23/19 2125     PRN Meds:   sodium phosphate IVPB 0.16 mmol/kg PRN   Or     sodium phosphate IVPB 0.32 mmol/kg PRN   magnesium sulfate 1 g PRN   sodium chloride flush 10 mL PRN   albuterol 2.5 mg Q6H PRN   sodium chloride flush 10 mL PRN   magnesium hydroxide 30 mL Daily PRN   ondansetron 4 mg Q6H PRN   acetaminophen 650 mg Q4H PRN   bisacodyl 10 mg Daily PRN   acetaminophen 650 mg Q4H PRN     Subjective:     Doing ok, no pain  Objective: Intake/Output Summary (Last 24 hours) at 7/24/2019 1153  Last data filed at 7/24/2019 0900  Gross per 24 hour   Intake 2613.85 ml   Output 3250 ml   Net -636.15 ml      Vitals:   Vitals:    07/24/19 0800   BP: 103/66   Pulse:    Resp:    Temp:    SpO2:      Physical Exam:   Gen:  awake, alert, cooperative, no apparent distress  Head/Eyes:  Normocephalic atraumatic, EOMI   NECK:   symmetrical, trachea midline  LUNGS: Normal Effort   CARDIOVASCULAR:  Normal rate  ABDOMEN: Non tender, non distended, no HSM noted. MUSCULOSKELETAL:  ROM limited  NEUROLOGIC: Alert and Oriented,  Cranial nerves II-XII are grossly intact.    SKIN:  no bruising or bleeding, normal skin color,  no redness      Data:       CBC   Recent Labs     07/22/19  0435 07/23/19  0500 07/24/19  0415   WBC 13.1* 13.8* 15.3*   HGB 10.7* 9.2* 8.7*   HCT 32.2* 27.6* 26.8*    132* 114*      BMP Recent Labs     07/23/19  0030 07/23/19  0500 07/23/19  1620 07/24/19  0415    142 137 140   K 3.7 3.7 3.5 3.5   * 111* 109 109   CO2 19* 18* 21 22   PHOS 1.7*  --  1.7*  --    BUN 18 16 13 11   CREATININE 0.6 0.6 0.5* 0.5*         Electronically signed by Jay Davidson MD on 7/24/2019 at 11:53 AM

## 2019-07-24 NOTE — PROGRESS NOTES
[id number] [place] OF SPEECH/LANGUAGE PATHOLOGY  DAILY PROGRESS NOTE  Syeda Cuevas  7/24/2019  [id number]  Acute cystitis without hematuria [N30.00]  Acute respiratory failure with hypoxia ([address] [J96.01]  HCAP (healthcare-associated pneumonia) [J18.9]  Septic shock ([facility] [address] [A41.9, R65.21]  Acute respiratory failure with hypoxemia (HCC) [J96.01]  Allergies   Allergen Reactions    Food Swelling     \"Rye, Whole grains, Barley\"    Trental [Pentoxifylline] Swelling    Tramadol      \"I Felt Shaky\"    Vistaril [Hydroxyzine Hcl] Other (See Comments)     Sedated and was placed on respirator    Erythromycin      \"Stomach Pain\"    Motrin [Ibuprofen] Diarrhea    Tetracyclines & Related Nausea And Vomiting         Pt was seen this date for dysphagia treatment. IMPRESSION AND RECOMMENDATIONS: Syeda Cuevas was seen for ongoing assessment of swallow function. RN reported concerns for possible aspiration d/t worsening lung sounds. Pt was seen for tx seated upright in bed, alert, cooperative given cues. Baseline vocal quality improved/clear compared to yesterday's evaluation. She was presented with PO trials of ice chips, thin liquids via tsp, nectar liquids via tsp, and puree. Oral stage characterized by vertical lingual \"mashing\" with disorganized bolus formation, slow AP transit with lingual pumping, and adequate clearance for all textures. Suspect mild-moderate pharyngeal dysphagia characterized by minimally delayed swallow initiation and reduced laryngeal elevation. Immediate change in vocal quality noted following trial of thin liquids via tsp x1. Recommend downgrade to nectar liquids with continued puree, total feed, with aspiration precautions. SLP willl continue to follow closely. Recommendations discussed with RN.      GOALS (current status in bold):  Short-term Goals  Timeframe for Short-term Goals: length of admission  Goal 1: Pt will tolerate puree/thin liquid diet with

## 2019-07-24 NOTE — PROGRESS NOTES
Physical Therapy    Facility/Department: [facility] ICU  Initial Assessment    NAME: Rigo Hernandez  : 1950  MRN: [id number]    Date of Service: 2019    Discharge Recommendations:  Subacute/Skilled Nursing Facility, ECF with PT        Assessment   Body structures, Functions, Activity limitations: Decreased functional mobility ; Decreased strength;Decreased endurance;Decreased ADL status; Decreased ROM; Decreased balance(decreased activity violeta, lethargy and extreme weakness)  Assessment: pt admit from SNF with acute resp. failure, COPD, pneumonia, sepsis, pt has had 2 other recent hospitalizations and a current decline in status and function; pt presents with extreme weakness, lethargy, dependant in position changes, repositioning and bed mobility; unable to further assess functional mob at this time; recommend PT services to address problem areas and progress toward goals, PT trial; pt will need placement upon hospital discharge ? return to SNF vs ECF with therapy service  Treatment Diagnosis: weakness, lethargy, decreased strength, endurance and activity violeta, dependant with positioning and rolling  Prognosis: Fair((-))  Decision Making: High Complexity  History: 4+ personal factors, comorbidities  Exam: 4+ body systems, functional/activity limitations  Clinical Presentation: unpredictable characteristics  Patient Education: purpose and goals of therapy  Barriers to Learning: lethargy, participation  REQUIRES PT FOLLOW UP: Yes(trial)  Activity Tolerance  Activity Tolerance: Patient limited by fatigue;Patient limited by endurance(extreme weakness and lethargy)       Patient Diagnosis(es): The primary encounter diagnosis was Septic shock ([facility] [address] Diagnoses of HCAP (healthcare-associated pneumonia), Acute cystitis without hematuria, and Acute respiratory failure with hypoxemia ([facility] [address] were also pertinent to this visit.      has a past medical history of Anxiety, CAD (coronary artery disease), CAD S/P percutaneous

## 2019-07-25 ENCOUNTER — APPOINTMENT (OUTPATIENT)
Dept: GENERAL RADIOLOGY | Age: 69
DRG: 871 | End: 2019-07-25
Payer: MEDICARE

## 2019-07-25 ENCOUNTER — APPOINTMENT (OUTPATIENT)
Dept: CT IMAGING | Age: 69
DRG: 871 | End: 2019-07-25
Payer: MEDICARE

## 2019-07-25 LAB
ANION GAP SERPL CALCULATED.3IONS-SCNC: 8 MMOL/L (ref 4–16)
BUN BLDV-MCNC: 8 MG/DL (ref 6–23)
CALCIUM SERPL-MCNC: 9.1 MG/DL (ref 8.3–10.6)
CHLORIDE BLD-SCNC: 110 MMOL/L (ref 99–110)
CO2: 26 MMOL/L (ref 21–32)
CREAT SERPL-MCNC: 0.5 MG/DL (ref 0.6–1.1)
CULTURE: NORMAL
CULTURE: NORMAL
DOSE AMOUNT: ABNORMAL
DOSE TIME: ABNORMAL
GFR AFRICAN AMERICAN: >60 ML/MIN/1.73M2
GFR NON-AFRICAN AMERICAN: >60 ML/MIN/1.73M2
GLUCOSE BLD-MCNC: 184 MG/DL (ref 70–99)
GLUCOSE BLD-MCNC: 203 MG/DL (ref 70–99)
GLUCOSE BLD-MCNC: 204 MG/DL (ref 70–99)
GLUCOSE BLD-MCNC: 217 MG/DL (ref 70–99)
GLUCOSE BLD-MCNC: 241 MG/DL (ref 70–99)
HCT VFR BLD CALC: 32.1 % (ref 37–47)
HEMOGLOBIN: 10.6 GM/DL (ref 12.5–16)
Lab: NORMAL
Lab: NORMAL
MCH RBC QN AUTO: 27 PG (ref 27–31)
MCHC RBC AUTO-ENTMCNC: 33 % (ref 32–36)
MCV RBC AUTO: 81.9 FL (ref 78–100)
PDW BLD-RTO: 16.2 % (ref 11.7–14.9)
PLATELET # BLD: 165 K/CU MM (ref 140–440)
PMV BLD AUTO: 12.9 FL (ref 7.5–11.1)
POTASSIUM SERPL-SCNC: 4 MMOL/L (ref 3.5–5.1)
RBC # BLD: 3.92 M/CU MM (ref 4.2–5.4)
SODIUM BLD-SCNC: 144 MMOL/L (ref 135–145)
SPECIMEN: NORMAL
SPECIMEN: NORMAL
VANCOMYCIN TROUGH: 25.5 UG/ML (ref 10–20)
WBC # BLD: 18.1 K/CU MM (ref 4–10.5)

## 2019-07-25 PROCEDURE — 36592 COLLECT BLOOD FROM PICC: CPT

## 2019-07-25 PROCEDURE — 6360000002 HC RX W HCPCS: Performed by: NURSE PRACTITIONER

## 2019-07-25 PROCEDURE — 74018 RADEX ABDOMEN 1 VIEW: CPT

## 2019-07-25 PROCEDURE — 0D9670Z DRAINAGE OF STOMACH WITH DRAINAGE DEVICE, VIA NATURAL OR ARTIFICIAL OPENING: ICD-10-PCS | Performed by: INTERNAL MEDICINE

## 2019-07-25 PROCEDURE — 6360000002 HC RX W HCPCS: Performed by: INTERNAL MEDICINE

## 2019-07-25 PROCEDURE — 80202 ASSAY OF VANCOMYCIN: CPT

## 2019-07-25 PROCEDURE — 6370000000 HC RX 637 (ALT 250 FOR IP): Performed by: NURSE PRACTITIONER

## 2019-07-25 PROCEDURE — 31720 CLEARANCE OF AIRWAYS: CPT

## 2019-07-25 PROCEDURE — 94761 N-INVAS EAR/PLS OXIMETRY MLT: CPT

## 2019-07-25 PROCEDURE — 2700000000 HC OXYGEN THERAPY PER DAY

## 2019-07-25 PROCEDURE — 2500000003 HC RX 250 WO HCPCS: Performed by: FAMILY MEDICINE

## 2019-07-25 PROCEDURE — 2580000003 HC RX 258: Performed by: INTERNAL MEDICINE

## 2019-07-25 PROCEDURE — 6360000002 HC RX W HCPCS: Performed by: FAMILY MEDICINE

## 2019-07-25 PROCEDURE — 6370000000 HC RX 637 (ALT 250 FOR IP): Performed by: PHYSICIAN ASSISTANT

## 2019-07-25 PROCEDURE — 85027 COMPLETE CBC AUTOMATED: CPT

## 2019-07-25 PROCEDURE — 6370000000 HC RX 637 (ALT 250 FOR IP): Performed by: INTERNAL MEDICINE

## 2019-07-25 PROCEDURE — 80048 BASIC METABOLIC PNL TOTAL CA: CPT

## 2019-07-25 PROCEDURE — 2580000003 HC RX 258: Performed by: FAMILY MEDICINE

## 2019-07-25 PROCEDURE — 82962 GLUCOSE BLOOD TEST: CPT

## 2019-07-25 PROCEDURE — 94640 AIRWAY INHALATION TREATMENT: CPT

## 2019-07-25 PROCEDURE — 74176 CT ABD & PELVIS W/O CONTRAST: CPT

## 2019-07-25 PROCEDURE — 2000000000 HC ICU R&B

## 2019-07-25 PROCEDURE — 2580000003 HC RX 258: Performed by: NURSE PRACTITIONER

## 2019-07-25 PROCEDURE — 6370000000 HC RX 637 (ALT 250 FOR IP): Performed by: FAMILY MEDICINE

## 2019-07-25 PROCEDURE — 71250 CT THORAX DX C-: CPT

## 2019-07-25 PROCEDURE — 6370000000 HC RX 637 (ALT 250 FOR IP): Performed by: SURGERY

## 2019-07-25 RX ORDER — DEXTROSE MONOHYDRATE 25 G/50ML
12.5 INJECTION, SOLUTION INTRAVENOUS PRN
Status: DISCONTINUED | OUTPATIENT
Start: 2019-07-25 | End: 2019-08-07 | Stop reason: HOSPADM

## 2019-07-25 RX ORDER — FUROSEMIDE 10 MG/ML
40 INJECTION INTRAMUSCULAR; INTRAVENOUS 2 TIMES DAILY
Status: DISCONTINUED | OUTPATIENT
Start: 2019-07-25 | End: 2019-07-26

## 2019-07-25 RX ORDER — NICOTINE POLACRILEX 4 MG
15 LOZENGE BUCCAL PRN
Status: DISCONTINUED | OUTPATIENT
Start: 2019-07-25 | End: 2019-08-07 | Stop reason: HOSPADM

## 2019-07-25 RX ORDER — DEXTROSE MONOHYDRATE 50 MG/ML
100 INJECTION, SOLUTION INTRAVENOUS PRN
Status: DISCONTINUED | OUTPATIENT
Start: 2019-07-25 | End: 2019-08-07 | Stop reason: HOSPADM

## 2019-07-25 RX ADMIN — VANCOMYCIN HYDROCHLORIDE 1000 MG: 1 INJECTION, SOLUTION INTRAVENOUS at 08:23

## 2019-07-25 RX ADMIN — DOCUSATE SODIUM 100 MG: 100 CAPSULE, LIQUID FILLED ORAL at 21:02

## 2019-07-25 RX ADMIN — INSULIN LISPRO 1 UNITS: 100 INJECTION, SOLUTION INTRAVENOUS; SUBCUTANEOUS at 21:02

## 2019-07-25 RX ADMIN — SIMVASTATIN 20 MG: 20 TABLET, FILM COATED ORAL at 21:02

## 2019-07-25 RX ADMIN — ALBUTEROL SULFATE 2.5 MG: 2.5 SOLUTION RESPIRATORY (INHALATION) at 00:20

## 2019-07-25 RX ADMIN — SERTRALINE HYDROCHLORIDE 50 MG: 50 TABLET ORAL at 21:02

## 2019-07-25 RX ADMIN — ENOXAPARIN SODIUM 30 MG: 30 INJECTION SUBCUTANEOUS at 08:26

## 2019-07-25 RX ADMIN — SODIUM CHLORIDE, PRESERVATIVE FREE 10 ML: 5 INJECTION INTRAVENOUS at 21:29

## 2019-07-25 RX ADMIN — ALBUTEROL SULFATE 2.5 MG: 2.5 SOLUTION RESPIRATORY (INHALATION) at 04:03

## 2019-07-25 RX ADMIN — MEROPENEM 1 G: 1 INJECTION, POWDER, FOR SOLUTION INTRAVENOUS at 04:39

## 2019-07-25 RX ADMIN — AMITRIPTYLINE HYDROCHLORIDE 50 MG: 50 TABLET, FILM COATED ORAL at 21:02

## 2019-07-25 RX ADMIN — SODIUM CHLORIDE, PRESERVATIVE FREE 10 ML: 5 INJECTION INTRAVENOUS at 21:03

## 2019-07-25 RX ADMIN — MIDODRINE HYDROCHLORIDE 10 MG: 5 TABLET ORAL at 17:27

## 2019-07-25 RX ADMIN — FUROSEMIDE 40 MG: 10 INJECTION, SOLUTION INTRAMUSCULAR; INTRAVENOUS at 17:27

## 2019-07-25 RX ADMIN — PREGABALIN 150 MG: 75 CAPSULE ORAL at 21:02

## 2019-07-25 RX ADMIN — INSULIN LISPRO 4 UNITS: 100 INJECTION, SOLUTION INTRAVENOUS; SUBCUTANEOUS at 17:56

## 2019-07-25 RX ADMIN — MEROPENEM 1 G: 1 INJECTION, POWDER, FOR SOLUTION INTRAVENOUS at 21:02

## 2019-07-25 RX ADMIN — AMIODARONE HYDROCHLORIDE 1 MG/MIN: 50 INJECTION, SOLUTION INTRAVENOUS at 12:11

## 2019-07-25 RX ADMIN — MEROPENEM 1 G: 1 INJECTION, POWDER, FOR SOLUTION INTRAVENOUS at 12:19

## 2019-07-25 RX ADMIN — IPRATROPIUM BROMIDE AND ALBUTEROL SULFATE 1 AMPULE: .5; 3 SOLUTION RESPIRATORY (INHALATION) at 07:49

## 2019-07-25 RX ADMIN — SODIUM CHLORIDE, PRESERVATIVE FREE 10 ML: 5 INJECTION INTRAVENOUS at 08:27

## 2019-07-25 RX ADMIN — IPRATROPIUM BROMIDE AND ALBUTEROL SULFATE 1 AMPULE: .5; 3 SOLUTION RESPIRATORY (INHALATION) at 20:20

## 2019-07-25 RX ADMIN — IPRATROPIUM BROMIDE AND ALBUTEROL SULFATE 1 AMPULE: .5; 3 SOLUTION RESPIRATORY (INHALATION) at 15:47

## 2019-07-25 RX ADMIN — AMIODARONE HYDROCHLORIDE 0.5 MG/MIN: 50 INJECTION, SOLUTION INTRAVENOUS at 18:22

## 2019-07-25 RX ADMIN — AMIODARONE HYDROCHLORIDE 150 MG: 50 INJECTION, SOLUTION INTRAVENOUS at 11:53

## 2019-07-25 RX ADMIN — ONDANSETRON 4 MG: 2 INJECTION INTRAMUSCULAR; INTRAVENOUS at 17:56

## 2019-07-25 RX ADMIN — INSULIN LISPRO 4 UNITS: 100 INJECTION, SOLUTION INTRAVENOUS; SUBCUTANEOUS at 08:30

## 2019-07-25 RX ADMIN — METHYLPREDNISOLONE SODIUM SUCCINATE 40 MG: 40 INJECTION, POWDER, LYOPHILIZED, FOR SOLUTION INTRAMUSCULAR; INTRAVENOUS at 12:15

## 2019-07-25 RX ADMIN — INSULIN LISPRO 4 UNITS: 100 INJECTION, SOLUTION INTRAVENOUS; SUBCUTANEOUS at 12:25

## 2019-07-25 RX ADMIN — SODIUM PHOSPHATE, MONOBASIC, MONOHYDRATE 20 MMOL: 276; 142 INJECTION, SOLUTION INTRAVENOUS at 12:54

## 2019-07-25 RX ADMIN — METHYLPREDNISOLONE SODIUM SUCCINATE 40 MG: 40 INJECTION, POWDER, LYOPHILIZED, FOR SOLUTION INTRAMUSCULAR; INTRAVENOUS at 00:11

## 2019-07-25 ASSESSMENT — PAIN DESCRIPTION - LOCATION: LOCATION: ABDOMEN

## 2019-07-25 ASSESSMENT — PAIN SCALES - GENERAL: PAINLEVEL_OUTOF10: 0

## 2019-07-25 ASSESSMENT — PAIN DESCRIPTION - ORIENTATION: ORIENTATION: MID

## 2019-07-25 ASSESSMENT — PAIN DESCRIPTION - PAIN TYPE: TYPE: ACUTE PAIN

## 2019-07-25 ASSESSMENT — PAIN DESCRIPTION - ONSET: ONSET: ON-GOING

## 2019-07-25 ASSESSMENT — PAIN DESCRIPTION - FREQUENCY: FREQUENCY: CONTINUOUS

## 2019-07-25 NOTE — PROGRESS NOTES
Meds given at this time with pudding. No signs of aspiration, coughing, or choking but d/t patient weakness having trouble swallowing. Patient unable to hold held upright and pudding seeming to pool in back of throat and requiring suctioning after med administration.

## 2019-07-25 NOTE — PROGRESS NOTES
amitriptyline  50 mg Oral Nightly    aspirin  81 mg Oral Daily    clopidogrel  75 mg Oral Daily    pregabalin  150 mg Oral BID    sertraline  50 mg Oral Nightly    simvastatin  20 mg Oral Nightly    sodium chloride flush  10 mL Intravenous 2 times per day    docusate sodium  100 mg Oral BID    enoxaparin  30 mg Subcutaneous Daily      Infusions:    dextrose      amiodarone      Followed by   Jerrod Velasquez amiodarone       PRN Meds:   glucose 15 g PRN   dextrose 12.5 g PRN   glucagon (rDNA) 1 mg PRN   dextrose 100 mL/hr PRN   sodium phosphate IVPB 0.16 mmol/kg PRN   Or     sodium phosphate IVPB 0.32 mmol/kg PRN   magnesium sulfate 1 g PRN   sodium chloride flush 10 mL PRN   albuterol 2.5 mg Q6H PRN   sodium chloride flush 10 mL PRN   magnesium hydroxide 30 mL Daily PRN   ondansetron 4 mg Q6H PRN   acetaminophen 650 mg Q4H PRN   bisacodyl 10 mg Daily PRN   acetaminophen 650 mg Q4H PRN     Subjective:   Reports abd pain, no distress    Objective: Intake/Output Summary (Last 24 hours) at 7/25/2019 1136  Last data filed at 7/25/2019 9286  Gross per 24 hour   Intake 1862.58 ml   Output 2800 ml   Net -937.42 ml      Vitals:   Vitals:    07/25/19 0800   BP: 116/79   Pulse:    Resp:    Temp:    SpO2:      Physical Exam:   Gen:  awake, alert, cooperative, no apparent distress  Head/Eyes:  Normocephalic atraumatic, EOMI   NECK:   symmetrical, trachea midline  LUNGS: Normal Effort   CARDIOVASCULAR:  Normal rate, trace pedal edema  ABDOMEN: +mild tender, non distended, no HSM noted. MUSCULOSKELETAL:  ROM limited  NEUROLOGIC: Alert and Oriented,  Cranial nerves II-XII are grossly intact.    SKIN:  no bruising or bleeding, normal skin color,  no redness      Data:       CBC   Recent Labs     07/23/19  0500 07/24/19  0415 07/25/19  0445   WBC 13.8* 15.3* 18.1*   HGB 9.2* 8.7* 10.6*   HCT 27.6* 26.8* 32.1*   * 114* 165      BMP Recent Labs     07/23/19  0030  07/23/19  1620 07/24/19  0415 07/25/19  0445      < > 137

## 2019-07-25 NOTE — PROGRESS NOTES
Cardiology Progress Note       Alexis Hunter is a 77 y.o. female   1950     SUBJECTIVE:   Patient  Seen  And  examnined    Still  Has  Frequent  pvcs    BP  Low  Normal    OBJECTIVE:    Review of Systems:  General appearance: alert, appears stated age and cooperative  Skin: Skin color, texture, normal. No rashes or lesions  HEENT: No nose bleed, headache, vision problems  CV: C/O chest pain, tightness, pressure,   Respiratory: C/o no SOB, RUBI, Orthopnea, PND  GI: No abdominal pain, black stool, bloating  Limbs: No c/o edema, pain, swelling, intermittent claudication, joint pains  Neuro: No dizziness, lightheadedness, syncope, gait problems, memory problems  Psych: grossly normal. No SI/depression. Vitals:   Blood pressure 116/79, pulse 110, temperature 98.4 °F (36.9 °C), temperature source Rectal, resp. rate 29, height 5' 1\" (1.549 m), weight 171 lb 14.4 oz (78 kg), SpO2 96 %, not currently breastfeeding. HEENT: AT, NC, PERRLA  Neck: No JVD  Heart: S1 S2 audible, no murmur   Lungs:bilat  rhonchi   Abdomen: Nontender   Limbs: No edema   CNS: no focal deficit      Past Medical History:   Diagnosis Date    Anxiety     CAD (coronary artery disease)     Dr Ariela Garduno CAD S/P percutaneous coronary angioplasty 2012    stent RCA; Ahmed    Chronic back pain     COPD (chronic obstructive pulmonary disease) ([facility]     Kathi Child Gout 2018    History of cerebral infarction 2000    right hemiparesis with full recovery    Hyperlipidemia     Hypertension     Lumbar spinal stenosis     Lung cancer ([facility] 09/2014    Dr Lauro Shields; left upper lobectomy; non small cell CA    Obesity (BMI 30.0-34. 9)     Restless leg syndrome     Rheumatoid arthritis (HCC)     Smoker     Systemic lupus erythematosus (HCC) 1992    Dr Jaylen Stubbs, visiting physician    Type II diabetes mellitus ([facility] 1969    Ventral hernia 06/2018    right ventral hernia seen on CT abd 6/2018        Patient Active Problem List   Diagnosis    [facility]    Hidradenitis suppurativa    Controlled type 2 diabetes mellitus with diabetic polyneuropathy, without long-term current use of insulin (HCC)    CAD (coronary artery disease)    Anxiety disorder    Lupus (HCC)    Chronic obstructive pulmonary disease (HCC)    CAD S/P percutaneous coronary angioplasty    Gout    History of lung cancer    Smoker    Chronic low back pain with bilateral sciatica    Obesity (BMI 30.0-34. 9)    Essential hypertension    Chronic respiratory failure (HCC)    Syncope and collapse    Hypomagnesemia syndrome    Moderate malnutrition (HCC)    Sepsis (HCC)    Generalized muscle weakness    Acute hyperkalemia    Acute respiratory failure with hypoxia (HCC)        Allergies   Allergen Reactions    Food Swelling     \"Rye, Whole grains, Barley\"    Trental [Pentoxifylline] Swelling    Tramadol      \"I Felt Shaky\"    Vistaril [Hydroxyzine Hcl] Other (See Comments)     Sedated and was placed on respirator    Erythromycin      \"Stomach Pain\"    Motrin [Ibuprofen] Diarrhea    Tetracyclines & Related Nausea And Vomiting        Current Inpatient Medications:    Current Facility-Administered Medications   Medication Dose Route Frequency Provider Last Rate Last Dose    glucose (GLUTOSE) 40 % oral gel 15 g  15 g Oral PRN Jen Davies PA-C        dextrose 50 % IV solution  12.5 g Intravenous PRN Jen Davies PA-C        glucagon (rDNA) injection 1 mg  1 mg Intramuscular PRN Jen Davies PA-C        dextrose 5 % solution  100 mL/hr Intravenous PRN Jen Davies PA-C        insulin lispro (HUMALOG) injection vial 0-12 Units  0-12 Units Subcutaneous TID  Elena Hutson PA-C   4 Units at 07/25/19 0830    insulin lispro (HUMALOG) injection vial 0-6 Units  0-6 Units Subcutaneous Nightly Jen Davies PA-C        amiodarone (CORDARONE) 150 mg in dextrose 5 % 100 mL bolus  150 mg Intravenous Once Fariba Cintron MD        Followed by   Beryl Whipple amiodarone (CORDARONE) 450 mg amitriptyline (ELAVIL) tablet 50 mg  50 mg Oral Nightly Michelle Tarunh, APRN - CNP   50 mg at 07/24/19 2028    aspirin chewable tablet 81 mg  81 mg Oral Daily Michelle Tarunh, APRN - CNP   81 mg at 07/24/19 0948    clopidogrel (PLAVIX) tablet 75 mg  75 mg Oral Daily Michelle Katerina, APRN - CNP   75 mg at 07/24/19 0947    [Held by provider] losartan (COZAAR) tablet 25 mg  25 mg Oral Daily Michelle Tarunh, APRN - CNP        pregabalin (LYRICA) capsule 150 mg  150 mg Oral BID Michelle Katerina, APRN - CNP   150 mg at 07/24/19 2028    sertraline (ZOLOFT) tablet 50 mg  50 mg Oral Nightly Michelle Oliverlah, APRN - CNP   50 mg at 07/24/19 2028    simvastatin (ZOCOR) tablet 20 mg  20 mg Oral Nightly Michelle Katerina, APRN - CNP   20 mg at 07/24/19 2028    sodium chloride flush 0.9 % injection 10 mL  10 mL Intravenous 2 times per day Leslee Harrington, APRN - CNP   10 mL at 07/25/19 0827    sodium chloride flush 0.9 % injection 10 mL  10 mL Intravenous PRN Michelle Borges, APRN - CNP        magnesium hydroxide (MILK OF MAGNESIA) 400 MG/5ML suspension 30 mL  30 mL Oral Daily PRN Michelle Borges, APRN - CNP        ondansetron (ZOFRAN) injection 4 mg  4 mg Intravenous Q6H PRN Michelle Borges, APRN - CNP   4 mg at 07/24/19 0939    acetaminophen (TYLENOL) suppository 650 mg  650 mg Rectal Q4H PRN Michelle Borges, APRN - CNP        bisacodyl (DULCOLAX) suppository 10 mg  10 mg Rectal Daily PRN Makenna Hill MD        docusate sodium (COLACE) capsule 100 mg  100 mg Oral BID Makenna Hill MD   100 mg at 07/24/19 0948    acetaminophen (TYLENOL) suppository 650 mg  650 mg Rectal Q4H PRN Chirag Nicolas MD   650 mg at 07/21/19 0226    enoxaparin (LOVENOX) injection 30 mg  30 mg Subcutaneous Daily NORAH Martinez CNP   30 mg at 07/25/19 0826           Labs:  CBC with Differential:    Lab Results   Component Value Date    WBC 18.1 07/25/2019    RBC 3.92 07/25/2019    HGB 10.6 07/25/2019    HCT 32.1 BILIRUBINUR SMALL 07/20/2019    BLOODU NEGATIVE 07/20/2019     ABG:    Lab Results   Component Value Date    PMK6MNN 42.0 07/24/2019    PO2ART 50 07/24/2019    MWV6FBW 25.4 07/24/2019     FLP:    Lab Results   Component Value Date    TRIG 82 04/25/2019    HDL 28 04/25/2019    LDLDIRECT 25 04/25/2019     TSH:  No results found for: TSH   DATA:   ECG: Sinus Rhythm       ASSESSMENT:      1  Frequent  PVC   Due  To  Low  Mag  And  k    In setting  Of  Respiratory  Failure  Patient  On  amiodorone   mckinley  Give  Additional  Iv  Loading  Dose   2  Cad   No  Chest  Pain  ,  Prior  RCA  Stent  3  htn    BP  Low    At present  4  Dm  5  Lung  Cancer  And  Acute  Respiratory  failure  PLAN    iv  amiodorone   Loading  dose

## 2019-07-25 NOTE — DISCHARGE INSTR - COC
Volume (cm^3) 0.03 cm^3 7/22/2019  5:00 PM   Distance Tunneling (cm) 0 cm 7/22/2019  5:00 PM   Tunneling Position ___ O'Clock 0 7/22/2019  5:00 PM   Undermining Starts ___ O'Clock 0 7/22/2019  5:00 PM   Undermining Ends___ O'Clock 0 7/22/2019  5:00 PM   Undermining Maxium Distance (cm) 0 7/22/2019  5:00 PM   Wound Assessment Purple 7/25/2019  7:34 AM   Drainage Amount Small 7/25/2019  7:34 AM   Drainage Description Serosanguinous 7/25/2019  7:34 AM   Odor None 7/25/2019  7:34 AM   Margins Defined edges 7/25/2019  7:34 AM   Jana-wound Assessment Dry 7/25/2019  7:34 AM   Non-staged Wound Description Full thickness 7/25/2019  7:34 AM   Red%Wound Bed 100 7/25/2019  7:34 AM   Culture Taken No 7/20/2019  8:12 AM   Number of days: 5       Wound 07/20/19 Leg Dorsal;Mid;Right small cluster open sores (Active)   Dressing Status Other (Comment) 7/25/2019  7:34 AM   Wound Assessment Dry;Red 7/25/2019  7:34 AM   Drainage Amount None 7/25/2019  7:34 AM   Number of days: 4        Elimination:  Continence:   · Bowel: No  · Bladder: No  Urinary Catheter: Removal Date 8/7/2019   Colostomy/Ileostomy/Ileal Conduit: No       Date of Last BM: 8/7/2019    Intake/Output Summary (Last 24 hours) at 7/25/2019 0819  Last data filed at 7/25/2019 0611  Gross per 24 hour   Intake 1982.58 ml   Output 2800 ml   Net -817.42 ml     I/O last 3 completed shifts: In: 1982.6 [P.O.:360; I.V.:1200; IV Piggyback:422.6]  Out: 2800 [Urine:2800]    Safety Concerns:      At Risk for Falls    Impairments/Disabilities:      None    Patient's personal belongings (please select all that are sent with patient):  {[facility] DME Belongings:[id number]}    RN SIGNATURE:  Electronically signed by Billy Reyes RN on 8/7/19 at 1:34 PM                PHYSICIAN SECTION    Nutrition Therapy:  Current Nutrition Therapy:   - Oral Diet:  Puree, dysphagia soft diet    Routes of Feeding: Oral  Liquids: No Restrictions  Daily Fluid Restriction: no  Last Modified Barium Swallow

## 2019-07-26 ENCOUNTER — APPOINTMENT (OUTPATIENT)
Dept: GENERAL RADIOLOGY | Age: 69
DRG: 871 | End: 2019-07-26
Payer: MEDICARE

## 2019-07-26 PROBLEM — R65.21 SEPTIC SHOCK (HCC): Status: ACTIVE | Noted: 2018-04-20

## 2019-07-26 LAB
ANION GAP SERPL CALCULATED.3IONS-SCNC: 6 MMOL/L (ref 4–16)
BUN BLDV-MCNC: 6 MG/DL (ref 6–23)
CALCIUM SERPL-MCNC: 9.2 MG/DL (ref 8.3–10.6)
CHLORIDE BLD-SCNC: 104 MMOL/L (ref 99–110)
CO2: 31 MMOL/L (ref 21–32)
CREAT SERPL-MCNC: 0.4 MG/DL (ref 0.6–1.1)
GFR AFRICAN AMERICAN: >60 ML/MIN/1.73M2
GFR NON-AFRICAN AMERICAN: >60 ML/MIN/1.73M2
GLUCOSE BLD-MCNC: 133 MG/DL (ref 70–99)
GLUCOSE BLD-MCNC: 139 MG/DL (ref 70–99)
GLUCOSE BLD-MCNC: 144 MG/DL (ref 70–99)
GLUCOSE BLD-MCNC: 171 MG/DL (ref 70–99)
HCT VFR BLD CALC: 28.6 % (ref 37–47)
HEMOGLOBIN: 9.1 GM/DL (ref 12.5–16)
MAGNESIUM: 1.8 MG/DL (ref 1.8–2.4)
MCH RBC QN AUTO: 26.8 PG (ref 27–31)
MCHC RBC AUTO-ENTMCNC: 31.8 % (ref 32–36)
MCV RBC AUTO: 84.1 FL (ref 78–100)
PDW BLD-RTO: 15.9 % (ref 11.7–14.9)
PLATELET # BLD: 174 K/CU MM (ref 140–440)
PMV BLD AUTO: 12.2 FL (ref 7.5–11.1)
POTASSIUM SERPL-SCNC: 3.7 MMOL/L (ref 3.5–5.1)
RBC # BLD: 3.4 M/CU MM (ref 4.2–5.4)
SODIUM BLD-SCNC: 141 MMOL/L (ref 135–145)
WBC # BLD: 20.7 K/CU MM (ref 4–10.5)

## 2019-07-26 PROCEDURE — 6360000002 HC RX W HCPCS: Performed by: FAMILY MEDICINE

## 2019-07-26 PROCEDURE — 85027 COMPLETE CBC AUTOMATED: CPT

## 2019-07-26 PROCEDURE — 99232 SBSQ HOSP IP/OBS MODERATE 35: CPT | Performed by: INTERNAL MEDICINE

## 2019-07-26 PROCEDURE — 6370000000 HC RX 637 (ALT 250 FOR IP): Performed by: FAMILY MEDICINE

## 2019-07-26 PROCEDURE — 94761 N-INVAS EAR/PLS OXIMETRY MLT: CPT

## 2019-07-26 PROCEDURE — 2700000000 HC OXYGEN THERAPY PER DAY

## 2019-07-26 PROCEDURE — 83735 ASSAY OF MAGNESIUM: CPT

## 2019-07-26 PROCEDURE — 6370000000 HC RX 637 (ALT 250 FOR IP): Performed by: SURGERY

## 2019-07-26 PROCEDURE — 2580000003 HC RX 258: Performed by: NURSE PRACTITIONER

## 2019-07-26 PROCEDURE — 2580000003 HC RX 258: Performed by: INTERNAL MEDICINE

## 2019-07-26 PROCEDURE — 94640 AIRWAY INHALATION TREATMENT: CPT

## 2019-07-26 PROCEDURE — 2060000000 HC ICU INTERMEDIATE R&B

## 2019-07-26 PROCEDURE — 92526 ORAL FUNCTION THERAPY: CPT

## 2019-07-26 PROCEDURE — 6360000002 HC RX W HCPCS: Performed by: INTERNAL MEDICINE

## 2019-07-26 PROCEDURE — 6370000000 HC RX 637 (ALT 250 FOR IP): Performed by: PHYSICIAN ASSISTANT

## 2019-07-26 PROCEDURE — 82962 GLUCOSE BLOOD TEST: CPT

## 2019-07-26 PROCEDURE — 6360000002 HC RX W HCPCS: Performed by: NURSE PRACTITIONER

## 2019-07-26 PROCEDURE — 6370000000 HC RX 637 (ALT 250 FOR IP): Performed by: NURSE PRACTITIONER

## 2019-07-26 PROCEDURE — 36592 COLLECT BLOOD FROM PICC: CPT

## 2019-07-26 PROCEDURE — 80048 BASIC METABOLIC PNL TOTAL CA: CPT

## 2019-07-26 PROCEDURE — 6370000000 HC RX 637 (ALT 250 FOR IP): Performed by: INTERNAL MEDICINE

## 2019-07-26 PROCEDURE — 2580000003 HC RX 258: Performed by: FAMILY MEDICINE

## 2019-07-26 RX ORDER — DICYCLOMINE HCL 20 MG
20 TABLET ORAL 4 TIMES DAILY PRN
Status: DISCONTINUED | OUTPATIENT
Start: 2019-07-26 | End: 2019-08-07 | Stop reason: HOSPADM

## 2019-07-26 RX ORDER — FUROSEMIDE 10 MG/ML
40 INJECTION INTRAMUSCULAR; INTRAVENOUS 2 TIMES DAILY
Status: DISCONTINUED | OUTPATIENT
Start: 2019-07-26 | End: 2019-07-26

## 2019-07-26 RX ORDER — DICYCLOMINE HCL 20 MG
20 TABLET ORAL
Status: DISCONTINUED | OUTPATIENT
Start: 2019-07-26 | End: 2019-07-26

## 2019-07-26 RX ORDER — METHYLPREDNISOLONE SODIUM SUCCINATE 40 MG/ML
40 INJECTION, POWDER, LYOPHILIZED, FOR SOLUTION INTRAMUSCULAR; INTRAVENOUS DAILY
Status: DISCONTINUED | OUTPATIENT
Start: 2019-07-27 | End: 2019-07-27

## 2019-07-26 RX ADMIN — ONDANSETRON 4 MG: 2 INJECTION INTRAMUSCULAR; INTRAVENOUS at 08:02

## 2019-07-26 RX ADMIN — AMITRIPTYLINE HYDROCHLORIDE 50 MG: 50 TABLET, FILM COATED ORAL at 21:17

## 2019-07-26 RX ADMIN — DOCUSATE SODIUM 100 MG: 100 CAPSULE, LIQUID FILLED ORAL at 08:03

## 2019-07-26 RX ADMIN — SODIUM CHLORIDE, PRESERVATIVE FREE 10 ML: 5 INJECTION INTRAVENOUS at 21:17

## 2019-07-26 RX ADMIN — IPRATROPIUM BROMIDE AND ALBUTEROL SULFATE 1 AMPULE: .5; 3 SOLUTION RESPIRATORY (INHALATION) at 07:38

## 2019-07-26 RX ADMIN — VANCOMYCIN HYDROCHLORIDE 1250 MG: 5 INJECTION, POWDER, LYOPHILIZED, FOR SOLUTION INTRAVENOUS at 23:44

## 2019-07-26 RX ADMIN — INSULIN LISPRO 1 UNITS: 100 INJECTION, SOLUTION INTRAVENOUS; SUBCUTANEOUS at 21:18

## 2019-07-26 RX ADMIN — MEROPENEM 1 G: 1 INJECTION, POWDER, FOR SOLUTION INTRAVENOUS at 21:17

## 2019-07-26 RX ADMIN — SERTRALINE HYDROCHLORIDE 50 MG: 50 TABLET ORAL at 21:17

## 2019-07-26 RX ADMIN — CLOPIDOGREL BISULFATE 75 MG: 75 TABLET ORAL at 08:03

## 2019-07-26 RX ADMIN — MEROPENEM 1 G: 1 INJECTION, POWDER, FOR SOLUTION INTRAVENOUS at 05:10

## 2019-07-26 RX ADMIN — IPRATROPIUM BROMIDE AND ALBUTEROL SULFATE 1 AMPULE: .5; 3 SOLUTION RESPIRATORY (INHALATION) at 22:15

## 2019-07-26 RX ADMIN — MEROPENEM 1 G: 1 INJECTION, POWDER, FOR SOLUTION INTRAVENOUS at 11:34

## 2019-07-26 RX ADMIN — SODIUM CHLORIDE, PRESERVATIVE FREE 10 ML: 5 INJECTION INTRAVENOUS at 21:18

## 2019-07-26 RX ADMIN — VANCOMYCIN HYDROCHLORIDE 1250 MG: 5 INJECTION, POWDER, LYOPHILIZED, FOR SOLUTION INTRAVENOUS at 02:47

## 2019-07-26 RX ADMIN — PREGABALIN 150 MG: 75 CAPSULE ORAL at 08:02

## 2019-07-26 RX ADMIN — IPRATROPIUM BROMIDE AND ALBUTEROL SULFATE 1 AMPULE: .5; 3 SOLUTION RESPIRATORY (INHALATION) at 15:41

## 2019-07-26 RX ADMIN — FUROSEMIDE 40 MG: 10 INJECTION, SOLUTION INTRAMUSCULAR; INTRAVENOUS at 08:02

## 2019-07-26 RX ADMIN — PREGABALIN 150 MG: 75 CAPSULE ORAL at 21:17

## 2019-07-26 RX ADMIN — IPRATROPIUM BROMIDE AND ALBUTEROL SULFATE 1 AMPULE: .5; 3 SOLUTION RESPIRATORY (INHALATION) at 11:53

## 2019-07-26 RX ADMIN — METOPROLOL TARTRATE 25 MG: 25 TABLET, FILM COATED ORAL at 08:04

## 2019-07-26 RX ADMIN — DOCUSATE SODIUM 100 MG: 100 CAPSULE, LIQUID FILLED ORAL at 21:17

## 2019-07-26 RX ADMIN — SIMVASTATIN 20 MG: 20 TABLET, FILM COATED ORAL at 21:17

## 2019-07-26 RX ADMIN — METHYLPREDNISOLONE SODIUM SUCCINATE 40 MG: 40 INJECTION, POWDER, LYOPHILIZED, FOR SOLUTION INTRAMUSCULAR; INTRAVENOUS at 00:28

## 2019-07-26 RX ADMIN — INSULIN LISPRO 2 UNITS: 100 INJECTION, SOLUTION INTRAVENOUS; SUBCUTANEOUS at 17:48

## 2019-07-26 RX ADMIN — SODIUM CHLORIDE, PRESERVATIVE FREE 10 ML: 5 INJECTION INTRAVENOUS at 08:05

## 2019-07-26 RX ADMIN — MIDODRINE HYDROCHLORIDE 10 MG: 5 TABLET ORAL at 17:48

## 2019-07-26 RX ADMIN — ENOXAPARIN SODIUM 30 MG: 30 INJECTION SUBCUTANEOUS at 08:03

## 2019-07-26 RX ADMIN — MIDODRINE HYDROCHLORIDE 10 MG: 5 TABLET ORAL at 08:03

## 2019-07-26 RX ADMIN — AMIODARONE HYDROCHLORIDE 200 MG: 200 TABLET ORAL at 08:03

## 2019-07-26 RX ADMIN — MIDODRINE HYDROCHLORIDE 10 MG: 5 TABLET ORAL at 11:34

## 2019-07-26 RX ADMIN — ASPIRIN 81 MG 81 MG: 81 TABLET ORAL at 08:03

## 2019-07-26 ASSESSMENT — PAIN DESCRIPTION - DESCRIPTORS: DESCRIPTORS: OTHER (COMMENT)

## 2019-07-26 ASSESSMENT — PAIN - FUNCTIONAL ASSESSMENT: PAIN_FUNCTIONAL_ASSESSMENT: PREVENTS OR INTERFERES SOME ACTIVE ACTIVITIES AND ADLS

## 2019-07-26 ASSESSMENT — PAIN DESCRIPTION - PROGRESSION: CLINICAL_PROGRESSION: NOT CHANGED

## 2019-07-26 ASSESSMENT — PAIN DESCRIPTION - LOCATION: LOCATION: ABDOMEN

## 2019-07-26 ASSESSMENT — PAIN SCALES - GENERAL: PAINLEVEL_OUTOF10: 2

## 2019-07-26 ASSESSMENT — PAIN DESCRIPTION - FREQUENCY: FREQUENCY: INTERMITTENT

## 2019-07-26 ASSESSMENT — PAIN DESCRIPTION - ONSET: ONSET: ON-GOING

## 2019-07-26 ASSESSMENT — PAIN DESCRIPTION - ORIENTATION: ORIENTATION: MID

## 2019-07-26 NOTE — PROGRESS NOTES
Diagnosis    [facility]    Hidradenitis suppurativa    Controlled type 2 diabetes mellitus with diabetic polyneuropathy, without long-term current use of insulin (HCC)    CAD (coronary artery disease)    Anxiety disorder    Lupus (HCC)    Chronic obstructive pulmonary disease (HCC)    CAD S/P percutaneous coronary angioplasty    Gout    History of lung cancer    Smoker    Chronic low back pain with bilateral sciatica    Obesity (BMI 30.0-34. 9)    Essential hypertension    Chronic respiratory failure (HCC)    Syncope and collapse    Hypomagnesemia syndrome    Moderate malnutrition (HCC)    Sepsis (HCC)    Generalized muscle weakness    Acute hyperkalemia    Acute respiratory failure with hypoxia (HCC)        Allergies   Allergen Reactions    Food Swelling     \"Rye, Whole grains, Barley\"    Trental [Pentoxifylline] Swelling    Tramadol      \"I Felt Shaky\"    Vistaril [Hydroxyzine Hcl] Other (See Comments)     Sedated and was placed on respirator    Erythromycin      \"Stomach Pain\"    Motrin [Ibuprofen] Diarrhea    Tetracyclines & Related Nausea And Vomiting        Current Inpatient Medications:    Current Facility-Administered Medications   Medication Dose Route Frequency Provider Last Rate Last Dose    glucose (GLUTOSE) 40 % oral gel 15 g  15 g Oral PRN Jen Davies PA-C        dextrose 50 % IV solution  12.5 g Intravenous PRN Jen Davies PA-C        glucagon (rDNA) injection 1 mg  1 mg Intramuscular PRN Jen Davies PA-C        dextrose 5 % solution  100 mL/hr Intravenous PRN Jen Davies PA-C        insulin lispro (HUMALOG) injection vial 0-12 Units  0-12 Units Subcutaneous TID  Jen Davies PA-C   Stopped at 07/26/19 0600    insulin lispro (HUMALOG) injection vial 0-6 Units  0-6 Units Subcutaneous Nightly Marti Evangelista PA-C   1 Units at 07/25/19 2102    vancomycin (VANCOCIN) 1,250 mg in dextrose 5 % 250 mL IVPB  1,250 mg Intravenous Q18H Kurtis Silva MD 07/20/2019    BASOSABS 0.0 07/20/2019    DIFFTYPE MANUAL DIFFERENTIAL 07/21/2019     CMP:    Lab Results   Component Value Date     07/26/2019    K 3.7 07/26/2019     07/26/2019    CO2 31 07/26/2019    BUN 6 07/26/2019    CREATININE 0.4 07/26/2019    GFRAA >60 07/26/2019    LABGLOM >60 07/26/2019    GLUCOSE 171 07/26/2019    PROT 4.6 07/20/2019    PROT 7.7 06/07/2011    LABALBU 2.3 07/20/2019    CALCIUM 9.2 07/26/2019    BILITOT 1.1 07/20/2019    ALKPHOS 120 07/20/2019    AST 33 07/20/2019    ALT 38 07/20/2019     Hepatic Function Panel:    Lab Results   Component Value Date    ALKPHOS 120 07/20/2019    ALT 38 07/20/2019    AST 33 07/20/2019    PROT 4.6 07/20/2019    PROT 7.7 06/07/2011    BILITOT 1.1 07/20/2019    BILIDIR 0.2 05/30/2017    IBILI 0.0 05/30/2017    LABALBU 2.3 07/20/2019     Magnesium:    Lab Results   Component Value Date    MG 1.9 07/24/2019     PT/INR:    Lab Results   Component Value Date    PROTIME 13.3 04/20/2019    PROTIME 11.4 06/07/2011    INR 1.17 04/20/2019     Last 3 Troponin:    Lab Results   Component Value Date    TROPONINI <0.006 02/08/2014    TROPONINI 0.065 06/07/2011    TROPONINI 0.065 06/07/2011    TROPONINI <0.006 06/07/2011     U/A:    Lab Results   Component Value Date    COLORU RAH 07/20/2019    WBCUA 2 07/20/2019    RBCUA 7 07/20/2019    MUCUS RARE 06/16/2019    TRICHOMONAS NONE SEEN 07/20/2019    YEAST FEW 07/02/2019    BACTERIA NEGATIVE 07/20/2019    CLARITYU CLEAR 07/20/2019    SPECGRAV 1.019 07/20/2019    LEUKOCYTESUR NEGATIVE 07/20/2019    UROBILINOGEN 1 07/20/2019    BILIRUBINUR SMALL 07/20/2019    BLOODU NEGATIVE 07/20/2019     ABG:    Lab Results   Component Value Date    PGH6LLG 42.0 07/24/2019    PO2ART 50 07/24/2019    MWP9HMM 25.4 07/24/2019     FLP:    Lab Results   Component Value Date    TRIG 82 04/25/2019    HDL 28 04/25/2019    LDLDIRECT 25 04/25/2019     TSH:  No results found for: TSH   DATA:   ECG: Sinus Rhythm       ASSESSMENT:       1

## 2019-07-27 LAB
ANION GAP SERPL CALCULATED.3IONS-SCNC: 5 MMOL/L (ref 4–16)
BUN BLDV-MCNC: 6 MG/DL (ref 6–23)
CALCIUM SERPL-MCNC: 9.2 MG/DL (ref 8.3–10.6)
CHLORIDE BLD-SCNC: 103 MMOL/L (ref 99–110)
CO2: 34 MMOL/L (ref 21–32)
CREAT SERPL-MCNC: 0.4 MG/DL (ref 0.6–1.1)
DOSE AMOUNT: NORMAL
DOSE TIME: NORMAL
GFR AFRICAN AMERICAN: >60 ML/MIN/1.73M2
GFR NON-AFRICAN AMERICAN: >60 ML/MIN/1.73M2
GLUCOSE BLD-MCNC: 104 MG/DL (ref 70–99)
GLUCOSE BLD-MCNC: 118 MG/DL (ref 70–99)
GLUCOSE BLD-MCNC: 163 MG/DL (ref 70–99)
GLUCOSE BLD-MCNC: 169 MG/DL (ref 70–99)
GLUCOSE BLD-MCNC: 272 MG/DL (ref 70–99)
HCT VFR BLD CALC: 30.2 % (ref 37–47)
HEMOGLOBIN: 9.7 GM/DL (ref 12.5–16)
MAGNESIUM: 1.8 MG/DL (ref 1.8–2.4)
MCH RBC QN AUTO: 26.9 PG (ref 27–31)
MCHC RBC AUTO-ENTMCNC: 32.1 % (ref 32–36)
MCV RBC AUTO: 83.9 FL (ref 78–100)
PDW BLD-RTO: 15.8 % (ref 11.7–14.9)
PLATELET # BLD: 221 K/CU MM (ref 140–440)
PMV BLD AUTO: 12 FL (ref 7.5–11.1)
POTASSIUM SERPL-SCNC: 3.4 MMOL/L (ref 3.5–5.1)
RBC # BLD: 3.6 M/CU MM (ref 4.2–5.4)
SODIUM BLD-SCNC: 142 MMOL/L (ref 135–145)
VANCOMYCIN TROUGH: 18.3 UG/ML (ref 10–20)
WBC # BLD: 16.3 K/CU MM (ref 4–10.5)

## 2019-07-27 PROCEDURE — 6370000000 HC RX 637 (ALT 250 FOR IP): Performed by: FAMILY MEDICINE

## 2019-07-27 PROCEDURE — 80048 BASIC METABOLIC PNL TOTAL CA: CPT

## 2019-07-27 PROCEDURE — 94640 AIRWAY INHALATION TREATMENT: CPT

## 2019-07-27 PROCEDURE — 6370000000 HC RX 637 (ALT 250 FOR IP): Performed by: INTERNAL MEDICINE

## 2019-07-27 PROCEDURE — 6360000002 HC RX W HCPCS: Performed by: INTERNAL MEDICINE

## 2019-07-27 PROCEDURE — 80202 ASSAY OF VANCOMYCIN: CPT

## 2019-07-27 PROCEDURE — 2580000003 HC RX 258: Performed by: NURSE PRACTITIONER

## 2019-07-27 PROCEDURE — 82962 GLUCOSE BLOOD TEST: CPT

## 2019-07-27 PROCEDURE — 6370000000 HC RX 637 (ALT 250 FOR IP): Performed by: PHYSICIAN ASSISTANT

## 2019-07-27 PROCEDURE — 2580000003 HC RX 258: Performed by: FAMILY MEDICINE

## 2019-07-27 PROCEDURE — 2580000003 HC RX 258: Performed by: INTERNAL MEDICINE

## 2019-07-27 PROCEDURE — 6360000002 HC RX W HCPCS: Performed by: FAMILY MEDICINE

## 2019-07-27 PROCEDURE — 6360000002 HC RX W HCPCS: Performed by: NURSE PRACTITIONER

## 2019-07-27 PROCEDURE — 2060000000 HC ICU INTERMEDIATE R&B

## 2019-07-27 PROCEDURE — 94761 N-INVAS EAR/PLS OXIMETRY MLT: CPT

## 2019-07-27 PROCEDURE — 83735 ASSAY OF MAGNESIUM: CPT

## 2019-07-27 PROCEDURE — 85027 COMPLETE CBC AUTOMATED: CPT

## 2019-07-27 PROCEDURE — 2700000000 HC OXYGEN THERAPY PER DAY

## 2019-07-27 PROCEDURE — 6370000000 HC RX 637 (ALT 250 FOR IP): Performed by: NURSE PRACTITIONER

## 2019-07-27 PROCEDURE — 6370000000 HC RX 637 (ALT 250 FOR IP): Performed by: SURGERY

## 2019-07-27 RX ORDER — MAGNESIUM SULFATE IN WATER 40 MG/ML
2 INJECTION, SOLUTION INTRAVENOUS ONCE
Status: COMPLETED | OUTPATIENT
Start: 2019-07-27 | End: 2019-07-27

## 2019-07-27 RX ORDER — POTASSIUM CHLORIDE 29.8 MG/ML
20 INJECTION INTRAVENOUS ONCE
Status: COMPLETED | OUTPATIENT
Start: 2019-07-27 | End: 2019-07-27

## 2019-07-27 RX ORDER — PREDNISONE 10 MG/1
10 TABLET ORAL DAILY
Status: COMPLETED | OUTPATIENT
Start: 2019-08-03 | End: 2019-08-05

## 2019-07-27 RX ORDER — PREDNISONE 20 MG/1
20 TABLET ORAL DAILY
Status: COMPLETED | OUTPATIENT
Start: 2019-07-28 | End: 2019-07-30

## 2019-07-27 RX ORDER — PREDNISONE 1 MG/1
5 TABLET ORAL DAILY
Status: DISCONTINUED | OUTPATIENT
Start: 2019-08-06 | End: 2019-08-07 | Stop reason: HOSPADM

## 2019-07-27 RX ORDER — POTASSIUM CHLORIDE 20 MEQ/1
40 TABLET, EXTENDED RELEASE ORAL ONCE
Status: COMPLETED | OUTPATIENT
Start: 2019-07-27 | End: 2019-07-27

## 2019-07-27 RX ADMIN — IPRATROPIUM BROMIDE AND ALBUTEROL SULFATE 1 AMPULE: .5; 3 SOLUTION RESPIRATORY (INHALATION) at 06:58

## 2019-07-27 RX ADMIN — MIDODRINE HYDROCHLORIDE 10 MG: 5 TABLET ORAL at 17:10

## 2019-07-27 RX ADMIN — IPRATROPIUM BROMIDE AND ALBUTEROL SULFATE 1 AMPULE: .5; 3 SOLUTION RESPIRATORY (INHALATION) at 20:03

## 2019-07-27 RX ADMIN — PREGABALIN 150 MG: 75 CAPSULE ORAL at 20:54

## 2019-07-27 RX ADMIN — INSULIN LISPRO 2 UNITS: 100 INJECTION, SOLUTION INTRAVENOUS; SUBCUTANEOUS at 12:32

## 2019-07-27 RX ADMIN — INSULIN LISPRO 6 UNITS: 100 INJECTION, SOLUTION INTRAVENOUS; SUBCUTANEOUS at 17:11

## 2019-07-27 RX ADMIN — MEROPENEM 1 G: 1 INJECTION, POWDER, FOR SOLUTION INTRAVENOUS at 12:06

## 2019-07-27 RX ADMIN — SODIUM CHLORIDE, PRESERVATIVE FREE 10 ML: 5 INJECTION INTRAVENOUS at 20:53

## 2019-07-27 RX ADMIN — DOCUSATE SODIUM 100 MG: 100 CAPSULE, LIQUID FILLED ORAL at 20:54

## 2019-07-27 RX ADMIN — METHYLPREDNISOLONE SODIUM SUCCINATE 40 MG: 40 INJECTION, POWDER, LYOPHILIZED, FOR SOLUTION INTRAMUSCULAR; INTRAVENOUS at 08:06

## 2019-07-27 RX ADMIN — MEROPENEM 1 G: 1 INJECTION, POWDER, FOR SOLUTION INTRAVENOUS at 20:52

## 2019-07-27 RX ADMIN — SIMVASTATIN 20 MG: 20 TABLET, FILM COATED ORAL at 20:54

## 2019-07-27 RX ADMIN — MIDODRINE HYDROCHLORIDE 10 MG: 5 TABLET ORAL at 12:49

## 2019-07-27 RX ADMIN — VANCOMYCIN HYDROCHLORIDE 1250 MG: 5 INJECTION, POWDER, LYOPHILIZED, FOR SOLUTION INTRAVENOUS at 16:10

## 2019-07-27 RX ADMIN — SODIUM CHLORIDE, PRESERVATIVE FREE 10 ML: 5 INJECTION INTRAVENOUS at 08:07

## 2019-07-27 RX ADMIN — MIDODRINE HYDROCHLORIDE 10 MG: 5 TABLET ORAL at 08:06

## 2019-07-27 RX ADMIN — DOCUSATE SODIUM 100 MG: 100 CAPSULE, LIQUID FILLED ORAL at 08:06

## 2019-07-27 RX ADMIN — IPRATROPIUM BROMIDE AND ALBUTEROL SULFATE 1 AMPULE: .5; 3 SOLUTION RESPIRATORY (INHALATION) at 14:55

## 2019-07-27 RX ADMIN — CLOPIDOGREL BISULFATE 75 MG: 75 TABLET ORAL at 08:06

## 2019-07-27 RX ADMIN — AMITRIPTYLINE HYDROCHLORIDE 50 MG: 50 TABLET, FILM COATED ORAL at 20:54

## 2019-07-27 RX ADMIN — MAGNESIUM SULFATE HEPTAHYDRATE 2 G: 40 INJECTION, SOLUTION INTRAVENOUS at 12:49

## 2019-07-27 RX ADMIN — AMIODARONE HYDROCHLORIDE 200 MG: 200 TABLET ORAL at 08:06

## 2019-07-27 RX ADMIN — SERTRALINE HYDROCHLORIDE 50 MG: 50 TABLET ORAL at 20:54

## 2019-07-27 RX ADMIN — POTASSIUM CHLORIDE 20 MEQ: 400 INJECTION, SOLUTION INTRAVENOUS at 12:06

## 2019-07-27 RX ADMIN — MEROPENEM 1 G: 1 INJECTION, POWDER, FOR SOLUTION INTRAVENOUS at 03:29

## 2019-07-27 RX ADMIN — POTASSIUM CHLORIDE 40 MEQ: 20 TABLET, EXTENDED RELEASE ORAL at 12:49

## 2019-07-27 RX ADMIN — ASPIRIN 81 MG 81 MG: 81 TABLET ORAL at 08:06

## 2019-07-27 RX ADMIN — ENOXAPARIN SODIUM 30 MG: 30 INJECTION SUBCUTANEOUS at 08:06

## 2019-07-27 RX ADMIN — INSULIN LISPRO 1 UNITS: 100 INJECTION, SOLUTION INTRAVENOUS; SUBCUTANEOUS at 21:00

## 2019-07-27 RX ADMIN — POTASSIUM CHLORIDE 20 MEQ: 400 INJECTION, SOLUTION INTRAVENOUS at 13:22

## 2019-07-27 RX ADMIN — IPRATROPIUM BROMIDE AND ALBUTEROL SULFATE 1 AMPULE: .5; 3 SOLUTION RESPIRATORY (INHALATION) at 11:20

## 2019-07-27 RX ADMIN — PREGABALIN 150 MG: 75 CAPSULE ORAL at 08:06

## 2019-07-27 NOTE — PROGRESS NOTES
suppurativa    Controlled type 2 diabetes mellitus with diabetic polyneuropathy, without long-term current use of insulin (HCC)    CAD (coronary artery disease)    Anxiety disorder    Lupus (HCC)    Chronic obstructive pulmonary disease (HCC)    Septic shock (HCC)    CAD S/P percutaneous coronary angioplasty    Gout    History of lung cancer    Smoker    Chronic low back pain with bilateral sciatica    Obesity (BMI 30.0-34. 9)    Essential hypertension    Chronic respiratory failure (HCC)    Syncope and collapse    Hypomagnesemia syndrome    Moderate malnutrition (HCC)    Sepsis (HCC)    Generalized muscle weakness    Acute hyperkalemia    Acute respiratory failure with hypoxia (HCC)        Allergies   Allergen Reactions    Food Swelling     \"Rye, Whole grains, Barley\"    Trental [Pentoxifylline] Swelling    Tramadol      \"I Felt Shaky\"    Vistaril [Hydroxyzine Hcl] Other (See Comments)     Sedated and was placed on respirator    Erythromycin      \"Stomach Pain\"    Motrin [Ibuprofen] Diarrhea    Tetracyclines & Related Nausea And Vomiting        Current Inpatient Medications:    Current Facility-Administered Medications   Medication Dose Route Frequency Provider Last Rate Last Dose    methylPREDNISolone sodium (SOLU-MEDROL) injection 40 mg  40 mg Intravenous Daily Chilo Suarez MD   40 mg at 07/27/19 0806    dicyclomine (BENTYL) tablet 20 mg  20 mg Oral 4x Daily PRN Chilo Suarez MD        glucose (GLUTOSE) 40 % oral gel 15 g  15 g Oral PRN Jen Davies PA-C        dextrose 50 % IV solution  12.5 g Intravenous PRN Jen Davies PA-C        glucagon (rDNA) injection 1 mg  1 mg Intramuscular PRN Jen Davies PA-C        dextrose 5 % solution  100 mL/hr Intravenous PRN Jen Davies PA-C        insulin lispro (HUMALOG) injection vial 0-12 Units  0-12 Units Subcutaneous TID  Jad Hale PA-C   2 Units at 07/26/19 1748    insulin lispro (HUMALOG) injection vial 0-6 Units

## 2019-07-27 NOTE — PROGRESS NOTES
Hospitalist Progress Note      Name:  Pako Figueredo /Age/Sex: 1950  (77 y.o. female)   MRN & CSN:  [id number] & [id number] Admission Date/Time: 2019 12:11 AM   Location:  -A PCP: Ophelia Choi DO       Pako Figueredo is a 77 y.o.  female  who presents with Respiratory Distress      Assessment and Plan:   Acute Resp Failure 2/2 to COPD Exacerbation and PNA  - improved on NC O2 now  - intubated   - extubated   - steroids tapered to oral  - nebs  -  sputum cx +MRSA MDR  - CTA chest: neg PE  - check CT chest: interval RML collapse noted  - SLP eval: recommended dysphagia puree  - pulm consulted for vent mx     Septic Shock 2/2 to CAP and UTI  - resolved clinically  - levo gtt weaned off    - IV Vanc + Cefepime Day #2, d/c cefepime and start IV Merrem x 10 days for E.COLI ESBL, will need 10 days of IV Vanc as well  - stopped now  - check urine strep + legionella: neg  - check blood cx NTD  - urine cx: + e.coli, ESBL  - added Midodrine 10 TID  - d/c losartan  - resume lopressor now for mild tachy      PVC's  -stable  - cardio consulted  - amio gtt done  - increased metoprolol     Ileus?  Persistent abd pain  -resolved  - removed NGT   - check CT abd: no acute process'  - bentyl prn  - Surgery following and signed off     Hypophosphatemia  - replaced    Hypokalemia  - replace    CAD  COPD  HTN  HLD     SLP eval     Pt/ot for rehab needs            Diet DIET GENERAL; Dysphagia Pureed  Dietary Nutrition Supplements: Low Calorie High Protein Supplement   Code Status Full Code     Medications:   Medications:    potassium chloride  20 mEq Intravenous Once    potassium chloride  40 mEq Oral Once    potassium chloride  20 mEq Intravenous Once    magnesium sulfate  2 g Intravenous Once    methylPREDNISolone  40 mg Intravenous Daily    insulin lispro  0-12 Units Subcutaneous TID WC    insulin lispro  0-6 Units Subcutaneous Nightly    vancomycin  1,250 mg Intravenous Q18H    metoprolol tartrate  25 mg Oral BID    ipratropium-albuterol  1 ampule Inhalation Q4H WA    lidocaine PF  5 mL Intradermal Once    sodium chloride flush  10 mL Intravenous 2 times per day    meropenem  1 g Intravenous Q8H    midodrine  10 mg Per NG tube TID WC    amiodarone  200 mg Oral Daily    amitriptyline  50 mg Oral Nightly    aspirin  81 mg Oral Daily    clopidogrel  75 mg Oral Daily    pregabalin  150 mg Oral BID    sertraline  50 mg Oral Nightly    simvastatin  20 mg Oral Nightly    sodium chloride flush  10 mL Intravenous 2 times per day    docusate sodium  100 mg Oral BID    enoxaparin  30 mg Subcutaneous Daily      Infusions:    dextrose       PRN Meds:   dicyclomine 20 mg 4x Daily PRN   glucose 15 g PRN   dextrose 12.5 g PRN   glucagon (rDNA) 1 mg PRN   dextrose 100 mL/hr PRN   sodium phosphate IVPB 0.16 mmol/kg PRN   Or     sodium phosphate IVPB 0.32 mmol/kg PRN   magnesium sulfate 1 g PRN   sodium chloride flush 10 mL PRN   albuterol 2.5 mg Q6H PRN   sodium chloride flush 10 mL PRN   magnesium hydroxide 30 mL Daily PRN   ondansetron 4 mg Q6H PRN   acetaminophen 650 mg Q4H PRN   bisacodyl 10 mg Daily PRN   acetaminophen 650 mg Q4H PRN     Subjective:     Doing well, feels hungry  Objective: Intake/Output Summary (Last 24 hours) at 7/27/2019 1152  Last data filed at 7/27/2019 0334  Gross per 24 hour   Intake --   Output 2425 ml   Net -2425 ml      Vitals:   Vitals:    07/27/19 1149   BP: 91/67   Pulse: 92   Resp: 21   Temp: 97.9 °F (36.6 °C)   SpO2: 100%     Physical Exam:   Gen:  awake, sleepy  Head/Eyes:  Normocephalic atraumatic, EOMI   NECK:   symmetrical, trachea midline  LUNGS: Normal Effort   CARDIOVASCULAR:  Normal rate  ABDOMEN:  non distended, no HSM noted. MUSCULOSKELETAL:  ROM limited  NEUROLOGIC: Alert and Oriented,  Cranial nerves II-XII are grossly intact.    SKIN:  no bruising or bleeding, normal skin color,  no redness      Data:       CBC   Recent Labs     07/25/19  4646 07/26/19  0450 07/27/19  0555   WBC 18.1* 20.7* 16.3*   HGB 10.6* 9.1* 9.7*   HCT 32.1* 28.6* 30.2*    174 221      BMP Recent Labs     07/25/19  0445 07/26/19  0450 07/27/19  0555    141 142   K 4.0 3.7 3.4*    104 103   CO2 26 31 34*   BUN 8 6 6   CREATININE 0.5* 0.4* 0.4*         Electronically signed by Warden Valentine MD on 7/27/2019 at 11:52 AM

## 2019-07-28 LAB
ANION GAP SERPL CALCULATED.3IONS-SCNC: 5 MMOL/L (ref 4–16)
BUN BLDV-MCNC: 7 MG/DL (ref 6–23)
CALCIUM SERPL-MCNC: 9.3 MG/DL (ref 8.3–10.6)
CHLORIDE BLD-SCNC: 105 MMOL/L (ref 99–110)
CO2: 34 MMOL/L (ref 21–32)
CREAT SERPL-MCNC: 0.4 MG/DL (ref 0.6–1.1)
GFR AFRICAN AMERICAN: >60 ML/MIN/1.73M2
GFR NON-AFRICAN AMERICAN: >60 ML/MIN/1.73M2
GLUCOSE BLD-MCNC: 129 MG/DL (ref 70–99)
GLUCOSE BLD-MCNC: 145 MG/DL (ref 70–99)
GLUCOSE BLD-MCNC: 157 MG/DL (ref 70–99)
GLUCOSE BLD-MCNC: 75 MG/DL (ref 70–99)
GLUCOSE BLD-MCNC: 93 MG/DL (ref 70–99)
HCT VFR BLD CALC: 28.1 % (ref 37–47)
HEMOGLOBIN: 8.9 GM/DL (ref 12.5–16)
MAGNESIUM: 2 MG/DL (ref 1.8–2.4)
MCH RBC QN AUTO: 27.1 PG (ref 27–31)
MCHC RBC AUTO-ENTMCNC: 31.7 % (ref 32–36)
MCV RBC AUTO: 85.4 FL (ref 78–100)
PDW BLD-RTO: 16.1 % (ref 11.7–14.9)
PLATELET # BLD: 237 K/CU MM (ref 140–440)
PMV BLD AUTO: 11.5 FL (ref 7.5–11.1)
POTASSIUM SERPL-SCNC: 4.6 MMOL/L (ref 3.5–5.1)
RBC # BLD: 3.29 M/CU MM (ref 4.2–5.4)
SODIUM BLD-SCNC: 144 MMOL/L (ref 135–145)
WBC # BLD: 20.7 K/CU MM (ref 4–10.5)

## 2019-07-28 PROCEDURE — 6370000000 HC RX 637 (ALT 250 FOR IP): Performed by: INTERNAL MEDICINE

## 2019-07-28 PROCEDURE — 6360000002 HC RX W HCPCS: Performed by: NURSE PRACTITIONER

## 2019-07-28 PROCEDURE — 6370000000 HC RX 637 (ALT 250 FOR IP): Performed by: NURSE PRACTITIONER

## 2019-07-28 PROCEDURE — 99232 SBSQ HOSP IP/OBS MODERATE 35: CPT | Performed by: INTERNAL MEDICINE

## 2019-07-28 PROCEDURE — 2580000003 HC RX 258: Performed by: FAMILY MEDICINE

## 2019-07-28 PROCEDURE — 85027 COMPLETE CBC AUTOMATED: CPT

## 2019-07-28 PROCEDURE — 82962 GLUCOSE BLOOD TEST: CPT

## 2019-07-28 PROCEDURE — 6370000000 HC RX 637 (ALT 250 FOR IP): Performed by: PHYSICIAN ASSISTANT

## 2019-07-28 PROCEDURE — 2060000000 HC ICU INTERMEDIATE R&B

## 2019-07-28 PROCEDURE — 80048 BASIC METABOLIC PNL TOTAL CA: CPT

## 2019-07-28 PROCEDURE — 6370000000 HC RX 637 (ALT 250 FOR IP): Performed by: FAMILY MEDICINE

## 2019-07-28 PROCEDURE — 2580000003 HC RX 258: Performed by: NURSE PRACTITIONER

## 2019-07-28 PROCEDURE — 2580000003 HC RX 258: Performed by: INTERNAL MEDICINE

## 2019-07-28 PROCEDURE — 94640 AIRWAY INHALATION TREATMENT: CPT

## 2019-07-28 PROCEDURE — 83735 ASSAY OF MAGNESIUM: CPT

## 2019-07-28 PROCEDURE — 2700000000 HC OXYGEN THERAPY PER DAY

## 2019-07-28 PROCEDURE — 6370000000 HC RX 637 (ALT 250 FOR IP): Performed by: SURGERY

## 2019-07-28 PROCEDURE — 6360000002 HC RX W HCPCS: Performed by: FAMILY MEDICINE

## 2019-07-28 PROCEDURE — 94761 N-INVAS EAR/PLS OXIMETRY MLT: CPT

## 2019-07-28 PROCEDURE — 6360000002 HC RX W HCPCS: Performed by: INTERNAL MEDICINE

## 2019-07-28 RX ORDER — OXYCODONE HYDROCHLORIDE AND ACETAMINOPHEN 5; 325 MG/1; MG/1
1 TABLET ORAL ONCE
Status: COMPLETED | OUTPATIENT
Start: 2019-07-28 | End: 2019-07-28

## 2019-07-28 RX ORDER — OXYCODONE AND ACETAMINOPHEN 7.5; 325 MG/1; MG/1
1 TABLET ORAL EVERY 6 HOURS PRN
Status: DISCONTINUED | OUTPATIENT
Start: 2019-07-28 | End: 2019-08-07 | Stop reason: HOSPADM

## 2019-07-28 RX ADMIN — MEROPENEM 1 G: 1 INJECTION, POWDER, FOR SOLUTION INTRAVENOUS at 22:12

## 2019-07-28 RX ADMIN — ENOXAPARIN SODIUM 30 MG: 30 INJECTION SUBCUTANEOUS at 07:51

## 2019-07-28 RX ADMIN — MEROPENEM 1 G: 1 INJECTION, POWDER, FOR SOLUTION INTRAVENOUS at 04:19

## 2019-07-28 RX ADMIN — METOPROLOL TARTRATE 12.5 MG: 25 TABLET ORAL at 12:04

## 2019-07-28 RX ADMIN — PREGABALIN 150 MG: 75 CAPSULE ORAL at 21:33

## 2019-07-28 RX ADMIN — DOCUSATE SODIUM 100 MG: 100 CAPSULE, LIQUID FILLED ORAL at 07:51

## 2019-07-28 RX ADMIN — AMITRIPTYLINE HYDROCHLORIDE 50 MG: 50 TABLET, FILM COATED ORAL at 21:33

## 2019-07-28 RX ADMIN — MIDODRINE HYDROCHLORIDE 10 MG: 5 TABLET ORAL at 12:04

## 2019-07-28 RX ADMIN — INSULIN LISPRO 2 UNITS: 100 INJECTION, SOLUTION INTRAVENOUS; SUBCUTANEOUS at 11:58

## 2019-07-28 RX ADMIN — SIMVASTATIN 20 MG: 20 TABLET, FILM COATED ORAL at 21:33

## 2019-07-28 RX ADMIN — PREDNISONE 20 MG: 20 TABLET ORAL at 07:51

## 2019-07-28 RX ADMIN — VANCOMYCIN HYDROCHLORIDE 1250 MG: 5 INJECTION, POWDER, LYOPHILIZED, FOR SOLUTION INTRAVENOUS at 07:59

## 2019-07-28 RX ADMIN — MIDODRINE HYDROCHLORIDE 10 MG: 5 TABLET ORAL at 17:09

## 2019-07-28 RX ADMIN — DOCUSATE SODIUM 100 MG: 100 CAPSULE, LIQUID FILLED ORAL at 21:33

## 2019-07-28 RX ADMIN — PREGABALIN 150 MG: 75 CAPSULE ORAL at 07:51

## 2019-07-28 RX ADMIN — INSULIN LISPRO 2 UNITS: 100 INJECTION, SOLUTION INTRAVENOUS; SUBCUTANEOUS at 17:03

## 2019-07-28 RX ADMIN — IPRATROPIUM BROMIDE AND ALBUTEROL SULFATE 1 AMPULE: .5; 3 SOLUTION RESPIRATORY (INHALATION) at 20:19

## 2019-07-28 RX ADMIN — METOPROLOL TARTRATE 12.5 MG: 25 TABLET ORAL at 21:32

## 2019-07-28 RX ADMIN — IPRATROPIUM BROMIDE AND ALBUTEROL SULFATE 1 AMPULE: .5; 3 SOLUTION RESPIRATORY (INHALATION) at 08:15

## 2019-07-28 RX ADMIN — SODIUM CHLORIDE, PRESERVATIVE FREE 10 ML: 5 INJECTION INTRAVENOUS at 21:34

## 2019-07-28 RX ADMIN — MEROPENEM 1 G: 1 INJECTION, POWDER, FOR SOLUTION INTRAVENOUS at 12:05

## 2019-07-28 RX ADMIN — SODIUM CHLORIDE, PRESERVATIVE FREE 10 ML: 5 INJECTION INTRAVENOUS at 07:53

## 2019-07-28 RX ADMIN — MIDODRINE HYDROCHLORIDE 10 MG: 5 TABLET ORAL at 07:51

## 2019-07-28 RX ADMIN — CLOPIDOGREL BISULFATE 75 MG: 75 TABLET ORAL at 07:51

## 2019-07-28 RX ADMIN — OXYCODONE HYDROCHLORIDE AND ACETAMINOPHEN 1 TABLET: 5; 325 TABLET ORAL at 10:36

## 2019-07-28 RX ADMIN — AMIODARONE HYDROCHLORIDE 200 MG: 200 TABLET ORAL at 07:51

## 2019-07-28 RX ADMIN — SODIUM CHLORIDE, PRESERVATIVE FREE 10 ML: 5 INJECTION INTRAVENOUS at 07:52

## 2019-07-28 RX ADMIN — SODIUM CHLORIDE, PRESERVATIVE FREE 10 ML: 5 INJECTION INTRAVENOUS at 21:33

## 2019-07-28 RX ADMIN — IPRATROPIUM BROMIDE AND ALBUTEROL SULFATE 1 AMPULE: .5; 3 SOLUTION RESPIRATORY (INHALATION) at 15:32

## 2019-07-28 RX ADMIN — SERTRALINE HYDROCHLORIDE 50 MG: 50 TABLET ORAL at 21:33

## 2019-07-28 RX ADMIN — ASPIRIN 81 MG 81 MG: 81 TABLET ORAL at 07:51

## 2019-07-28 ASSESSMENT — PAIN SCALES - GENERAL: PAINLEVEL_OUTOF10: 8

## 2019-07-28 NOTE — PROGRESS NOTES
Overview Note:     Dr Cj Rivas for pain mgt 11/2018:       CAD S/P percutaneous coronary angioplasty      Overview Note:     Dr Sharlene Cardoso; stent RCA; 2012;      Smoker     Obesity (BMI 30.0-34. 9)     Septic shock ([facility] 04/20/2018    Gout 01/01/2018    [facility] 04/10/2017     Overview Note:     Currently smoking; Dr Carina Mott; hx of Lung CA      Lupus ([facility] 10/21/2016    Anxiety disorder 08/07/2016    Controlled type 2 diabetes mellitus with diabetic polyneuropathy, without long-term current use of insulin ([facility] 05/28/2016    CAD (coronary artery disease) 05/28/2016    Hidradenitis suppurativa 02/20/2015    Lung cancer ([facility] 10/28/2014    History of lung cancer 09/01/2014     Overview Note:     2014: Dr Carina Mott; nonsmall cell lung CA; Left upper lobectomy     Leukocytosis  AECOPD  Acute hypoxic resp failure  HTN  HLD  CAD  Leukocytosis  Anemia  MRSA Pneumonia     1. Abx  2. Nebs  3. Keep sats > 92%  4. C/w present management  5. Await placement  No follow-ups on file.     Electronically signed by Sarah Lowe MD on 7/28/2019 at 11:49 AM

## 2019-07-28 NOTE — PROGRESS NOTES
[address] [facility]  consulted by Dr. Roland Aviles for monitoring and adjustment. Indication for treatment: MRSA pneumonia/UTI  Goal trough: 15-20 mcg/mL     Pertinent Laboratory Values:   Temp Readings from Last 3 Encounters:   07/28/19 98.6 °F (37 °C) (Oral)   07/04/19 97.5 °F (36.4 °C) (Oral)   06/20/19 98.5 °F (36.9 °C) (Oral)     Recent Labs     07/26/19  0450 07/27/19  0555 07/28/19  0346   WBC 20.7* 16.3* 20.7*     Recent Labs     07/26/19  0450 07/27/19  0555 07/28/19  0346   BUN 6 6 7   CREATININE 0.4* 0.4* 0.4*     Estimated Creatinine Clearance: 132 mL/min (A) (based on SCr of 0.4 mg/dL (L)). Intake/Output Summary (Last 24 hours) at 7/28/2019 1218  Last data filed at 7/28/2019 0354  Gross per 24 hour   Intake 240 ml   Output 710 ml   Net -470 ml       Pertinent Cultures:  Date    Source    Results  7/20   MRSA screen   POSITIVE  7/20   Strep pneumo/ legionella  Negative  7/20   Urine    ESBL E Coli  7/20   Blood    NGTD  7/21   Sputum   MRSA     Vancomycin level:   TROUGH:    Recent Labs     07/27/19  1615   [address] 18.3     RANDOM:  No results for input(s): VANCORANDOM in the last 72 hours. Previous levels:  · Vancomycin 1250 mg q24h IVPB- Trough of 8.8   · Vancomycin 1250 mg q12h IVPB- Trough of 20.6    · Vancomycin 1250 mg q18h IVPB- Trough of 18.3    Assessment:  · WBC and temperature: trending up (receiving methylprednisolone)  · SCr, BUN, and urine output: stable  · Day(s) of therapy: 9  · Vancomycin level: therapeutic    Plan:  · Continue vancomycin 1250mg q18h IVPB  · Pharmacy will continue to monitor patient and adjust therapy as indicated    Thank you for the consult.   Reema NgD, [facility]  7/28/2019 12:18 PM

## 2019-07-28 NOTE — PROGRESS NOTES
Hospitalist Progress Note      Name:  Candy Jha /Age/Sex: 1950  (77 y.o. female)   MRN & CSN:  [id number] & [id number] Admission Date/Time: 2019 12:11 AM   Location:  -A PCP: Paulino Rushing DO       Candy Jha is a 77 y.o.  female  who presents with Respiratory Distress      Assessment and Plan:   Acute Resp Failure 2/2 to COPD Exacerbation and PNA  - improved on NC O2 now  - intubated   - extubated   - steroids tapered to oral  - nebs  -  sputum cx +MRSA MDR  - CTA chest: neg PE  - check CT chest: interval RML collapse noted  - SLP eval: recommended dysphagia puree  - pulm consulted for vent mx     Septic Shock 2/2 to CAP and UTI  - resolved clinically  - levo gtt weaned off    - IV Vanc + Cefepime Day #2, d/c cefepime and start IV Merrem x 10 days for E.COLI ESBL, will need 10 days of IV Vanc as well  - stopped now  - check urine strep + legionella: neg  - check blood cx NTD  - urine cx: + e.coli, ESBL  - added Midodrine 10 TID  - d/c losartan  - resume lopressor now for mild tachy      PVC's  -stable  - cardio consulted  - amio gtt done  - increased metoprolol     Ileus?  Persistent abd pain  -resolved  - removed NGT   - check CT abd: no acute process'  - bentyl prn  - Surgery following and signed off     Hypophosphatemia  - replaced     Hypokalemia  - replaced    Leucocytosis  - d/w pt and she states this is chronic for her and was told she has high WBC  - f/u hematology outpt    CAD  COPD  HTN  HLD     SLP eval     Pt/ot for rehab needs, placement pending    Reports being on percocet at home, will consult her pain mx physician Dr Nirav Cedillo, I don't see it on her home med list.            Diet DIET GENERAL; Dysphagia Pureed  Dietary Nutrition Supplements: Low Calorie High Protein Supplement   Code Status Full Code     Medications:   Medications:    metoprolol tartrate  12.5 mg Oral BID    predniSONE  20 mg Oral Daily    Followed by   Rose Buenrostro ON 2019] predniSONE  15 mg Oral Daily    Followed by   Ana Montgomery ON 8/3/2019] predniSONE  10 mg Oral Daily    Followed by   Ana Montgomery ON 8/6/2019] predniSONE  5 mg Oral Daily    insulin lispro  0-12 Units Subcutaneous TID     insulin lispro  0-6 Units Subcutaneous Nightly    vancomycin  1,250 mg Intravenous Q18H    ipratropium-albuterol  1 ampule Inhalation Q4H WA    lidocaine PF  5 mL Intradermal Once    sodium chloride flush  10 mL Intravenous 2 times per day    meropenem  1 g Intravenous Q8H    midodrine  10 mg Per NG tube TID     amiodarone  200 mg Oral Daily    amitriptyline  50 mg Oral Nightly    aspirin  81 mg Oral Daily    clopidogrel  75 mg Oral Daily    pregabalin  150 mg Oral BID    sertraline  50 mg Oral Nightly    simvastatin  20 mg Oral Nightly    sodium chloride flush  10 mL Intravenous 2 times per day    docusate sodium  100 mg Oral BID    enoxaparin  30 mg Subcutaneous Daily      Infusions:    dextrose       PRN Meds:   dicyclomine 20 mg 4x Daily PRN   glucose 15 g PRN   dextrose 12.5 g PRN   glucagon (rDNA) 1 mg PRN   dextrose 100 mL/hr PRN   sodium phosphate IVPB 0.16 mmol/kg PRN   Or     sodium phosphate IVPB 0.32 mmol/kg PRN   magnesium sulfate 1 g PRN   sodium chloride flush 10 mL PRN   albuterol 2.5 mg Q6H PRN   sodium chloride flush 10 mL PRN   magnesium hydroxide 30 mL Daily PRN   ondansetron 4 mg Q6H PRN   acetaminophen 650 mg Q4H PRN   bisacodyl 10 mg Daily PRN   acetaminophen 650 mg Q4H PRN     Subjective:     Doing ok, no pain or distress  Objective:        Intake/Output Summary (Last 24 hours) at 7/28/2019 1139  Last data filed at 7/28/2019 0354  Gross per 24 hour   Intake 240 ml   Output 710 ml   Net -470 ml      Vitals:   Vitals:    07/28/19 0745   BP: 97/62   Pulse: 110   Resp: 22   Temp: 98.2 °F (36.8 °C)   SpO2: 93%     Physical Exam:   Gen:  awake, alert, cooperative, no apparent distress  Head/Eyes:  Normocephalic atraumatic, EOMI   NECK:   symmetrical, trachea midline  LUNGS: Normal Effort   CARDIOVASCULAR:  Normal rate  ABDOMEN: Non tender, non distended, no HSM noted. MUSCULOSKELETAL:  ROM limited  NEUROLOGIC: Alert and Oriented,  Cranial nerves II-XII are grossly intact.    SKIN:  no bruising or bleeding, normal skin color,  no redness      Data:       CBC   Recent Labs     07/26/19 0450 07/27/19  0555 07/28/19  0346   WBC 20.7* 16.3* 20.7*   HGB 9.1* 9.7* 8.9*   HCT 28.6* 30.2* 28.1*    221 237      BMP Recent Labs     07/26/19 0450 07/27/19  0555 07/28/19  0346    142 144   K 3.7 3.4* 4.6    103 105   CO2 31 34* 34*   BUN 6 6 7   CREATININE 0.4* 0.4* 0.4*         Electronically signed by Tello Forrester MD on 7/28/2019 at 11:39 AM

## 2019-07-28 NOTE — PROGRESS NOTES
Cardiology Progress Note       Darci Morin is a 77 y.o. female   1950     SUBJECTIVE:   Patient  Seen  And  Examined  Feels  Better  BP  Low  At times      Less  pvcs      k  And  Mag     OBJECTIVE:    Review of Systems:  General appearance: alert, appears stated age and cooperative  Skin: Skin color, texture, normal. No rashes or lesions  HEENT: No nose bleed, headache, vision problems  CV: C/O chest pain, tightness, pressure,   Respiratory: C/o no SOB, RUBI, Orthopnea, PND  GI: No abdominal pain, black stool, bloating  Limbs: No c/o edema, pain, swelling, intermittent claudication, joint pains  Neuro: No dizziness, lightheadedness, syncope, gait problems, memory problems  Psych: grossly normal. No SI/depression. Vitals:   Blood pressure 97/62, pulse 110, temperature 98.2 °F (36.8 °C), temperature source Oral, resp. rate 22, height 5' 1\" (1.549 m), weight 183 lb 13.8 oz (83.4 kg), SpO2 93 %, not currently breastfeeding. HEENT: AT, NC, PERRLA  Neck: No JVD  Heart: S1 S2 audible, no murmur   Lungs: CTA   Abdomen: Nontender   Limbs: No edema   CNS: no focal deficit      Past Medical History:   Diagnosis Date    Anxiety     CAD (coronary artery disease)     Dr Mcgarry Amish CAD S/P percutaneous coronary angioplasty 2012    stent RCA; Ahmed    Chronic back pain     COPD (chronic obstructive pulmonary disease) ([facility]     Jerral Jetty Gout 2018    History of cerebral infarction 2000    right hemiparesis with full recovery    Hyperlipidemia     Hypertension     Lumbar spinal stenosis     Lung cancer ([facility] 09/2014    Dr Vazquez Earing; left upper lobectomy; non small cell CA    Obesity (BMI 30.0-34. 9)     Restless leg syndrome     Rheumatoid arthritis (HCC)     Smoker     Systemic lupus erythematosus (HCC) 1992    Dr Kaila Cortez, visiting physician    Type II diabetes mellitus ([facility] 1969    Ventral hernia 06/2018    right ventral hernia seen on CT abd 6/2018        Patient Active Problem List   Diagnosis    Lung NORAH Borges CNP   200 mg at 07/28/19 0751    amitriptyline (ELAVIL) tablet 50 mg  50 mg Oral Nightly NORAH Martinez - CNP   50 mg at 07/27/19 2054    aspirin chewable tablet 81 mg  81 mg Oral Daily Michelle Borges APRN - CNP   81 mg at 07/28/19 0751    clopidogrel (PLAVIX) tablet 75 mg  75 mg Oral Daily NORAH Martinez - CNP   75 mg at 07/28/19 0751    pregabalin (LYRICA) capsule 150 mg  150 mg Oral BID NORAH Berkowitz CNP   150 mg at 07/28/19 0751    sertraline (ZOLOFT) tablet 50 mg  50 mg Oral Nightly NORAH Martinez - CNP   50 mg at 07/27/19 2054    simvastatin (ZOCOR) tablet 20 mg  20 mg Oral Nightly NORAH Martinez - CNP   20 mg at 07/27/19 2054    sodium chloride flush 0.9 % injection 10 mL  10 mL Intravenous 2 times per day NORAH Berkowitz CNP   10 mL at 07/28/19 0753    sodium chloride flush 0.9 % injection 10 mL  10 mL Intravenous PRN NORAH Martinez CNP        magnesium hydroxide (MILK OF MAGNESIA) 400 MG/5ML suspension 30 mL  30 mL Oral Daily PRN NORAH Martinez - CNP        ondansetron (ZOFRAN) injection 4 mg  4 mg Intravenous Q6H PRN NORAH Martinez - CNP   4 mg at 07/26/19 0802    acetaminophen (TYLENOL) suppository 650 mg  650 mg Rectal Q4H PRN Michelle Borges APRN - CNP        bisacodyl (DULCOLAX) suppository 10 mg  10 mg Rectal Daily PRN Stephan Reid MD        docusate sodium (COLACE) capsule 100 mg  100 mg Oral BID Stephan Reid MD   100 mg at 07/28/19 0751    acetaminophen (TYLENOL) suppository 650 mg  650 mg Rectal Q4H PRN Mary Cuellar MD   650 mg at 07/21/19 0226    enoxaparin (LOVENOX) injection 30 mg  30 mg Subcutaneous Daily NORAH Martinez - CNP   30 mg at 07/28/19 0751           Labs:  CBC with Differential:    Lab Results   Component Value Date    WBC 20.7 07/28/2019    RBC 3.29 07/28/2019    HGB 8.9 07/28/2019    HCT 28.1 07/28/2019     07/28/2019    MCV 85.4 07/28/2019    MCH 27.1 07/28/2019    MCHC 31.7 07/28/2019    RDW 16.1 07/28/2019    NRBC 1 03/23/2017    SEGSPCT 58.0 07/21/2019    BANDSPCT 26 07/21/2019    LYMPHOPCT 10.0 07/21/2019    MONOPCT 2.0 07/21/2019    MYELOPCT 1 07/21/2019    EOSPCT 0.1 06/08/2011    BASOPCT 0.4 07/20/2019    MONOSABS 0.2 07/21/2019    LYMPHSABS 1.0 07/21/2019    EOSABS 0.1 07/20/2019    BASOSABS 0.0 07/20/2019    DIFFTYPE MANUAL DIFFERENTIAL 07/21/2019     CMP:    Lab Results   Component Value Date     07/28/2019    K 4.6 07/28/2019     07/28/2019    CO2 34 07/28/2019    BUN 7 07/28/2019    CREATININE 0.4 07/28/2019    GFRAA >60 07/28/2019    LABGLOM >60 07/28/2019    GLUCOSE 93 07/28/2019    PROT 4.6 07/20/2019    PROT 7.7 06/07/2011    LABALBU 2.3 07/20/2019    CALCIUM 9.3 07/28/2019    BILITOT 1.1 07/20/2019    ALKPHOS 120 07/20/2019    AST 33 07/20/2019    ALT 38 07/20/2019     Hepatic Function Panel:    Lab Results   Component Value Date    ALKPHOS 120 07/20/2019    ALT 38 07/20/2019    AST 33 07/20/2019    PROT 4.6 07/20/2019    PROT 7.7 06/07/2011    BILITOT 1.1 07/20/2019    BILIDIR 0.2 05/30/2017    IBILI 0.0 05/30/2017    LABALBU 2.3 07/20/2019     Magnesium:    Lab Results   Component Value Date    MG 1.8 07/27/2019     PT/INR:    Lab Results   Component Value Date    PROTIME 13.3 04/20/2019    PROTIME 11.4 06/07/2011    INR 1.17 04/20/2019     Last 3 Troponin:    Lab Results   Component Value Date    TROPONINI <0.006 02/08/2014    TROPONINI 0.065 06/07/2011    TROPONINI 0.065 06/07/2011    TROPONINI <0.006 06/07/2011     U/A:    Lab Results   Component Value Date    COLORU RAH 07/20/2019    WBCUA 2 07/20/2019    RBCUA 7 07/20/2019    MUCUS RARE 06/16/2019    TRICHOMONAS NONE SEEN 07/20/2019    YEAST FEW 07/02/2019    BACTERIA NEGATIVE 07/20/2019    CLARITYU CLEAR 07/20/2019    SPECGRAV 1.019 07/20/2019    LEUKOCYTESUR NEGATIVE 07/20/2019    UROBILINOGEN 1 07/20/2019    BILIRUBINUR SMALL 07/20/2019    BLOODU NEGATIVE 07/20/2019 ABG:    Lab Results   Component Value Date    HSG3GFB 42.0 07/24/2019    PO2ART 50 07/24/2019    CAK3AQN 25.4 07/24/2019     FLP:    Lab Results   Component Value Date    TRIG 82 04/25/2019    HDL 28 04/25/2019    LDLDIRECT 25 04/25/2019     TSH:  No results found for: TSH   DATA:   ECG: Sinus Rhythm         ASSESSMENT:   1   Frequent  pvs     Now  Much  Better  k  And  Mag  Low   Discussed  With  Nurse    For  Replacement  2    Cad  No  Chest  Pain  3  htn,  Sometimes   Hypotension  Will  Decrease  Lopressor  Dosage   4  Dm  5  Lung  Cancer and  Acute  Respiratory failure  PLAN   Decrease  Lopressor to  12.5  Mg  bid

## 2019-07-29 ENCOUNTER — APPOINTMENT (OUTPATIENT)
Dept: GENERAL RADIOLOGY | Age: 69
DRG: 871 | End: 2019-07-29
Payer: MEDICARE

## 2019-07-29 LAB
AMMONIA: 33 UMOL/L (ref 11–51)
ANION GAP SERPL CALCULATED.3IONS-SCNC: 6 MMOL/L (ref 4–16)
BASE EXCESS MIXED: ABNORMAL (ref 0–2.3)
BUN BLDV-MCNC: 8 MG/DL (ref 6–23)
CALCIUM SERPL-MCNC: 9.4 MG/DL (ref 8.3–10.6)
CARBON MONOXIDE, BLOOD: 3.4 % (ref 0–5)
CHLORIDE BLD-SCNC: 101 MMOL/L (ref 99–110)
CO2 CONTENT: 37.3 MMOL/L (ref 19–24)
CO2: 35 MMOL/L (ref 21–32)
COMMENT: AC
CREAT SERPL-MCNC: 0.4 MG/DL (ref 0.6–1.1)
GFR AFRICAN AMERICAN: >60 ML/MIN/1.73M2
GFR NON-AFRICAN AMERICAN: >60 ML/MIN/1.73M2
GLUCOSE BLD-MCNC: 109 MG/DL (ref 70–99)
GLUCOSE BLD-MCNC: 146 MG/DL (ref 70–99)
GLUCOSE BLD-MCNC: 146 MG/DL (ref 70–99)
GLUCOSE BLD-MCNC: 169 MG/DL (ref 70–99)
GLUCOSE BLD-MCNC: 194 MG/DL (ref 70–99)
GLUCOSE BLD-MCNC: 87 MG/DL (ref 70–99)
HCO3 ARTERIAL: 35.9 MMOL/L (ref 18–23)
HCT VFR BLD CALC: 29.2 % (ref 37–47)
HEMOGLOBIN: 9.4 GM/DL (ref 12.5–16)
LACTATE: 0.5 MMOL/L (ref 0.4–2)
MAGNESIUM: 2 MG/DL (ref 1.8–2.4)
MCH RBC QN AUTO: 27.3 PG (ref 27–31)
MCHC RBC AUTO-ENTMCNC: 32.2 % (ref 32–36)
MCV RBC AUTO: 84.9 FL (ref 78–100)
METHEMOGLOBIN ARTERIAL: 1.2 %
O2 SATURATION: 92.1 % (ref 96–97)
PCO2 ARTERIAL: 45 MMHG (ref 32–45)
PDW BLD-RTO: 16.5 % (ref 11.7–14.9)
PH BLOOD: 7.51 (ref 7.34–7.45)
PLATELET # BLD: 286 K/CU MM (ref 140–440)
PMV BLD AUTO: 11.4 FL (ref 7.5–11.1)
PO2 ARTERIAL: 64 MMHG (ref 75–100)
POTASSIUM SERPL-SCNC: 4.6 MMOL/L (ref 3.5–5.1)
PREALBUMIN: ABNORMAL MG/DL (ref 20–40)
PRO-BNP: 693.7 PG/ML
RBC # BLD: 3.44 M/CU MM (ref 4.2–5.4)
SODIUM BLD-SCNC: 142 MMOL/L (ref 135–145)
TROPONIN T: <0.01 NG/ML
WBC # BLD: 18.7 K/CU MM (ref 4–10.5)

## 2019-07-29 PROCEDURE — 84484 ASSAY OF TROPONIN QUANT: CPT

## 2019-07-29 PROCEDURE — 6370000000 HC RX 637 (ALT 250 FOR IP): Performed by: INTERNAL MEDICINE

## 2019-07-29 PROCEDURE — 6360000002 HC RX W HCPCS: Performed by: INTERNAL MEDICINE

## 2019-07-29 PROCEDURE — 84134 ASSAY OF PREALBUMIN: CPT

## 2019-07-29 PROCEDURE — 6370000000 HC RX 637 (ALT 250 FOR IP): Performed by: FAMILY MEDICINE

## 2019-07-29 PROCEDURE — 87205 SMEAR GRAM STAIN: CPT

## 2019-07-29 PROCEDURE — 2580000003 HC RX 258: Performed by: FAMILY MEDICINE

## 2019-07-29 PROCEDURE — 89220 SPUTUM SPECIMEN COLLECTION: CPT

## 2019-07-29 PROCEDURE — 6360000002 HC RX W HCPCS

## 2019-07-29 PROCEDURE — 87086 URINE CULTURE/COLONY COUNT: CPT

## 2019-07-29 PROCEDURE — 6360000002 HC RX W HCPCS: Performed by: FAMILY MEDICINE

## 2019-07-29 PROCEDURE — 6370000000 HC RX 637 (ALT 250 FOR IP): Performed by: PHYSICIAN ASSISTANT

## 2019-07-29 PROCEDURE — 6370000000 HC RX 637 (ALT 250 FOR IP): Performed by: NURSE PRACTITIONER

## 2019-07-29 PROCEDURE — 82962 GLUCOSE BLOOD TEST: CPT

## 2019-07-29 PROCEDURE — P9047 ALBUMIN (HUMAN), 25%, 50ML: HCPCS | Performed by: INTERNAL MEDICINE

## 2019-07-29 PROCEDURE — 2700000000 HC OXYGEN THERAPY PER DAY

## 2019-07-29 PROCEDURE — 87070 CULTURE OTHR SPECIMN AEROBIC: CPT

## 2019-07-29 PROCEDURE — 94750 HC PULMONARY COMPLIANCE STUDY: CPT

## 2019-07-29 PROCEDURE — 99221 1ST HOSP IP/OBS SF/LOW 40: CPT | Performed by: OBSTETRICS & GYNECOLOGY

## 2019-07-29 PROCEDURE — 2580000003 HC RX 258: Performed by: INTERNAL MEDICINE

## 2019-07-29 PROCEDURE — 83735 ASSAY OF MAGNESIUM: CPT

## 2019-07-29 PROCEDURE — 94640 AIRWAY INHALATION TREATMENT: CPT

## 2019-07-29 PROCEDURE — 2500000003 HC RX 250 WO HCPCS: Performed by: FAMILY MEDICINE

## 2019-07-29 PROCEDURE — 83605 ASSAY OF LACTIC ACID: CPT

## 2019-07-29 PROCEDURE — 87040 BLOOD CULTURE FOR BACTERIA: CPT

## 2019-07-29 PROCEDURE — 51702 INSERT TEMP BLADDER CATH: CPT

## 2019-07-29 PROCEDURE — 36600 WITHDRAWAL OF ARTERIAL BLOOD: CPT

## 2019-07-29 PROCEDURE — 6360000002 HC RX W HCPCS: Performed by: NURSE PRACTITIONER

## 2019-07-29 PROCEDURE — 6370000000 HC RX 637 (ALT 250 FOR IP): Performed by: PAIN MEDICINE

## 2019-07-29 PROCEDURE — 36592 COLLECT BLOOD FROM PICC: CPT

## 2019-07-29 PROCEDURE — 82140 ASSAY OF AMMONIA: CPT

## 2019-07-29 PROCEDURE — 85027 COMPLETE CBC AUTOMATED: CPT

## 2019-07-29 PROCEDURE — 5A1945Z RESPIRATORY VENTILATION, 24-96 CONSECUTIVE HOURS: ICD-10-PCS | Performed by: INTERNAL MEDICINE

## 2019-07-29 PROCEDURE — 71045 X-RAY EXAM CHEST 1 VIEW: CPT

## 2019-07-29 PROCEDURE — 6360000002 HC RX W HCPCS: Performed by: HOSPITALIST

## 2019-07-29 PROCEDURE — 83880 ASSAY OF NATRIURETIC PEPTIDE: CPT

## 2019-07-29 PROCEDURE — 0BH17EZ INSERTION OF ENDOTRACHEAL AIRWAY INTO TRACHEA, VIA NATURAL OR ARTIFICIAL OPENING: ICD-10-PCS | Performed by: INTERNAL MEDICINE

## 2019-07-29 PROCEDURE — 2580000003 HC RX 258: Performed by: NURSE PRACTITIONER

## 2019-07-29 PROCEDURE — 31720 CLEARANCE OF AIRWAYS: CPT

## 2019-07-29 PROCEDURE — 2580000003 HC RX 258

## 2019-07-29 PROCEDURE — 99291 CRITICAL CARE FIRST HOUR: CPT | Performed by: INTERNAL MEDICINE

## 2019-07-29 PROCEDURE — 31500 INSERT EMERGENCY AIRWAY: CPT

## 2019-07-29 PROCEDURE — 93005 ELECTROCARDIOGRAM TRACING: CPT | Performed by: INTERNAL MEDICINE

## 2019-07-29 PROCEDURE — 2000000000 HC ICU R&B

## 2019-07-29 PROCEDURE — 94761 N-INVAS EAR/PLS OXIMETRY MLT: CPT

## 2019-07-29 PROCEDURE — 80048 BASIC METABOLIC PNL TOTAL CA: CPT

## 2019-07-29 PROCEDURE — 82803 BLOOD GASES ANY COMBINATION: CPT

## 2019-07-29 RX ORDER — 0.9 % SODIUM CHLORIDE 0.9 %
1000 INTRAVENOUS SOLUTION INTRAVENOUS ONCE
Status: DISCONTINUED | OUTPATIENT
Start: 2019-07-29 | End: 2019-08-05

## 2019-07-29 RX ORDER — ATROPINE SULFATE 0.1 MG/ML
INJECTION INTRAVENOUS
Status: COMPLETED | OUTPATIENT
Start: 2019-07-29 | End: 2019-07-29

## 2019-07-29 RX ORDER — DOPAMINE HYDROCHLORIDE 320 MG/100ML
INJECTION, SOLUTION INTRAVENOUS CONTINUOUS PRN
Status: COMPLETED | OUTPATIENT
Start: 2019-07-29 | End: 2019-07-29

## 2019-07-29 RX ORDER — DOPAMINE HYDROCHLORIDE 160 MG/100ML
5 INJECTION, SOLUTION INTRAVENOUS CONTINUOUS
Status: DISCONTINUED | OUTPATIENT
Start: 2019-07-29 | End: 2019-07-30

## 2019-07-29 RX ORDER — PROPOFOL 10 MG/ML
10 INJECTION, EMULSION INTRAVENOUS CONTINUOUS
Status: DISCONTINUED | OUTPATIENT
Start: 2019-07-29 | End: 2019-08-01

## 2019-07-29 RX ORDER — SODIUM CHLORIDE 9 MG/ML
INJECTION, SOLUTION INTRAVENOUS ONCE
Status: COMPLETED | OUTPATIENT
Start: 2019-07-29 | End: 2019-07-29

## 2019-07-29 RX ORDER — ALBUMIN (HUMAN) 12.5 G/50ML
25 SOLUTION INTRAVENOUS ONCE
Status: COMPLETED | OUTPATIENT
Start: 2019-07-29 | End: 2019-07-29

## 2019-07-29 RX ORDER — ACETYLCYSTEINE 200 MG/ML
600 SOLUTION ORAL; RESPIRATORY (INHALATION) 2 TIMES DAILY
Status: DISPENSED | OUTPATIENT
Start: 2019-07-29 | End: 2019-08-01

## 2019-07-29 RX ORDER — SODIUM CHLORIDE 9 MG/ML
INJECTION, SOLUTION INTRAVENOUS CONTINUOUS
Status: DISCONTINUED | OUTPATIENT
Start: 2019-07-29 | End: 2019-07-30

## 2019-07-29 RX ORDER — CHLORHEXIDINE GLUCONATE 0.12 MG/ML
15 RINSE ORAL 2 TIMES DAILY
Status: DISCONTINUED | OUTPATIENT
Start: 2019-07-29 | End: 2019-08-01

## 2019-07-29 RX ADMIN — MEROPENEM 1 G: 1 INJECTION, POWDER, FOR SOLUTION INTRAVENOUS at 21:05

## 2019-07-29 RX ADMIN — PREDNISONE 20 MG: 20 TABLET ORAL at 08:32

## 2019-07-29 RX ADMIN — VANCOMYCIN HYDROCHLORIDE 1250 MG: 5 INJECTION, POWDER, LYOPHILIZED, FOR SOLUTION INTRAVENOUS at 21:42

## 2019-07-29 RX ADMIN — ENOXAPARIN SODIUM 30 MG: 30 INJECTION SUBCUTANEOUS at 08:33

## 2019-07-29 RX ADMIN — INSULIN LISPRO 2 UNITS: 100 INJECTION, SOLUTION INTRAVENOUS; SUBCUTANEOUS at 12:34

## 2019-07-29 RX ADMIN — MEROPENEM 1 G: 1 INJECTION, POWDER, FOR SOLUTION INTRAVENOUS at 13:45

## 2019-07-29 RX ADMIN — NOREPINEPHRINE BITARTRATE 2 MCG/MIN: 1 INJECTION INTRAVENOUS at 10:57

## 2019-07-29 RX ADMIN — SODIUM CHLORIDE: 9 INJECTION, SOLUTION INTRAVENOUS at 11:27

## 2019-07-29 RX ADMIN — PROPOFOL 5 MCG/KG/MIN: 10 INJECTION, EMULSION INTRAVENOUS at 22:35

## 2019-07-29 RX ADMIN — VANCOMYCIN HYDROCHLORIDE 1250 MG: 5 INJECTION, POWDER, LYOPHILIZED, FOR SOLUTION INTRAVENOUS at 04:07

## 2019-07-29 RX ADMIN — ACETYLCYSTEINE 600 MG: 200 SOLUTION ORAL; RESPIRATORY (INHALATION) at 20:41

## 2019-07-29 RX ADMIN — IPRATROPIUM BROMIDE AND ALBUTEROL SULFATE 1 AMPULE: .5; 3 SOLUTION RESPIRATORY (INHALATION) at 08:13

## 2019-07-29 RX ADMIN — IPRATROPIUM BROMIDE AND ALBUTEROL SULFATE 1 AMPULE: .5; 3 SOLUTION RESPIRATORY (INHALATION) at 16:02

## 2019-07-29 RX ADMIN — ALBUMIN (HUMAN) 25 G: 0.25 INJECTION, SOLUTION INTRAVENOUS at 11:34

## 2019-07-29 RX ADMIN — OXYCODONE HYDROCHLORIDE AND ACETAMINOPHEN 1 TABLET: 7.5; 325 TABLET ORAL at 08:32

## 2019-07-29 RX ADMIN — DOPAMINE HYDROCHLORIDE IN DEXTROSE 5 MCG/KG/MIN: 1.6 INJECTION, SOLUTION INTRAVENOUS at 11:52

## 2019-07-29 RX ADMIN — CLOPIDOGREL BISULFATE 75 MG: 75 TABLET ORAL at 08:31

## 2019-07-29 RX ADMIN — IPRATROPIUM BROMIDE AND ALBUTEROL SULFATE 1 AMPULE: .5; 3 SOLUTION RESPIRATORY (INHALATION) at 20:40

## 2019-07-29 RX ADMIN — CHLORHEXIDINE GLUCONATE 0.12% ORAL RINSE 15 ML: 1.2 LIQUID ORAL at 12:09

## 2019-07-29 RX ADMIN — MIDODRINE HYDROCHLORIDE 10 MG: 5 TABLET ORAL at 08:31

## 2019-07-29 RX ADMIN — CHLORHEXIDINE GLUCONATE 0.12% ORAL RINSE 15 ML: 1.2 LIQUID ORAL at 21:05

## 2019-07-29 RX ADMIN — METOPROLOL TARTRATE 12.5 MG: 25 TABLET ORAL at 08:34

## 2019-07-29 RX ADMIN — PREGABALIN 150 MG: 75 CAPSULE ORAL at 08:31

## 2019-07-29 RX ADMIN — SODIUM CHLORIDE, PRESERVATIVE FREE 10 ML: 5 INJECTION INTRAVENOUS at 21:43

## 2019-07-29 RX ADMIN — INSULIN LISPRO 2 UNITS: 100 INJECTION, SOLUTION INTRAVENOUS; SUBCUTANEOUS at 17:05

## 2019-07-29 RX ADMIN — ATROPINE SULFATE 1 MG: 0.1 INJECTION PARENTERAL at 11:33

## 2019-07-29 RX ADMIN — DOPAMINE HYDROCHLORIDE 5 MCG/KG/MIN: 320 INJECTION, SOLUTION INTRAVENOUS at 11:40

## 2019-07-29 RX ADMIN — ASPIRIN 81 MG 81 MG: 81 TABLET ORAL at 08:32

## 2019-07-29 RX ADMIN — AMIODARONE HYDROCHLORIDE 200 MG: 200 TABLET ORAL at 08:31

## 2019-07-29 RX ADMIN — MEROPENEM 1 G: 1 INJECTION, POWDER, FOR SOLUTION INTRAVENOUS at 04:07

## 2019-07-29 RX ADMIN — SODIUM CHLORIDE: 9 INJECTION, SOLUTION INTRAVENOUS at 13:49

## 2019-07-29 RX ADMIN — INSULIN LISPRO 1 UNITS: 100 INJECTION, SOLUTION INTRAVENOUS; SUBCUTANEOUS at 21:05

## 2019-07-29 ASSESSMENT — PAIN - FUNCTIONAL ASSESSMENT: PAIN_FUNCTIONAL_ASSESSMENT: PREVENTS OR INTERFERES SOME ACTIVE ACTIVITIES AND ADLS

## 2019-07-29 ASSESSMENT — PULMONARY FUNCTION TESTS: PIF_VALUE: 21

## 2019-07-29 ASSESSMENT — PAIN DESCRIPTION - ORIENTATION
ORIENTATION: RIGHT;LEFT
ORIENTATION: RIGHT;LEFT

## 2019-07-29 ASSESSMENT — PAIN SCALES - GENERAL
PAINLEVEL_OUTOF10: 9

## 2019-07-29 ASSESSMENT — PAIN DESCRIPTION - LOCATION
LOCATION: BUTTOCKS;BACK
LOCATION: BUTTOCKS;BACK

## 2019-07-29 ASSESSMENT — PAIN DESCRIPTION - FREQUENCY
FREQUENCY: CONTINUOUS
FREQUENCY: CONTINUOUS

## 2019-07-29 ASSESSMENT — PAIN DESCRIPTION - DESCRIPTORS
DESCRIPTORS: ACHING
DESCRIPTORS: ACHING

## 2019-07-29 ASSESSMENT — PAIN DESCRIPTION - ONSET
ONSET: ON-GOING
ONSET: ON-GOING

## 2019-07-29 ASSESSMENT — PAIN DESCRIPTION - PAIN TYPE
TYPE: ACUTE PAIN
TYPE: ACUTE PAIN

## 2019-07-29 ASSESSMENT — PAIN DESCRIPTION - PROGRESSION: CLINICAL_PROGRESSION: NOT CHANGED

## 2019-07-29 NOTE — CONSULTS
leg syndrome     [facility] arthritis [facility]     Smoker     Systemic lupus erythematosus (HCC) 1992    Dr Madonna Orellana, visiting physician    Type II diabetes mellitus ([facility] 1969    Ventral hernia 06/2018    right ventral hernia seen on CT abd 6/2018       Past Surgical History:        Procedure Laterality Date    ABDOMINAL EXPLORATION SURGERY  Last Done 2005    2-3 times; repeated bowel obstructions;    ANKLE FRACTURE SURGERY      APPENDECTOMY  1972    CATARACT REMOVAL WITH IMPLANT Bilateral 2000's   [address] Right 2000's    Right Thigh, Plates, Rods, Screws    HUMERUS FRACTURE SURGERY Left 2000's    Broken Left Arm, Plates And Screws    LUNG REMOVAL, PARTIAL Left 09/30/2014    Lung CA, left; Robotic Assisted Left Thoracotomy, Left Lung Cancer; ROWDY lobectomy;  Nonsmall Cell CA    OTHER SURGICAL HISTORY  2/20/15    excision of hydradenitis suppurative of cesilia anal area    REVISION TOTAL KNEE ARTHROPLASTY Right 2005    SKIN CANCER EXCISION Right 2012    Right Ankle    ANTONY AND BSO  1972    TOTAL KNEE ARTHROPLASTY Right 2003    Total Right Knee with revision       Current Medications:    Current Facility-Administered Medications: 0.9 % sodium chloride bolus, 1,000 mL, Intravenous, Once  norepinephrine (LEVOPHED) 16 mg in dextrose 5 % 250 mL infusion, 2 mcg/min, Intravenous, Continuous  chlorhexidine (PERIDEX) 0.12 % solution 15 mL, 15 mL, Mouth/Throat, BID  propofol injection, 10 mcg/kg/min, Intravenous, Continuous  acetylcysteine (MUCOMYST) 20 % solution 600 mg, 600 mg, Inhalation, BID  DOPamine (INTROPIN) 400 mg in dextrose 5 % 250 mL infusion, 5 mcg/kg/min, Intravenous, Continuous  0.9 % sodium chloride infusion, , Intravenous, Continuous  [Held by provider] metoprolol tartrate (LOPRESSOR) tablet 12.5 mg, 12.5 mg, Oral, BID  vancomycin (VANCOCIN) 1,250 mg in dextrose 5 % 250 mL IVPB, 1,250 mg, Intravenous, Q18H  oxyCODONE-acetaminophen (PERCOCET) 7.5-325 MG per tablet Nightly  simvastatin (ZOCOR) tablet 20 mg, 20 mg, Oral, Nightly  sodium chloride flush 0.9 % injection 10 mL, 10 mL, Intravenous, 2 times per day  sodium chloride flush 0.9 % injection 10 mL, 10 mL, Intravenous, PRN  magnesium hydroxide (MILK OF MAGNESIA) 400 MG/5ML suspension 30 mL, 30 mL, Oral, Daily PRN  ondansetron (ZOFRAN) injection 4 mg, 4 mg, Intravenous, Q6H PRN  acetaminophen (TYLENOL) suppository 650 mg, 650 mg, Rectal, Q4H PRN  bisacodyl (DULCOLAX) suppository 10 mg, 10 mg, Rectal, Daily PRN  docusate sodium (COLACE) capsule 100 mg, 100 mg, Oral, BID  acetaminophen (TYLENOL) suppository 650 mg, 650 mg, Rectal, Q4H PRN  enoxaparin (LOVENOX) injection 30 mg, 30 mg, Subcutaneous, Daily    Allergies:  Food; Trental [pentoxifylline]; Tramadol; Vistaril [hydroxyzine hcl]; Erythromycin; Motrin [ibuprofen]; and Tetracyclines & related    Social History:    Was living with her daughter until her last discharge when she went to an [facility]. Continues to smoke cigarettes. Has 2 daughters and one son. Patient seemed to have a bed to chair existence.     Family History:       Problem Relation Age of Onset    Heart Disease Mother     Diabetes Mother     Arthritis Mother     Depression Mother     High Blood Pressure Mother     High Cholesterol Mother     Kidney Disease Mother         On Dialysis    Learning Disabilities Mother     Mental Illness Mother     Stroke Mother     Vision Loss Mother     Early Death Father         Brain Hemorrhage    Other Father         \"Bowel Problems\"    High Blood Pressure Father     Heart Disease Father     Mental Illness Brother         Schizophrenia , Paranoia    Diabetes Brother     Lupus Sister     Arthritis Sister     Depression Sister     Diabetes Sister     Heart Disease Sister     High Blood Pressure Sister     High Cholesterol Sister     Kidney Disease Sister     Stroke Sister        REVIEW OF SYSTEMS:    Review of systems not obtained due to patient factors

## 2019-07-29 NOTE — PROGRESS NOTES
[facility] consulted by Dr. Jaida Tovar for monitoring and adjustment. Indication for treatment: MRSA pneumonia/UTI  Goal trough: 15-20 mcg/mL     Pertinent Laboratory Values:   Temp Readings from Last 3 Encounters:   07/29/19 99.5 °F (37.5 °C) (Rectal)   07/04/19 97.5 °F (36.4 °C) (Oral)   06/20/19 98.5 °F (36.9 °C) (Oral)     Recent Labs     07/27/19  0555 07/28/19  0346 07/29/19  0445 07/29/19  1155   WBC 16.3* 20.7* 18.7*  --    LACTATE  --   --   --  0.5     Recent Labs     07/27/19  0555 07/28/19  0346 07/29/19  0445   BUN 6 7 8   CREATININE 0.4* 0.4* 0.4*     Estimated Creatinine Clearance: 134 mL/min (A) (based on SCr of 0.4 mg/dL (L)). Intake/Output Summary (Last 24 hours) at 7/29/2019 1710  Last data filed at 7/29/2019 1555  Gross per 24 hour   Intake 480 ml   Output 2600 ml   Net -2120 ml       Pertinent Cultures:  Date    Source    Results  7/20   MRSA screen   POSITIVE  7/20   Strep pneumo/ legionella  Negative  7/20   Urine    ESBL E Coli  7/20   Blood    NGTD  7/21   Sputum   MRSA     Vancomycin level:   TROUGH:    Recent Labs     07/27/19  1615   [address] 18.3     RANDOM:  No results for input(s): VANCORANDOM in the last 72 hours. Previous levels:  · Vancomycin 1250 mg q24h IVPB- Trough of 8.8   · Vancomycin 1250 mg q12h IVPB- Trough of 20.6    · Vancomycin 1250 mg q18h IVPB- Trough of 18.3 (7/27/19)    Assessment:  · WBC and temperature: trending up (receiving methylprednisolone)  · SCr, BUN, and urine output: stable, output   · Day(s) of therapy: 10  · Vancomycin level: therapeutic @18.3    Plan:  · Continue vancomycin 1250mg q18h IVPB, renal function remains stable. · Dr Alexus Abreu ordered last vanco dose to be 8/1 @21:00 (after 6 doses of vanco 1gm q18h)  · Will not check any more vanco levels unless there is a change in renal status  · Pharmacy will continue to monitor patient and adjust therapy as indicated    Thank you for the consult.   Nubia Olvera Debby Gaxiola  7/29/2019 5:10 PM

## 2019-07-29 NOTE — PROGRESS NOTES
[id number] [facility] SPEECH/LANGUAGE PATHOLOGY    Julia Fitting  7/29/2019  [phone]    Noted Julia Fitting was reintubated this morning. SLP to sign off at this time. Please reorder SLP evaluation following extubation if appropriate.     Peace Quezada MA CCC_SLP  7/29/2019  11:11 AM

## 2019-07-30 ENCOUNTER — APPOINTMENT (OUTPATIENT)
Dept: GENERAL RADIOLOGY | Age: 69
DRG: 871 | End: 2019-07-30
Payer: MEDICARE

## 2019-07-30 PROBLEM — E43 SEVERE MALNUTRITION (HCC): Status: ACTIVE | Noted: 2019-07-30

## 2019-07-30 LAB
ANION GAP SERPL CALCULATED.3IONS-SCNC: 6 MMOL/L (ref 4–16)
BASE EXCESS MIXED: ABNORMAL (ref 0–2.3)
BUN BLDV-MCNC: 7 MG/DL (ref 6–23)
CALCIUM SERPL-MCNC: 9.4 MG/DL (ref 8.3–10.6)
CARBON MONOXIDE, BLOOD: 2.5 % (ref 0–5)
CHLORIDE BLD-SCNC: 102 MMOL/L (ref 99–110)
CO2 CONTENT: 34.5 MMOL/L (ref 19–24)
CO2: 32 MMOL/L (ref 21–32)
COMMENT: ABNORMAL
CREAT SERPL-MCNC: 0.3 MG/DL (ref 0.6–1.1)
CULTURE: NORMAL
GFR AFRICAN AMERICAN: >60 ML/MIN/1.73M2
GFR NON-AFRICAN AMERICAN: >60 ML/MIN/1.73M2
GLUCOSE BLD-MCNC: 126 MG/DL (ref 70–99)
GLUCOSE BLD-MCNC: 134 MG/DL (ref 70–99)
GLUCOSE BLD-MCNC: 138 MG/DL (ref 70–99)
GLUCOSE BLD-MCNC: 160 MG/DL (ref 70–99)
GLUCOSE BLD-MCNC: 179 MG/DL (ref 70–99)
HCO3 ARTERIAL: 33.3 MMOL/L (ref 18–23)
HCT VFR BLD CALC: 28.1 % (ref 37–47)
HEMOGLOBIN: 9.3 GM/DL (ref 12.5–16)
Lab: NORMAL
MCH RBC QN AUTO: 27.3 PG (ref 27–31)
MCHC RBC AUTO-ENTMCNC: 33.1 % (ref 32–36)
MCV RBC AUTO: 82.4 FL (ref 78–100)
METHEMOGLOBIN ARTERIAL: 1.4 %
O2 SATURATION: 94.6 % (ref 96–97)
PCO2 ARTERIAL: 39 MMHG (ref 32–45)
PDW BLD-RTO: 16.1 % (ref 11.7–14.9)
PH BLOOD: 7.54 (ref 7.34–7.45)
PLATELET # BLD: 335 K/CU MM (ref 140–440)
PMV BLD AUTO: 11.3 FL (ref 7.5–11.1)
PO2 ARTERIAL: 94 MMHG (ref 75–100)
POTASSIUM SERPL-SCNC: 4.2 MMOL/L (ref 3.5–5.1)
RBC # BLD: 3.41 M/CU MM (ref 4.2–5.4)
SODIUM BLD-SCNC: 140 MMOL/L (ref 135–145)
SPECIMEN: NORMAL
WBC # BLD: 16.5 K/CU MM (ref 4–10.5)

## 2019-07-30 PROCEDURE — 94640 AIRWAY INHALATION TREATMENT: CPT

## 2019-07-30 PROCEDURE — 6360000002 HC RX W HCPCS: Performed by: NURSE PRACTITIONER

## 2019-07-30 PROCEDURE — 6370000000 HC RX 637 (ALT 250 FOR IP): Performed by: INTERNAL MEDICINE

## 2019-07-30 PROCEDURE — 6360000002 HC RX W HCPCS: Performed by: INTERNAL MEDICINE

## 2019-07-30 PROCEDURE — 2000000000 HC ICU R&B

## 2019-07-30 PROCEDURE — 6370000000 HC RX 637 (ALT 250 FOR IP): Performed by: FAMILY MEDICINE

## 2019-07-30 PROCEDURE — 99231 SBSQ HOSP IP/OBS SF/LOW 25: CPT | Performed by: OBSTETRICS & GYNECOLOGY

## 2019-07-30 PROCEDURE — 36600 WITHDRAWAL OF ARTERIAL BLOOD: CPT

## 2019-07-30 PROCEDURE — 94003 VENT MGMT INPAT SUBQ DAY: CPT

## 2019-07-30 PROCEDURE — 89220 SPUTUM SPECIMEN COLLECTION: CPT

## 2019-07-30 PROCEDURE — P9047 ALBUMIN (HUMAN), 25%, 50ML: HCPCS | Performed by: INTERNAL MEDICINE

## 2019-07-30 PROCEDURE — 94761 N-INVAS EAR/PLS OXIMETRY MLT: CPT

## 2019-07-30 PROCEDURE — 2580000003 HC RX 258: Performed by: FAMILY MEDICINE

## 2019-07-30 PROCEDURE — 2580000003 HC RX 258: Performed by: NURSE PRACTITIONER

## 2019-07-30 PROCEDURE — 6370000000 HC RX 637 (ALT 250 FOR IP): Performed by: SURGERY

## 2019-07-30 PROCEDURE — 6370000000 HC RX 637 (ALT 250 FOR IP): Performed by: NURSE PRACTITIONER

## 2019-07-30 PROCEDURE — 93308 TTE F-UP OR LMTD: CPT

## 2019-07-30 PROCEDURE — 99211 OFF/OP EST MAY X REQ PHY/QHP: CPT

## 2019-07-30 PROCEDURE — 71045 X-RAY EXAM CHEST 1 VIEW: CPT

## 2019-07-30 PROCEDURE — 31720 CLEARANCE OF AIRWAYS: CPT

## 2019-07-30 PROCEDURE — 82962 GLUCOSE BLOOD TEST: CPT

## 2019-07-30 PROCEDURE — 85027 COMPLETE CBC AUTOMATED: CPT

## 2019-07-30 PROCEDURE — 80048 BASIC METABOLIC PNL TOTAL CA: CPT

## 2019-07-30 PROCEDURE — 6370000000 HC RX 637 (ALT 250 FOR IP): Performed by: PHYSICIAN ASSISTANT

## 2019-07-30 PROCEDURE — 2700000000 HC OXYGEN THERAPY PER DAY

## 2019-07-30 PROCEDURE — 2500000003 HC RX 250 WO HCPCS: Performed by: INTERNAL MEDICINE

## 2019-07-30 PROCEDURE — 99291 CRITICAL CARE FIRST HOUR: CPT | Performed by: INTERNAL MEDICINE

## 2019-07-30 PROCEDURE — 82803 BLOOD GASES ANY COMBINATION: CPT

## 2019-07-30 PROCEDURE — 6360000002 HC RX W HCPCS: Performed by: FAMILY MEDICINE

## 2019-07-30 PROCEDURE — 2580000003 HC RX 258: Performed by: INTERNAL MEDICINE

## 2019-07-30 PROCEDURE — 94750 HC PULMONARY COMPLIANCE STUDY: CPT

## 2019-07-30 PROCEDURE — 36592 COLLECT BLOOD FROM PICC: CPT

## 2019-07-30 RX ORDER — ALBUMIN (HUMAN) 12.5 G/50ML
25 SOLUTION INTRAVENOUS EVERY 8 HOURS
Status: COMPLETED | OUTPATIENT
Start: 2019-07-30 | End: 2019-08-01

## 2019-07-30 RX ORDER — SODIUM CHLORIDE, SODIUM LACTATE, POTASSIUM CHLORIDE, CALCIUM CHLORIDE 600; 310; 30; 20 MG/100ML; MG/100ML; MG/100ML; MG/100ML
INJECTION, SOLUTION INTRAVENOUS CONTINUOUS
Status: DISCONTINUED | OUTPATIENT
Start: 2019-07-30 | End: 2019-07-31

## 2019-07-30 RX ADMIN — IPRATROPIUM BROMIDE AND ALBUTEROL SULFATE 1 AMPULE: .5; 3 SOLUTION RESPIRATORY (INHALATION) at 07:48

## 2019-07-30 RX ADMIN — IPRATROPIUM BROMIDE AND ALBUTEROL SULFATE 1 AMPULE: .5; 3 SOLUTION RESPIRATORY (INHALATION) at 19:55

## 2019-07-30 RX ADMIN — SODIUM CHLORIDE, POTASSIUM CHLORIDE, SODIUM LACTATE AND CALCIUM CHLORIDE: 600; 310; 30; 20 INJECTION, SOLUTION INTRAVENOUS at 20:07

## 2019-07-30 RX ADMIN — SODIUM CHLORIDE, PRESERVATIVE FREE 10 ML: 5 INJECTION INTRAVENOUS at 20:07

## 2019-07-30 RX ADMIN — INSULIN LISPRO 1 UNITS: 100 INJECTION, SOLUTION INTRAVENOUS; SUBCUTANEOUS at 20:08

## 2019-07-30 RX ADMIN — INSULIN LISPRO 2 UNITS: 100 INJECTION, SOLUTION INTRAVENOUS; SUBCUTANEOUS at 17:12

## 2019-07-30 RX ADMIN — MIDODRINE HYDROCHLORIDE 10 MG: 5 TABLET ORAL at 13:19

## 2019-07-30 RX ADMIN — ALBUMIN (HUMAN) 25 G: 0.25 INJECTION, SOLUTION INTRAVENOUS at 16:56

## 2019-07-30 RX ADMIN — MEROPENEM 1 G: 1 INJECTION, POWDER, FOR SOLUTION INTRAVENOUS at 03:29

## 2019-07-30 RX ADMIN — SIMVASTATIN 20 MG: 20 TABLET, FILM COATED ORAL at 20:06

## 2019-07-30 RX ADMIN — ASPIRIN 81 MG 81 MG: 81 TABLET ORAL at 09:49

## 2019-07-30 RX ADMIN — DOPAMINE HYDROCHLORIDE IN DEXTROSE 5 MCG/KG/MIN: 1.6 INJECTION, SOLUTION INTRAVENOUS at 02:17

## 2019-07-30 RX ADMIN — ALBUMIN (HUMAN) 25 G: 0.25 INJECTION, SOLUTION INTRAVENOUS at 10:20

## 2019-07-30 RX ADMIN — PREGABALIN 150 MG: 75 CAPSULE ORAL at 20:06

## 2019-07-30 RX ADMIN — CHLORHEXIDINE GLUCONATE 0.12% ORAL RINSE 15 ML: 1.2 LIQUID ORAL at 10:03

## 2019-07-30 RX ADMIN — MEROPENEM 1 G: 1 INJECTION, POWDER, FOR SOLUTION INTRAVENOUS at 20:06

## 2019-07-30 RX ADMIN — PREDNISONE 20 MG: 20 TABLET ORAL at 09:44

## 2019-07-30 RX ADMIN — MIDODRINE HYDROCHLORIDE 10 MG: 5 TABLET ORAL at 09:44

## 2019-07-30 RX ADMIN — IPRATROPIUM BROMIDE AND ALBUTEROL SULFATE 1 AMPULE: .5; 3 SOLUTION RESPIRATORY (INHALATION) at 17:02

## 2019-07-30 RX ADMIN — MEROPENEM 1 G: 1 INJECTION, POWDER, FOR SOLUTION INTRAVENOUS at 11:07

## 2019-07-30 RX ADMIN — PROPOFOL 25 MCG/KG/MIN: 10 INJECTION, EMULSION INTRAVENOUS at 10:32

## 2019-07-30 RX ADMIN — IPRATROPIUM BROMIDE AND ALBUTEROL SULFATE 1 AMPULE: .5; 3 SOLUTION RESPIRATORY (INHALATION) at 12:31

## 2019-07-30 RX ADMIN — ACETYLCYSTEINE 600 MG: 200 SOLUTION ORAL; RESPIRATORY (INHALATION) at 07:49

## 2019-07-30 RX ADMIN — SODIUM CHLORIDE, PRESERVATIVE FREE 10 ML: 5 INJECTION INTRAVENOUS at 10:05

## 2019-07-30 RX ADMIN — SODIUM CHLORIDE, POTASSIUM CHLORIDE, SODIUM LACTATE AND CALCIUM CHLORIDE: 600; 310; 30; 20 INJECTION, SOLUTION INTRAVENOUS at 10:03

## 2019-07-30 RX ADMIN — PREGABALIN 150 MG: 75 CAPSULE ORAL at 09:44

## 2019-07-30 RX ADMIN — MIDODRINE HYDROCHLORIDE 10 MG: 5 TABLET ORAL at 16:53

## 2019-07-30 RX ADMIN — PROPOFOL 35 MCG/KG/MIN: 10 INJECTION, EMULSION INTRAVENOUS at 21:33

## 2019-07-30 RX ADMIN — DOCUSATE SODIUM 100 MG: 100 CAPSULE, LIQUID FILLED ORAL at 20:06

## 2019-07-30 RX ADMIN — ENOXAPARIN SODIUM 30 MG: 30 INJECTION SUBCUTANEOUS at 09:42

## 2019-07-30 RX ADMIN — ACETYLCYSTEINE 600 MG: 200 SOLUTION ORAL; RESPIRATORY (INHALATION) at 18:50

## 2019-07-30 RX ADMIN — SERTRALINE HYDROCHLORIDE 50 MG: 50 TABLET ORAL at 20:06

## 2019-07-30 RX ADMIN — AMIODARONE HYDROCHLORIDE 200 MG: 200 TABLET ORAL at 09:43

## 2019-07-30 RX ADMIN — SODIUM CHLORIDE, PRESERVATIVE FREE 10 ML: 5 INJECTION INTRAVENOUS at 10:04

## 2019-07-30 RX ADMIN — AMITRIPTYLINE HYDROCHLORIDE 50 MG: 50 TABLET, FILM COATED ORAL at 20:06

## 2019-07-30 RX ADMIN — VANCOMYCIN HYDROCHLORIDE 1250 MG: 5 INJECTION, POWDER, LYOPHILIZED, FOR SOLUTION INTRAVENOUS at 15:06

## 2019-07-30 RX ADMIN — SODIUM CHLORIDE: 9 INJECTION, SOLUTION INTRAVENOUS at 02:46

## 2019-07-30 RX ADMIN — CHLORHEXIDINE GLUCONATE 0.12% ORAL RINSE 15 ML: 1.2 LIQUID ORAL at 20:06

## 2019-07-30 RX ADMIN — CLOPIDOGREL BISULFATE 75 MG: 75 TABLET ORAL at 09:44

## 2019-07-30 RX ADMIN — NOREPINEPHRINE BITARTRATE 4 MCG/MIN: 1 INJECTION INTRAVENOUS at 14:30

## 2019-07-30 RX ADMIN — PROPOFOL 35 MCG/KG/MIN: 10 INJECTION, EMULSION INTRAVENOUS at 16:53

## 2019-07-30 ASSESSMENT — PULMONARY FUNCTION TESTS
PIF_VALUE: 23
PIF_VALUE: 24
PIF_VALUE: 19
PIF_VALUE: 22
PIF_VALUE: 21
PIF_VALUE: 21
PIF_VALUE: 23
PIF_VALUE: 23
PIF_VALUE: 21
PIF_VALUE: 21
PIF_VALUE: 26
PIF_VALUE: 21
PIF_VALUE: 23
PIF_VALUE: 21
PIF_VALUE: 22

## 2019-07-30 NOTE — CONSULTS
succinate (TOPROL XL) 50 MG extended release tablet Take 50 mg by mouth daily      sertraline (ZOLOFT) 50 MG tablet Take 50 mg by mouth nightly      amiodarone (CORDARONE) 200 MG tablet Take 1 tablet by mouth daily 30 tablet 3    simvastatin (ZOCOR) 20 MG tablet Take 1 tablet by mouth nightly 30 tablet 3    magnesium oxide (MAG-OX) 400 (241.3 Mg) MG TABS tablet Take 1 tablet by mouth 2 times daily 30 tablet 0    fluconazole (DIFLUCAN) 150 MG tablet       losartan (COZAAR) 25 MG tablet       ipratropium-albuterol (DUONEB) 0.5-2.5 (3) MG/3ML SOLN nebulizer solution Inhale 3 mLs into the lungs 4 times daily Dx: COPD J44.9 360 mL 5    amitriptyline (ELAVIL) 50 MG tablet Take 50 mg by mouth nightly      OXYGEN Inhale 2 L/min into the lungs as needed      guaiFENesin (MUCINEX) 600 MG extended release tablet Take 2 tablets by mouth 2 times daily 120 tablet 5    clopidogrel (PLAVIX) 75 MG tablet Take 75 mg by mouth daily      famotidine (PEPCID) 20 MG tablet Take 20 mg by mouth 2 times daily      PROAIR  (90 Base) MCG/ACT inhaler Inhale 2 puffs into the lungs every 6 hours as needed for Wheezing or Shortness of Breath       budesonide-formoterol (SYMBICORT) 160-4.5 MCG/ACT AERO Inhale 2 puffs into the lungs 2 times daily 1 Inhaler 5    aspirin 81 MG chewable tablet Take 1 tablet by mouth daily 30 tablet 0    benzonatate (TESSALON) 100 MG capsule Take 100 mg by mouth 3 times daily as needed for Cough       pregabalin (LYRICA) 150 MG capsule Take 150 mg by mouth 2 times daily      albuterol (PROVENTIL) (2.5 MG/3ML) 0.083% nebulizer solution Take 3 mLs by nebulization every 6 hours as needed for Wheezing 120 each 3    Multiple Vitamins-Minerals (MULTIVITAMIN PO) Take  by mouth every morning.  Over The Counter           Objective:      BP 85/64   Pulse 103   Temp 98.4 °F (36.9 °C) (Rectal)   Resp 14   Ht 5' 1\" (1.549 m)   Wt 180 lb 1.9 oz (81.7 kg)   SpO2 100%   BMI 34.03 kg/m²   Devendra Risk Clean;Dry; Intact 6/20/2019 10:41 AM   Dressing Changed Changed/New 6/20/2019 10:41 AM   Wound Cleansed Rinsed/Irrigated with saline 6/20/2019 10:41 AM   Wound Length (cm) 0.5 cm 6/20/2019 10:41 AM   Wound Width (cm) 0.5 cm 6/20/2019 10:41 AM   Wound Depth (cm) 0.1 cm 6/20/2019 10:41 AM   Wound Surface Area (cm^2) 0.25 cm^2 6/20/2019 10:41 AM   Wound Volume (cm^3) 0.02 cm^3 6/20/2019 10:41 AM   Distance Tunneling (cm) 0 cm 6/20/2019 10:41 AM   Tunneling Position ___ O'Clock 0 6/20/2019 10:41 AM   Undermining Starts ___ O'Clock 0 6/20/2019 10:41 AM   Undermining Ends___ O'Clock 0 6/20/2019 10:41 AM   Undermining Maxium Distance (cm) 0 6/20/2019 10:41 AM   Wound Assessment Pink 6/20/2019 10:41 AM   Drainage Amount Small 6/20/2019 10:41 AM   Drainage Description Serosanguinous 6/20/2019 10:41 AM   Odor None 6/20/2019 10:41 AM   Margins Defined edges 6/20/2019 10:41 AM   Jana-wound Assessment Intact 6/20/2019 10:41 AM   Non-staged Wound Description Full thickness 6/20/2019 10:41 AM   [place]%Wound Bed 100 6/20/2019 10:41 AM   Red%Wound Bed 0 6/20/2019 10:41 AM   Yellow%Wound Bed 0 6/20/2019 10:41 AM   Black%Wound Bed 0 6/20/2019 10:41 AM   Purple%Wound Bed 0 6/20/2019 10:41 AM   Other%Wound Bed 0 6/20/2019 10:41 AM   Number of days: 40       Wound 07/20/19 Coccyx Mid coccyx (Active)   Wound Pressure Unstageable 7/29/2019  8:09 AM   Dressing Status Clean;Dry; Intact 7/30/2019  2:59 PM   Dressing Changed Changed/New 7/30/2019  2:59 PM   Dressing/Treatment Other (comment) 7/30/2019 12:45 PM   Wound Cleansed Rinsed/Irrigated with saline 7/30/2019  2:59 PM   Dressing Change Due 07/26/19 7/26/2019  2:00 PM   Wound Length (cm) 1.5 cm 7/30/2019  2:59 PM   Wound Width (cm) 1 cm 7/30/2019  2:59 PM   Wound Depth (cm) 0.1 cm 7/30/2019  2:59 PM   Wound Surface Area (cm^2) 1.5 cm^2 7/30/2019  2:59 PM   Change in Wound Size % (l*w) -500 7/30/2019  2:59 PM   Wound Volume (cm^3) 0.15 cm^3 7/30/2019  2:59 PM   Wound Healing % -400 7/30/2019

## 2019-07-31 ENCOUNTER — APPOINTMENT (OUTPATIENT)
Dept: CT IMAGING | Age: 69
DRG: 871 | End: 2019-07-31
Payer: MEDICARE

## 2019-07-31 ENCOUNTER — APPOINTMENT (OUTPATIENT)
Dept: GENERAL RADIOLOGY | Age: 69
DRG: 871 | End: 2019-07-31
Payer: MEDICARE

## 2019-07-31 PROBLEM — D50.9 IRON DEFICIENCY ANEMIA: Status: ACTIVE | Noted: 2019-07-31

## 2019-07-31 LAB
ALBUMIN SERPL-MCNC: 3.1 GM/DL (ref 3.4–5)
ALP BLD-CCNC: 77 IU/L (ref 40–129)
ALT SERPL-CCNC: 44 U/L (ref 10–40)
ANION GAP SERPL CALCULATED.3IONS-SCNC: 7 MMOL/L (ref 4–16)
AST SERPL-CCNC: 37 IU/L (ref 15–37)
BASE EXCESS MIXED: ABNORMAL (ref 0–2.3)
BILIRUB SERPL-MCNC: 0.3 MG/DL (ref 0–1)
BILIRUBIN DIRECT: 0.2 MG/DL (ref 0–0.3)
BILIRUBIN, INDIRECT: 0.1 MG/DL (ref 0–0.7)
BUN BLDV-MCNC: 7 MG/DL (ref 6–23)
CALCIUM SERPL-MCNC: 10.1 MG/DL (ref 8.3–10.6)
CARBON MONOXIDE, BLOOD: 2.2 % (ref 0–5)
CHLORIDE BLD-SCNC: 106 MMOL/L (ref 99–110)
CO2 CONTENT: 36.4 MMOL/L (ref 19–24)
CO2: 33 MMOL/L (ref 21–32)
COMMENT: ABNORMAL
CREAT SERPL-MCNC: 0.3 MG/DL (ref 0.6–1.1)
GFR AFRICAN AMERICAN: >60 ML/MIN/1.73M2
GFR NON-AFRICAN AMERICAN: >60 ML/MIN/1.73M2
GLUCOSE BLD-MCNC: 113 MG/DL (ref 70–99)
GLUCOSE BLD-MCNC: 117 MG/DL (ref 70–99)
GLUCOSE BLD-MCNC: 126 MG/DL (ref 70–99)
GLUCOSE BLD-MCNC: 145 MG/DL (ref 70–99)
GLUCOSE BLD-MCNC: 171 MG/DL (ref 70–99)
HCO3 ARTERIAL: 35 MMOL/L (ref 18–23)
HCT VFR BLD CALC: 21.1 % (ref 37–47)
HCT VFR BLD CALC: 32.1 % (ref 37–47)
HEMOCCULT SP1 STL QL: POSITIVE
HEMOGLOBIN: 10.7 GM/DL (ref 12.5–16)
HEMOGLOBIN: ABNORMAL GM/DL (ref 12.5–16)
INR BLD: 1.09 INDEX
LACTATE DEHYDROGENASE: 141 IU/L (ref 120–246)
MCH RBC QN AUTO: 27.8 PG (ref 27–31)
MCHC RBC AUTO-ENTMCNC: 32.2 % (ref 32–36)
MCV RBC AUTO: 86.1 FL (ref 78–100)
METHEMOGLOBIN ARTERIAL: 1.2 %
O2 SATURATION: 95.8 % (ref 96–97)
PCO2 ARTERIAL: 47 MMHG (ref 32–45)
PDW BLD-RTO: 16.5 % (ref 11.7–14.9)
PH BLOOD: 7.48 (ref 7.34–7.45)
PLATELET # BLD: 261 K/CU MM (ref 140–440)
PMV BLD AUTO: 11.6 FL (ref 7.5–11.1)
PO2 ARTERIAL: 141 MMHG (ref 75–100)
POTASSIUM SERPL-SCNC: 3.9 MMOL/L (ref 3.5–5.1)
PROTHROMBIN TIME: 12.6 SECONDS (ref 9.12–12.5)
RBC # BLD: 2.45 M/CU MM (ref 4.2–5.4)
REASON FOR REJECTION: NORMAL
REJECTED TEST: NORMAL
SODIUM BLD-SCNC: 146 MMOL/L (ref 135–145)
TOTAL PROTEIN: 4.5 GM/DL (ref 6.4–8.2)
WBC # BLD: 13 K/CU MM (ref 4–10.5)

## 2019-07-31 PROCEDURE — 80076 HEPATIC FUNCTION PANEL: CPT

## 2019-07-31 PROCEDURE — 85610 PROTHROMBIN TIME: CPT

## 2019-07-31 PROCEDURE — 86850 RBC ANTIBODY SCREEN: CPT

## 2019-07-31 PROCEDURE — 86900 BLOOD TYPING SEROLOGIC ABO: CPT

## 2019-07-31 PROCEDURE — 2580000003 HC RX 258: Performed by: FAMILY MEDICINE

## 2019-07-31 PROCEDURE — 71045 X-RAY EXAM CHEST 1 VIEW: CPT

## 2019-07-31 PROCEDURE — 6360000002 HC RX W HCPCS: Performed by: INTERNAL MEDICINE

## 2019-07-31 PROCEDURE — 83615 LACTATE (LD) (LDH) ENZYME: CPT

## 2019-07-31 PROCEDURE — 85014 HEMATOCRIT: CPT

## 2019-07-31 PROCEDURE — 6370000000 HC RX 637 (ALT 250 FOR IP): Performed by: PHYSICIAN ASSISTANT

## 2019-07-31 PROCEDURE — 31720 CLEARANCE OF AIRWAYS: CPT

## 2019-07-31 PROCEDURE — 6370000000 HC RX 637 (ALT 250 FOR IP): Performed by: INTERNAL MEDICINE

## 2019-07-31 PROCEDURE — 94761 N-INVAS EAR/PLS OXIMETRY MLT: CPT

## 2019-07-31 PROCEDURE — 85027 COMPLETE CBC AUTOMATED: CPT

## 2019-07-31 PROCEDURE — 6370000000 HC RX 637 (ALT 250 FOR IP): Performed by: NURSE PRACTITIONER

## 2019-07-31 PROCEDURE — 80048 BASIC METABOLIC PNL TOTAL CA: CPT

## 2019-07-31 PROCEDURE — 36592 COLLECT BLOOD FROM PICC: CPT

## 2019-07-31 PROCEDURE — C9113 INJ PANTOPRAZOLE SODIUM, VIA: HCPCS | Performed by: INTERNAL MEDICINE

## 2019-07-31 PROCEDURE — 2580000003 HC RX 258: Performed by: NURSE PRACTITIONER

## 2019-07-31 PROCEDURE — 89220 SPUTUM SPECIMEN COLLECTION: CPT

## 2019-07-31 PROCEDURE — 94750 HC PULMONARY COMPLIANCE STUDY: CPT

## 2019-07-31 PROCEDURE — 6360000002 HC RX W HCPCS: Performed by: NURSE PRACTITIONER

## 2019-07-31 PROCEDURE — 2700000000 HC OXYGEN THERAPY PER DAY

## 2019-07-31 PROCEDURE — 36600 WITHDRAWAL OF ARTERIAL BLOOD: CPT

## 2019-07-31 PROCEDURE — 2580000003 HC RX 258: Performed by: INTERNAL MEDICINE

## 2019-07-31 PROCEDURE — 2500000003 HC RX 250 WO HCPCS: Performed by: INTERNAL MEDICINE

## 2019-07-31 PROCEDURE — 86922 COMPATIBILITY TEST ANTIGLOB: CPT

## 2019-07-31 PROCEDURE — G0328 FECAL BLOOD SCRN IMMUNOASSAY: HCPCS

## 2019-07-31 PROCEDURE — 94003 VENT MGMT INPAT SUBQ DAY: CPT

## 2019-07-31 PROCEDURE — 94640 AIRWAY INHALATION TREATMENT: CPT

## 2019-07-31 PROCEDURE — 82803 BLOOD GASES ANY COMBINATION: CPT

## 2019-07-31 PROCEDURE — 83010 ASSAY OF HAPTOGLOBIN QUANT: CPT

## 2019-07-31 PROCEDURE — P9016 RBC LEUKOCYTES REDUCED: HCPCS

## 2019-07-31 PROCEDURE — 86901 BLOOD TYPING SEROLOGIC RH(D): CPT

## 2019-07-31 PROCEDURE — 6370000000 HC RX 637 (ALT 250 FOR IP): Performed by: FAMILY MEDICINE

## 2019-07-31 PROCEDURE — P9047 ALBUMIN (HUMAN), 25%, 50ML: HCPCS | Performed by: INTERNAL MEDICINE

## 2019-07-31 PROCEDURE — 99291 CRITICAL CARE FIRST HOUR: CPT | Performed by: INTERNAL MEDICINE

## 2019-07-31 PROCEDURE — 36430 TRANSFUSION BLD/BLD COMPNT: CPT

## 2019-07-31 PROCEDURE — 2000000000 HC ICU R&B

## 2019-07-31 PROCEDURE — 6360000002 HC RX W HCPCS: Performed by: FAMILY MEDICINE

## 2019-07-31 PROCEDURE — 99222 1ST HOSP IP/OBS MODERATE 55: CPT | Performed by: INTERNAL MEDICINE

## 2019-07-31 PROCEDURE — 82962 GLUCOSE BLOOD TEST: CPT

## 2019-07-31 PROCEDURE — 85018 HEMOGLOBIN: CPT

## 2019-07-31 PROCEDURE — 74176 CT ABD & PELVIS W/O CONTRAST: CPT

## 2019-07-31 RX ORDER — POLYETHYLENE GLYCOL 3350 17 G/17G
17 POWDER, FOR SOLUTION ORAL DAILY
Status: DISCONTINUED | OUTPATIENT
Start: 2019-07-31 | End: 2019-08-07 | Stop reason: HOSPADM

## 2019-07-31 RX ORDER — PANTOPRAZOLE SODIUM 40 MG/10ML
40 INJECTION, POWDER, LYOPHILIZED, FOR SOLUTION INTRAVENOUS 2 TIMES DAILY
Status: DISCONTINUED | OUTPATIENT
Start: 2019-07-31 | End: 2019-08-07 | Stop reason: HOSPADM

## 2019-07-31 RX ORDER — FUROSEMIDE 10 MG/ML
40 INJECTION INTRAMUSCULAR; INTRAVENOUS ONCE
Status: COMPLETED | OUTPATIENT
Start: 2019-07-31 | End: 2019-07-31

## 2019-07-31 RX ORDER — 0.9 % SODIUM CHLORIDE 0.9 %
250 INTRAVENOUS SOLUTION INTRAVENOUS ONCE
Status: COMPLETED | OUTPATIENT
Start: 2019-07-31 | End: 2019-08-01

## 2019-07-31 RX ADMIN — SODIUM CHLORIDE, PRESERVATIVE FREE 10 ML: 5 INJECTION INTRAVENOUS at 09:40

## 2019-07-31 RX ADMIN — SIMVASTATIN 20 MG: 20 TABLET, FILM COATED ORAL at 20:16

## 2019-07-31 RX ADMIN — PREGABALIN 150 MG: 75 CAPSULE ORAL at 20:16

## 2019-07-31 RX ADMIN — MIDODRINE HYDROCHLORIDE 10 MG: 5 TABLET ORAL at 09:09

## 2019-07-31 RX ADMIN — ACETYLCYSTEINE 600 MG: 200 SOLUTION ORAL; RESPIRATORY (INHALATION) at 20:15

## 2019-07-31 RX ADMIN — IPRATROPIUM BROMIDE AND ALBUTEROL SULFATE 1 AMPULE: .5; 3 SOLUTION RESPIRATORY (INHALATION) at 02:10

## 2019-07-31 RX ADMIN — ALBUMIN (HUMAN) 25 G: 0.25 INJECTION, SOLUTION INTRAVENOUS at 09:38

## 2019-07-31 RX ADMIN — CHLORHEXIDINE GLUCONATE 0.12% ORAL RINSE 15 ML: 1.2 LIQUID ORAL at 20:24

## 2019-07-31 RX ADMIN — PROPOFOL 35 MCG/KG/MIN: 10 INJECTION, EMULSION INTRAVENOUS at 02:02

## 2019-07-31 RX ADMIN — MEROPENEM 1 G: 1 INJECTION, POWDER, FOR SOLUTION INTRAVENOUS at 11:09

## 2019-07-31 RX ADMIN — CHLORHEXIDINE GLUCONATE 0.12% ORAL RINSE 15 ML: 1.2 LIQUID ORAL at 09:09

## 2019-07-31 RX ADMIN — ASPIRIN 81 MG 81 MG: 81 TABLET ORAL at 09:09

## 2019-07-31 RX ADMIN — SERTRALINE HYDROCHLORIDE 50 MG: 50 TABLET ORAL at 20:16

## 2019-07-31 RX ADMIN — MEROPENEM 1 G: 1 INJECTION, POWDER, FOR SOLUTION INTRAVENOUS at 21:52

## 2019-07-31 RX ADMIN — PANTOPRAZOLE SODIUM 40 MG: 40 INJECTION, POWDER, FOR SOLUTION INTRAVENOUS at 11:10

## 2019-07-31 RX ADMIN — PROPOFOL 25 MCG/KG/MIN: 10 INJECTION, EMULSION INTRAVENOUS at 21:24

## 2019-07-31 RX ADMIN — ALBUMIN (HUMAN) 25 G: 0.25 INJECTION, SOLUTION INTRAVENOUS at 01:32

## 2019-07-31 RX ADMIN — PREDNISONE 15 MG: 5 TABLET ORAL at 09:09

## 2019-07-31 RX ADMIN — MIDODRINE HYDROCHLORIDE 10 MG: 5 TABLET ORAL at 16:47

## 2019-07-31 RX ADMIN — CLOPIDOGREL BISULFATE 75 MG: 75 TABLET ORAL at 09:09

## 2019-07-31 RX ADMIN — SODIUM CHLORIDE, PRESERVATIVE FREE 10 ML: 5 INJECTION INTRAVENOUS at 20:25

## 2019-07-31 RX ADMIN — IPRATROPIUM BROMIDE AND ALBUTEROL SULFATE 1 AMPULE: .5; 3 SOLUTION RESPIRATORY (INHALATION) at 15:26

## 2019-07-31 RX ADMIN — FUROSEMIDE 40 MG: 10 INJECTION, SOLUTION INTRAMUSCULAR; INTRAVENOUS at 16:29

## 2019-07-31 RX ADMIN — VANCOMYCIN HYDROCHLORIDE 1250 MG: 5 INJECTION, POWDER, LYOPHILIZED, FOR SOLUTION INTRAVENOUS at 09:08

## 2019-07-31 RX ADMIN — ALBUMIN (HUMAN) 25 G: 0.25 INJECTION, SOLUTION INTRAVENOUS at 20:02

## 2019-07-31 RX ADMIN — PREGABALIN 150 MG: 75 CAPSULE ORAL at 09:09

## 2019-07-31 RX ADMIN — POLYETHYLENE GLYCOL (3350) 17 G: 17 POWDER, FOR SOLUTION ORAL at 11:10

## 2019-07-31 RX ADMIN — PROPOFOL 25 MCG/KG/MIN: 10 INJECTION, EMULSION INTRAVENOUS at 14:17

## 2019-07-31 RX ADMIN — AMIODARONE HYDROCHLORIDE 200 MG: 200 TABLET ORAL at 09:09

## 2019-07-31 RX ADMIN — PANTOPRAZOLE SODIUM 40 MG: 40 INJECTION, POWDER, FOR SOLUTION INTRAVENOUS at 20:03

## 2019-07-31 RX ADMIN — DEXMEDETOMIDINE HYDROCHLORIDE 0.2 MCG/KG/HR: 100 INJECTION, SOLUTION INTRAVENOUS at 11:09

## 2019-07-31 RX ADMIN — INSULIN LISPRO 2 UNITS: 100 INJECTION, SOLUTION INTRAVENOUS; SUBCUTANEOUS at 09:36

## 2019-07-31 RX ADMIN — ENOXAPARIN SODIUM 30 MG: 30 INJECTION SUBCUTANEOUS at 09:09

## 2019-07-31 RX ADMIN — ACETYLCYSTEINE 600 MG: 200 SOLUTION ORAL; RESPIRATORY (INHALATION) at 07:10

## 2019-07-31 RX ADMIN — MEROPENEM 1 G: 1 INJECTION, POWDER, FOR SOLUTION INTRAVENOUS at 03:23

## 2019-07-31 RX ADMIN — FUROSEMIDE 40 MG: 10 INJECTION, SOLUTION INTRAMUSCULAR; INTRAVENOUS at 20:03

## 2019-07-31 RX ADMIN — PROPOFOL 35 MCG/KG/MIN: 10 INJECTION, EMULSION INTRAVENOUS at 09:05

## 2019-07-31 RX ADMIN — SODIUM CHLORIDE 250 ML: 9 INJECTION, SOLUTION INTRAVENOUS at 13:30

## 2019-07-31 RX ADMIN — MIDODRINE HYDROCHLORIDE 10 MG: 5 TABLET ORAL at 11:28

## 2019-07-31 RX ADMIN — AMITRIPTYLINE HYDROCHLORIDE 50 MG: 50 TABLET, FILM COATED ORAL at 20:16

## 2019-07-31 RX ADMIN — IPRATROPIUM BROMIDE AND ALBUTEROL SULFATE 1 AMPULE: .5; 3 SOLUTION RESPIRATORY (INHALATION) at 11:10

## 2019-07-31 RX ADMIN — IPRATROPIUM BROMIDE AND ALBUTEROL SULFATE 1 AMPULE: .5; 3 SOLUTION RESPIRATORY (INHALATION) at 07:09

## 2019-07-31 RX ADMIN — SODIUM CHLORIDE, POTASSIUM CHLORIDE, SODIUM LACTATE AND CALCIUM CHLORIDE: 600; 310; 30; 20 INJECTION, SOLUTION INTRAVENOUS at 05:25

## 2019-07-31 ASSESSMENT — PULMONARY FUNCTION TESTS
PIF_VALUE: 21
PIF_VALUE: 21
PIF_VALUE: 18
PIF_VALUE: 18
PIF_VALUE: 24
PIF_VALUE: 23
PIF_VALUE: 20
PIF_VALUE: 21
PIF_VALUE: 21
PIF_VALUE: 25
PIF_VALUE: 19
PIF_VALUE: 24
PIF_VALUE: 29
PIF_VALUE: 24
PIF_VALUE: 23
PIF_VALUE: 24
PIF_VALUE: 19
PIF_VALUE: 21
PIF_VALUE: 25
PIF_VALUE: 27
PIF_VALUE: 24

## 2019-07-31 NOTE — PROGRESS NOTES
chloride flush, albuterol, sodium chloride flush, magnesium hydroxide, ondansetron, acetaminophen, bisacodyl, acetaminophen       Physical Exam:  Vitals:    07/31/19 1032   BP: 81/60   Pulse: 88   Resp:    Temp:    SpO2:         General: patient on vent sedated  Chest: Nontender  Cardiac: sinus   Lungs:Clear to auscultation and percussion. Abdomen: Soft, NT, ND, +BS  Extremities: no edema  Vascular:  Equal 2+ peripheral pulses. Lab Data:  CBC:   Recent Labs     07/29/19 0445 07/30/19  0530 07/31/19  0630   WBC 18.7* 16.5* 13.0*   HGB 9.4* 9.3* 6.8  6.8 CALLED TO HENRIQUE FRITZ 07/31/2019 0725 LE  *   HCT 29.2* 28.1* 21.1*   MCV 84.9 82.4 86.1    335 261     BMP:   Recent Labs     07/29/19 0445 07/30/19  0530 07/31/19  0525    140 146*   K 4.6 4.2 3.9    102 106   CO2 35* 32 33*   BUN 8 7 7   CREATININE 0.4* 0.3* 0.3*     LIVER PROFILE: No results for input(s): AST, ALT, LIPASE, BILIDIR, BILITOT, ALKPHOS in the last 72 hours. Invalid input(s): AMYLASE,  ALB  PT/INR: No results for input(s): PROTIME, INR in the last 72 hours. APTT: No results for input(s): APTT in the last 72 hours. BNP:  No results for input(s): BNP in the last 72 hours.       Assessment:  Patient Active Problem List    Diagnosis Date Noted    [facility] 04/20/2018     Priority: Low    Chronic obstructive pulmonary disease ([facility] 04/10/2017     Priority: Low    Lupus ([facility] 10/21/2016     Priority: Low    Anxiety disorder 08/07/2016     Priority: Low    Controlled type 2 diabetes mellitus with diabetic polyneuropathy, without long-term current use of insulin ([facility] 05/28/2016     Priority: Low    CAD (coronary artery disease) 05/28/2016     Priority: Low    Hidradenitis suppurativa 02/20/2015     Priority: Low    Lung cancer ([facility] 10/28/2014     Priority: Low    Severe malnutrition ([facility] 07/30/2019    Acute respiratory failure with hypoxemia (HCC) 07/20/2019    Generalized muscle weakness

## 2019-07-31 NOTE — PROGRESS NOTES
07/31/19 0011   Vent Information   Vent Type 980   Vent Mode AC/VC   Vt Ordered 450 mL   Rate Set 14 bmp   Peak Flow 55 L/min   Pressure Support 0 cmH20   FiO2  50 %   Sensitivity 3   PEEP/CPAP 5   I Time/ I Time % 0 s   Humidification Source HME   Vent Patient Data   High Peep/I Pressure 0   Peak Inspiratory Pressure 21 cmH2O   Mean Airway Pressure 8.4 cmH20   Rate Measured 14 br/min   Vt Exhaled 422 mL   Minute Volume 5.91 Liters   I:E Ratio 1:3.80   Spontaneous Breathing Trial (SBT) RT Doc   Pulse 102   SpO2 100 %   Additional Respiratory  Assessments   Position Right Side   Alarm Settings   High Pressure Alarm 40 cmH2O   Low Minute Volume Alarm 3 L/min   Apnea (secs) 20 secs   High Respiratory Rate 40 br/min   Low Exhaled Vt  250 mL   ETT (adult)   Placement Date/Time: 07/29/19 1007   Preoxygenation: Yes  Mask Ventilation: Ventilated by mask (1)  Technique: Direct laryngoscopy;Rapid sequence;Stylet  Type: Cuffed  Tube Size: 8 mm  Laryngoscope: Mac  Blade Size: 3  Location: Oral  Insertion attemp. ..    Secured at 24 cm   Measured From Lips   ET Placement Right   Secured By Commercial tube bush   Site Condition Dry

## 2019-07-31 NOTE — PROGRESS NOTES
First unit of blood completed. No transfusion reaction noted. IV lasix to be administered. Blood bank contacted for second unit.

## 2019-07-31 NOTE — CONSULTS
abdomen and pelvis. ASSESSMENT AND PLAN:  A 61-year-old -American female patient who  has past medical history of lung cancer, status post partial lung  resection, coronary artery disease, on Plavix, COPD, hypertension,  hyperkalemia, obesity, rheumatoid arthritis, lupus, and diabetes,  presented with septic shock due to pneumonia and had been intubate and  has been on pressors right now. Today, the patient was found to drop  hemoglobin from 9.3 to 6.8 over 24 hours; however, there were no signs  of overt GI bleeding. The rectal exam only showed yellowish stool. I  had asked nurse to do 500 mL NG lavage and the NG lavage return was  clear without any coffee-ground materials or blood. 1.  Acute drop of hemoglobin and hematocrit over 24 hours. At this  time, I do not see any signs of overt GI bleeding. The patient's  hemoglobin dropped by almost 2.5 points, which suggests the patient  might lost blood, around 200 to 300 mL; however, there was no overt GI  bleeding; therefore, it is very unlikely the patient has had a GI  bleeding that might contribute to her anemia. Even tough the stool  guaiac test was positive, which could not explain her sudden drop of  hemoglobin by 2.5 points over 24 hours. I suspect the anemia might be  due to lab variation, IV fluids, retroperitoneal hematoma, acute  hemolytic anemia; therefore, I will check the patient's LDH, direct and  indirect bilirubin, and a CT scan of the abdomen without contrast.  2.  Because the patient has hypotension and hypoxia due to septic shock  and pneumonia and has severe COPD exacerbation, also there were no signs  of overt GI bleeding, I do not plan to do any emergent invasive GI  procedure because they have quite a low yield at this time, even though  stool guaiac test was positive. I recommend to continue to transfuse  the patient and follow the patient's hemoglobin and hematocrit every  eight hours.   I will empirically start the patient

## 2019-07-31 NOTE — PROGRESS NOTES
Precedex stopped- Per Dr. Claus Turcios, hold off on wean from ventilator until patient vitals and labs stable.

## 2019-07-31 NOTE — PROGRESS NOTES
07/30/19 1955   Vent Information   Vent Type 980   Vent Mode AC/VC   Vt Ordered 450 mL   Rate Set 14 bmp   Peak Flow 55 L/min   Pressure Support 0 cmH20   FiO2  50 %   Sensitivity 3   PEEP/CPAP 5   I Time/ I Time % 0 s   Humidification Source HME   Circuit Condensation Drained   Vent Patient Data   High Peep/I Pressure 0   Peak Inspiratory Pressure 21 cmH2O   Mean Airway Pressure 8.5 cmH20   Rate Measured 14 br/min   Vt Exhaled 415 mL   Minute Volume 5.81 Liters   I:E Ratio 1:3.80   Plateau Pressure 14 SGM95   Static Compliance 45 mL/cmH2O   Dynamic Compliance 18 mL/cmH2O   Cough/Sputum   Sputum How Obtained Endotracheal;Suctioned   $Obtained Sample $Nasotracheal Suction   Cough Productive   Sputum Amount Small   Sputum Color Cloudy   Tenacity Thin   Spontaneous Breathing Trial (SBT) RT Doc   Pulse 91   SpO2 100 %   Additional Respiratory  Assessments   Position Right Side   Alarm Settings   High Pressure Alarm 40 cmH2O   Low Minute Volume Alarm 3 L/min   Apnea (secs) 20 secs   High Respiratory Rate 40 br/min   Low Exhaled Vt  250 mL   ETT (adult)   Placement Date/Time: 07/29/19 1007   Preoxygenation: Yes  Mask Ventilation: Ventilated by mask (1)  Technique: Direct laryngoscopy;Rapid sequence;Stylet  Type: Cuffed  Tube Size: 8 mm  Laryngoscope: Mac  Blade Size: 3  Location: Oral  Insertion attemp. ..    Secured at 24 cm   Measured From Lips   ET Placement Right   Secured By Commercial tube bush   Site Condition Dry

## 2019-07-31 NOTE — PROGRESS NOTES
Narrative   EXAMINATION:   ONE XRAY VIEW OF THE CHEST       7/31/2019 5:11 am       COMPARISON:   July 30, 2019       HISTORY:   ORDERING SYSTEM PROVIDED HISTORY: ETT placement   TECHNOLOGIST PROVIDED HISTORY:   Reason for exam:->ETT placement   Reason for Exam: on vent   Acuity: Acute   Type of Exam: Subsequent/Follow-up       FINDINGS:   ETT tip is 4.4 cm above the reji.  Enteric catheter extends beyond the   inferior margin of the image.  Right PICC catheter tip overlies the mid SVC.       Cardiac silhouette is within normal range when accounting for rotation.  No   focal consolidation.  Small bilateral pleural effusions.  No pneumothorax or   evidence of pulmonary edema.           Impression   1. No acute pulmonary abnormality.  Small bilateral pleural effusions.

## 2019-07-31 NOTE — PROGRESS NOTES
Pulmonary and Critical Care  Progress Note      VITALS:  BP 91/71   Pulse 93   Temp 98.6 °F (37 °C) (Rectal)   Resp 14   Ht 5' 1\" (1.549 m)   Wt 175 lb 11.3 oz (79.7 kg)   SpO2 100%   BMI 33.20 kg/m²     Subjective:   CHIEF COMPLAINT :SOB     HPI:                The patient is a 77 y.o. female with significant past medical history of HTN, HLD, COPD  presents with complaints of SOB. She was found to have MRSA pneumonia. She is being treated with Abx. She had hypoxemia. She had decreased menatl status , unable to clear to secretions. She was intubated and brought to the ICU. Her hypotension has improved. She is sedated responding to the painful stimuli. Objective:   PHYSICAL EXAM:    LUNGS:Occasional basal crackles  Abd-soft, BS+,NT  Ext - no pedal edema  CVS-s1s2, no murmurs      DATA:    CBC:  Recent Labs     07/29/19 0445 07/30/19  0530 07/31/19  0630   WBC 18.7* 16.5* 13.0*   RBC 3.44* 3.41* 2.45*   HGB 9.4* 9.3* 6.8  6.8 CALLED TO RUI CHAUHAN RN RB 07/31/2019 0725 LE  *   HCT 29.2* 28.1* 21.1*    335 261   MCV 84.9 82.4 86.1   MCH 27.3 27.3 27.8   MCHC 32.2 33.1 32.2   RDW 16.5* 16.1* 16.5*      BMP:  Recent Labs     07/29/19  0445 07/30/19  0530 07/31/19  0525    140 146*   K 4.6 4.2 3.9    102 106   CO2 35* 32 33*   BUN 8 7 7   CREATININE 0.4* 0.3* 0.3*   CALCIUM 9.4 9.4 10.1   GLUCOSE 87 134* 145*      ABG:  Recent Labs     07/29/19  1000 07/30/19  0600 07/31/19  0600   PH 7.51* 7.54* 7.48*   PO2ART 64* 94 141*   [id number] 45.0 39.0 47.0*   O2SAT 92.1* 94.6* 95.8*     BNP  Lab Results   Component Value Date    BNP 27 02/08/2014      D-Dimer:  Lab Results   Component Value Date    DDIMER 507 (H) 02/08/2014      1.  Radiology: Reviewed      Assessment/Plan     Patient Active Problem List    Diagnosis Date Noted    Severe malnutrition ([address] 07/30/2019    Acute respiratory failure with hypoxemia (HCC) 07/20/2019    Generalized muscle weakness 07/02/2019    Acute hyperkalemia

## 2019-08-01 ENCOUNTER — APPOINTMENT (OUTPATIENT)
Dept: GENERAL RADIOLOGY | Age: 69
DRG: 871 | End: 2019-08-01
Payer: MEDICARE

## 2019-08-01 LAB
ABO/RH: NORMAL
ANION GAP SERPL CALCULATED.3IONS-SCNC: 10 MMOL/L (ref 4–16)
ANION GAP SERPL CALCULATED.3IONS-SCNC: 8 MMOL/L (ref 4–16)
ANTIBODY SCREEN: NEGATIVE
BASE EXCESS MIXED: ABNORMAL (ref 0–2.3)
BASE EXCESS MIXED: ABNORMAL (ref 0–2.3)
BUN BLDV-MCNC: 7 MG/DL (ref 6–23)
BUN BLDV-MCNC: 8 MG/DL (ref 6–23)
CALCIUM SERPL-MCNC: 10.4 MG/DL (ref 8.3–10.6)
CALCIUM SERPL-MCNC: 10.7 MG/DL (ref 8.3–10.6)
CARBON MONOXIDE, BLOOD: 0 % (ref 0–5)
CARBON MONOXIDE, BLOOD: 0 % (ref 0–5)
CHLORIDE BLD-SCNC: 103 MMOL/L (ref 99–110)
CHLORIDE BLD-SCNC: 105 MMOL/L (ref 99–110)
CO2 CONTENT: 24.6 MMOL/L (ref 19–24)
CO2 CONTENT: 39 MMOL/L (ref 19–24)
CO2: 32 MMOL/L (ref 21–32)
CO2: 32 MMOL/L (ref 21–32)
COMMENT: ABNORMAL
COMMENT: ABNORMAL
COMPONENT: NORMAL
COMPONENT: NORMAL
CREAT SERPL-MCNC: 0.4 MG/DL (ref 0.6–1.1)
CREAT SERPL-MCNC: 0.4 MG/DL (ref 0.6–1.1)
CROSSMATCH RESULT: NORMAL
CROSSMATCH RESULT: NORMAL
EKG ATRIAL RATE: 79 BPM
EKG DIAGNOSIS: NORMAL
EKG P AXIS: 39 DEGREES
EKG P-R INTERVAL: 126 MS
EKG Q-T INTERVAL: 398 MS
EKG QRS DURATION: 116 MS
EKG QTC CALCULATION (BAZETT): 456 MS
EKG R AXIS: -77 DEGREES
EKG T AXIS: 31 DEGREES
EKG VENTRICULAR RATE: 79 BPM
GFR AFRICAN AMERICAN: >60 ML/MIN/1.73M2
GFR AFRICAN AMERICAN: >60 ML/MIN/1.73M2
GFR NON-AFRICAN AMERICAN: >60 ML/MIN/1.73M2
GFR NON-AFRICAN AMERICAN: >60 ML/MIN/1.73M2
GLUCOSE BLD-MCNC: 102 MG/DL (ref 70–99)
GLUCOSE BLD-MCNC: 108 MG/DL (ref 70–99)
GLUCOSE BLD-MCNC: 110 MG/DL (ref 70–99)
GLUCOSE BLD-MCNC: 111 MG/DL (ref 70–99)
GLUCOSE BLD-MCNC: 42 MG/DL (ref 70–99)
GLUCOSE BLD-MCNC: 68 MG/DL (ref 70–99)
GLUCOSE BLD-MCNC: 94 MG/DL (ref 70–99)
HCO3 ARTERIAL: 24 MMOL/L (ref 18–23)
HCO3 ARTERIAL: 37.6 MMOL/L (ref 18–23)
HCT VFR BLD CALC: 34.2 % (ref 37–47)
HCT VFR BLD CALC: 35.2 % (ref 37–47)
HEMOGLOBIN: 11.1 GM/DL (ref 12.5–16)
HEMOGLOBIN: 11.3 GM/DL (ref 12.5–16)
MAGNESIUM: 2 MG/DL (ref 1.8–2.4)
MCH RBC QN AUTO: 28 PG (ref 27–31)
MCHC RBC AUTO-ENTMCNC: 32.5 % (ref 32–36)
MCV RBC AUTO: 86.1 FL (ref 78–100)
METHEMOGLOBIN ARTERIAL: 0 %
METHEMOGLOBIN ARTERIAL: 0 %
O2 SATURATION: 90.7 % (ref 96–97)
O2 SATURATION: 94.2 % (ref 96–97)
PCO2 ARTERIAL: 19 MMHG (ref 32–45)
PCO2 ARTERIAL: 46 MMHG (ref 32–45)
PDW BLD-RTO: 15.9 % (ref 11.7–14.9)
PH BLOOD: 7.52 (ref 7.34–7.45)
PH BLOOD: 7.71 (ref 7.34–7.45)
PLATELET # BLD: 232 K/CU MM (ref 140–440)
PMV BLD AUTO: 11.2 FL (ref 7.5–11.1)
PO2 ARTERIAL: 116 MMHG (ref 75–100)
PO2 ARTERIAL: 65 MMHG (ref 75–100)
POTASSIUM SERPL-SCNC: 3.5 MMOL/L (ref 3.5–5.1)
POTASSIUM SERPL-SCNC: 4.5 MMOL/L (ref 3.5–5.1)
RBC # BLD: 3.97 M/CU MM (ref 4.2–5.4)
SODIUM BLD-SCNC: 143 MMOL/L (ref 135–145)
SODIUM BLD-SCNC: 147 MMOL/L (ref 135–145)
STATUS: NORMAL
STATUS: NORMAL
TRANSFUSION STATUS: NORMAL
TRANSFUSION STATUS: NORMAL
UNIT DIVISION: 0
UNIT DIVISION: 0
UNIT NUMBER: NORMAL
UNIT NUMBER: NORMAL
WBC # BLD: 15.8 K/CU MM (ref 4–10.5)

## 2019-08-01 PROCEDURE — 6360000002 HC RX W HCPCS: Performed by: FAMILY MEDICINE

## 2019-08-01 PROCEDURE — 2700000000 HC OXYGEN THERAPY PER DAY

## 2019-08-01 PROCEDURE — 85027 COMPLETE CBC AUTOMATED: CPT

## 2019-08-01 PROCEDURE — 94761 N-INVAS EAR/PLS OXIMETRY MLT: CPT

## 2019-08-01 PROCEDURE — 82962 GLUCOSE BLOOD TEST: CPT

## 2019-08-01 PROCEDURE — 2580000003 HC RX 258: Performed by: PHYSICIAN ASSISTANT

## 2019-08-01 PROCEDURE — 36600 WITHDRAWAL OF ARTERIAL BLOOD: CPT

## 2019-08-01 PROCEDURE — 2000000000 HC ICU R&B

## 2019-08-01 PROCEDURE — 2580000003 HC RX 258: Performed by: INTERNAL MEDICINE

## 2019-08-01 PROCEDURE — 99291 CRITICAL CARE FIRST HOUR: CPT | Performed by: INTERNAL MEDICINE

## 2019-08-01 PROCEDURE — 71045 X-RAY EXAM CHEST 1 VIEW: CPT

## 2019-08-01 PROCEDURE — 2500000003 HC RX 250 WO HCPCS: Performed by: INTERNAL MEDICINE

## 2019-08-01 PROCEDURE — 94003 VENT MGMT INPAT SUBQ DAY: CPT

## 2019-08-01 PROCEDURE — 6370000000 HC RX 637 (ALT 250 FOR IP): Performed by: FAMILY MEDICINE

## 2019-08-01 PROCEDURE — 6370000000 HC RX 637 (ALT 250 FOR IP): Performed by: INTERNAL MEDICINE

## 2019-08-01 PROCEDURE — 84132 ASSAY OF SERUM POTASSIUM: CPT

## 2019-08-01 PROCEDURE — 82803 BLOOD GASES ANY COMBINATION: CPT

## 2019-08-01 PROCEDURE — 89220 SPUTUM SPECIMEN COLLECTION: CPT

## 2019-08-01 PROCEDURE — 2580000003 HC RX 258: Performed by: FAMILY MEDICINE

## 2019-08-01 PROCEDURE — 31720 CLEARANCE OF AIRWAYS: CPT

## 2019-08-01 PROCEDURE — 74018 RADEX ABDOMEN 1 VIEW: CPT

## 2019-08-01 PROCEDURE — 80048 BASIC METABOLIC PNL TOTAL CA: CPT

## 2019-08-01 PROCEDURE — 6360000002 HC RX W HCPCS: Performed by: INTERNAL MEDICINE

## 2019-08-01 PROCEDURE — 99232 SBSQ HOSP IP/OBS MODERATE 35: CPT | Performed by: INTERNAL MEDICINE

## 2019-08-01 PROCEDURE — 99231 SBSQ HOSP IP/OBS SF/LOW 25: CPT | Performed by: OBSTETRICS & GYNECOLOGY

## 2019-08-01 PROCEDURE — 94750 HC PULMONARY COMPLIANCE STUDY: CPT

## 2019-08-01 PROCEDURE — 85018 HEMOGLOBIN: CPT

## 2019-08-01 PROCEDURE — C9113 INJ PANTOPRAZOLE SODIUM, VIA: HCPCS | Performed by: INTERNAL MEDICINE

## 2019-08-01 PROCEDURE — P9047 ALBUMIN (HUMAN), 25%, 50ML: HCPCS | Performed by: INTERNAL MEDICINE

## 2019-08-01 PROCEDURE — 83735 ASSAY OF MAGNESIUM: CPT

## 2019-08-01 PROCEDURE — 93010 ELECTROCARDIOGRAM REPORT: CPT | Performed by: INTERNAL MEDICINE

## 2019-08-01 PROCEDURE — 2580000003 HC RX 258: Performed by: NURSE PRACTITIONER

## 2019-08-01 PROCEDURE — 85014 HEMATOCRIT: CPT

## 2019-08-01 PROCEDURE — 6370000000 HC RX 637 (ALT 250 FOR IP): Performed by: NURSE PRACTITIONER

## 2019-08-01 PROCEDURE — 94640 AIRWAY INHALATION TREATMENT: CPT

## 2019-08-01 PROCEDURE — 6360000002 HC RX W HCPCS: Performed by: NURSE PRACTITIONER

## 2019-08-01 RX ORDER — POTASSIUM CHLORIDE 29.8 MG/ML
20 INJECTION INTRAVENOUS PRN
Status: DISCONTINUED | OUTPATIENT
Start: 2019-08-01 | End: 2019-08-07 | Stop reason: HOSPADM

## 2019-08-01 RX ADMIN — IPRATROPIUM BROMIDE AND ALBUTEROL SULFATE 1 AMPULE: .5; 3 SOLUTION RESPIRATORY (INHALATION) at 08:14

## 2019-08-01 RX ADMIN — PROPOFOL 25 MCG/KG/MIN: 10 INJECTION, EMULSION INTRAVENOUS at 05:10

## 2019-08-01 RX ADMIN — PREGABALIN 150 MG: 75 CAPSULE ORAL at 21:09

## 2019-08-01 RX ADMIN — POTASSIUM CHLORIDE 20 MEQ: 400 INJECTION, SOLUTION INTRAVENOUS at 11:20

## 2019-08-01 RX ADMIN — PREGABALIN 150 MG: 75 CAPSULE ORAL at 09:52

## 2019-08-01 RX ADMIN — CHLORHEXIDINE GLUCONATE 0.12% ORAL RINSE 15 ML: 1.2 LIQUID ORAL at 09:51

## 2019-08-01 RX ADMIN — IPRATROPIUM BROMIDE AND ALBUTEROL SULFATE 1 AMPULE: .5; 3 SOLUTION RESPIRATORY (INHALATION) at 15:45

## 2019-08-01 RX ADMIN — MIDODRINE HYDROCHLORIDE 10 MG: 5 TABLET ORAL at 08:29

## 2019-08-01 RX ADMIN — METOPROLOL TARTRATE 25 MG: 25 TABLET ORAL at 21:16

## 2019-08-01 RX ADMIN — MIDODRINE HYDROCHLORIDE 10 MG: 5 TABLET ORAL at 11:45

## 2019-08-01 RX ADMIN — IPRATROPIUM BROMIDE AND ALBUTEROL SULFATE 1 AMPULE: .5; 3 SOLUTION RESPIRATORY (INHALATION) at 21:16

## 2019-08-01 RX ADMIN — POLYETHYLENE GLYCOL (3350) 17 G: 17 POWDER, FOR SOLUTION ORAL at 09:52

## 2019-08-01 RX ADMIN — ENOXAPARIN SODIUM 30 MG: 30 INJECTION SUBCUTANEOUS at 09:50

## 2019-08-01 RX ADMIN — AMITRIPTYLINE HYDROCHLORIDE 50 MG: 50 TABLET, FILM COATED ORAL at 21:09

## 2019-08-01 RX ADMIN — METOPROLOL TARTRATE 25 MG: 25 TABLET ORAL at 09:52

## 2019-08-01 RX ADMIN — SODIUM CHLORIDE, PRESERVATIVE FREE 10 ML: 5 INJECTION INTRAVENOUS at 09:53

## 2019-08-01 RX ADMIN — SERTRALINE HYDROCHLORIDE 50 MG: 50 TABLET ORAL at 21:10

## 2019-08-01 RX ADMIN — DEXTROSE MONOHYDRATE 12.5 G: 500 INJECTION PARENTERAL at 08:23

## 2019-08-01 RX ADMIN — PREDNISONE 15 MG: 5 TABLET ORAL at 09:51

## 2019-08-01 RX ADMIN — PANTOPRAZOLE SODIUM 40 MG: 40 INJECTION, POWDER, FOR SOLUTION INTRAVENOUS at 09:51

## 2019-08-01 RX ADMIN — DEXMEDETOMIDINE HYDROCHLORIDE 0.2 MCG/KG/HR: 100 INJECTION, SOLUTION INTRAVENOUS at 10:57

## 2019-08-01 RX ADMIN — VANCOMYCIN HYDROCHLORIDE 1250 MG: 5 INJECTION, POWDER, LYOPHILIZED, FOR SOLUTION INTRAVENOUS at 22:00

## 2019-08-01 RX ADMIN — AMIODARONE HYDROCHLORIDE 200 MG: 200 TABLET ORAL at 09:51

## 2019-08-01 RX ADMIN — VANCOMYCIN HYDROCHLORIDE 1250 MG: 5 INJECTION, POWDER, LYOPHILIZED, FOR SOLUTION INTRAVENOUS at 05:09

## 2019-08-01 RX ADMIN — ALBUMIN (HUMAN) 25 G: 0.25 INJECTION, SOLUTION INTRAVENOUS at 02:25

## 2019-08-01 RX ADMIN — SIMVASTATIN 20 MG: 20 TABLET, FILM COATED ORAL at 21:10

## 2019-08-01 RX ADMIN — POTASSIUM CHLORIDE 20 MEQ: 400 INJECTION, SOLUTION INTRAVENOUS at 10:05

## 2019-08-01 RX ADMIN — SODIUM CHLORIDE, PRESERVATIVE FREE 10 ML: 5 INJECTION INTRAVENOUS at 21:21

## 2019-08-01 RX ADMIN — PANTOPRAZOLE SODIUM 40 MG: 40 INJECTION, POWDER, FOR SOLUTION INTRAVENOUS at 21:10

## 2019-08-01 RX ADMIN — MIDODRINE HYDROCHLORIDE 10 MG: 5 TABLET ORAL at 16:54

## 2019-08-01 RX ADMIN — IPRATROPIUM BROMIDE AND ALBUTEROL SULFATE 1 AMPULE: .5; 3 SOLUTION RESPIRATORY (INHALATION) at 15:08

## 2019-08-01 RX ADMIN — MEROPENEM 1 G: 1 INJECTION, POWDER, FOR SOLUTION INTRAVENOUS at 04:30

## 2019-08-01 ASSESSMENT — PULMONARY FUNCTION TESTS
PIF_VALUE: 22
PIF_VALUE: 20
PIF_VALUE: 23
PIF_VALUE: 22
PIF_VALUE: 20
PIF_VALUE: 22
PIF_VALUE: 22
PIF_VALUE: 20
PIF_VALUE: 22
PIF_VALUE: 20

## 2019-08-01 NOTE — PROGRESS NOTES
Daily Progress Note    I have seen ,spoken to  and examined this patient personally, independently of the Physician assistant . I have reviewed the hospital care given to date and reviewed all pertinent labs and imaging. The plan was developed mutually at the time of the visit with the patient,  PA  and myself. I have spoken with patient, nursing staff and provided written and verbal instructions . The above note has been reviewed and I agree with the assessment, diagnosis, and treatment plan with changes made by me as follows     CARDIOLOGY ATTENDING ADDENDUM    HPI:  I have reviewed the above HPI  And agree with above   Alayna Villatoro is a 77 y. o.year old who and presents with had concerns including Respiratory Distress. Chief Complaint   Patient presents with    Respiratory Distress     Interval history:  Patient is on vent   Heart rate and BP stable  Has PVC noted  Had good urine output with lasix  Await extubation   HFrEf medical treatment -EF 30% range-apical ballooning  Anemia improved post transfusion  Start low dose Lopresor with amiodarone   Hx of NSVT in past  Hx of drug abuse  Keep K close to 4.0-mag is ok     Cath--1/14  [id number]  LEFT MAIN-PATENT  LAD-MID 50% STENOSIS  LCX/OM-MILD DISEASE  RCA-STENT PATENT-MILD DISEASE  LV-EF 50%  MEDICAL TREATMENT     Physical Exam:  General:  On vent   Head:normal  Eye:normal  Neck:  No JVD   Chest:  Clear to auscultation, respiration easy  Cardiovascular:  Sinus   Abdomen:   nontender  Extremities:  No edema    Pulses; palpable  Neuro: grossly normal      MEDICAL DECISION MAKING;    I agree with the above plan, which was planned by myself and discussed with PA. Sonya Soto Electronically signed by Betsy Clay MD [facility] on 8/1/2019 at 12:48 PM      Pt.  Sedated on vent this am  HR stable, vent trigeminy, BP low but stable    Sepsis d/t PNA/ UTI    On ABx    On vent-weaning today per RN    Cont. midodrine    Per primary    Frequent PVC's    On amiodarone    BMP stable drugs. Family history:  family history includes Arthritis in her mother and sister; Depression in her mother and sister; Diabetes in her brother, mother, and sister; Early Death in her father; Heart Disease in her father, mother, and sister; High Blood Pressure in her father, mother, and sister; High Cholesterol in her mother and sister; Kidney Disease in her mother and sister; Learning Disabilities in her mother; Lupus in her sister; Mental Illness in her brother and mother; Other in her father; Stroke in her mother and sister; Vision Loss in her mother.     Objective:   /81   Pulse 88   Temp 98.4 °F (36.9 °C) (Rectal)   Resp 12   Ht 5' 1\" (1.549 m)   Wt 175 lb 11.3 oz (79.7 kg)   SpO2 99%   BMI 33.20 kg/m²       Intake/Output Summary (Last 24 hours) at 8/1/2019 0904  Last data filed at 8/1/2019 0511  Gross per 24 hour   Intake 2686.9 ml   Output 7100 ml   Net -4413.1 ml       Medications:   Scheduled Meds:   metoprolol tartrate  25 mg Oral BID    polyethylene glycol  17 g Per NG tube Daily    pantoprazole  40 mg Intravenous BID    sodium chloride  1,000 mL Intravenous Once    chlorhexidine  15 mL Mouth/Throat BID    vancomycin  1,250 mg Intravenous Q18H    predniSONE  15 mg Oral Daily    Followed by   Britta Araya ON 8/3/2019] predniSONE  10 mg Oral Daily    Followed by   Britta Araya ON 8/6/2019] predniSONE  5 mg Oral Daily    insulin lispro  0-12 Units Subcutaneous TID     insulin lispro  0-6 Units Subcutaneous Nightly    ipratropium-albuterol  1 ampule Inhalation Q4H WA    sodium chloride flush  10 mL Intravenous 2 times per day    midodrine  10 mg Per NG tube TID     amiodarone  200 mg Oral Daily    amitriptyline  50 mg Oral Nightly    pregabalin  150 mg Oral BID    sertraline  50 mg Oral Nightly    simvastatin  20 mg Oral Nightly    sodium chloride flush  10 mL Intravenous 2 times per day    docusate sodium  100 mg Oral BID    enoxaparin  30 mg Subcutaneous Daily

## 2019-08-01 NOTE — PROGRESS NOTES
Pulmonary and Critical Care  Progress Note      VITALS:  /81   Pulse 88   Temp 98.4 °F (36.9 °C) (Rectal)   Resp 12   Ht 5' 1\" (1.549 m)   Wt 175 lb 11.3 oz (79.7 kg)   SpO2 99%   BMI 33.20 kg/m²     Subjective:   CHIEF COMPLAINT :SOB     HPI:                The patient is a 77 y.o. female with significant past medical history of HTN, HLD, COPD  presents with complaints of SOB. She was found to have MRSA pneumonia. She is being treated with Abx. She had hypoxemia. She had decreased menatl status , unable to clear to secretions. She was intubated and brought to the ICU. Her hypotension has improved. She is weaning well on the vent. She is opening her eyes and following commands    Objective:   PHYSICAL EXAM:    LUNGS:Occasional basal crackles  Abd-soft, BS+, NT  Ext - 2+ pedal edema  CVS-s1s2, no murmurs      DATA:    CBC:  Recent Labs     07/30/19  0530 07/31/19  0630 07/31/19  2202 08/01/19  0435   WBC 16.5* 13.0*  --  15.8*   RBC 3.41* 2.45*  --  3.97*   HGB 9.3* 6.8  6.8 CALLED TO RUI CHAUHAN RN RB 07/31/2019 0725 LE  * 10.7* 11.1*   HCT 28.1* 21.1* 32.1* 34.2*    261  --  232   MCV 82.4 86.1  --  86.1   MCH 27.3 27.8  --  28.0   MCHC 33.1 32.2  --  32.5   RDW 16.1* 16.5*  --  15.9*      BMP:  Recent Labs     07/30/19  0530 07/31/19  0525 08/01/19  0435    146* 147*   K 4.2 3.9 3.5    106 105   CO2 32 33* 32   BUN 7 7 8   CREATININE 0.3* 0.3* 0.4*   CALCIUM 9.4 10.1 10.7*   GLUCOSE 134* 145* 68*      ABG:  Recent Labs     07/30/19  0600 07/31/19  0600 08/01/19  0600   PH 7.54* 7.48* 7.52*   PO2ART 94 141* 65*   [id number] 39.0 47.0* 46.0*   O2SAT 94.6* 95.8* 90.7*     BNP  Lab Results   Component Value Date    BNP 27 02/08/2014      D-Dimer:  Lab Results   Component Value Date    DDIMER 507 (H) 02/08/2014      1.  Radiology: Reviewed      Assessment/Plan     Patient Active Problem List    Diagnosis Date Noted    Iron deficiency anemia 07/31/2019    Severe malnutrition ([address]

## 2019-08-01 NOTE — PROGRESS NOTES
Pt with very hypoactive BS. Message sent to Dr. Anish Ramirez for possible KUB prior to TF restarting.  Awaiting call back     Martin Lyn RN

## 2019-08-01 NOTE — PROGRESS NOTES
Dr. Kristin Johnston notified of repeat ABGs after on spontaneous for 30-45 minutes. Okay to proceed with extubation.  Will carry out orders    Gin Garibay RN

## 2019-08-01 NOTE — PROGRESS NOTES
07/31/19 2010   Vent Information   Vent Type 980   Vent Mode AC/VC   Vt Ordered 450 mL   Rate Set 12 bmp   Peak Flow 55 L/min   Pressure Support 0 cmH20   FiO2  40 %   Sensitivity 3   PEEP/CPAP 5   I Time/ I Time % 0 s   Humidification Source HME   Vent Patient Data   High Peep/I Pressure 0   Peak Inspiratory Pressure 21 cmH2O   Mean Airway Pressure 8 cmH20   Rate Measured 12 br/min   Vt Exhaled 398 mL   Minute Volume 4.76 Liters   I:E Ratio 1:4.60   Plateau Pressure 14 [id number]   Static Compliance 47 mL/cmH2O   Dynamic Compliance 35 mL/cmH2O   Cough/Sputum   Sputum How Obtained Endotracheal;Suctioned   $Obtained Sample $Nasotracheal Suction   Cough Productive   Sputum Amount Small   Sputum Color Cloudy   Tenacity Thin   Spontaneous Breathing Trial (SBT) RT Doc   Pulse 87   Additional Respiratory  Assessments   Position Semi-Schulte's   Alarm Settings   High Pressure Alarm 40 cmH2O   Low Minute Volume Alarm 3 L/min   Apnea (secs) 20 secs   High Respiratory Rate 40 br/min   Low Exhaled Vt  250 mL   ETT (adult)   Placement Date/Time: 07/29/19 1007   Preoxygenation: Yes  Mask Ventilation: Ventilated by mask (1)  Technique: Direct laryngoscopy;Rapid sequence;Stylet  Type: Cuffed  Tube Size: 8 mm  Laryngoscope: Mac  Blade Size: 3  Location: Oral  Insertion attemp. ..    Secured at 24 cm   Measured From Lips   ET Placement Left   Secured By Commercial tube bush   Site Condition Dry

## 2019-08-01 NOTE — PROGRESS NOTES
Q18H    predniSONE  15 mg Oral Daily    Followed by   Daneil Clear ON 8/3/2019] predniSONE  10 mg Oral Daily    Followed by   Daneil Clear ON 8/6/2019] predniSONE  5 mg Oral Daily    insulin lispro  0-12 Units Subcutaneous TID     insulin lispro  0-6 Units Subcutaneous Nightly    ipratropium-albuterol  1 ampule Inhalation Q4H WA    sodium chloride flush  10 mL Intravenous 2 times per day    midodrine  10 mg Per NG tube TID     amiodarone  200 mg Oral Daily    amitriptyline  50 mg Oral Nightly    pregabalin  150 mg Oral BID    sertraline  50 mg Oral Nightly    simvastatin  20 mg Oral Nightly    sodium chloride flush  10 mL Intravenous 2 times per day    docusate sodium  100 mg Oral BID    enoxaparin  30 mg Subcutaneous Daily      Infusions:    dexmedetomidine (PRECEDEX) IV infusion 0.1 mcg/kg/hr (08/01/19 1337)    norepinephrine Stopped (07/31/19 1925)    dextrose       PRN Meds:     potassium chloride 20 mEq PRN   oxyCODONE-acetaminophen 1 tablet Q6H PRN   dicyclomine 20 mg 4x Daily PRN   glucose 15 g PRN   dextrose 12.5 g PRN   glucagon (rDNA) 1 mg PRN   dextrose 100 mL/hr PRN   sodium phosphate IVPB 0.16 mmol/kg PRN   Or     sodium phosphate IVPB 0.32 mmol/kg PRN   magnesium sulfate 1 g PRN   sodium chloride flush 10 mL PRN   albuterol 2.5 mg Q6H PRN   sodium chloride flush 10 mL PRN   magnesium hydroxide 30 mL Daily PRN   ondansetron 4 mg Q6H PRN   acetaminophen 650 mg Q4H PRN   bisacodyl 10 mg Daily PRN   acetaminophen 650 mg Q4H PRN     Subjective: Intubated and sedated with good and appropriate response to stimuli - opens her eyes     Objective:        Intake/Output Summary (Last 24 hours) at 8/1/2019 1552  Last data filed at 8/1/2019 1200  Gross per 24 hour   Intake 1920.9 ml   Output 6200 ml   Net -4279.1 ml      Vitals:   Vitals:    08/01/19 1546   BP:    Pulse:    Resp: 19   Temp:    SpO2: 99%     Physical Exam:   Gen: Intubated and sedated  Head/Eyes:  Normocephalic atraumatic, eyes

## 2019-08-01 NOTE — PROGRESS NOTES
Medications:    No current facility-administered medications on file prior to encounter. Current Outpatient Medications on File Prior to Encounter   Medication Sig Dispense Refill    metoprolol succinate (TOPROL XL) 50 MG extended release tablet Take 50 mg by mouth daily      sertraline (ZOLOFT) 50 MG tablet Take 50 mg by mouth nightly      amiodarone (CORDARONE) 200 MG tablet Take 1 tablet by mouth daily 30 tablet 3    simvastatin (ZOCOR) 20 MG tablet Take 1 tablet by mouth nightly 30 tablet 3    magnesium oxide (MAG-OX) 400 (241.3 Mg) MG TABS tablet Take 1 tablet by mouth 2 times daily 30 tablet 0    fluconazole (DIFLUCAN) 150 MG tablet       losartan (COZAAR) 25 MG tablet       ipratropium-albuterol (DUONEB) 0.5-2.5 (3) MG/3ML SOLN nebulizer solution Inhale 3 mLs into the lungs 4 times daily Dx: COPD J44.9 360 mL 5    amitriptyline (ELAVIL) 50 MG tablet Take 50 mg by mouth nightly      OXYGEN Inhale 2 L/min into the lungs as needed      guaiFENesin (MUCINEX) 600 MG extended release tablet Take 2 tablets by mouth 2 times daily 120 tablet 5    clopidogrel (PLAVIX) 75 MG tablet Take 75 mg by mouth daily      famotidine (PEPCID) 20 MG tablet Take 20 mg by mouth 2 times daily      PROAIR  (90 Base) MCG/ACT inhaler Inhale 2 puffs into the lungs every 6 hours as needed for Wheezing or Shortness of Breath       budesonide-formoterol (SYMBICORT) 160-4.5 MCG/ACT AERO Inhale 2 puffs into the lungs 2 times daily 1 Inhaler 5    aspirin 81 MG chewable tablet Take 1 tablet by mouth daily 30 tablet 0    benzonatate (TESSALON) 100 MG capsule Take 100 mg by mouth 3 times daily as needed for Cough       pregabalin (LYRICA) 150 MG capsule Take 150 mg by mouth 2 times daily      albuterol (PROVENTIL) (2.5 MG/3ML) 0.083% nebulizer solution Take 3 mLs by nebulization every 6 hours as needed for Wheezing 120 each 3    Multiple Vitamins-Minerals (MULTIVITAMIN PO) Take  by mouth every morning.  Over starting urinary stream, no hematuria. Physical Examination  Vital Signs: /78   Pulse 88   Temp 98.4 °F (36.9 °C) (Rectal)   Resp 12   Ht 5' 1\" (1.549 m)   Wt 175 lb 11.3 oz (79.7 kg)   SpO2 99%   BMI 33.20 kg/m²  Body mass index is 33.2 kg/m². General: The patient is a 77 y.o. female in No acute distress. EYE: EOMI, Gross visual field was normal. Pupils reactive, The conjunctive was normal, with no erythema. ENT: no lymphadenopathy, oropharynx is without erythema, edema, or exudates, and moist mucus membranes. The nasal mucosa, septum and turbinates were normal without inflammation or edema. Neck: There was no mass on palpitation, tracheal position was in the middle of the neck and there was no enlarged thyroid. There was no JVD. Lungs: The respiratory was not in labor and the patient did not use accessory muscle. Clear to auscultation bilaterally, no wheeze/crackles. Cardiovascular: Regular rate and rhythm, normal S1 & S2, no murmurs, rubs or gallops appreciated. Peripheral pulses were normal and no tenderness. Abdomen: Soft, non-tender, no rebound or guarding or peritoneal features, no masses, no hepatosplenomegaly. Extremities: upper and lower extremities were warm and dry, no clubbing, cyanosis, edema. Neuro: CN II-XII were intact grossly. Sensation was normal on all extremities and the muscle strength was normal and symmetry. Rectum:  There was no fistular, fissure, external hemorrhoid, tenderness, abscess, erythema or discharge    Labs:  CBC  WBC   Date Value Ref Range Status   08/01/2019 15.8 (H) 4.0 - 10.5 K/CU MM Final     Hemoglobin   Date Value Ref Range Status   08/01/2019 11.1 (L) 12.5 - 16.0 GM/DL Final     Hematocrit   Date Value Ref Range Status   08/01/2019 34.2 (L) 37 - 47 % Final     MCV   Date Value Ref Range Status   08/01/2019 86.1 78 - 100 FL Final        Glucose   Date Value Ref Range Status   08/01/2019 111 (H) 70 - 99 MG/DL Final     CO2   Date Value Ref Range

## 2019-08-02 ENCOUNTER — APPOINTMENT (OUTPATIENT)
Dept: GENERAL RADIOLOGY | Age: 69
DRG: 871 | End: 2019-08-02
Payer: MEDICARE

## 2019-08-02 LAB
ANION GAP SERPL CALCULATED.3IONS-SCNC: 7 MMOL/L (ref 4–16)
BUN BLDV-MCNC: 8 MG/DL (ref 6–23)
CALCIUM SERPL-MCNC: 10.3 MG/DL (ref 8.3–10.6)
CHLORIDE BLD-SCNC: 101 MMOL/L (ref 99–110)
CO2: 32 MMOL/L (ref 21–32)
CREAT SERPL-MCNC: 0.4 MG/DL (ref 0.6–1.1)
CULTURE: NORMAL
GFR AFRICAN AMERICAN: >60 ML/MIN/1.73M2
GFR NON-AFRICAN AMERICAN: >60 ML/MIN/1.73M2
GLUCOSE BLD-MCNC: 130 MG/DL (ref 70–99)
GLUCOSE BLD-MCNC: 132 MG/DL (ref 70–99)
GLUCOSE BLD-MCNC: 135 MG/DL (ref 70–99)
GLUCOSE BLD-MCNC: 158 MG/DL (ref 70–99)
GLUCOSE BLD-MCNC: 175 MG/DL (ref 70–99)
GRAM SMEAR: NORMAL
HAPTOGLOBIN: 127 MG/DL (ref 30–200)
HAPTOGLOBIN: NORMAL MG/DL (ref 30–200)
HCT VFR BLD CALC: 32.5 % (ref 37–47)
HCT VFR BLD CALC: 35.6 % (ref 37–47)
HCT VFR BLD CALC: 38 % (ref 37–47)
HEMOGLOBIN: 10.6 GM/DL (ref 12.5–16)
HEMOGLOBIN: 11.6 GM/DL (ref 12.5–16)
HEMOGLOBIN: 12.2 GM/DL (ref 12.5–16)
Lab: NORMAL
MCH RBC QN AUTO: 28.6 PG (ref 27–31)
MCHC RBC AUTO-ENTMCNC: 32.6 % (ref 32–36)
MCV RBC AUTO: 87.7 FL (ref 78–100)
PDW BLD-RTO: 16.9 % (ref 11.7–14.9)
PLATELET # BLD: 258 K/CU MM (ref 140–440)
PMV BLD AUTO: 11.5 FL (ref 7.5–11.1)
POTASSIUM SERPL-SCNC: 4 MMOL/L (ref 3.5–5.1)
RBC # BLD: 4.06 M/CU MM (ref 4.2–5.4)
SODIUM BLD-SCNC: 140 MMOL/L (ref 135–145)
SPECIMEN: NORMAL
WBC # BLD: 13.4 K/CU MM (ref 4–10.5)

## 2019-08-02 PROCEDURE — C9113 INJ PANTOPRAZOLE SODIUM, VIA: HCPCS | Performed by: INTERNAL MEDICINE

## 2019-08-02 PROCEDURE — 6370000000 HC RX 637 (ALT 250 FOR IP): Performed by: INTERNAL MEDICINE

## 2019-08-02 PROCEDURE — 2580000003 HC RX 258: Performed by: NURSE PRACTITIONER

## 2019-08-02 PROCEDURE — 6370000000 HC RX 637 (ALT 250 FOR IP): Performed by: PAIN MEDICINE

## 2019-08-02 PROCEDURE — 92610 EVALUATE SWALLOWING FUNCTION: CPT

## 2019-08-02 PROCEDURE — 99232 SBSQ HOSP IP/OBS MODERATE 35: CPT | Performed by: INTERNAL MEDICINE

## 2019-08-02 PROCEDURE — 99231 SBSQ HOSP IP/OBS SF/LOW 25: CPT | Performed by: OBSTETRICS & GYNECOLOGY

## 2019-08-02 PROCEDURE — 6370000000 HC RX 637 (ALT 250 FOR IP): Performed by: PHYSICIAN ASSISTANT

## 2019-08-02 PROCEDURE — 82962 GLUCOSE BLOOD TEST: CPT

## 2019-08-02 PROCEDURE — 36592 COLLECT BLOOD FROM PICC: CPT

## 2019-08-02 PROCEDURE — 6360000002 HC RX W HCPCS: Performed by: NURSE PRACTITIONER

## 2019-08-02 PROCEDURE — 6360000002 HC RX W HCPCS: Performed by: INTERNAL MEDICINE

## 2019-08-02 PROCEDURE — 6370000000 HC RX 637 (ALT 250 FOR IP): Performed by: FAMILY MEDICINE

## 2019-08-02 PROCEDURE — 80048 BASIC METABOLIC PNL TOTAL CA: CPT

## 2019-08-02 PROCEDURE — 6370000000 HC RX 637 (ALT 250 FOR IP): Performed by: SURGERY

## 2019-08-02 PROCEDURE — 2580000003 HC RX 258: Performed by: FAMILY MEDICINE

## 2019-08-02 PROCEDURE — 85018 HEMOGLOBIN: CPT

## 2019-08-02 PROCEDURE — 2000000000 HC ICU R&B

## 2019-08-02 PROCEDURE — 2700000000 HC OXYGEN THERAPY PER DAY

## 2019-08-02 PROCEDURE — 6370000000 HC RX 637 (ALT 250 FOR IP): Performed by: NURSE PRACTITIONER

## 2019-08-02 PROCEDURE — 2500000003 HC RX 250 WO HCPCS: Performed by: NURSE PRACTITIONER

## 2019-08-02 PROCEDURE — 85027 COMPLETE CBC AUTOMATED: CPT

## 2019-08-02 PROCEDURE — 94640 AIRWAY INHALATION TREATMENT: CPT

## 2019-08-02 PROCEDURE — 85014 HEMATOCRIT: CPT

## 2019-08-02 RX ORDER — METOPROLOL SUCCINATE 25 MG/1
25 TABLET, EXTENDED RELEASE ORAL DAILY
Status: DISCONTINUED | OUTPATIENT
Start: 2019-08-02 | End: 2019-08-07 | Stop reason: HOSPADM

## 2019-08-02 RX ORDER — FUROSEMIDE 20 MG/1
20 TABLET ORAL DAILY
Status: DISCONTINUED | OUTPATIENT
Start: 2019-08-02 | End: 2019-08-04

## 2019-08-02 RX ORDER — CLOPIDOGREL BISULFATE 75 MG/1
75 TABLET ORAL DAILY
Status: DISCONTINUED | OUTPATIENT
Start: 2019-08-02 | End: 2019-08-07 | Stop reason: HOSPADM

## 2019-08-02 RX ADMIN — IPRATROPIUM BROMIDE AND ALBUTEROL SULFATE 1 AMPULE: .5; 3 SOLUTION RESPIRATORY (INHALATION) at 15:20

## 2019-08-02 RX ADMIN — SERTRALINE HYDROCHLORIDE 50 MG: 50 TABLET ORAL at 21:10

## 2019-08-02 RX ADMIN — IPRATROPIUM BROMIDE AND ALBUTEROL SULFATE 1 AMPULE: .5; 3 SOLUTION RESPIRATORY (INHALATION) at 07:38

## 2019-08-02 RX ADMIN — MIDODRINE HYDROCHLORIDE 10 MG: 5 TABLET ORAL at 11:01

## 2019-08-02 RX ADMIN — SODIUM CHLORIDE, PRESERVATIVE FREE 10 ML: 5 INJECTION INTRAVENOUS at 21:11

## 2019-08-02 RX ADMIN — PREDNISONE 15 MG: 5 TABLET ORAL at 11:00

## 2019-08-02 RX ADMIN — METOPROLOL SUCCINATE 25 MG: 25 TABLET, EXTENDED RELEASE ORAL at 11:46

## 2019-08-02 RX ADMIN — MIDODRINE HYDROCHLORIDE 10 MG: 5 TABLET ORAL at 14:24

## 2019-08-02 RX ADMIN — PANTOPRAZOLE SODIUM 40 MG: 40 INJECTION, POWDER, FOR SOLUTION INTRAVENOUS at 21:11

## 2019-08-02 RX ADMIN — OXYCODONE HYDROCHLORIDE AND ACETAMINOPHEN 1 TABLET: 7.5; 325 TABLET ORAL at 18:08

## 2019-08-02 RX ADMIN — INSULIN LISPRO 1 UNITS: 100 INJECTION, SOLUTION INTRAVENOUS; SUBCUTANEOUS at 21:15

## 2019-08-02 RX ADMIN — PANTOPRAZOLE SODIUM 40 MG: 40 INJECTION, POWDER, FOR SOLUTION INTRAVENOUS at 11:01

## 2019-08-02 RX ADMIN — INSULIN LISPRO 2 UNITS: 100 INJECTION, SOLUTION INTRAVENOUS; SUBCUTANEOUS at 17:57

## 2019-08-02 RX ADMIN — PREGABALIN 150 MG: 75 CAPSULE ORAL at 21:10

## 2019-08-02 RX ADMIN — SIMVASTATIN 20 MG: 20 TABLET, FILM COATED ORAL at 21:10

## 2019-08-02 RX ADMIN — NOREPINEPHRINE BITARTRATE 2 MCG/MIN: 1 INJECTION INTRAVENOUS at 02:59

## 2019-08-02 RX ADMIN — SODIUM CHLORIDE, PRESERVATIVE FREE 10 ML: 5 INJECTION INTRAVENOUS at 11:08

## 2019-08-02 RX ADMIN — FUROSEMIDE 20 MG: 20 TABLET ORAL at 11:46

## 2019-08-02 RX ADMIN — IPRATROPIUM BROMIDE AND ALBUTEROL SULFATE 1 AMPULE: .5; 3 SOLUTION RESPIRATORY (INHALATION) at 19:38

## 2019-08-02 RX ADMIN — ENOXAPARIN SODIUM 30 MG: 30 INJECTION SUBCUTANEOUS at 11:11

## 2019-08-02 RX ADMIN — AMIODARONE HYDROCHLORIDE 200 MG: 200 TABLET ORAL at 11:00

## 2019-08-02 RX ADMIN — CLOPIDOGREL BISULFATE 75 MG: 75 TABLET ORAL at 11:46

## 2019-08-02 RX ADMIN — SODIUM CHLORIDE, PRESERVATIVE FREE 10 ML: 5 INJECTION INTRAVENOUS at 11:09

## 2019-08-02 RX ADMIN — AMITRIPTYLINE HYDROCHLORIDE 50 MG: 50 TABLET, FILM COATED ORAL at 21:10

## 2019-08-02 RX ADMIN — MIDODRINE HYDROCHLORIDE 10 MG: 5 TABLET ORAL at 17:55

## 2019-08-02 RX ADMIN — DOCUSATE SODIUM 100 MG: 100 CAPSULE, LIQUID FILLED ORAL at 21:10

## 2019-08-02 RX ADMIN — PREGABALIN 150 MG: 75 CAPSULE ORAL at 11:00

## 2019-08-02 RX ADMIN — IPRATROPIUM BROMIDE AND ALBUTEROL SULFATE 1 AMPULE: .5; 3 SOLUTION RESPIRATORY (INHALATION) at 11:44

## 2019-08-02 ASSESSMENT — PAIN SCALES - GENERAL
PAINLEVEL_OUTOF10: 4
PAINLEVEL_OUTOF10: 9

## 2019-08-02 ASSESSMENT — PAIN DESCRIPTION - PAIN TYPE: TYPE: CHRONIC PAIN

## 2019-08-02 ASSESSMENT — PAIN DESCRIPTION - DESCRIPTORS: DESCRIPTORS: ACHING

## 2019-08-02 ASSESSMENT — PAIN DESCRIPTION - FREQUENCY: FREQUENCY: INTERMITTENT

## 2019-08-02 ASSESSMENT — PAIN DESCRIPTION - LOCATION: LOCATION: BACK

## 2019-08-02 ASSESSMENT — PAIN DESCRIPTION - ORIENTATION: ORIENTATION: LOWER

## 2019-08-02 NOTE — PROGRESS NOTES
Allergies: Allergies   Allergen Reactions    Food Swelling     \"Rye, Whole grains, Barley\"    Trental [Pentoxifylline] Swelling    Tramadol      \"I Felt Shaky\"    Vistaril [Hydroxyzine Hcl] Other (See Comments)     Sedated and was placed on respirator    Erythromycin      \"Stomach Pain\"    Motrin [Ibuprofen] Diarrhea    Tetracyclines & Related Nausea And Vomiting       Social History:    Social History     Socioeconomic History    Marital status:       Spouse name: Not on file    Number of children: Not on file    Years of education: Not on file    Highest education level: Not on file   Occupational History    Not on file   Social Needs    Financial resource strain: Not on file    Food insecurity:     Worry: Not on file     Inability: Not on file    Transportation needs:     Medical: Not on file     Non-medical: Not on file   Tobacco Use    Smoking status: Current Every Day Smoker     Packs/day: 0.50     Years: 32.00     Pack years: 16.00     Types: Cigarettes     Start date: 1986    Smokeless tobacco: Never Used   Substance and Sexual Activity    Alcohol use: No     Alcohol/week: 0.0 standard drinks    Drug use: No     Comment: states quit 1-2-14    Sexual activity: Not Currently   Lifestyle    Physical activity:     Days per week: Not on file     Minutes per session: Not on file    Stress: Not on file   Relationships    Social connections:     Talks on phone: Not on file     Gets together: Not on file     Attends Yazidi service: Not on file     Active member of club or organization: Not on file     Attends meetings of clubs or organizations: Not on file     Relationship status: Not on file    Intimate partner violence:     Fear of current or ex partner: Not on file     Emotionally abused: Not on file     Physically abused: Not on file     Forced sexual activity: Not on file   Other Topics Concern    Not on file   Social History Narrative    Not on file       Family 32 21 - 32 MMOL/L Final     BUN   Date Value Ref Range Status   08/02/2019 8 6 - 23 MG/DL Final     Lab Results   Component Value Date    ALT 44 07/31/2019    AST 37 07/31/2019     No results found for: AMYLASE  Lab Results   Component Value Date    LIPASE 4 06/16/2019     No results found for: ESR  No components found for: CREACTIVEPR  Lab Results   Component Value Date    ZORAIDA None Detected 05/29/2019    ZORAIDA  05/29/2019     (NOTE)  If suspicion of connective tissue disease is strong and ZORAIDA EIA is   negative, consider testing for ZORAIDA by IFA ([id number]). INTERPRETIVE INFORMATION: Anti-Nuclear Antibodies (ZORAIDA), IgG by   DANY  Anti-Nuclear Antibodies (ZORAIDA), IgG by DANY: ZORAIDA specimens are   screened using enzyme-linked immunosorbent assay (DANY)   methodology. All DANY results reported as Detected are further   tested by indirect fluorescent assay (IFA) using HEp-2 substrate   with an IgG-specific conjugate. The ZORAIDA DANY screen is designed   to detect antibodies against dsDNA, histone, SS-A (Ro), SS-B (La),   Lujan, snRNP/Sm, Scl-70, Heidy-1, centromere, and an extract of lysed   HEp-2 cells. ZORAIDA DANY assays have been reported to have lower   sensitivities than ZORAIDA IFA for systemic autoimmune rheumatic   diseases (SARD). Negative results do not necessarily rule out SARD. Performed by [place] [id number], [id number] [facility] [phone]  www. Julia Webster MD, Lab. Director      No components found for: Christen Yohana  No results found for: CEA  No components found for: OCCULTBLOOD, OCCBLDSINPOC, OCCULTBLOODS, OCCBLD1, OCCBLD2, OCCBLD3, OCCULTBLOOD  Lab Results   Component Value Date    IRON 106 05/29/2019    FERRITIN 173 05/29/2019     No results found for: HAV    Assessment     Assessment and Plan:    1. The patient hemoglobin hematocrit had remained stable after 2 units transfusion couple days ago without any drop.   I would recommend continuing to follow patient hemoglobin hematocrit

## 2019-08-02 NOTE — PROGRESS NOTES
polyneuropathy, without long-term current use of insulin (HCC)    CAD (coronary artery disease)    Anxiety disorder    Lupus (HCC)    Chronic obstructive pulmonary disease (HCC)    Septic shock (HCC)    CAD S/P percutaneous coronary angioplasty    Gout    History of lung cancer    Smoker    Chronic low back pain with bilateral sciatica    Obesity (BMI 30.0-34. 9)    Essential hypertension    Chronic respiratory failure (HCC)    Syncope and collapse    Hypomagnesemia syndrome    Moderate malnutrition (HCC)    Sepsis (HCC)    Generalized muscle weakness    Acute hyperkalemia    Acute respiratory failure with hypoxemia (HCC)    Severe malnutrition (HCC)    Iron deficiency anemia       Plan:   1. continue present treatment   2. Continue aggressive bronchopulmonary toilet  3.  Continue IV antibiotics with vancomycin    Avani Ramirez MD  8/2/2019  11:21 AM

## 2019-08-02 NOTE — PROGRESS NOTES
Speech Language Pathology  Facility/Department: [facility] ICU   CLINICAL BEDSIDE SWALLOW EVALUATION    NAME: Kaitlin Rangel  : 1950  MRN: [id number]    IMPRESSIONS: Sauk Him was referred for a bedside swallow evaluation s/p extubation . She was admitted with respiratory distress and intubated on vent - and -. Most recent MBS 6/10/19 revealed no aspiration, recommended thin liquid/soft solid diet. Pt was seen for evaluation seated upright in bed, alert, cooperative. Baseline vocal quality is mildly hoarse. Baseline cough is wet. Oral mechanism examination reveals overall weakness with decreased lingual coordination/ROM. She was presented with PO trials of ice chips, thin liquids via tsp/cup/straw, puree, and soft solids. Oral stage is mild-moderately impaired, characterized by slow/disorganized vertical \"mashing\" for mastication with lingual pumping, adequate oral clearance for all textures. Suspect mild pharyngeal dysphagia characterized by delayed swallow initiation and reduced laryngeal elevation. Immediate cough x1 following trial of ice chips. Recommend initiation of thin liquid/puree diet. Pt is a total feed and should be positioned upright for all PO. SLP will follow. Results/recommendations discussed with pt and RN.    ADMISSION DATE: 2019  ADMITTING DIAGNOSIS: has Lung cancer ([address] Hidradenitis suppurativa; Controlled type 2 diabetes mellitus with diabetic polyneuropathy, without long-term current use of insulin ([address] CAD (coronary artery disease); Anxiety disorder; Lupus ([address] Chronic obstructive pulmonary disease ([address] Septic shock ([address] CAD S/P percutaneous coronary angioplasty; Gout; History of lung cancer; Smoker; Chronic low back pain with bilateral sciatica; Obesity (BMI 30.0-34.9); Essential hypertension; Chronic respiratory failure ([address] Syncope and collapse; Hypomagnesemia syndrome; Moderate malnutrition ([address] Sepsis ([address]  Generalized muscle

## 2019-08-02 NOTE — PROGRESS NOTES
primary     Frequent PVC's    On amiodarone and lopressor    BMP stable    Watch for now     Anemia    S/p transfusion yesterday    Hgb improved to 11.6 today    GI following     Acute on Chronic HFrEF    Good UOP    CXR clear now    On Lasix, and BB    EF 30%-apical ballooning noted    No ACE d/t hypotension    Med. Tx.     CAD s/p PCI hx    On DAPT    Stable-trop neg. Cont. Med. Tx.     Will cont. To follow     Echo-7/30/19  Summary   This is a limited echocardiogram.   Left ventricular systolic function is abnormal.   Ejection fraction is visually estimated at 30%.   Apical hypokinesis, mid - apical anterolateral akinesis.   Basal segments are hyperkinetic.   No evidence of any pericardial effusion.   apical ballooning noted -Takotsubo syndrome        Past medical history:    has a past medical history of Anxiety, CAD (coronary artery disease), CAD S/P percutaneous coronary angioplasty, Chronic back pain, COPD (chronic obstructive pulmonary disease) ([address] Gout, History of cerebral infarction, Hyperlipidemia, Hypertension, Lumbar spinal stenosis, Lung cancer ([address] Obesity (BMI 30.0-34.9), Restless leg syndrome, Rheumatoid arthritis ([address] Smoker, Systemic lupus erythematosus ([address] Type II diabetes mellitus ([address] and Ventral hernia. Past surgical history:   has a past surgical history that includes Skin cancer excision (Right, 2012); Appendectomy (1972); Cholecystectomy (8969); other surgical history (2/20/15); Total knee arthroplasty (Right, 2003); Revision Total Knee Arthroplasty (Right, 2005); aurora and bso (cervix removed) (1972); Humerus fracture surgery (Left, 2000's); Femur fracture surgery (Right, 2000's); Abdominal exploration surgery (Last Done 2005); Cataract removal with implant (Bilateral, 2000's); Lung removal, partial (Left, 09/30/2014); and Ankle fracture surgery. Social History:   reports that she has been smoking cigarettes. She started smoking about 33 years ago.  She has a 16.00 pack-year Generalized muscle weakness 07/02/2019    Acute hyperkalemia 07/02/2019    Sepsis ([facility] 06/16/2019    Moderate malnutrition ([address] 04/25/2019    Hypomagnesemia syndrome 04/18/2019    Syncope and collapse 01/25/2019    Chronic respiratory failure ([address] 11/26/2018    Essential hypertension 11/06/2018    Chronic low back pain with bilateral sciatica 11/05/2018    CAD S/P percutaneous coronary angioplasty     Smoker     Obesity (BMI 30.0-34. 9)     Septic shock ([address] 04/20/2018    Gout 01/01/2018    Chronic obstructive pulmonary disease ([address] 04/10/2017    Lupus ([facility] 10/21/2016    Anxiety disorder 08/07/2016    Controlled type 2 diabetes mellitus with diabetic polyneuropathy, without long-term current use of insulin ([facility] 05/28/2016    CAD (coronary artery disease) 05/28/2016    Hidradenitis suppurativa 02/20/2015    Lung cancer ([facility] 10/28/2014    History of lung cancer 09/01/2014       Electronically signed by Kia Zarate PA-C on 8/2/2019 at 8:47 AM

## 2019-08-03 LAB
ANION GAP SERPL CALCULATED.3IONS-SCNC: 6 MMOL/L (ref 4–16)
ANION GAP SERPL CALCULATED.3IONS-SCNC: 7 MMOL/L (ref 4–16)
BUN BLDV-MCNC: 12 MG/DL (ref 6–23)
BUN BLDV-MCNC: 12 MG/DL (ref 6–23)
CALCIUM SERPL-MCNC: 10 MG/DL (ref 8.3–10.6)
CALCIUM SERPL-MCNC: 10.1 MG/DL (ref 8.3–10.6)
CHLORIDE BLD-SCNC: 106 MMOL/L (ref 99–110)
CHLORIDE BLD-SCNC: 107 MMOL/L (ref 99–110)
CO2: 31 MMOL/L (ref 21–32)
CO2: 33 MMOL/L (ref 21–32)
CREAT SERPL-MCNC: 0.4 MG/DL (ref 0.6–1.1)
CREAT SERPL-MCNC: 0.4 MG/DL (ref 0.6–1.1)
CULTURE: NORMAL
GFR AFRICAN AMERICAN: >60 ML/MIN/1.73M2
GFR AFRICAN AMERICAN: >60 ML/MIN/1.73M2
GFR NON-AFRICAN AMERICAN: >60 ML/MIN/1.73M2
GFR NON-AFRICAN AMERICAN: >60 ML/MIN/1.73M2
GLUCOSE BLD-MCNC: 111 MG/DL (ref 70–99)
GLUCOSE BLD-MCNC: 150 MG/DL (ref 70–99)
GLUCOSE BLD-MCNC: 181 MG/DL (ref 70–99)
GLUCOSE BLD-MCNC: 184 MG/DL (ref 70–99)
GLUCOSE BLD-MCNC: 88 MG/DL (ref 70–99)
GLUCOSE BLD-MCNC: 92 MG/DL (ref 70–99)
HCT VFR BLD CALC: 33.8 % (ref 37–47)
HCT VFR BLD CALC: 34.3 % (ref 37–47)
HCT VFR BLD CALC: 34.4 % (ref 37–47)
HCT VFR BLD CALC: 36.6 % (ref 37–47)
HEMOGLOBIN: 10.7 GM/DL (ref 12.5–16)
HEMOGLOBIN: 10.8 GM/DL (ref 12.5–16)
HEMOGLOBIN: 10.9 GM/DL (ref 12.5–16)
HEMOGLOBIN: 11.6 GM/DL (ref 12.5–16)
Lab: NORMAL
MCH RBC QN AUTO: 28.5 PG (ref 27–31)
MCHC RBC AUTO-ENTMCNC: 31.7 % (ref 32–36)
MCV RBC AUTO: 90.1 FL (ref 78–100)
PDW BLD-RTO: 17.3 % (ref 11.7–14.9)
PLATELET # BLD: 244 K/CU MM (ref 140–440)
PMV BLD AUTO: 11.2 FL (ref 7.5–11.1)
POTASSIUM SERPL-SCNC: 4.1 MMOL/L (ref 3.5–5.1)
POTASSIUM SERPL-SCNC: 4.5 MMOL/L (ref 3.5–5.1)
RBC # BLD: 3.75 M/CU MM (ref 4.2–5.4)
SODIUM BLD-SCNC: 143 MMOL/L (ref 135–145)
SODIUM BLD-SCNC: 147 MMOL/L (ref 135–145)
SPECIMEN: NORMAL
WBC # BLD: 13.5 K/CU MM (ref 4–10.5)

## 2019-08-03 PROCEDURE — 6370000000 HC RX 637 (ALT 250 FOR IP): Performed by: INTERNAL MEDICINE

## 2019-08-03 PROCEDURE — 99232 SBSQ HOSP IP/OBS MODERATE 35: CPT | Performed by: INTERNAL MEDICINE

## 2019-08-03 PROCEDURE — 6370000000 HC RX 637 (ALT 250 FOR IP): Performed by: NURSE PRACTITIONER

## 2019-08-03 PROCEDURE — 85027 COMPLETE CBC AUTOMATED: CPT

## 2019-08-03 PROCEDURE — 6360000002 HC RX W HCPCS: Performed by: INTERNAL MEDICINE

## 2019-08-03 PROCEDURE — 6370000000 HC RX 637 (ALT 250 FOR IP): Performed by: FAMILY MEDICINE

## 2019-08-03 PROCEDURE — 85014 HEMATOCRIT: CPT

## 2019-08-03 PROCEDURE — 80048 BASIC METABOLIC PNL TOTAL CA: CPT

## 2019-08-03 PROCEDURE — 2700000000 HC OXYGEN THERAPY PER DAY

## 2019-08-03 PROCEDURE — 6370000000 HC RX 637 (ALT 250 FOR IP): Performed by: SURGERY

## 2019-08-03 PROCEDURE — 6370000000 HC RX 637 (ALT 250 FOR IP): Performed by: PHYSICIAN ASSISTANT

## 2019-08-03 PROCEDURE — 2580000003 HC RX 258: Performed by: FAMILY MEDICINE

## 2019-08-03 PROCEDURE — 94640 AIRWAY INHALATION TREATMENT: CPT

## 2019-08-03 PROCEDURE — 94761 N-INVAS EAR/PLS OXIMETRY MLT: CPT

## 2019-08-03 PROCEDURE — 1200000000 HC SEMI PRIVATE

## 2019-08-03 PROCEDURE — 2580000003 HC RX 258: Performed by: INTERNAL MEDICINE

## 2019-08-03 PROCEDURE — 85018 HEMOGLOBIN: CPT

## 2019-08-03 PROCEDURE — C9113 INJ PANTOPRAZOLE SODIUM, VIA: HCPCS | Performed by: INTERNAL MEDICINE

## 2019-08-03 PROCEDURE — 82962 GLUCOSE BLOOD TEST: CPT

## 2019-08-03 PROCEDURE — 6370000000 HC RX 637 (ALT 250 FOR IP): Performed by: PAIN MEDICINE

## 2019-08-03 PROCEDURE — 2580000003 HC RX 258: Performed by: NURSE PRACTITIONER

## 2019-08-03 PROCEDURE — 6360000002 HC RX W HCPCS: Performed by: NURSE PRACTITIONER

## 2019-08-03 RX ORDER — DEXTROSE MONOHYDRATE 50 MG/ML
INJECTION, SOLUTION INTRAVENOUS CONTINUOUS
Status: DISCONTINUED | OUTPATIENT
Start: 2019-08-03 | End: 2019-08-04

## 2019-08-03 RX ADMIN — MIDODRINE HYDROCHLORIDE 10 MG: 5 TABLET ORAL at 09:06

## 2019-08-03 RX ADMIN — AMIODARONE HYDROCHLORIDE 200 MG: 200 TABLET ORAL at 09:06

## 2019-08-03 RX ADMIN — SODIUM CHLORIDE, PRESERVATIVE FREE 10 ML: 5 INJECTION INTRAVENOUS at 20:48

## 2019-08-03 RX ADMIN — OXYCODONE HYDROCHLORIDE AND ACETAMINOPHEN 1 TABLET: 7.5; 325 TABLET ORAL at 00:08

## 2019-08-03 RX ADMIN — PREGABALIN 150 MG: 75 CAPSULE ORAL at 09:05

## 2019-08-03 RX ADMIN — IPRATROPIUM BROMIDE AND ALBUTEROL SULFATE 1 AMPULE: .5; 3 SOLUTION RESPIRATORY (INHALATION) at 11:32

## 2019-08-03 RX ADMIN — OXYCODONE HYDROCHLORIDE AND ACETAMINOPHEN 1 TABLET: 7.5; 325 TABLET ORAL at 20:47

## 2019-08-03 RX ADMIN — POLYETHYLENE GLYCOL (3350) 17 G: 17 POWDER, FOR SOLUTION ORAL at 09:07

## 2019-08-03 RX ADMIN — SODIUM CHLORIDE, PRESERVATIVE FREE 10 ML: 5 INJECTION INTRAVENOUS at 09:09

## 2019-08-03 RX ADMIN — CLOPIDOGREL BISULFATE 75 MG: 75 TABLET ORAL at 09:06

## 2019-08-03 RX ADMIN — PANTOPRAZOLE SODIUM 40 MG: 40 INJECTION, POWDER, FOR SOLUTION INTRAVENOUS at 20:47

## 2019-08-03 RX ADMIN — ENOXAPARIN SODIUM 30 MG: 30 INJECTION SUBCUTANEOUS at 09:07

## 2019-08-03 RX ADMIN — IPRATROPIUM BROMIDE AND ALBUTEROL SULFATE 1 AMPULE: .5; 3 SOLUTION RESPIRATORY (INHALATION) at 07:41

## 2019-08-03 RX ADMIN — SERTRALINE HYDROCHLORIDE 50 MG: 50 TABLET ORAL at 20:49

## 2019-08-03 RX ADMIN — MIDODRINE HYDROCHLORIDE 10 MG: 5 TABLET ORAL at 12:44

## 2019-08-03 RX ADMIN — PANTOPRAZOLE SODIUM 40 MG: 40 INJECTION, POWDER, FOR SOLUTION INTRAVENOUS at 09:08

## 2019-08-03 RX ADMIN — DOCUSATE SODIUM 100 MG: 100 CAPSULE, LIQUID FILLED ORAL at 09:06

## 2019-08-03 RX ADMIN — SODIUM CHLORIDE, PRESERVATIVE FREE 10 ML: 5 INJECTION INTRAVENOUS at 09:07

## 2019-08-03 RX ADMIN — PREDNISONE 10 MG: 10 TABLET ORAL at 09:06

## 2019-08-03 RX ADMIN — DOCUSATE SODIUM 100 MG: 100 CAPSULE, LIQUID FILLED ORAL at 20:47

## 2019-08-03 RX ADMIN — IPRATROPIUM BROMIDE AND ALBUTEROL SULFATE 1 AMPULE: .5; 3 SOLUTION RESPIRATORY (INHALATION) at 16:03

## 2019-08-03 RX ADMIN — SIMVASTATIN 20 MG: 20 TABLET, FILM COATED ORAL at 20:47

## 2019-08-03 RX ADMIN — PREGABALIN 150 MG: 75 CAPSULE ORAL at 20:47

## 2019-08-03 RX ADMIN — OXYCODONE HYDROCHLORIDE AND ACETAMINOPHEN 1 TABLET: 7.5; 325 TABLET ORAL at 12:44

## 2019-08-03 RX ADMIN — AMITRIPTYLINE HYDROCHLORIDE 50 MG: 50 TABLET, FILM COATED ORAL at 20:47

## 2019-08-03 RX ADMIN — FUROSEMIDE 20 MG: 20 TABLET ORAL at 09:05

## 2019-08-03 RX ADMIN — IPRATROPIUM BROMIDE AND ALBUTEROL SULFATE 1 AMPULE: .5; 3 SOLUTION RESPIRATORY (INHALATION) at 20:35

## 2019-08-03 RX ADMIN — METOPROLOL SUCCINATE 25 MG: 25 TABLET, EXTENDED RELEASE ORAL at 09:31

## 2019-08-03 RX ADMIN — DEXTROSE MONOHYDRATE: 50 INJECTION, SOLUTION INTRAVENOUS at 10:26

## 2019-08-03 RX ADMIN — INSULIN LISPRO 2 UNITS: 100 INJECTION, SOLUTION INTRAVENOUS; SUBCUTANEOUS at 17:49

## 2019-08-03 RX ADMIN — INSULIN LISPRO 1 UNITS: 100 INJECTION, SOLUTION INTRAVENOUS; SUBCUTANEOUS at 20:49

## 2019-08-03 RX ADMIN — MIDODRINE HYDROCHLORIDE 10 MG: 5 TABLET ORAL at 17:48

## 2019-08-03 ASSESSMENT — PAIN DESCRIPTION - LOCATION
LOCATION: BACK

## 2019-08-03 ASSESSMENT — PAIN DESCRIPTION - ORIENTATION
ORIENTATION: LOWER
ORIENTATION: LOWER;MID
ORIENTATION: LOWER;MID
ORIENTATION: LOWER
ORIENTATION: LOWER
ORIENTATION: MID

## 2019-08-03 ASSESSMENT — PAIN DESCRIPTION - FREQUENCY
FREQUENCY: CONTINUOUS
FREQUENCY: INTERMITTENT

## 2019-08-03 ASSESSMENT — PAIN DESCRIPTION - PAIN TYPE
TYPE: CHRONIC PAIN

## 2019-08-03 ASSESSMENT — PAIN SCALES - GENERAL
PAINLEVEL_OUTOF10: 8
PAINLEVEL_OUTOF10: 3
PAINLEVEL_OUTOF10: 3
PAINLEVEL_OUTOF10: 5
PAINLEVEL_OUTOF10: 3
PAINLEVEL_OUTOF10: 3
PAINLEVEL_OUTOF10: 8
PAINLEVEL_OUTOF10: 8

## 2019-08-03 ASSESSMENT — PAIN DESCRIPTION - DESCRIPTORS
DESCRIPTORS: ACHING
DESCRIPTORS: ACHING;SORE
DESCRIPTORS: ACHING
DESCRIPTORS: ACHING;DISCOMFORT
DESCRIPTORS: ACHING

## 2019-08-03 NOTE — PROGRESS NOTES
Medications:    metoprolol succinate  25 mg Oral Daily    furosemide  20 mg Oral Daily    clopidogrel  75 mg Oral Daily    polyethylene glycol  17 g Per NG tube Daily    pantoprazole  40 mg Intravenous BID    sodium chloride  1,000 mL Intravenous Once    predniSONE  10 mg Oral Daily    Followed by   Allen Hughes ON 8/6/2019] predniSONE  5 mg Oral Daily    insulin lispro  0-12 Units Subcutaneous TID     insulin lispro  0-6 Units Subcutaneous Nightly    ipratropium-albuterol  1 ampule Inhalation Q4H WA    sodium chloride flush  10 mL Intravenous 2 times per day    midodrine  10 mg Per NG tube TID     amiodarone  200 mg Oral Daily    amitriptyline  50 mg Oral Nightly    pregabalin  150 mg Oral BID    sertraline  50 mg Oral Nightly    simvastatin  20 mg Oral Nightly    sodium chloride flush  10 mL Intravenous 2 times per day    docusate sodium  100 mg Oral BID    enoxaparin  30 mg Subcutaneous Daily      Infusions:    dextrose       PRN Meds:     potassium chloride 20 mEq PRN   oxyCODONE-acetaminophen 1 tablet Q6H PRN   dicyclomine 20 mg 4x Daily PRN   glucose 15 g PRN   dextrose 12.5 g PRN   glucagon (rDNA) 1 mg PRN   dextrose 100 mL/hr PRN   sodium phosphate IVPB 0.16 mmol/kg PRN   Or     sodium phosphate IVPB 0.32 mmol/kg PRN   magnesium sulfate 1 g PRN   sodium chloride flush 10 mL PRN   albuterol 2.5 mg Q6H PRN   sodium chloride flush 10 mL PRN   magnesium hydroxide 30 mL Daily PRN   ondansetron 4 mg Q6H PRN   acetaminophen 650 mg Q4H PRN   bisacodyl 10 mg Daily PRN   acetaminophen 650 mg Q4H PRN     Subjective:     Awake and alert, oriented x3 - no SOB and no chest pain   Feels hungry and wants to eat   Denies any nausea or vomiting     Objective:        Intake/Output Summary (Last 24 hours) at 8/3/2019 0900  Last data filed at 8/3/2019 0603  Gross per 24 hour   Intake 675 ml   Output 2405 ml   Net -1730 ml      Vitals:   Vitals:    08/03/19 0700   BP: 94/60   Pulse: 90   Resp: 18   Temp:

## 2019-08-03 NOTE — PROGRESS NOTES
Essential hypertension 11/06/2018    Chronic low back pain with bilateral sciatica 11/05/2018    CAD S/P percutaneous coronary angioplasty     Smoker     Obesity (BMI 30.0-34. 9)     Septic shock ([facility] 04/20/2018    Gout 01/01/2018    Chronic obstructive pulmonary disease ([address] 04/10/2017    Lupus ([facility] 10/21/2016    Anxiety disorder 08/07/2016    Controlled type 2 diabetes mellitus with diabetic polyneuropathy, without long-term current use of insulin ([facility] 05/28/2016    CAD (coronary artery disease) 05/28/2016    Hidradenitis suppurativa 02/20/2015    Lung cancer ([facility] 10/28/2014    History of lung cancer 09/01/2014       Electronically signed by Noe Albert PA-C on 8/3/2019 at 10:10 AM

## 2019-08-03 NOTE — PROGRESS NOTES
Eventually the patient needs a colonoscopy and possible upper endoscopy. Given the patient still had respiratory distress and sepsis. I would recommend no emergent GI procedure at this time. Gustavo Vicente MD PhD [facility]  [address], [address] [facility] [address]  0ffice: [phone]  Fax: [phone]

## 2019-08-04 ENCOUNTER — APPOINTMENT (OUTPATIENT)
Dept: GENERAL RADIOLOGY | Age: 69
DRG: 871 | End: 2019-08-04
Payer: MEDICARE

## 2019-08-04 LAB
ANION GAP SERPL CALCULATED.3IONS-SCNC: 5 MMOL/L (ref 4–16)
BUN BLDV-MCNC: 13 MG/DL (ref 6–23)
CALCIUM SERPL-MCNC: 9.4 MG/DL (ref 8.3–10.6)
CHLORIDE BLD-SCNC: 106 MMOL/L (ref 99–110)
CO2: 32 MMOL/L (ref 21–32)
CREAT SERPL-MCNC: 0.4 MG/DL (ref 0.6–1.1)
GFR AFRICAN AMERICAN: >60 ML/MIN/1.73M2
GFR NON-AFRICAN AMERICAN: >60 ML/MIN/1.73M2
GLUCOSE BLD-MCNC: 124 MG/DL (ref 70–99)
GLUCOSE BLD-MCNC: 126 MG/DL (ref 70–99)
GLUCOSE BLD-MCNC: 50 MG/DL (ref 70–99)
GLUCOSE BLD-MCNC: 70 MG/DL (ref 70–99)
GLUCOSE BLD-MCNC: 74 MG/DL (ref 70–99)
GLUCOSE BLD-MCNC: 90 MG/DL (ref 70–99)
GLUCOSE BLD-MCNC: 93 MG/DL (ref 70–99)
HCT VFR BLD CALC: 32.6 % (ref 37–47)
HCT VFR BLD CALC: 33.5 % (ref 37–47)
HEMOGLOBIN: 10.2 GM/DL (ref 12.5–16)
HEMOGLOBIN: 10.6 GM/DL (ref 12.5–16)
MCH RBC QN AUTO: 28.4 PG (ref 27–31)
MCHC RBC AUTO-ENTMCNC: 31.6 % (ref 32–36)
MCV RBC AUTO: 89.8 FL (ref 78–100)
PDW BLD-RTO: 17.4 % (ref 11.7–14.9)
PLATELET # BLD: 234 K/CU MM (ref 140–440)
PMV BLD AUTO: 10.6 FL (ref 7.5–11.1)
POTASSIUM SERPL-SCNC: 3.8 MMOL/L (ref 3.5–5.1)
RBC # BLD: 3.73 M/CU MM (ref 4.2–5.4)
SODIUM BLD-SCNC: 143 MMOL/L (ref 135–145)
WBC # BLD: 14 K/CU MM (ref 4–10.5)

## 2019-08-04 PROCEDURE — 1200000000 HC SEMI PRIVATE

## 2019-08-04 PROCEDURE — 87070 CULTURE OTHR SPECIMN AEROBIC: CPT

## 2019-08-04 PROCEDURE — 6360000002 HC RX W HCPCS: Performed by: INTERNAL MEDICINE

## 2019-08-04 PROCEDURE — 6370000000 HC RX 637 (ALT 250 FOR IP): Performed by: PAIN MEDICINE

## 2019-08-04 PROCEDURE — 94761 N-INVAS EAR/PLS OXIMETRY MLT: CPT

## 2019-08-04 PROCEDURE — 2580000003 HC RX 258: Performed by: FAMILY MEDICINE

## 2019-08-04 PROCEDURE — 6370000000 HC RX 637 (ALT 250 FOR IP): Performed by: FAMILY MEDICINE

## 2019-08-04 PROCEDURE — 6370000000 HC RX 637 (ALT 250 FOR IP): Performed by: INTERNAL MEDICINE

## 2019-08-04 PROCEDURE — 85018 HEMOGLOBIN: CPT

## 2019-08-04 PROCEDURE — C9113 INJ PANTOPRAZOLE SODIUM, VIA: HCPCS | Performed by: INTERNAL MEDICINE

## 2019-08-04 PROCEDURE — 6370000000 HC RX 637 (ALT 250 FOR IP): Performed by: NURSE PRACTITIONER

## 2019-08-04 PROCEDURE — 6360000002 HC RX W HCPCS: Performed by: NURSE PRACTITIONER

## 2019-08-04 PROCEDURE — 2580000003 HC RX 258: Performed by: NURSE PRACTITIONER

## 2019-08-04 PROCEDURE — 2700000000 HC OXYGEN THERAPY PER DAY

## 2019-08-04 PROCEDURE — 94640 AIRWAY INHALATION TREATMENT: CPT

## 2019-08-04 PROCEDURE — 2580000003 HC RX 258: Performed by: INTERNAL MEDICINE

## 2019-08-04 PROCEDURE — 85027 COMPLETE CBC AUTOMATED: CPT

## 2019-08-04 PROCEDURE — 99232 SBSQ HOSP IP/OBS MODERATE 35: CPT | Performed by: INTERNAL MEDICINE

## 2019-08-04 PROCEDURE — 82962 GLUCOSE BLOOD TEST: CPT

## 2019-08-04 PROCEDURE — 87205 SMEAR GRAM STAIN: CPT

## 2019-08-04 PROCEDURE — 6370000000 HC RX 637 (ALT 250 FOR IP): Performed by: PHYSICIAN ASSISTANT

## 2019-08-04 PROCEDURE — 85014 HEMATOCRIT: CPT

## 2019-08-04 PROCEDURE — 71045 X-RAY EXAM CHEST 1 VIEW: CPT

## 2019-08-04 PROCEDURE — 6370000000 HC RX 637 (ALT 250 FOR IP): Performed by: SURGERY

## 2019-08-04 PROCEDURE — 80048 BASIC METABOLIC PNL TOTAL CA: CPT

## 2019-08-04 PROCEDURE — 36592 COLLECT BLOOD FROM PICC: CPT

## 2019-08-04 RX ORDER — FUROSEMIDE 40 MG/1
40 TABLET ORAL 2 TIMES DAILY
Status: DISCONTINUED | OUTPATIENT
Start: 2019-08-04 | End: 2019-08-05

## 2019-08-04 RX ORDER — LEVOFLOXACIN 5 MG/ML
500 INJECTION, SOLUTION INTRAVENOUS EVERY 24 HOURS
Status: DISCONTINUED | OUTPATIENT
Start: 2019-08-04 | End: 2019-08-07 | Stop reason: HOSPADM

## 2019-08-04 RX ADMIN — MIDODRINE HYDROCHLORIDE 10 MG: 5 TABLET ORAL at 12:20

## 2019-08-04 RX ADMIN — DICYCLOMINE HYDROCHLORIDE 20 MG: 20 TABLET ORAL at 15:52

## 2019-08-04 RX ADMIN — AMITRIPTYLINE HYDROCHLORIDE 50 MG: 50 TABLET, FILM COATED ORAL at 21:25

## 2019-08-04 RX ADMIN — SODIUM CHLORIDE, PRESERVATIVE FREE 10 ML: 5 INJECTION INTRAVENOUS at 21:31

## 2019-08-04 RX ADMIN — FUROSEMIDE 20 MG: 20 TABLET ORAL at 08:21

## 2019-08-04 RX ADMIN — DEXTROSE 15 G: 15 GEL ORAL at 08:42

## 2019-08-04 RX ADMIN — OXYCODONE HYDROCHLORIDE AND ACETAMINOPHEN 1 TABLET: 7.5; 325 TABLET ORAL at 21:52

## 2019-08-04 RX ADMIN — PREGABALIN 150 MG: 75 CAPSULE ORAL at 21:25

## 2019-08-04 RX ADMIN — AMIODARONE HYDROCHLORIDE 200 MG: 200 TABLET ORAL at 08:22

## 2019-08-04 RX ADMIN — SODIUM CHLORIDE, PRESERVATIVE FREE 10 ML: 5 INJECTION INTRAVENOUS at 08:25

## 2019-08-04 RX ADMIN — CLOPIDOGREL BISULFATE 75 MG: 75 TABLET ORAL at 08:21

## 2019-08-04 RX ADMIN — FUROSEMIDE 40 MG: 40 TABLET ORAL at 15:52

## 2019-08-04 RX ADMIN — SIMVASTATIN 20 MG: 20 TABLET, FILM COATED ORAL at 21:25

## 2019-08-04 RX ADMIN — OXYCODONE HYDROCHLORIDE AND ACETAMINOPHEN 1 TABLET: 7.5; 325 TABLET ORAL at 08:27

## 2019-08-04 RX ADMIN — PREDNISONE 10 MG: 10 TABLET ORAL at 08:22

## 2019-08-04 RX ADMIN — PREGABALIN 150 MG: 75 CAPSULE ORAL at 08:22

## 2019-08-04 RX ADMIN — DEXTROSE MONOHYDRATE: 50 INJECTION, SOLUTION INTRAVENOUS at 00:57

## 2019-08-04 RX ADMIN — POLYETHYLENE GLYCOL (3350) 17 G: 17 POWDER, FOR SOLUTION ORAL at 08:21

## 2019-08-04 RX ADMIN — METOPROLOL SUCCINATE 25 MG: 25 TABLET, EXTENDED RELEASE ORAL at 08:21

## 2019-08-04 RX ADMIN — OXYCODONE HYDROCHLORIDE AND ACETAMINOPHEN 1 TABLET: 7.5; 325 TABLET ORAL at 15:52

## 2019-08-04 RX ADMIN — ENOXAPARIN SODIUM 30 MG: 30 INJECTION SUBCUTANEOUS at 08:21

## 2019-08-04 RX ADMIN — PANTOPRAZOLE SODIUM 40 MG: 40 INJECTION, POWDER, FOR SOLUTION INTRAVENOUS at 08:21

## 2019-08-04 RX ADMIN — MIDODRINE HYDROCHLORIDE 10 MG: 5 TABLET ORAL at 18:14

## 2019-08-04 RX ADMIN — MIDODRINE HYDROCHLORIDE 10 MG: 5 TABLET ORAL at 08:22

## 2019-08-04 RX ADMIN — IPRATROPIUM BROMIDE AND ALBUTEROL SULFATE 1 AMPULE: .5; 3 SOLUTION RESPIRATORY (INHALATION) at 08:10

## 2019-08-04 RX ADMIN — PANTOPRAZOLE SODIUM 40 MG: 40 INJECTION, POWDER, FOR SOLUTION INTRAVENOUS at 21:26

## 2019-08-04 RX ADMIN — SODIUM CHLORIDE, PRESERVATIVE FREE 10 ML: 5 INJECTION INTRAVENOUS at 08:21

## 2019-08-04 RX ADMIN — SERTRALINE HYDROCHLORIDE 50 MG: 50 TABLET ORAL at 21:25

## 2019-08-04 RX ADMIN — DOCUSATE SODIUM 100 MG: 100 CAPSULE, LIQUID FILLED ORAL at 08:21

## 2019-08-04 RX ADMIN — LEVOFLOXACIN 500 MG: 5 INJECTION, SOLUTION INTRAVENOUS at 15:53

## 2019-08-04 ASSESSMENT — PAIN SCALES - WONG BAKER
WONGBAKER_NUMERICALRESPONSE: 2

## 2019-08-04 ASSESSMENT — PAIN DESCRIPTION - ONSET: ONSET: ON-GOING

## 2019-08-04 ASSESSMENT — PAIN DESCRIPTION - LOCATION
LOCATION: ABDOMEN
LOCATION: BACK

## 2019-08-04 ASSESSMENT — PAIN DESCRIPTION - DESCRIPTORS: DESCRIPTORS: DISCOMFORT

## 2019-08-04 ASSESSMENT — PAIN DESCRIPTION - PAIN TYPE
TYPE: ACUTE PAIN
TYPE: CHRONIC PAIN

## 2019-08-04 ASSESSMENT — PAIN DESCRIPTION - PROGRESSION
CLINICAL_PROGRESSION: NOT CHANGED

## 2019-08-04 ASSESSMENT — PAIN SCALES - GENERAL
PAINLEVEL_OUTOF10: 0
PAINLEVEL_OUTOF10: 9
PAINLEVEL_OUTOF10: 8
PAINLEVEL_OUTOF10: 7

## 2019-08-04 NOTE — PROGRESS NOTES
Patient assigned to room 3008. Nurse to nurse report called Prudence Diez. Answered all questions. Will bring the patient up shortly.

## 2019-08-04 NOTE — PROGRESS NOTES
(Left, 09/30/2014); and Ankle fracture surgery. Social History:   reports that she has been smoking cigarettes. She started smoking about 33 years ago. She has a 16.00 pack-year smoking history. She has never used smokeless tobacco. She reports that she does not drink alcohol or use drugs. Family history:  family history includes Arthritis in her mother and sister; Depression in her mother and sister; Diabetes in her brother, mother, and sister; Early Death in her father; Heart Disease in her father, mother, and sister; High Blood Pressure in her father, mother, and sister; High Cholesterol in her mother and sister; Kidney Disease in her mother and sister; Learning Disabilities in her mother; Lupus in her sister; Mental Illness in her brother and mother; Other in her father; Stroke in her mother and sister; Vision Loss in her mother.       Objective:   BP 89/65   Pulse 82   Temp 98.3 °F (36.8 °C) (Oral)   Resp 15   Ht 5' 1\" (1.549 m)   Wt 165 lb 8 oz (75.1 kg)   SpO2 99%   BMI 31.27 kg/m²       Intake/Output Summary (Last 24 hours) at 8/4/2019 1158  Last data filed at 8/4/2019 1023  Gross per 24 hour   Intake 1830 ml   Output 2060 ml   Net -230 ml       Medications:   Scheduled Meds:   metoprolol succinate  25 mg Oral Daily    furosemide  20 mg Oral Daily    clopidogrel  75 mg Oral Daily    polyethylene glycol  17 g Per NG tube Daily    pantoprazole  40 mg Intravenous BID    sodium chloride  1,000 mL Intravenous Once    predniSONE  10 mg Oral Daily    Followed by   Laqueta Show ON 8/6/2019] predniSONE  5 mg Oral Daily    insulin lispro  0-12 Units Subcutaneous TID     insulin lispro  0-6 Units Subcutaneous Nightly    ipratropium-albuterol  1 ampule Inhalation Q4H WA    sodium chloride flush  10 mL Intravenous 2 times per day    midodrine  10 mg Per NG tube TID     amiodarone  200 mg Oral Daily    amitriptyline  50 mg Oral Nightly    pregabalin  150 mg Oral BID    sertraline  50 mg Oral

## 2019-08-04 NOTE — PROGRESS NOTES
endotracheal and enteric tubes. Assessment:     Patient Active Problem List   Diagnosis    Lung [facility] ([facility]    Hidradenitis suppurativa    Controlled type 2 diabetes mellitus with diabetic polyneuropathy, without long-term current use of insulin ([facility]    CAD (coronary artery disease)    Anxiety disorder    Lupus (HCC)    Chronic obstructive pulmonary disease (HCC)    Septic shock ([facility]    CAD S/P percutaneous coronary angioplasty    Gout    History of lung cancer    Smoker    Chronic low back pain with bilateral sciatica    Obesity (BMI 30.0-34. 9)    Essential hypertension    Chronic respiratory failure (HCC)    Syncope and collapse    Hypomagnesemia syndrome    Moderate malnutrition (HCC)    Sepsis (HCC)    Generalized muscle weakness    Acute hyperkalemia    Acute respiratory failure with hypoxemia (HCC)    Severe malnutrition (HCC)    Iron deficiency anemia       Plan:   1. continue present treatment   2. Will need continued aggressive bronchopulmonary toilet  3. Continue nebulized bronchodilators and oral prednisone  4.  May need IV antibiotics, reculture sputum, repeat chest x-ray    Williams John MD  8/4/2019  10:46 AM

## 2019-08-05 ENCOUNTER — APPOINTMENT (OUTPATIENT)
Dept: GENERAL RADIOLOGY | Age: 69
DRG: 871 | End: 2019-08-05
Payer: MEDICARE

## 2019-08-05 LAB
ANION GAP SERPL CALCULATED.3IONS-SCNC: 6 MMOL/L (ref 4–16)
BUN BLDV-MCNC: 16 MG/DL (ref 6–23)
CALCIUM SERPL-MCNC: 9.8 MG/DL (ref 8.3–10.6)
CHLORIDE BLD-SCNC: 107 MMOL/L (ref 99–110)
CO2: 32 MMOL/L (ref 21–32)
CREAT SERPL-MCNC: 0.4 MG/DL (ref 0.6–1.1)
GFR AFRICAN AMERICAN: >60 ML/MIN/1.73M2
GFR NON-AFRICAN AMERICAN: >60 ML/MIN/1.73M2
GLUCOSE BLD-MCNC: 125 MG/DL (ref 70–99)
GLUCOSE BLD-MCNC: 161 MG/DL (ref 70–99)
GLUCOSE BLD-MCNC: 48 MG/DL (ref 70–99)
GLUCOSE BLD-MCNC: 54 MG/DL (ref 70–99)
GLUCOSE BLD-MCNC: 72 MG/DL (ref 70–99)
GLUCOSE BLD-MCNC: 73 MG/DL (ref 70–99)
GLUCOSE BLD-MCNC: 78 MG/DL (ref 70–99)
GLUCOSE BLD-MCNC: 80 MG/DL (ref 70–99)
HCT VFR BLD CALC: 33.8 % (ref 37–47)
HEMOGLOBIN: 10.4 GM/DL (ref 12.5–16)
MCH RBC QN AUTO: 27.9 PG (ref 27–31)
MCHC RBC AUTO-ENTMCNC: 30.8 % (ref 32–36)
MCV RBC AUTO: 90.6 FL (ref 78–100)
PDW BLD-RTO: 17.4 % (ref 11.7–14.9)
PLATELET # BLD: 239 K/CU MM (ref 140–440)
PMV BLD AUTO: 11 FL (ref 7.5–11.1)
POTASSIUM SERPL-SCNC: 4 MMOL/L (ref 3.5–5.1)
RBC # BLD: 3.73 M/CU MM (ref 4.2–5.4)
SODIUM BLD-SCNC: 145 MMOL/L (ref 135–145)
WBC # BLD: 15.6 K/CU MM (ref 4–10.5)

## 2019-08-05 PROCEDURE — 1200000000 HC SEMI PRIVATE

## 2019-08-05 PROCEDURE — 92526 ORAL FUNCTION THERAPY: CPT

## 2019-08-05 PROCEDURE — 6370000000 HC RX 637 (ALT 250 FOR IP): Performed by: NURSE PRACTITIONER

## 2019-08-05 PROCEDURE — C9113 INJ PANTOPRAZOLE SODIUM, VIA: HCPCS | Performed by: INTERNAL MEDICINE

## 2019-08-05 PROCEDURE — 6370000000 HC RX 637 (ALT 250 FOR IP): Performed by: INTERNAL MEDICINE

## 2019-08-05 PROCEDURE — 2580000003 HC RX 258: Performed by: INTERNAL MEDICINE

## 2019-08-05 PROCEDURE — 2700000000 HC OXYGEN THERAPY PER DAY

## 2019-08-05 PROCEDURE — 94640 AIRWAY INHALATION TREATMENT: CPT

## 2019-08-05 PROCEDURE — 6370000000 HC RX 637 (ALT 250 FOR IP): Performed by: FAMILY MEDICINE

## 2019-08-05 PROCEDURE — 82962 GLUCOSE BLOOD TEST: CPT

## 2019-08-05 PROCEDURE — 4A023N7 MEASUREMENT OF CARDIAC SAMPLING AND PRESSURE, LEFT HEART, PERCUTANEOUS APPROACH: ICD-10-PCS | Performed by: INTERNAL MEDICINE

## 2019-08-05 PROCEDURE — 6360000002 HC RX W HCPCS: Performed by: INTERNAL MEDICINE

## 2019-08-05 PROCEDURE — 6360000002 HC RX W HCPCS

## 2019-08-05 PROCEDURE — 36592 COLLECT BLOOD FROM PICC: CPT

## 2019-08-05 PROCEDURE — 93458 L HRT ARTERY/VENTRICLE ANGIO: CPT

## 2019-08-05 PROCEDURE — 2500000003 HC RX 250 WO HCPCS

## 2019-08-05 PROCEDURE — 94644 CONT INHLJ TX 1ST HOUR: CPT

## 2019-08-05 PROCEDURE — 2580000003 HC RX 258: Performed by: FAMILY MEDICINE

## 2019-08-05 PROCEDURE — 6360000004 HC RX CONTRAST MEDICATION

## 2019-08-05 PROCEDURE — 85027 COMPLETE CBC AUTOMATED: CPT

## 2019-08-05 PROCEDURE — C1769 GUIDE WIRE: HCPCS

## 2019-08-05 PROCEDURE — 80048 BASIC METABOLIC PNL TOTAL CA: CPT

## 2019-08-05 PROCEDURE — C1894 INTRO/SHEATH, NON-LASER: HCPCS

## 2019-08-05 PROCEDURE — B2111ZZ FLUOROSCOPY OF MULTIPLE CORONARY ARTERIES USING LOW OSMOLAR CONTRAST: ICD-10-PCS | Performed by: INTERNAL MEDICINE

## 2019-08-05 PROCEDURE — 2709999900 HC NON-CHARGEABLE SUPPLY

## 2019-08-05 PROCEDURE — 94761 N-INVAS EAR/PLS OXIMETRY MLT: CPT

## 2019-08-05 PROCEDURE — C1887 CATHETER, GUIDING: HCPCS

## 2019-08-05 PROCEDURE — 2580000003 HC RX 258: Performed by: NURSE PRACTITIONER

## 2019-08-05 PROCEDURE — 71045 X-RAY EXAM CHEST 1 VIEW: CPT

## 2019-08-05 PROCEDURE — 99232 SBSQ HOSP IP/OBS MODERATE 35: CPT | Performed by: INTERNAL MEDICINE

## 2019-08-05 RX ORDER — SODIUM CHLORIDE 9 MG/ML
INJECTION, SOLUTION INTRAVENOUS CONTINUOUS
Status: DISCONTINUED | OUTPATIENT
Start: 2019-08-05 | End: 2019-08-06

## 2019-08-05 RX ORDER — SODIUM CHLORIDE 0.9 % (FLUSH) 0.9 %
10 SYRINGE (ML) INJECTION EVERY 12 HOURS SCHEDULED
Status: DISCONTINUED | OUTPATIENT
Start: 2019-08-05 | End: 2019-08-07 | Stop reason: HOSPADM

## 2019-08-05 RX ORDER — SODIUM CHLORIDE 0.9 % (FLUSH) 0.9 %
10 SYRINGE (ML) INJECTION PRN
Status: DISCONTINUED | OUTPATIENT
Start: 2019-08-05 | End: 2019-08-07 | Stop reason: HOSPADM

## 2019-08-05 RX ORDER — SODIUM CHLORIDE 9 MG/ML
INJECTION, SOLUTION INTRAVENOUS CONTINUOUS
Status: DISCONTINUED | OUTPATIENT
Start: 2019-08-05 | End: 2019-08-05

## 2019-08-05 RX ORDER — ATROPINE SULFATE 0.4 MG/ML
0.5 AMPUL (ML) INJECTION
Status: ACTIVE | OUTPATIENT
Start: 2019-08-05 | End: 2019-08-05

## 2019-08-05 RX ORDER — ACETAMINOPHEN 325 MG/1
650 TABLET ORAL EVERY 4 HOURS PRN
Status: DISCONTINUED | OUTPATIENT
Start: 2019-08-05 | End: 2019-08-07 | Stop reason: HOSPADM

## 2019-08-05 RX ADMIN — IPRATROPIUM BROMIDE AND ALBUTEROL SULFATE 1 AMPULE: .5; 3 SOLUTION RESPIRATORY (INHALATION) at 11:28

## 2019-08-05 RX ADMIN — AMITRIPTYLINE HYDROCHLORIDE 50 MG: 50 TABLET, FILM COATED ORAL at 20:27

## 2019-08-05 RX ADMIN — SODIUM CHLORIDE: 9 INJECTION, SOLUTION INTRAVENOUS at 09:25

## 2019-08-05 RX ADMIN — SODIUM CHLORIDE, PRESERVATIVE FREE 10 ML: 5 INJECTION INTRAVENOUS at 09:13

## 2019-08-05 RX ADMIN — PANTOPRAZOLE SODIUM 40 MG: 40 INJECTION, POWDER, FOR SOLUTION INTRAVENOUS at 09:21

## 2019-08-05 RX ADMIN — LEVOFLOXACIN 500 MG: 5 INJECTION, SOLUTION INTRAVENOUS at 14:39

## 2019-08-05 RX ADMIN — MIDODRINE HYDROCHLORIDE 10 MG: 5 TABLET ORAL at 16:16

## 2019-08-05 RX ADMIN — DEXTROSE MONOHYDRATE 12.5 G: 500 INJECTION PARENTERAL at 11:49

## 2019-08-05 RX ADMIN — SIMVASTATIN 20 MG: 20 TABLET, FILM COATED ORAL at 20:28

## 2019-08-05 RX ADMIN — PANTOPRAZOLE SODIUM 40 MG: 40 INJECTION, POWDER, FOR SOLUTION INTRAVENOUS at 20:27

## 2019-08-05 RX ADMIN — ACETAMINOPHEN 650 MG: 325 TABLET ORAL at 20:27

## 2019-08-05 RX ADMIN — SERTRALINE HYDROCHLORIDE 50 MG: 50 TABLET ORAL at 20:27

## 2019-08-05 RX ADMIN — MIDODRINE HYDROCHLORIDE 10 MG: 5 TABLET ORAL at 09:12

## 2019-08-05 RX ADMIN — OXYCODONE HYDROCHLORIDE AND ACETAMINOPHEN 1 TABLET: 7.5; 325 TABLET ORAL at 18:05

## 2019-08-05 RX ADMIN — DOCUSATE SODIUM 100 MG: 100 CAPSULE, LIQUID FILLED ORAL at 20:28

## 2019-08-05 RX ADMIN — AMIODARONE HYDROCHLORIDE 200 MG: 200 TABLET ORAL at 09:12

## 2019-08-05 RX ADMIN — PREGABALIN 150 MG: 75 CAPSULE ORAL at 20:28

## 2019-08-05 RX ADMIN — PREGABALIN 150 MG: 75 CAPSULE ORAL at 09:13

## 2019-08-05 RX ADMIN — IPRATROPIUM BROMIDE AND ALBUTEROL SULFATE 1 AMPULE: .5; 3 SOLUTION RESPIRATORY (INHALATION) at 20:04

## 2019-08-05 RX ADMIN — SODIUM CHLORIDE: 9 INJECTION, SOLUTION INTRAVENOUS at 23:25

## 2019-08-05 RX ADMIN — IPRATROPIUM BROMIDE AND ALBUTEROL SULFATE 1 AMPULE: .5; 3 SOLUTION RESPIRATORY (INHALATION) at 07:05

## 2019-08-05 RX ADMIN — PREDNISONE 10 MG: 10 TABLET ORAL at 09:12

## 2019-08-05 RX ADMIN — IPRATROPIUM BROMIDE AND ALBUTEROL SULFATE 1 AMPULE: .5; 3 SOLUTION RESPIRATORY (INHALATION) at 15:27

## 2019-08-05 ASSESSMENT — PAIN DESCRIPTION - PROGRESSION

## 2019-08-05 ASSESSMENT — PAIN SCALES - WONG BAKER
WONGBAKER_NUMERICALRESPONSE: 2

## 2019-08-05 ASSESSMENT — PAIN SCALES - GENERAL
PAINLEVEL_OUTOF10: 9
PAINLEVEL_OUTOF10: 3

## 2019-08-05 NOTE — PLAN OF CARE
Problem: Falls - Risk of:  Goal: Will remain free from falls  Description  Will remain free from falls  8/5/2019 1105 by Belle Bray RN  Outcome: Ongoing  8/5/2019 0010 by Long Marks RN  Outcome: Ongoing  Goal: Absence of physical injury  Description  Absence of physical injury  8/5/2019 1105 by Belle Bray RN  Outcome: Ongoing  8/5/2019 0010 by Long Marks RN  Outcome: Ongoing     Problem: OXYGENATION/RESPIRATORY FUNCTION  Goal: Patient will maintain patent airway  8/5/2019 1105 by Belle Bray RN  Outcome: Ongoing  8/5/2019 0010 by Long Marks RN  Outcome: Ongoing  Goal: Patient will achieve/maintain normal respiratory rate/effort  Description  Respiratory rate and effort will be within normal limits for the patient  8/5/2019 1105 by Belle Bray RN  Outcome: Ongoing  8/5/2019 0010 by Long Marks RN  Outcome: Ongoing     Problem: SKIN INTEGRITY  Goal: Skin integrity is maintained or improved  8/5/2019 1105 by Belle Bray RN  Outcome: Ongoing  8/5/2019 0010 by Long Marks RN  Outcome: Ongoing     Problem: NUTRITION  Goal: Nutritional status is improving  8/5/2019 1105 by Belle Bray RN  Outcome: Ongoing  8/5/2019 0010 by Long Marks RN  Outcome: Ongoing     Problem: Risk for Impaired Skin Integrity  Goal: Tissue integrity - skin and mucous membranes  Description  Structural intactness and normal physiological function of skin and  mucous membranes.   8/5/2019 1105 by Belle Bray RN  Outcome: Ongoing  8/5/2019 0010 by Long Marks RN  Outcome: Ongoing     Problem: Nutrition  Goal: Optimal nutrition therapy  8/5/2019 1105 by Belle Bray RN  Outcome: Ongoing  8/5/2019 0010 by Long Marks RN  Outcome: Ongoing     Problem: Musculor/Skeletal Functional Status  Goal: Highest potential functional level  8/5/2019 1105 by Belle Bray RN  Outcome: Ongoing  8/5/2019 0010 by Long Marks RN  Outcome: Ongoing  Goal: Absence of falls  8/5/2019 1105 by Reilly Wheat

## 2019-08-05 NOTE — PROGRESS NOTES
oral manipulation for possible diet advancement. Partially met, diet advanced, continue  Goal 3: Pt/caregivers will indicate understanding of all recommendations.  Meeting, continue        EDUCATION: recommendations and plan d/w pt, RN    PAIN RATING (0-10 Scale): 0  Time in/Time out: SLP Individual Minutes  Time In: 3144  Time Out: 6015  Minutes: 25    Visit number: 2      Vanesa Montes De Oca MA CCC-SLP  8/5/2019  4:40 PM

## 2019-08-05 NOTE — PROGRESS NOTES
Pulmonary and Critical Care  Progress Note      VITALS:  /72   Pulse 92   Temp 98.2 °F (36.8 °C) (Oral)   Resp 13   Ht 5' 1\" (1.549 m)   Wt 165 lb 8 oz (75.1 kg)   SpO2 96%   BMI 31.27 kg/m²     Subjective:   CHIEF COMPLAINT :SOB     HPI:                The patient is a 77 y.o. female with significant past medical history of HTN, HLD, COPD  presents with complaints of SOB. She was found to have MRSA pneumonia. She is being treated with Abx. She had hypoxemia. She had decreased menatl status , unable to clear to secretions. She had a cath done which showed patent coronaries and stent. At this time she is lying in th bed. She is not in acute resp distress. Objective:   PHYSICAL EXAM:    LUNGS:Occasional basal crackles  Abd-soft, BS+, NT  Ext - 1 + pedal edema  CVS-s1s2, no murmurs      DATA:    CBC:  Recent Labs     08/03/19  0605  08/04/19  0210 08/04/19  0600 08/05/19  0240   WBC 13.5*  --   --  14.0* 15.6*   RBC 3.75*  --   --  3.73* 3.73*   HGB 10.7*   < > 10.2* 10.6* 10.4*   HCT 33.8*   < > 32.6* 33.5* 33.8*     --   --  234 239   MCV 90.1  --   --  89.8 90.6   MCH 28.5  --   --  28.4 27.9   MCHC 31.7*  --   --  31.6* 30.8*   RDW 17.3*  --   --  17.4* 17.4*    < > = values in this interval not displayed. BMP:  Recent Labs     08/03/19  1250 08/04/19  0600 08/05/19  0240    143 145   K 4.5 3.8 4.0    106 107   CO2 31 32 32   BUN 12 13 16   CREATININE 0.4* 0.4* 0.4*   CALCIUM 10.1 9.4 9.8   GLUCOSE 150* 93 80      ABG:  No results for input(s): PH, PO2ART, POC8JKE, HCO3, BEART, O2SAT in the last 72 hours. BNP  Lab Results   Component Value Date    BNP 27 02/08/2014      D-Dimer:  Lab Results   Component Value Date    DDIMER 507 (H) 02/08/2014      1.  Radiology: CXR reviewed      Assessment/Plan     Patient Active Problem List    Diagnosis Date Noted    Iron deficiency anemia 07/31/2019    Severe malnutrition ([facility] [address] [address] 07/30/2019    Acute respiratory failure with hypoxemia

## 2019-08-05 NOTE — PROGRESS NOTES
Daily Progress Note       I have seen ,spoken to  and examined this patient personally, independently of the Physician assistant . I have reviewed the hospital care given to date and reviewed all pertinent labs and imaging. The plan was developed mutually at the time of the visit with the patient,  PA  and myself. I have spoken with patient, nursing staff and provided written and verbal instructions . The above note has been reviewed and I agree with the assessment, diagnosis, and treatment plan with changes made by me as follows     CARDIOLOGY ATTENDING ADDENDUM    HPI:  I have reviewed the above HPI  And agree with above   Cedrick Day is a 77 y. o.year old who and presents with had concerns including Respiratory Distress. Chief Complaint   Patient presents with    Respiratory Distress     Interval history:  Patient is awake alert  Cath showed mild CAD  Non ischemic CM-stent RCA patent  Keep  On plavix   Apical ballooning --no ICD at present  Optimize medical treatment  BP is low  Hx of  NSVT stable     Physical Exam:  General:  Awake alert   Head:normal  Eye:normal  Neck:  No JVD   Chest:  Clear to auscultation, respiration easy  Cardiovascular:  sinus  Abdomen:   nontender  Extremities:  2+ edema    Pulses; palpable  Neuro: grossly normal      MEDICAL DECISION MAKING;    I agree with the above plan, which was planned by myself and discussed with PA. Fly Mosley Electronically signed by Nicki Conway MD [facility] on 8/5/2019 at 11:54 AM     Pt. Awake, alert and feeling ok  HR stable, Sinus with frequent PVC's-mutlifocal, BP low but stable  No CP, SOB stable today, denies orthopnea     Sepsis d/t PNA/ UTI    On ABx    Cont. midodrine    Per primary     Frequent PVC's    On amiodarone and toprol    Watch for now     Anemia    S/p transfusion    Hgb 10.4    GI following     Acute on Chronic HFrEF    Good UOP    On Lasix, and BB    EF 30%-apical ballooning noted    No ACE d/t hypotension    LHC planned for today     CAD s/p PCI

## 2019-08-06 ENCOUNTER — APPOINTMENT (OUTPATIENT)
Dept: CT IMAGING | Age: 69
DRG: 871 | End: 2019-08-06
Payer: MEDICARE

## 2019-08-06 LAB
ANION GAP SERPL CALCULATED.3IONS-SCNC: 6 MMOL/L (ref 4–16)
BUN BLDV-MCNC: 15 MG/DL (ref 6–23)
CALCIUM SERPL-MCNC: 9.7 MG/DL (ref 8.3–10.6)
CHLORIDE BLD-SCNC: 107 MMOL/L (ref 99–110)
CO2: 30 MMOL/L (ref 21–32)
CREAT SERPL-MCNC: 0.5 MG/DL (ref 0.6–1.1)
GFR AFRICAN AMERICAN: >60 ML/MIN/1.73M2
GFR NON-AFRICAN AMERICAN: >60 ML/MIN/1.73M2
GLUCOSE BLD-MCNC: 70 MG/DL (ref 70–99)
GLUCOSE BLD-MCNC: 78 MG/DL (ref 70–99)
HCT VFR BLD CALC: 32.3 % (ref 37–47)
HEMOGLOBIN: 10.1 GM/DL (ref 12.5–16)
MCH RBC QN AUTO: 28.3 PG (ref 27–31)
MCHC RBC AUTO-ENTMCNC: 31.3 % (ref 32–36)
MCV RBC AUTO: 90.5 FL (ref 78–100)
PDW BLD-RTO: 17.4 % (ref 11.7–14.9)
PLATELET # BLD: 245 K/CU MM (ref 140–440)
PMV BLD AUTO: 11.4 FL (ref 7.5–11.1)
POTASSIUM SERPL-SCNC: 3.7 MMOL/L (ref 3.5–5.1)
RBC # BLD: 3.57 M/CU MM (ref 4.2–5.4)
SODIUM BLD-SCNC: 143 MMOL/L (ref 135–145)
WBC # BLD: 11.3 K/CU MM (ref 4–10.5)

## 2019-08-06 PROCEDURE — 99211 OFF/OP EST MAY X REQ PHY/QHP: CPT

## 2019-08-06 PROCEDURE — C9113 INJ PANTOPRAZOLE SODIUM, VIA: HCPCS | Performed by: INTERNAL MEDICINE

## 2019-08-06 PROCEDURE — 97530 THERAPEUTIC ACTIVITIES: CPT

## 2019-08-06 PROCEDURE — 6370000000 HC RX 637 (ALT 250 FOR IP): Performed by: INTERNAL MEDICINE

## 2019-08-06 PROCEDURE — 82962 GLUCOSE BLOOD TEST: CPT

## 2019-08-06 PROCEDURE — 36592 COLLECT BLOOD FROM PICC: CPT

## 2019-08-06 PROCEDURE — 94761 N-INVAS EAR/PLS OXIMETRY MLT: CPT

## 2019-08-06 PROCEDURE — 99231 SBSQ HOSP IP/OBS SF/LOW 25: CPT | Performed by: INTERNAL MEDICINE

## 2019-08-06 PROCEDURE — 1200000000 HC SEMI PRIVATE

## 2019-08-06 PROCEDURE — 6360000002 HC RX W HCPCS: Performed by: INTERNAL MEDICINE

## 2019-08-06 PROCEDURE — 80048 BASIC METABOLIC PNL TOTAL CA: CPT

## 2019-08-06 PROCEDURE — 2700000000 HC OXYGEN THERAPY PER DAY

## 2019-08-06 PROCEDURE — 94640 AIRWAY INHALATION TREATMENT: CPT

## 2019-08-06 PROCEDURE — 97535 SELF CARE MNGMENT TRAINING: CPT

## 2019-08-06 PROCEDURE — 97167 OT EVAL HIGH COMPLEX 60 MIN: CPT

## 2019-08-06 PROCEDURE — 97112 NEUROMUSCULAR REEDUCATION: CPT

## 2019-08-06 PROCEDURE — 97164 PT RE-EVAL EST PLAN CARE: CPT

## 2019-08-06 PROCEDURE — 6370000000 HC RX 637 (ALT 250 FOR IP): Performed by: FAMILY MEDICINE

## 2019-08-06 PROCEDURE — 70450 CT HEAD/BRAIN W/O DYE: CPT

## 2019-08-06 PROCEDURE — 85027 COMPLETE CBC AUTOMATED: CPT

## 2019-08-06 PROCEDURE — 2580000003 HC RX 258: Performed by: INTERNAL MEDICINE

## 2019-08-06 PROCEDURE — 99231 SBSQ HOSP IP/OBS SF/LOW 25: CPT | Performed by: OBSTETRICS & GYNECOLOGY

## 2019-08-06 RX ORDER — OXYCODONE AND ACETAMINOPHEN 7.5; 325 MG/1; MG/1
1 TABLET ORAL EVERY 6 HOURS PRN
Qty: 10 TABLET | Refills: 0 | Status: SHIPPED | OUTPATIENT
Start: 2019-08-06 | End: 2019-08-09

## 2019-08-06 RX ADMIN — POLYETHYLENE GLYCOL (3350) 17 G: 17 POWDER, FOR SOLUTION ORAL at 08:26

## 2019-08-06 RX ADMIN — Medication 10 ML: at 20:19

## 2019-08-06 RX ADMIN — CLOPIDOGREL BISULFATE 75 MG: 75 TABLET ORAL at 08:26

## 2019-08-06 RX ADMIN — AMITRIPTYLINE HYDROCHLORIDE 50 MG: 50 TABLET, FILM COATED ORAL at 20:18

## 2019-08-06 RX ADMIN — DOCUSATE SODIUM 100 MG: 100 CAPSULE, LIQUID FILLED ORAL at 20:18

## 2019-08-06 RX ADMIN — PREGABALIN 150 MG: 75 CAPSULE ORAL at 20:18

## 2019-08-06 RX ADMIN — PANTOPRAZOLE SODIUM 40 MG: 40 INJECTION, POWDER, FOR SOLUTION INTRAVENOUS at 08:26

## 2019-08-06 RX ADMIN — MIDODRINE HYDROCHLORIDE 10 MG: 5 TABLET ORAL at 17:25

## 2019-08-06 RX ADMIN — IPRATROPIUM BROMIDE AND ALBUTEROL SULFATE 1 AMPULE: .5; 3 SOLUTION RESPIRATORY (INHALATION) at 15:29

## 2019-08-06 RX ADMIN — OXYCODONE HYDROCHLORIDE AND ACETAMINOPHEN 1 TABLET: 7.5; 325 TABLET ORAL at 20:18

## 2019-08-06 RX ADMIN — IPRATROPIUM BROMIDE AND ALBUTEROL SULFATE 1 AMPULE: .5; 3 SOLUTION RESPIRATORY (INHALATION) at 20:11

## 2019-08-06 RX ADMIN — PANTOPRAZOLE SODIUM 40 MG: 40 INJECTION, POWDER, FOR SOLUTION INTRAVENOUS at 20:19

## 2019-08-06 RX ADMIN — ENOXAPARIN SODIUM 30 MG: 30 INJECTION SUBCUTANEOUS at 08:25

## 2019-08-06 RX ADMIN — SERTRALINE HYDROCHLORIDE 50 MG: 50 TABLET ORAL at 20:18

## 2019-08-06 RX ADMIN — DOCUSATE SODIUM 100 MG: 100 CAPSULE, LIQUID FILLED ORAL at 08:26

## 2019-08-06 RX ADMIN — MIDODRINE HYDROCHLORIDE 10 MG: 5 TABLET ORAL at 11:49

## 2019-08-06 RX ADMIN — MIDODRINE HYDROCHLORIDE 10 MG: 5 TABLET ORAL at 08:26

## 2019-08-06 RX ADMIN — AMIODARONE HYDROCHLORIDE 200 MG: 200 TABLET ORAL at 08:26

## 2019-08-06 RX ADMIN — IPRATROPIUM BROMIDE AND ALBUTEROL SULFATE 1 AMPULE: .5; 3 SOLUTION RESPIRATORY (INHALATION) at 07:28

## 2019-08-06 RX ADMIN — ONDANSETRON 4 MG: 2 INJECTION INTRAMUSCULAR; INTRAVENOUS at 20:19

## 2019-08-06 RX ADMIN — PREGABALIN 150 MG: 75 CAPSULE ORAL at 08:25

## 2019-08-06 RX ADMIN — PREDNISONE 5 MG: 5 TABLET ORAL at 08:26

## 2019-08-06 RX ADMIN — SIMVASTATIN 20 MG: 20 TABLET, FILM COATED ORAL at 20:18

## 2019-08-06 RX ADMIN — DICYCLOMINE HYDROCHLORIDE 20 MG: 20 TABLET ORAL at 22:48

## 2019-08-06 RX ADMIN — METOPROLOL SUCCINATE 25 MG: 25 TABLET, EXTENDED RELEASE ORAL at 08:26

## 2019-08-06 RX ADMIN — OXYCODONE HYDROCHLORIDE AND ACETAMINOPHEN 1 TABLET: 7.5; 325 TABLET ORAL at 13:35

## 2019-08-06 RX ADMIN — LEVOFLOXACIN 500 MG: 5 INJECTION, SOLUTION INTRAVENOUS at 15:20

## 2019-08-06 ASSESSMENT — PAIN SCALES - GENERAL
PAINLEVEL_OUTOF10: 10
PAINLEVEL_OUTOF10: 9
PAINLEVEL_OUTOF10: 8
PAINLEVEL_OUTOF10: 1

## 2019-08-06 ASSESSMENT — PAIN SCALES - WONG BAKER
WONGBAKER_NUMERICALRESPONSE: 2

## 2019-08-06 ASSESSMENT — PAIN DESCRIPTION - PROGRESSION
CLINICAL_PROGRESSION: NOT CHANGED

## 2019-08-06 ASSESSMENT — PAIN DESCRIPTION - PAIN TYPE: TYPE: ACUTE PAIN

## 2019-08-06 ASSESSMENT — PAIN DESCRIPTION - ORIENTATION: ORIENTATION: LEFT;RIGHT

## 2019-08-06 ASSESSMENT — PAIN DESCRIPTION - DESCRIPTORS: DESCRIPTORS: DISCOMFORT

## 2019-08-06 ASSESSMENT — PAIN DESCRIPTION - LOCATION: LOCATION: BUTTOCKS

## 2019-08-06 NOTE — CONSULTS
OT). Pt was d/c'd to SNF following last hosp stay for rehab ([facility]) and was there prior to this admission. Examination of body systems (includes body structures/functions, activity/participation limitations):  · Observation:  Supine in bed upon arrival   · Cardiopulmonary: On 3L of O2  · Cognition: min impaired, see OT/SLP note for further evaluation. Musculoskeletal  · ROM R/L:  WFL. · Strength R/L:  2-/5, weakness in function and endurance. · Neuro:  Weakness in UE and LE      Mobility:  · Supine to sit: Max A x2  · Transfers: Max A x2/dependent  · Sitting balance:  Mod-SBA. · Standing balance:  NA.    · Gait: unable    Treatment:  Patient performed supine to sit transfer max A at LE, hips, and trunk, cues for sequencing, use of bedrail, HOB elevated 45 degrees. Patient performed sitting balance at EOB x20 min, cues for midline oreintation, varied assist from mod to SBA breifly. Required cues and assist for anterior weight shift and L lean d/t tendency for R lean. Patient performed STS from bed with max A x2 with stand pivot transfer to chair with max A x2. Patient with BM incontinence during transfer, x3 partial stands from chair with max to dependent, attempted to have patient pull with UE on bedrail to stand, unable to clear chair. Performed sitting balance in chair with weight shift anterior, right and left for hygiene and goyo pad placement. Repositioned in chair x2 with pillow on R side to encourage midline orientation. Safety: patient left in chair with chair alarm, call light within reach, RN notified, gait belt used. Assessment:  Patient s/p heart cath on 8/5, has been intubated/extubated x2 during hospital stay, most recently extubated on 8/1. Patient demonstrates significant weakness and debility and would benefit from skilled PT services and d/c to ARU.      Complexity: Re-eval after extubation  Prognosis: Good, no significant barriers to participation at this

## 2019-08-06 NOTE — CARE COORDINATION
Received referral for ARU. Will review patients clinicals and PT/OT notes. CT of head pending. Will continue to follow for results.   Thank you for the referral.

## 2019-08-06 NOTE — CONSULTS
[address] [address] Continence Nurse  Consult Note       Nico Medrano  AGE: 77 y.o. GENDER: female  : 1950  TODAY'S DATE:  2019    Subjective:     Reason for Evaluation and Assessment: follow up      Nico Medrano is a 77 y.o. female referred by:   [x] Physician  [] Nursing  [] Other:     Wound Identification:  Wound Type: pressure  Contributing Factors: chronic pressure and decreased mobility        PAST MEDICAL HISTORY        Diagnosis Date    Anxiety     CAD (coronary artery disease)     Dr James Lindsey CAD S/P percutaneous coronary angioplasty 2012    stent RCA; Ahmed    Chronic back pain     COPD (chronic obstructive pulmonary disease) ([facility] [facility]     Jacques Wawarsing Gout     History of cerebral infarction     right hemiparesis with full recovery    Hyperlipidemia     Hypertension     Lumbar spinal stenosis     [facility] 2014    Dr Tere Humphries; left upper lobectomy; non small cell CA    Obesity (BMI 30.0-34. 9)     Restless leg syndrome     Rheumatoid arthritis (HCC)     Smoker     Systemic lupus erythematosus (HCC)     Dr Munir Giordano, visiting physician    Type II diabetes mellitus ([facility] 1969    Ventral hernia 2018    right ventral hernia seen on CT abd 2018       PAST SURGICAL HISTORY    Past Surgical History:   Procedure Laterality Date    ABDOMINAL EXPLORATION SURGERY  Last Done     2-3 times; repeated bowel obstructions;    ANKLE FRACTURE SURGERY      APPENDECTOMY      CATARACT REMOVAL WITH IMPLANT Bilateral     CHOLECYSTECTOMY      FEMUR FRACTURE SURGERY Right     Right Thigh, Plates, Rods, Screws    HUMERUS FRACTURE SURGERY Left     Broken Left Arm, Plates And Screws    LUNG REMOVAL, PARTIAL Left 2014    Lung CA, left; Robotic Assisted Left Thoracotomy, Left Lung Cancer; ROWDY lobectomy;  Nonsmall Cell CA    OTHER SURGICAL HISTORY  2/20/15    excision of hydradenitis suppurative of cesilia anal area    REVISION TOTAL KNEE Distance Tunneling (cm) 0 cm 7/30/2019  2:59 PM   Tunneling Position ___ O'Clock 0 7/30/2019  2:59 PM   Undermining Starts ___ O'Clock 0 7/30/2019  2:59 PM   Undermining Ends___ O'Clock 0 7/30/2019  2:59 PM   Undermining Maxium Distance (cm) 0 7/30/2019  2:59 PM   Wound Assessment Red;Pink 8/4/2019  8:20 AM   Drainage Amount Scant 8/4/2019  8:20 AM   Drainage Description Serosanguinous 8/4/2019  8:20 AM   Odor None 8/4/2019  8:20 AM   Margins Defined edges 7/30/2019  2:59 PM   Jana-wound Assessment Pink 8/3/2019  2:30 PM   Non-staged Wound Description Full thickness 7/30/2019  2:59 PM   [place]%Wound Bed 50 7/30/2019  2:59 PM   Red%Wound Bed 50 7/30/2019  2:59 PM   Yellow%Wound Bed 0 7/30/2019  2:59 PM   Black%Wound Bed 0 7/30/2019  2:59 PM   Purple%Wound Bed 0 7/30/2019  2:59 PM   Other%Wound Bed 0 7/30/2019  2:59 PM   Culture Taken No 7/20/2019  8:12 AM   Number of days: 17       Wound 07/20/19 Leg Dorsal;Mid;Right small cluster open sores (Active)   Dressing Status Clean;Dry; Intact 8/5/2019  8:31 PM   Dressing Changed Changed/New 7/31/2019  3:27 AM   Dressing/Treatment Other (comment) 8/4/2019  8:20 AM   Wound Assessment Red 8/4/2019  8:20 AM   Drainage Amount None 8/4/2019  8:20 AM   Odor None 8/4/2019  8:20 AM   Number of days: 16       Response to treatment:  Well tolerated by patient. Pain Assessment:  Severity:    Quality of pain:   Wound Pain Timing/Severity:   Premedicated:     Plan:     Plan of Care: Wound 07/20/19 Coccyx Mid coccyx-Dressing/Treatment: (mepilex)  Wound 07/20/19 Leg Dorsal;Mid;Right small cluster open sores-Dressing/Treatment: (mepilex)    Heels and feet intact, floated on pillow. Buttocks red, mepilex border in place. Continue mepilex border or barrier cream if patient is incontinent. Positioned on right side.     Specialty Bed Required : yes  [] Low Air Loss   [x] Pressure Redistribution  [] Fluid Immersion  [] Bariatric  [] Total Pressure Relief  [] Other:     Discharge

## 2019-08-07 VITALS
OXYGEN SATURATION: 97 % | HEART RATE: 83 BPM | TEMPERATURE: 97.7 F | WEIGHT: 164.4 LBS | DIASTOLIC BLOOD PRESSURE: 67 MMHG | BODY MASS INDEX: 31.04 KG/M2 | SYSTOLIC BLOOD PRESSURE: 98 MMHG | HEIGHT: 61 IN | RESPIRATION RATE: 24 BRPM

## 2019-08-07 LAB
ANION GAP SERPL CALCULATED.3IONS-SCNC: 5 MMOL/L (ref 4–16)
BUN BLDV-MCNC: 12 MG/DL (ref 6–23)
CALCIUM SERPL-MCNC: 9.6 MG/DL (ref 8.3–10.6)
CHLORIDE BLD-SCNC: 104 MMOL/L (ref 99–110)
CO2: 30 MMOL/L (ref 21–32)
CREAT SERPL-MCNC: 0.5 MG/DL (ref 0.6–1.1)
GFR AFRICAN AMERICAN: >60 ML/MIN/1.73M2
GFR NON-AFRICAN AMERICAN: >60 ML/MIN/1.73M2
GLUCOSE BLD-MCNC: 79 MG/DL (ref 70–99)
GLUCOSE BLD-MCNC: 99 MG/DL (ref 70–99)
HCT VFR BLD CALC: 33.6 % (ref 37–47)
HEMOGLOBIN: 10.2 GM/DL (ref 12.5–16)
MCH RBC QN AUTO: 27.5 PG (ref 27–31)
MCHC RBC AUTO-ENTMCNC: 30.4 % (ref 32–36)
MCV RBC AUTO: 90.6 FL (ref 78–100)
PDW BLD-RTO: 17.6 % (ref 11.7–14.9)
PLATELET # BLD: 236 K/CU MM (ref 140–440)
PMV BLD AUTO: 11 FL (ref 7.5–11.1)
POTASSIUM SERPL-SCNC: 3.7 MMOL/L (ref 3.5–5.1)
RBC # BLD: 3.71 M/CU MM (ref 4.2–5.4)
SODIUM BLD-SCNC: 139 MMOL/L (ref 135–145)
WBC # BLD: 11 K/CU MM (ref 4–10.5)

## 2019-08-07 PROCEDURE — 80048 BASIC METABOLIC PNL TOTAL CA: CPT

## 2019-08-07 PROCEDURE — C9113 INJ PANTOPRAZOLE SODIUM, VIA: HCPCS | Performed by: INTERNAL MEDICINE

## 2019-08-07 PROCEDURE — 6360000002 HC RX W HCPCS: Performed by: INTERNAL MEDICINE

## 2019-08-07 PROCEDURE — 6370000000 HC RX 637 (ALT 250 FOR IP): Performed by: INTERNAL MEDICINE

## 2019-08-07 PROCEDURE — 82962 GLUCOSE BLOOD TEST: CPT

## 2019-08-07 PROCEDURE — 92526 ORAL FUNCTION THERAPY: CPT

## 2019-08-07 PROCEDURE — 2580000003 HC RX 258: Performed by: INTERNAL MEDICINE

## 2019-08-07 PROCEDURE — 94761 N-INVAS EAR/PLS OXIMETRY MLT: CPT

## 2019-08-07 PROCEDURE — 94640 AIRWAY INHALATION TREATMENT: CPT

## 2019-08-07 PROCEDURE — 6370000000 HC RX 637 (ALT 250 FOR IP): Performed by: FAMILY MEDICINE

## 2019-08-07 PROCEDURE — 2700000000 HC OXYGEN THERAPY PER DAY

## 2019-08-07 PROCEDURE — 99231 SBSQ HOSP IP/OBS SF/LOW 25: CPT | Performed by: INTERNAL MEDICINE

## 2019-08-07 PROCEDURE — 85027 COMPLETE CBC AUTOMATED: CPT

## 2019-08-07 PROCEDURE — 36592 COLLECT BLOOD FROM PICC: CPT

## 2019-08-07 RX ORDER — PREGABALIN 150 MG/1
150 CAPSULE ORAL 2 TIMES DAILY
Qty: 60 CAPSULE | Refills: 0 | Status: ON HOLD | OUTPATIENT
Start: 2019-08-07 | End: 2019-10-23 | Stop reason: HOSPADM

## 2019-08-07 RX ORDER — DICYCLOMINE HCL 20 MG
20 TABLET ORAL 4 TIMES DAILY PRN
Qty: 120 TABLET | Refills: 3 | DISCHARGE
Start: 2019-08-07

## 2019-08-07 RX ORDER — LEVOFLOXACIN 500 MG/1
500 TABLET, FILM COATED ORAL DAILY
Qty: 3 TABLET | Refills: 0 | DISCHARGE
Start: 2019-08-07 | End: 2019-08-10

## 2019-08-07 RX ORDER — MIDODRINE HYDROCHLORIDE 10 MG/1
10 TABLET ORAL
Qty: 90 TABLET | Refills: 3 | DISCHARGE
Start: 2019-08-07

## 2019-08-07 RX ORDER — MIDODRINE HYDROCHLORIDE 10 MG/1
10 TABLET ORAL
Qty: 90 TABLET | Refills: 3 | DISCHARGE
Start: 2019-08-07 | End: 2019-08-07

## 2019-08-07 RX ORDER — PREDNISONE 1 MG/1
5 TABLET ORAL DAILY
Qty: 3 TABLET | Refills: 0 | DISCHARGE
Start: 2019-08-08 | End: 2019-08-11

## 2019-08-07 RX ORDER — METOPROLOL SUCCINATE 25 MG/1
25 TABLET, EXTENDED RELEASE ORAL DAILY
Qty: 30 TABLET | Refills: 3 | Status: ON HOLD | DISCHARGE
Start: 2019-08-08 | End: 2019-10-23 | Stop reason: HOSPADM

## 2019-08-07 RX ORDER — PSEUDOEPHEDRINE HCL 30 MG
100 TABLET ORAL 2 TIMES DAILY
DISCHARGE
Start: 2019-08-07

## 2019-08-07 RX ADMIN — PREDNISONE 5 MG: 5 TABLET ORAL at 09:04

## 2019-08-07 RX ADMIN — PANTOPRAZOLE SODIUM 40 MG: 40 INJECTION, POWDER, FOR SOLUTION INTRAVENOUS at 09:04

## 2019-08-07 RX ADMIN — METOPROLOL SUCCINATE 25 MG: 25 TABLET, EXTENDED RELEASE ORAL at 09:04

## 2019-08-07 RX ADMIN — MIDODRINE HYDROCHLORIDE 10 MG: 5 TABLET ORAL at 12:07

## 2019-08-07 RX ADMIN — PREGABALIN 150 MG: 75 CAPSULE ORAL at 09:04

## 2019-08-07 RX ADMIN — CLOPIDOGREL BISULFATE 75 MG: 75 TABLET ORAL at 09:04

## 2019-08-07 RX ADMIN — DOCUSATE SODIUM 100 MG: 100 CAPSULE, LIQUID FILLED ORAL at 09:04

## 2019-08-07 RX ADMIN — IPRATROPIUM BROMIDE AND ALBUTEROL SULFATE 1 AMPULE: .5; 3 SOLUTION RESPIRATORY (INHALATION) at 12:33

## 2019-08-07 RX ADMIN — AMIODARONE HYDROCHLORIDE 200 MG: 200 TABLET ORAL at 09:04

## 2019-08-07 RX ADMIN — ENOXAPARIN SODIUM 30 MG: 30 INJECTION SUBCUTANEOUS at 09:04

## 2019-08-07 RX ADMIN — MIDODRINE HYDROCHLORIDE 10 MG: 5 TABLET ORAL at 09:04

## 2019-08-07 RX ADMIN — POLYETHYLENE GLYCOL (3350) 17 G: 17 POWDER, FOR SOLUTION ORAL at 09:03

## 2019-08-07 RX ADMIN — Medication 10 ML: at 09:03

## 2019-08-07 RX ADMIN — IPRATROPIUM BROMIDE AND ALBUTEROL SULFATE 1 AMPULE: .5; 3 SOLUTION RESPIRATORY (INHALATION) at 15:51

## 2019-08-07 RX ADMIN — OXYCODONE HYDROCHLORIDE AND ACETAMINOPHEN 1 TABLET: 7.5; 325 TABLET ORAL at 10:34

## 2019-08-07 RX ADMIN — IPRATROPIUM BROMIDE AND ALBUTEROL SULFATE 1 AMPULE: .5; 3 SOLUTION RESPIRATORY (INHALATION) at 08:21

## 2019-08-07 ASSESSMENT — PAIN DESCRIPTION - LOCATION: LOCATION: BACK;BUTTOCKS

## 2019-08-07 ASSESSMENT — PAIN DESCRIPTION - PAIN TYPE: TYPE: ACUTE PAIN;CHRONIC PAIN

## 2019-08-07 ASSESSMENT — PAIN SCALES - GENERAL
PAINLEVEL_OUTOF10: 8
PAINLEVEL_OUTOF10: 8

## 2019-08-07 NOTE — PROGRESS NOTES
Hospitalist Progress Note      Name:  Federica Milner /Age/Sex: 1950  (77 y.o. female)   MRN & CSN:  [id number] & [id number] Admission Date/Time: 2019 12:11 AM   [facility]  [facility] PCP: Selina Munroe DO       Federica Milner is a 77 y.o.  female  who presents with Respiratory Distress      Assessment and Plan:   Acute Resp Failure 2/2 to COPD Exacerbation and PNA  Intubated on  and extubated on  and re-intubated  : Ext on   Steroids tapered, nebs    Sputum cx +MRSA MDR, CTA chest: neg PE  Pulmonary following      Septic Shock  to CAP and UTI - clinically improved  IV Vanc and IV Merrem were stopped on ?  - now on Levaquin IV stop date 8/10  Midodrine was added  Monitor blood and urine cultures     UTI with ESBL +Ve E.COLI  Completed 8 days of  IV meropenem  Urine culture from  ESBL +ve and repeat  is NGTD      Anemia of acute on chronic GI loss   Hb was 11 on admission slowly dropped to 6.8  Transfused 1 unit  - Hb increased back to 10.6  Consulted GI but not recommending any endoscopy      Hypernatremia - resolved      Non-ischemic Cardiomyopathy   [facility]  > non-obstructive CAD per cardio   ECHO  LVEF 30% (drop from previous of 50)  -cardio following        Recurrent  PVC's  Cardiology started her on amiodarone  On Metoprolol reduced dose for low BP      Dysphagia   Likely related to repeated intubation   SLP recom Puree diet      Generalized peripheral edema - involving UE and LE  Likely from fluid overload with IV infusion for sepsis  Lasix d/c per cardiology      Hypophosphatemia - replaced     Hypokalemia - replaced -monitor BMP     Chronic conditions     Severe PCM  CAD  COPD  HTN  HLD  SLP eval     CODE STATUS -changed to DNR CCA      Dispo - plan for SNF when approved - medically ready for [address] consulted            Diet Dietary Nutrition Supplements: Low Calorie High Protein Supplement  DIET CARDIAC;  Dysphagia Soft and Bite-Sized   Code Status DNR-CCA     Medications:   Medications:    sodium chloride flush  10 mL Intravenous 2 times per day    levofloxacin  500 mg Intravenous Q24H    metoprolol succinate  25 mg Oral Daily    clopidogrel  75 mg Oral Daily    polyethylene glycol  17 g Per NG tube Daily    pantoprazole  40 mg Intravenous BID    predniSONE  5 mg Oral Daily    ipratropium-albuterol  1 ampule Inhalation Q4H WA    midodrine  10 mg Per NG tube TID WC    amiodarone  200 mg Oral Daily    amitriptyline  50 mg Oral Nightly    pregabalin  150 mg Oral BID    sertraline  50 mg Oral Nightly    simvastatin  20 mg Oral Nightly    docusate sodium  100 mg Oral BID    enoxaparin  30 mg Subcutaneous Daily      Infusions:    dextrose       PRN Meds:   sodium chloride flush 10 mL PRN   acetaminophen 650 mg Q4H PRN   potassium chloride 20 mEq PRN   oxyCODONE-acetaminophen 1 tablet Q6H PRN   dicyclomine 20 mg 4x Daily PRN   glucose 15 g PRN   dextrose 12.5 g PRN   glucagon (rDNA) 1 mg PRN   dextrose 100 mL/hr PRN   sodium phosphate IVPB 0.16 mmol/kg PRN   Or     sodium phosphate IVPB 0.32 mmol/kg PRN   magnesium sulfate 1 g PRN   albuterol 2.5 mg Q6H PRN   magnesium hydroxide 30 mL Daily PRN   ondansetron 4 mg Q6H PRN   acetaminophen 650 mg Q4H PRN   bisacodyl 10 mg Daily PRN     Subjective:     No complaints  Objective: Intake/Output Summary (Last 24 hours) at 8/7/2019 1039  Last data filed at 8/6/2019 2029  Gross per 24 hour   Intake --   Output 1625 ml   Net -1625 ml      Vitals:   Vitals:    08/07/19 0849   BP: 98/70   Pulse: 91   Resp: 18   Temp: 97.6 °F (36.4 °C)   SpO2: 97%     Physical Exam:   Gen:  awake, alert, cooperative, no apparent distress  Head/Eyes:  Normocephalic atraumatic, EOMI   NECK:   symmetrical, trachea midline  LUNGS: Normal Effort   CARDIOVASCULAR:  Normal rate  ABDOMEN: Non tender, non distended, no HSM noted.   MUSCULOSKELETAL:  ROM limited  NEUROLOGIC: Alert and Oriented,  Cranial nerves II-XII are

## 2019-08-07 NOTE — DISCHARGE SUMMARY
identified. No obvious pleural effusion or pneumothorax seen on either side. Stable chest.  Life-support appliances appear in stable and satisfactory position. Xr Chest Portable    Result Date: 7/29/2019  EXAMINATION: ONE X-RAY VIEW OF THE CHEST, 7/29/2019 10:22 am COMPARISON: 07/24/2019 HISTORY: ORDERING SYSTEM PROVIDED HISTORY: Intubated TECHNOLOGIST PROVIDED HISTORY: Reason for exam:-> Intubated Reason for Exam: Intubated Acuity: Acute Type of Exam: Initial FINDINGS: There has been interval placement of an endotracheal tube which terminates 4 cm above the reji. A right PICC is noted. An enteric tube courses to the mid abdomen and beyond the field of view. The patient is rotated to the right which has accentuated the mediastinal and cardiac contours. Patchy right perihilar and right upper lobe airspace opacities persist, similar to the previous exam.     1. Endotracheal tube terminating 4 cm above the reji. 2. Persistent right perihilar and right upper lobe airspace opacities concerning for pneumonia. Xr Chest Portable    Result Date: 7/24/2019  EXAMINATION: ONE XRAY VIEW OF THE CHEST 7/24/2019 3:07 pm COMPARISON: 07/23/2019 HISTORY: ORDERING SYSTEM PROVIDED HISTORY: possible fvo or aspiration TECHNOLOGIST PROVIDED HISTORY: Reason for exam:->possible fvo or aspiration Reason for Exam: possible fvo or aspiration Acuity: Acute Type of Exam: Initial Additional signs and symptoms: na Relevant Medical/Surgical History: diabetes, cad, copd, hypertension FINDINGS: A right IJ catheter has its tip in the right atrium. The heart size is within normal limits. There is no pneumothorax or edema. There is increased opacity in the peripheral right lung and at the left lung base. Chain sutures are noted on the left. Emphysematous change is noted. Increased right peripheral interstitial opacity may reflect developing pneumonia. Attention is suggested on follow-up.      Xr Chest Portable    Result 8/8/2019  What changed:    · medication strength  · how much to take  · additional instructions        CONTINUE taking these medications    amiodarone 200 MG tablet  Commonly known as:  CORDARONE  Take 1 tablet by mouth daily     amitriptyline 50 MG tablet  Commonly known as:  ELAVIL     clopidogrel 75 MG tablet  Commonly known as:  PLAVIX     famotidine 20 MG tablet  Commonly known as:  PEPCID     ipratropium-albuterol 0.5-2.5 (3) MG/3ML Soln nebulizer solution  Commonly known as:  DUONEB  Inhale 3 mLs into the lungs 4 times daily Dx: COPD J44.9     OXYGEN     pregabalin 150 MG capsule  Commonly known as:  LYRICA     sertraline 50 MG tablet  Commonly known as:  ZOLOFT     simvastatin 20 MG tablet  Commonly known as:  ZOCOR  Take 1 tablet by mouth nightly        STOP taking these medications    aspirin 81 MG chewable tablet     benzonatate 100 MG capsule  Commonly known as:  TESSALON     budesonide-formoterol 160-4.5 MCG/ACT Aero  Commonly known as:  SYMBICORT     fluconazole 150 MG tablet  Commonly known as:  DIFLUCAN     guaiFENesin 600 MG extended release tablet  Commonly known as:  MUCINEX     losartan 25 MG tablet  Commonly known as:  COZAAR     magnesium oxide 400 (241.3 Mg) MG Tabs tablet  Commonly known as:  MAG-OX     MULTIVITAMIN PO           Where to Get Your Medications      You can get these medications from any pharmacy    Bring a paper prescription for each of these medications  · oxyCODONE-acetaminophen 7.5-325 MG per tablet     Information about where to get these medications is not yet available    Ask your nurse or doctor about these medications  · dicyclomine 20 MG tablet  · docusate 100 MG Caps  · levofloxacin 500 MG tablet  · metoprolol succinate 25 MG extended release tablet  · midodrine 10 MG tablet  · predniSONE 5 MG tablet            Code Status: DNR-CCA     Consults:   PHARMACY TO DOSE VANCOMYCIN  IP CONSULT TO HOSPITALIST  IP CONSULT TO GENERAL SURGERY  IP CONSULT TO PULMONOLOGY  IP

## 2019-08-07 NOTE — PROGRESS NOTES
[id number] [place] OF SPEECH/LANGUAGE PATHOLOGY  DAILY PROGRESS NOTE  Meagan Crane  8/7/2019  [id number]  Acute cystitis without hematuria [N30.00]  Acute respiratory failure with hypoxia ([address] [J96.01]  HCAP (healthcare-associated pneumonia) [J18.9]  Septic shock ([facility] [address] [A41.9, R65.21]  Acute respiratory failure with hypoxemia (HCC) [J96.01]  Allergies   Allergen Reactions    Food Swelling     \"Rye, Whole grains, Barley\"    Trental [Pentoxifylline] Swelling    Tramadol      \"I Felt Shaky\"    Vistaril [Hydroxyzine Hcl] Other (See Comments)     Sedated and was placed on respirator    Erythromycin      \"Stomach Pain\"    Motrin [Ibuprofen] Diarrhea    Tetracyclines & Related Nausea And Vomiting         Pt was seen this date for dysphagia treatment. IMPRESSION AND RECOMMENDATIONS: Meagan Crane was seen for dysphagia tx and PO trials for possible diet advancement seated upright in bed, alert, cooperative. She was presented with PO trials of regular solids and initially refused offers of thin liquids. She requested pain medications and RN was notified. Oral stage characterized by prolonged, disorganized mastication with slow AP transit and adequate oral clearance. [facility] with age-appropriate swallow initiation and laryngeal elevation. No s/s aspiration. Pt was also seen with medication administration with pills whole with water; no reported difficulty or s/s aspiration. Discussed diet recommendations and pt strongly requests upgrade from dysphagia II. Diet advanced to dysphagia III and SLP will follow to confirm safety with this texture. Discussed with RN. GOALS (current status in bold):  Short-term Goals  Timeframe for Short-term Goals: length of admission  Goal 1: Pt will tolerate thin liquid/puree diet with adequate oral manipulation and no s/s aspiration.  Meeting, diet advanced, continue  Goal 2: Pt will participate in trials of advanced textures with adequate oral manipulation for possible diet advancement. Partially met, diet advanced, continue  Goal 3: Pt/caregivers will indicate understanding of all recommendations.  Meeting, continue        EDUCATION: recommendations and plan d/w pt, RN    PAIN RATING (0-10 Scale): 0  Time in/Time out: SLP Individual Minutes  Time In: 1010  Time Out: 4862  Minutes: 25    Visit number: [address]  8/7/2019  10:39 AM

## 2019-08-07 NOTE — PROGRESS NOTES
([facility] 05/28/2016    CAD (coronary artery disease) 05/28/2016    Hidradenitis suppurativa 02/20/2015    Lung cancer ([facility] 10/28/2014    History of lung cancer 09/01/2014       Electronically signed by Jonelle Turner PA-C on 8/7/2019 at 8:57 AM

## 2019-08-08 LAB
EKG ATRIAL RATE: 103 BPM
EKG DIAGNOSIS: NORMAL
EKG P AXIS: 62 DEGREES
EKG P-R INTERVAL: 136 MS
EKG Q-T INTERVAL: 434 MS
EKG QRS DURATION: 134 MS
EKG QTC CALCULATION (BAZETT): 568 MS
EKG R AXIS: 263 DEGREES
EKG T AXIS: 54 DEGREES
EKG VENTRICULAR RATE: 103 BPM

## 2019-10-07 ENCOUNTER — APPOINTMENT (OUTPATIENT)
Dept: CT IMAGING | Age: 69
End: 2019-10-07
Payer: MEDICARE

## 2019-10-07 ENCOUNTER — HOSPITAL ENCOUNTER (EMERGENCY)
Age: 69
Discharge: OTHER INSTITUTION | End: 2019-10-07
Attending: EMERGENCY MEDICINE
Payer: MEDICARE

## 2019-10-07 ENCOUNTER — APPOINTMENT (OUTPATIENT)
Dept: GENERAL RADIOLOGY | Age: 69
End: 2019-10-07
Payer: MEDICARE

## 2019-10-07 VITALS
TEMPERATURE: 98.3 F | WEIGHT: 164.4 LBS | RESPIRATION RATE: 18 BRPM | DIASTOLIC BLOOD PRESSURE: 75 MMHG | BODY MASS INDEX: 31.06 KG/M2 | SYSTOLIC BLOOD PRESSURE: 134 MMHG | HEART RATE: 100 BPM | OXYGEN SATURATION: 100 %

## 2019-10-07 DIAGNOSIS — N39.0 URINARY TRACT INFECTION WITHOUT HEMATURIA, SITE UNSPECIFIED: ICD-10-CM

## 2019-10-07 DIAGNOSIS — J18.9 PNEUMONIA DUE TO ORGANISM: ICD-10-CM

## 2019-10-07 DIAGNOSIS — E83.42 HYPOMAGNESEMIA: ICD-10-CM

## 2019-10-07 DIAGNOSIS — R41.82 ALTERED MENTAL STATUS, UNSPECIFIED ALTERED MENTAL STATUS TYPE: ICD-10-CM

## 2019-10-07 DIAGNOSIS — A41.9 SEPTICEMIA (HCC): Primary | ICD-10-CM

## 2019-10-07 LAB
ALBUMIN SERPL-MCNC: 2.4 GM/DL (ref 3.4–5)
ALP BLD-CCNC: 137 IU/L (ref 40–128)
ALT SERPL-CCNC: 11 U/L (ref 10–40)
ANION GAP SERPL CALCULATED.3IONS-SCNC: 10 MMOL/L (ref 4–16)
AST SERPL-CCNC: 14 IU/L (ref 15–37)
BACTERIA: ABNORMAL /HPF
BASE EXCESS: ABNORMAL (ref 0–2.4)
BASOPHILS ABSOLUTE: 0.1 K/CU MM
BASOPHILS RELATIVE PERCENT: 0.3 % (ref 0–1)
BILIRUB SERPL-MCNC: 0.6 MG/DL (ref 0–1)
BILIRUBIN URINE: NEGATIVE MG/DL
BLOOD, URINE: ABNORMAL
BUN BLDV-MCNC: 9 MG/DL (ref 6–23)
CALCIUM SERPL-MCNC: 9 MG/DL (ref 8.3–10.6)
CHLORIDE BLD-SCNC: 107 MMOL/L (ref 99–110)
CLARITY: ABNORMAL
CO2: 22 MMOL/L (ref 21–32)
COLOR: YELLOW
COMMENT: ABNORMAL
CREAT SERPL-MCNC: 0.4 MG/DL (ref 0.6–1.1)
DIFFERENTIAL TYPE: ABNORMAL
EOSINOPHILS ABSOLUTE: 0 K/CU MM
EOSINOPHILS RELATIVE PERCENT: 0.2 % (ref 0–3)
GFR AFRICAN AMERICAN: >60 ML/MIN/1.73M2
GFR NON-AFRICAN AMERICAN: >60 ML/MIN/1.73M2
GLUCOSE BLD-MCNC: 140 MG/DL (ref 70–99)
GLUCOSE, URINE: NEGATIVE MG/DL
HCO3 VENOUS: 21.6 MMOL/L (ref 19–25)
HCT VFR BLD CALC: 35.5 % (ref 37–47)
HEMOGLOBIN: 11.6 GM/DL (ref 12.5–16)
IMMATURE NEUTROPHIL %: 0.8 % (ref 0–0.43)
INR BLD: 1.56 INDEX
KETONES, URINE: ABNORMAL MG/DL
LACTATE: 0.9 MMOL/L (ref 0.4–2)
LEUKOCYTE ESTERASE, URINE: ABNORMAL
LIPASE: 3 IU/L (ref 13–60)
LYMPHOCYTES ABSOLUTE: 3.1 K/CU MM
LYMPHOCYTES RELATIVE PERCENT: 16.8 % (ref 24–44)
MAGNESIUM: 1.2 MG/DL (ref 1.8–2.4)
MCH RBC QN AUTO: 27.4 PG (ref 27–31)
MCHC RBC AUTO-ENTMCNC: 32.7 % (ref 32–36)
MCV RBC AUTO: 83.9 FL (ref 78–100)
MONOCYTES ABSOLUTE: 1 K/CU MM
MONOCYTES RELATIVE PERCENT: 5.2 % (ref 0–4)
MUCUS: ABNORMAL HPF
NITRITE URINE, QUANTITATIVE: POSITIVE
NUCLEATED RBC %: 0 %
O2 SAT, VEN: 93.8 % (ref 50–70)
PCO2, VEN: 34 MMHG (ref 38–52)
PDW BLD-RTO: 17.9 % (ref 11.7–14.9)
PH VENOUS: 7.41 (ref 7.32–7.42)
PH, URINE: 5 (ref 5–8)
PLATELET # BLD: 300 K/CU MM (ref 140–440)
PMV BLD AUTO: 10.8 FL (ref 7.5–11.1)
PO2, VEN: 211 MMHG (ref 28–48)
POTASSIUM SERPL-SCNC: 3.7 MMOL/L (ref 3.5–5.1)
PRO-BNP: 142 PG/ML
PROTEIN UA: 100 MG/DL
PROTHROMBIN TIME: 18.2 SECONDS (ref 9.12–12.5)
RBC # BLD: 4.23 M/CU MM (ref 4.2–5.4)
RBC URINE: 10 /HPF (ref 0–6)
SEGMENTED NEUTROPHILS ABSOLUTE COUNT: 14.3 K/CU MM
SEGMENTED NEUTROPHILS RELATIVE PERCENT: 76.7 % (ref 36–66)
SODIUM BLD-SCNC: 139 MMOL/L (ref 135–145)
SPECIFIC GRAVITY UA: 1.01 (ref 1–1.03)
TOTAL CK: 23 IU/L (ref 26–140)
TOTAL IMMATURE NEUTOROPHIL: 0.15 K/CU MM
TOTAL NUCLEATED RBC: 0 K/CU MM
TOTAL PROTEIN: 4.9 GM/DL (ref 6.4–8.2)
TRICHOMONAS: ABNORMAL /HPF
TROPONIN T: <0.01 NG/ML
UROBILINOGEN, URINE: NORMAL MG/DL (ref 0.2–1)
WBC # BLD: 18.6 K/CU MM (ref 4–10.5)
WBC CLUMP: ABNORMAL /HPF
WBC UA: 114 /HPF (ref 0–5)

## 2019-10-07 PROCEDURE — 82550 ASSAY OF CK (CPK): CPT

## 2019-10-07 PROCEDURE — 76937 US GUIDE VASCULAR ACCESS: CPT

## 2019-10-07 PROCEDURE — 80053 COMPREHEN METABOLIC PANEL: CPT

## 2019-10-07 PROCEDURE — 96366 THER/PROPH/DIAG IV INF ADDON: CPT

## 2019-10-07 PROCEDURE — 87077 CULTURE AEROBIC IDENTIFY: CPT

## 2019-10-07 PROCEDURE — 83605 ASSAY OF LACTIC ACID: CPT

## 2019-10-07 PROCEDURE — 93010 ELECTROCARDIOGRAM REPORT: CPT | Performed by: INTERNAL MEDICINE

## 2019-10-07 PROCEDURE — 36569 INSJ PICC 5 YR+ W/O IMAGING: CPT

## 2019-10-07 PROCEDURE — 83880 ASSAY OF NATRIURETIC PEPTIDE: CPT

## 2019-10-07 PROCEDURE — 85025 COMPLETE CBC W/AUTO DIFF WBC: CPT

## 2019-10-07 PROCEDURE — 6370000000 HC RX 637 (ALT 250 FOR IP): Performed by: EMERGENCY MEDICINE

## 2019-10-07 PROCEDURE — 83735 ASSAY OF MAGNESIUM: CPT

## 2019-10-07 PROCEDURE — 71045 X-RAY EXAM CHEST 1 VIEW: CPT

## 2019-10-07 PROCEDURE — 2580000003 HC RX 258: Performed by: EMERGENCY MEDICINE

## 2019-10-07 PROCEDURE — 85610 PROTHROMBIN TIME: CPT

## 2019-10-07 PROCEDURE — 96365 THER/PROPH/DIAG IV INF INIT: CPT

## 2019-10-07 PROCEDURE — 93005 ELECTROCARDIOGRAM TRACING: CPT | Performed by: EMERGENCY MEDICINE

## 2019-10-07 PROCEDURE — 70450 CT HEAD/BRAIN W/O DYE: CPT

## 2019-10-07 PROCEDURE — 74177 CT ABD & PELVIS W/CONTRAST: CPT

## 2019-10-07 PROCEDURE — C1751 CATH, INF, PER/CENT/MIDLINE: HCPCS

## 2019-10-07 PROCEDURE — 83690 ASSAY OF LIPASE: CPT

## 2019-10-07 PROCEDURE — 96367 TX/PROPH/DG ADDL SEQ IV INF: CPT

## 2019-10-07 PROCEDURE — 82805 BLOOD GASES W/O2 SATURATION: CPT

## 2019-10-07 PROCEDURE — 99284 EMERGENCY DEPT VISIT MOD MDM: CPT

## 2019-10-07 PROCEDURE — 87086 URINE CULTURE/COLONY COUNT: CPT

## 2019-10-07 PROCEDURE — 81001 URINALYSIS AUTO W/SCOPE: CPT

## 2019-10-07 PROCEDURE — 84484 ASSAY OF TROPONIN QUANT: CPT

## 2019-10-07 PROCEDURE — 87186 SC STD MICRODIL/AGAR DIL: CPT

## 2019-10-07 PROCEDURE — 6360000002 HC RX W HCPCS: Performed by: EMERGENCY MEDICINE

## 2019-10-07 PROCEDURE — 96375 TX/PRO/DX INJ NEW DRUG ADDON: CPT

## 2019-10-07 PROCEDURE — 87040 BLOOD CULTURE FOR BACTERIA: CPT

## 2019-10-07 PROCEDURE — 6360000004 HC RX CONTRAST MEDICATION: Performed by: EMERGENCY MEDICINE

## 2019-10-07 RX ORDER — ACETAMINOPHEN 650 MG/1
650 SUPPOSITORY RECTAL ONCE
Status: COMPLETED | OUTPATIENT
Start: 2019-10-07 | End: 2019-10-07

## 2019-10-07 RX ORDER — TAMSULOSIN HYDROCHLORIDE 0.4 MG/1
0.4 CAPSULE ORAL DAILY
Status: ON HOLD | COMMUNITY
End: 2019-10-23 | Stop reason: HOSPADM

## 2019-10-07 RX ORDER — MAGNESIUM SULFATE 1 G/100ML
1 INJECTION INTRAVENOUS ONCE
Status: COMPLETED | OUTPATIENT
Start: 2019-10-07 | End: 2019-10-07

## 2019-10-07 RX ORDER — 0.9 % SODIUM CHLORIDE 0.9 %
30 INTRAVENOUS SOLUTION INTRAVENOUS ONCE
Status: COMPLETED | OUTPATIENT
Start: 2019-10-07 | End: 2019-10-07

## 2019-10-07 RX ORDER — ATORVASTATIN CALCIUM 10 MG/1
10 TABLET, FILM COATED ORAL DAILY
Status: ON HOLD | COMMUNITY
End: 2019-10-23 | Stop reason: SDUPTHER

## 2019-10-07 RX ORDER — OXYCODONE AND ACETAMINOPHEN 7.5; 325 MG/1; MG/1
1 TABLET ORAL EVERY 4 HOURS PRN
COMMUNITY

## 2019-10-07 RX ORDER — LORAZEPAM 0.5 MG/1
2 TABLET ORAL 2 TIMES DAILY
Status: ON HOLD | COMMUNITY
End: 2019-10-23 | Stop reason: HOSPADM

## 2019-10-07 RX ORDER — FENTANYL CITRATE 50 UG/ML
25 INJECTION, SOLUTION INTRAMUSCULAR; INTRAVENOUS ONCE
Status: COMPLETED | OUTPATIENT
Start: 2019-10-07 | End: 2019-10-07

## 2019-10-07 RX ORDER — SODIUM CHLORIDE 9 MG/ML
INJECTION, SOLUTION INTRAVENOUS
Status: DISCONTINUED
Start: 2019-10-07 | End: 2019-10-07 | Stop reason: HOSPADM

## 2019-10-07 RX ADMIN — CEFEPIME HYDROCHLORIDE 2 G: 2 INJECTION, POWDER, FOR SOLUTION INTRAVENOUS at 14:56

## 2019-10-07 RX ADMIN — VANCOMYCIN HYDROCHLORIDE 1500 MG: 5 INJECTION, POWDER, LYOPHILIZED, FOR SOLUTION INTRAVENOUS at 13:09

## 2019-10-07 RX ADMIN — MAGNESIUM SULFATE HEPTAHYDRATE 1 G: 1 INJECTION, SOLUTION INTRAVENOUS at 17:20

## 2019-10-07 RX ADMIN — SODIUM CHLORIDE 2238 ML: 9 INJECTION, SOLUTION INTRAVENOUS at 13:09

## 2019-10-07 RX ADMIN — IOPAMIDOL 75 ML: 755 INJECTION, SOLUTION INTRAVENOUS at 14:44

## 2019-10-07 RX ADMIN — ACETAMINOPHEN 650 MG: 650 SUPPOSITORY RECTAL at 15:02

## 2019-10-07 RX ADMIN — FENTANYL CITRATE 25 MCG: 50 INJECTION, SOLUTION INTRAMUSCULAR; INTRAVENOUS at 17:20

## 2019-10-07 ASSESSMENT — PAIN SCALES - GENERAL
PAINLEVEL_OUTOF10: 10
PAINLEVEL_OUTOF10: 0
PAINLEVEL_OUTOF10: 0

## 2019-10-07 ASSESSMENT — PAIN DESCRIPTION - LOCATION: LOCATION: GENERALIZED

## 2019-10-09 LAB
CULTURE: ABNORMAL
CULTURE: ABNORMAL
Lab: ABNORMAL
SPECIMEN: ABNORMAL
TOTAL COLONY COUNT: ABNORMAL

## 2019-10-11 LAB
EKG ATRIAL RATE: 111 BPM
EKG DIAGNOSIS: NORMAL
EKG P AXIS: 49 DEGREES
EKG P-R INTERVAL: 144 MS
EKG Q-T INTERVAL: 376 MS
EKG QRS DURATION: 128 MS
EKG QTC CALCULATION (BAZETT): 511 MS
EKG R AXIS: 258 DEGREES
EKG T AXIS: 55 DEGREES
EKG VENTRICULAR RATE: 111 BPM

## 2019-10-12 LAB
CULTURE: NORMAL
CULTURE: NORMAL
Lab: NORMAL
Lab: NORMAL
SPECIMEN: NORMAL
SPECIMEN: NORMAL

## 2019-10-18 ENCOUNTER — APPOINTMENT (OUTPATIENT)
Dept: GENERAL RADIOLOGY | Age: 69
DRG: 871 | End: 2019-10-18
Payer: MEDICARE

## 2019-10-18 ENCOUNTER — APPOINTMENT (OUTPATIENT)
Dept: CT IMAGING | Age: 69
DRG: 871 | End: 2019-10-18
Payer: MEDICARE

## 2019-10-18 ENCOUNTER — HOSPITAL ENCOUNTER (INPATIENT)
Age: 69
LOS: 5 days | Discharge: HOME HEALTH | DRG: 871 | End: 2019-10-23
Attending: EMERGENCY MEDICINE | Admitting: INTERNAL MEDICINE
Payer: MEDICARE

## 2019-10-18 DIAGNOSIS — J96.01 ACUTE RESPIRATORY FAILURE WITH HYPOXEMIA (HCC): ICD-10-CM

## 2019-10-18 DIAGNOSIS — J18.9 PNEUMONIA DUE TO ORGANISM: Primary | ICD-10-CM

## 2019-10-18 DIAGNOSIS — R41.82 ALTERED MENTAL STATUS, UNSPECIFIED ALTERED MENTAL STATUS TYPE: ICD-10-CM

## 2019-10-18 PROBLEM — G93.40 ENCEPHALOPATHY ACUTE: Status: ACTIVE | Noted: 2019-10-18

## 2019-10-18 LAB
ALBUMIN SERPL-MCNC: 2.3 GM/DL (ref 3.4–5)
ALP BLD-CCNC: 93 IU/L (ref 40–129)
ALT SERPL-CCNC: 7 U/L (ref 10–40)
AMMONIA: 30 UMOL/L (ref 11–51)
AMPHETAMINES: NEGATIVE
ANION GAP SERPL CALCULATED.3IONS-SCNC: 8 MMOL/L (ref 4–16)
APTT: 34.9 SECONDS (ref 25.1–37.1)
AST SERPL-CCNC: 15 IU/L (ref 15–37)
BACTERIA: NEGATIVE /HPF
BARBITURATE SCREEN URINE: NEGATIVE
BASE EXCESS MIXED: ABNORMAL (ref 0–2.3)
BASOPHILS ABSOLUTE: 0 K/CU MM
BASOPHILS RELATIVE PERCENT: 0.4 % (ref 0–1)
BENZODIAZEPINE SCREEN, URINE: NEGATIVE
BILIRUB SERPL-MCNC: 0.4 MG/DL (ref 0–1)
BILIRUBIN URINE: NEGATIVE MG/DL
BLOOD, URINE: NEGATIVE
BUN BLDV-MCNC: 9 MG/DL (ref 6–23)
CALCIUM SERPL-MCNC: 8.4 MG/DL (ref 8.3–10.6)
CANNABINOID SCREEN URINE: NEGATIVE
CHLORIDE BLD-SCNC: 106 MMOL/L (ref 99–110)
CLARITY: CLEAR
CO2: 28 MMOL/L (ref 21–32)
COCAINE METABOLITE: NEGATIVE
COLOR: YELLOW
COMMENT: ABNORMAL
CREAT SERPL-MCNC: 0.6 MG/DL (ref 0.6–1.1)
DIFFERENTIAL TYPE: ABNORMAL
EOSINOPHILS ABSOLUTE: 0.1 K/CU MM
EOSINOPHILS RELATIVE PERCENT: 1.7 % (ref 0–3)
GFR AFRICAN AMERICAN: >60 ML/MIN/1.73M2
GFR NON-AFRICAN AMERICAN: >60 ML/MIN/1.73M2
GLUCOSE BLD-MCNC: 118 MG/DL (ref 70–99)
GLUCOSE BLD-MCNC: 97 MG/DL (ref 70–99)
GLUCOSE, URINE: NEGATIVE MG/DL
HCO3 VENOUS: 28.5 MMOL/L (ref 19–25)
HCT VFR BLD CALC: 28.5 % (ref 37–47)
HEMOGLOBIN: 9.1 GM/DL (ref 12.5–16)
HYALINE CASTS: 10 /LPF
IMMATURE NEUTROPHIL %: 0.7 % (ref 0–0.43)
INR BLD: 1.61 INDEX
KETONES, URINE: ABNORMAL MG/DL
LACTATE: 1.3 MMOL/L (ref 0.4–2)
LEUKOCYTE ESTERASE, URINE: ABNORMAL
LIPASE: <3 IU/L (ref 13–60)
LYMPHOCYTES ABSOLUTE: 3 K/CU MM
LYMPHOCYTES RELATIVE PERCENT: 35.7 % (ref 24–44)
MCH RBC QN AUTO: 27.3 PG (ref 27–31)
MCHC RBC AUTO-ENTMCNC: 31.9 % (ref 32–36)
MCV RBC AUTO: 85.6 FL (ref 78–100)
MONOCYTES ABSOLUTE: 0.7 K/CU MM
MONOCYTES RELATIVE PERCENT: 8.8 % (ref 0–4)
MUCUS: ABNORMAL HPF
NITRITE URINE, QUANTITATIVE: NEGATIVE
NUCLEATED RBC %: 0.4 %
O2 SAT, VEN: 92.6 % (ref 50–70)
OPIATES, URINE: NEGATIVE
OXYCODONE: ABNORMAL
PCO2, VEN: 43 MMHG (ref 38–52)
PDW BLD-RTO: 20.5 % (ref 11.7–14.9)
PH VENOUS: 7.43 (ref 7.32–7.42)
PH, URINE: 5 (ref 5–8)
PHENCYCLIDINE, URINE: NEGATIVE
PLATELET # BLD: 250 K/CU MM (ref 140–440)
PMV BLD AUTO: 11.8 FL (ref 7.5–11.1)
PO2, VEN: 221 MMHG (ref 28–48)
POTASSIUM SERPL-SCNC: 3.7 MMOL/L (ref 3.5–5.1)
PRO-BNP: 5281 PG/ML
PROTEIN UA: NEGATIVE MG/DL
PROTHROMBIN TIME: 18.5 SECONDS (ref 11.7–14.5)
RBC # BLD: 3.33 M/CU MM (ref 4.2–5.4)
RBC URINE: ABNORMAL /HPF (ref 0–6)
SEGMENTED NEUTROPHILS ABSOLUTE COUNT: 4.4 K/CU MM
SEGMENTED NEUTROPHILS RELATIVE PERCENT: 52.7 % (ref 36–66)
SODIUM BLD-SCNC: 142 MMOL/L (ref 135–145)
SPECIFIC GRAVITY UA: 1.02 (ref 1–1.03)
SQUAMOUS EPITHELIAL: <1 /HPF
TOTAL IMMATURE NEUTOROPHIL: 0.06 K/CU MM
TOTAL NUCLEATED RBC: 0 K/CU MM
TOTAL PROTEIN: 4.5 GM/DL (ref 6.4–8.2)
TRICHOMONAS: ABNORMAL /HPF
TROPONIN T: <0.01 NG/ML
TSH HIGH SENSITIVITY: 1.98 UIU/ML (ref 0.27–4.2)
UROBILINOGEN, URINE: NORMAL MG/DL (ref 0.2–1)
WBC # BLD: 8.4 K/CU MM (ref 4–10.5)
WBC UA: 16 /HPF (ref 0–5)
YEAST: ABNORMAL /HPF

## 2019-10-18 PROCEDURE — 80307 DRUG TEST PRSMV CHEM ANLYZR: CPT

## 2019-10-18 PROCEDURE — 93005 ELECTROCARDIOGRAM TRACING: CPT | Performed by: EMERGENCY MEDICINE

## 2019-10-18 PROCEDURE — 96368 THER/DIAG CONCURRENT INF: CPT

## 2019-10-18 PROCEDURE — 81001 URINALYSIS AUTO W/SCOPE: CPT

## 2019-10-18 PROCEDURE — 6370000000 HC RX 637 (ALT 250 FOR IP): Performed by: NURSE PRACTITIONER

## 2019-10-18 PROCEDURE — 84443 ASSAY THYROID STIM HORMONE: CPT

## 2019-10-18 PROCEDURE — 82140 ASSAY OF AMMONIA: CPT

## 2019-10-18 PROCEDURE — 85025 COMPLETE CBC W/AUTO DIFF WBC: CPT

## 2019-10-18 PROCEDURE — 96366 THER/PROPH/DIAG IV INF ADDON: CPT

## 2019-10-18 PROCEDURE — 6360000002 HC RX W HCPCS: Performed by: EMERGENCY MEDICINE

## 2019-10-18 PROCEDURE — 87899 AGENT NOS ASSAY W/OPTIC: CPT

## 2019-10-18 PROCEDURE — 85730 THROMBOPLASTIN TIME PARTIAL: CPT

## 2019-10-18 PROCEDURE — 87633 RESP VIRUS 12-25 TARGETS: CPT

## 2019-10-18 PROCEDURE — 2580000003 HC RX 258: Performed by: NURSE PRACTITIONER

## 2019-10-18 PROCEDURE — 87040 BLOOD CULTURE FOR BACTERIA: CPT

## 2019-10-18 PROCEDURE — 71250 CT THORAX DX C-: CPT

## 2019-10-18 PROCEDURE — 2060000000 HC ICU INTERMEDIATE R&B

## 2019-10-18 PROCEDURE — 80053 COMPREHEN METABOLIC PANEL: CPT

## 2019-10-18 PROCEDURE — 85610 PROTHROMBIN TIME: CPT

## 2019-10-18 PROCEDURE — 87086 URINE CULTURE/COLONY COUNT: CPT

## 2019-10-18 PROCEDURE — 93010 ELECTROCARDIOGRAM REPORT: CPT | Performed by: INTERNAL MEDICINE

## 2019-10-18 PROCEDURE — 83880 ASSAY OF NATRIURETIC PEPTIDE: CPT

## 2019-10-18 PROCEDURE — 82805 BLOOD GASES W/O2 SATURATION: CPT

## 2019-10-18 PROCEDURE — 84484 ASSAY OF TROPONIN QUANT: CPT

## 2019-10-18 PROCEDURE — 83605 ASSAY OF LACTIC ACID: CPT

## 2019-10-18 PROCEDURE — 36415 COLL VENOUS BLD VENIPUNCTURE: CPT

## 2019-10-18 PROCEDURE — 2580000003 HC RX 258: Performed by: EMERGENCY MEDICINE

## 2019-10-18 PROCEDURE — 70450 CT HEAD/BRAIN W/O DYE: CPT

## 2019-10-18 PROCEDURE — 87798 DETECT AGENT NOS DNA AMP: CPT

## 2019-10-18 PROCEDURE — 71045 X-RAY EXAM CHEST 1 VIEW: CPT

## 2019-10-18 PROCEDURE — 82962 GLUCOSE BLOOD TEST: CPT

## 2019-10-18 PROCEDURE — 87486 CHLMYD PNEUM DNA AMP PROBE: CPT

## 2019-10-18 PROCEDURE — 96367 TX/PROPH/DG ADDL SEQ IV INF: CPT

## 2019-10-18 PROCEDURE — 87449 NOS EACH ORGANISM AG IA: CPT

## 2019-10-18 PROCEDURE — 99285 EMERGENCY DEPT VISIT HI MDM: CPT

## 2019-10-18 PROCEDURE — 83690 ASSAY OF LIPASE: CPT

## 2019-10-18 PROCEDURE — 87581 M.PNEUMON DNA AMP PROBE: CPT

## 2019-10-18 PROCEDURE — 96365 THER/PROPH/DIAG IV INF INIT: CPT

## 2019-10-18 RX ORDER — SODIUM CHLORIDE 0.9 % (FLUSH) 0.9 %
10 SYRINGE (ML) INJECTION EVERY 12 HOURS SCHEDULED
Status: DISCONTINUED | OUTPATIENT
Start: 2019-10-18 | End: 2019-10-23 | Stop reason: HOSPADM

## 2019-10-18 RX ORDER — AMOXICILLIN AND CLAVULANATE POTASSIUM 875; 125 MG/1; MG/1
1 TABLET, FILM COATED ORAL 2 TIMES DAILY
Status: ON HOLD | COMMUNITY
End: 2019-10-23 | Stop reason: HOSPADM

## 2019-10-18 RX ORDER — IPRATROPIUM BROMIDE AND ALBUTEROL SULFATE 2.5; .5 MG/3ML; MG/3ML
1 SOLUTION RESPIRATORY (INHALATION)
Status: DISCONTINUED | OUTPATIENT
Start: 2019-10-19 | End: 2019-10-23 | Stop reason: HOSPADM

## 2019-10-18 RX ORDER — TAMSULOSIN HYDROCHLORIDE 0.4 MG/1
0.4 CAPSULE ORAL DAILY
Status: DISCONTINUED | OUTPATIENT
Start: 2019-10-19 | End: 2019-10-19

## 2019-10-18 RX ORDER — SODIUM CHLORIDE, SODIUM LACTATE, POTASSIUM CHLORIDE, CALCIUM CHLORIDE 600; 310; 30; 20 MG/100ML; MG/100ML; MG/100ML; MG/100ML
1000 INJECTION, SOLUTION INTRAVENOUS ONCE
Status: COMPLETED | OUTPATIENT
Start: 2019-10-18 | End: 2019-10-18

## 2019-10-18 RX ORDER — ACETAMINOPHEN 650 MG/1
650 SUPPOSITORY RECTAL EVERY 4 HOURS PRN
Status: DISCONTINUED | OUTPATIENT
Start: 2019-10-18 | End: 2019-10-23 | Stop reason: HOSPADM

## 2019-10-18 RX ORDER — SODIUM CHLORIDE 0.9 % (FLUSH) 0.9 %
10 SYRINGE (ML) INJECTION PRN
Status: DISCONTINUED | OUTPATIENT
Start: 2019-10-18 | End: 2019-10-23 | Stop reason: HOSPADM

## 2019-10-18 RX ORDER — VANCOMYCIN HYDROCHLORIDE 1 G/200ML
15 INJECTION, SOLUTION INTRAVENOUS ONCE
Status: COMPLETED | OUTPATIENT
Start: 2019-10-18 | End: 2019-10-18

## 2019-10-18 RX ORDER — ONDANSETRON 2 MG/ML
4 INJECTION INTRAMUSCULAR; INTRAVENOUS EVERY 6 HOURS PRN
Status: DISCONTINUED | OUTPATIENT
Start: 2019-10-18 | End: 2019-10-23 | Stop reason: HOSPADM

## 2019-10-18 RX ORDER — PANTOPRAZOLE SODIUM 40 MG/1
40 TABLET, DELAYED RELEASE ORAL DAILY
COMMUNITY

## 2019-10-18 RX ORDER — PANTOPRAZOLE SODIUM 40 MG/1
40 TABLET, DELAYED RELEASE ORAL DAILY
Status: DISCONTINUED | OUTPATIENT
Start: 2019-10-19 | End: 2019-10-23 | Stop reason: HOSPADM

## 2019-10-18 RX ORDER — MIDODRINE HYDROCHLORIDE 5 MG/1
10 TABLET ORAL
Status: DISCONTINUED | OUTPATIENT
Start: 2019-10-19 | End: 2019-10-23 | Stop reason: HOSPADM

## 2019-10-18 RX ORDER — POTASSIUM BICARBONATE 25 MEQ/1
20 TABLET, EFFERVESCENT ORAL 2 TIMES DAILY
COMMUNITY

## 2019-10-18 RX ORDER — POLYETHYLENE GLYCOL 3350 17 G/17G
17 POWDER, FOR SOLUTION ORAL DAILY PRN
Status: DISCONTINUED | OUTPATIENT
Start: 2019-10-18 | End: 2019-10-23 | Stop reason: HOSPADM

## 2019-10-18 RX ORDER — SIMVASTATIN 20 MG
20 TABLET ORAL NIGHTLY
Status: DISCONTINUED | OUTPATIENT
Start: 2019-10-18 | End: 2019-10-23 | Stop reason: HOSPADM

## 2019-10-18 RX ORDER — SODIUM CHLORIDE 9 MG/ML
INJECTION, SOLUTION INTRAVENOUS CONTINUOUS
Status: DISCONTINUED | OUTPATIENT
Start: 2019-10-18 | End: 2019-10-21

## 2019-10-18 RX ORDER — BENZONATATE 100 MG/1
100 CAPSULE ORAL 2 TIMES DAILY PRN
Status: DISCONTINUED | OUTPATIENT
Start: 2019-10-18 | End: 2019-10-23 | Stop reason: HOSPADM

## 2019-10-18 RX ORDER — BENZONATATE 100 MG/1
100 CAPSULE ORAL 2 TIMES DAILY PRN
COMMUNITY

## 2019-10-18 RX ORDER — LANOLIN ALCOHOL/MO/W.PET/CERES
5 CREAM (GRAM) TOPICAL DAILY
COMMUNITY

## 2019-10-18 RX ADMIN — Medication 10 ML: at 22:48

## 2019-10-18 RX ADMIN — SODIUM CHLORIDE, POTASSIUM CHLORIDE, SODIUM LACTATE AND CALCIUM CHLORIDE 1000 ML: 600; 310; 30; 20 INJECTION, SOLUTION INTRAVENOUS at 17:32

## 2019-10-18 RX ADMIN — SODIUM CHLORIDE: 9 INJECTION, SOLUTION INTRAVENOUS at 22:48

## 2019-10-18 RX ADMIN — SIMVASTATIN 20 MG: 20 TABLET, FILM COATED ORAL at 22:48

## 2019-10-18 RX ADMIN — PIPERACILLIN AND TAZOBACTAM 3.38 G: 3; .375 INJECTION, POWDER, LYOPHILIZED, FOR SOLUTION INTRAVENOUS at 18:24

## 2019-10-18 RX ADMIN — SERTRALINE HYDROCHLORIDE 50 MG: 50 TABLET ORAL at 22:48

## 2019-10-18 RX ADMIN — VANCOMYCIN HYDROCHLORIDE 1000 MG: 1 INJECTION, SOLUTION INTRAVENOUS at 18:24

## 2019-10-18 ASSESSMENT — PAIN DESCRIPTION - ORIENTATION: ORIENTATION: OTHER (COMMENT)

## 2019-10-18 ASSESSMENT — PAIN DESCRIPTION - LOCATION: LOCATION: OTHER (COMMENT)

## 2019-10-18 ASSESSMENT — PAIN SCALES - GENERAL: PAINLEVEL_OUTOF10: 9

## 2019-10-18 ASSESSMENT — PAIN DESCRIPTION - PAIN TYPE: TYPE: CHRONIC PAIN

## 2019-10-19 LAB
ADENOVIRUS DETECTION BY PCR: NOT DETECTED
ALBUMIN SERPL-MCNC: 2.2 GM/DL (ref 3.4–5)
ALP BLD-CCNC: 85 IU/L (ref 40–129)
ALT SERPL-CCNC: 7 U/L (ref 10–40)
ANION GAP SERPL CALCULATED.3IONS-SCNC: 4 MMOL/L (ref 4–16)
AST SERPL-CCNC: 13 IU/L (ref 15–37)
BASOPHILS ABSOLUTE: 0 K/CU MM
BASOPHILS RELATIVE PERCENT: 0.2 % (ref 0–1)
BILIRUB SERPL-MCNC: 0.4 MG/DL (ref 0–1)
BORDETELLA PERTUSSIS PCR: NOT DETECTED
BUN BLDV-MCNC: 7 MG/DL (ref 6–23)
CALCIUM SERPL-MCNC: 8 MG/DL (ref 8.3–10.6)
CHLAMYDOPHILA PNEUMONIA PCR: NOT DETECTED
CHLORIDE BLD-SCNC: 105 MMOL/L (ref 99–110)
CO2: 30 MMOL/L (ref 21–32)
CORONAVIRUS 229E PCR: NOT DETECTED
CORONAVIRUS HKU1 PCR: NOT DETECTED
CORONAVIRUS NL63 PCR: NOT DETECTED
CORONAVIRUS OC43 PCR: NOT DETECTED
CREAT SERPL-MCNC: 0.5 MG/DL (ref 0.6–1.1)
DIFFERENTIAL TYPE: ABNORMAL
EOSINOPHILS ABSOLUTE: 0.2 K/CU MM
EOSINOPHILS RELATIVE PERCENT: 2 % (ref 0–3)
GFR AFRICAN AMERICAN: >60 ML/MIN/1.73M2
GFR NON-AFRICAN AMERICAN: >60 ML/MIN/1.73M2
GLUCOSE BLD-MCNC: 109 MG/DL (ref 70–99)
HCT VFR BLD CALC: 26.7 % (ref 37–47)
HEMOGLOBIN: 8.5 GM/DL (ref 12.5–16)
HUMAN METAPNEUMOVIRUS PCR: NOT DETECTED
IMMATURE NEUTROPHIL %: 0.6 % (ref 0–0.43)
INFLUENZA A BY PCR: NOT DETECTED
INFLUENZA A H1 (2009) PCR: NOT DETECTED
INFLUENZA A H1 PANDEMIC PCR: NOT DETECTED
INFLUENZA A H3 PCR: NOT DETECTED
INFLUENZA B BY PCR: NOT DETECTED
LACTATE: 1.7 MMOL/L (ref 0.4–2)
LACTIC ACID, SEPSIS: 1.6 MMOL/L (ref 0.5–1.9)
LEGIONELLA URINARY AG: NEGATIVE
LYMPHOCYTES ABSOLUTE: 2.4 K/CU MM
LYMPHOCYTES RELATIVE PERCENT: 26.6 % (ref 24–44)
MCH RBC QN AUTO: 27 PG (ref 27–31)
MCHC RBC AUTO-ENTMCNC: 31.8 % (ref 32–36)
MCV RBC AUTO: 84.8 FL (ref 78–100)
MONOCYTES ABSOLUTE: 0.9 K/CU MM
MONOCYTES RELATIVE PERCENT: 9.6 % (ref 0–4)
MYCOPLASMA PNEUMONIAE PCR: NOT DETECTED
NUCLEATED RBC %: 0 %
PARAINFLUENZA 1 PCR: NOT DETECTED
PARAINFLUENZA 2 PCR: NOT DETECTED
PARAINFLUENZA 3 PCR: NOT DETECTED
PARAINFLUENZA 4 PCR: NOT DETECTED
PDW BLD-RTO: 20.6 % (ref 11.7–14.9)
PLATELET # BLD: 237 K/CU MM (ref 140–440)
PMV BLD AUTO: 12.3 FL (ref 7.5–11.1)
POTASSIUM SERPL-SCNC: 3.6 MMOL/L (ref 3.5–5.1)
PROCALCITONIN: 0.17
RBC # BLD: 3.15 M/CU MM (ref 4.2–5.4)
RHINOVIRUS ENTEROVIRUS PCR: NOT DETECTED
RSV PCR: NOT DETECTED
SEGMENTED NEUTROPHILS ABSOLUTE COUNT: 5.5 K/CU MM
SEGMENTED NEUTROPHILS RELATIVE PERCENT: 61 % (ref 36–66)
SODIUM BLD-SCNC: 139 MMOL/L (ref 135–145)
STREP PNEUMONIAE ANTIGEN: NORMAL
TOTAL IMMATURE NEUTOROPHIL: 0.05 K/CU MM
TOTAL NUCLEATED RBC: 0 K/CU MM
TOTAL PROTEIN: 4.2 GM/DL (ref 6.4–8.2)
WBC # BLD: 9 K/CU MM (ref 4–10.5)

## 2019-10-19 PROCEDURE — 94640 AIRWAY INHALATION TREATMENT: CPT

## 2019-10-19 PROCEDURE — 6370000000 HC RX 637 (ALT 250 FOR IP): Performed by: NURSE PRACTITIONER

## 2019-10-19 PROCEDURE — 2000000000 HC ICU R&B

## 2019-10-19 PROCEDURE — 2700000000 HC OXYGEN THERAPY PER DAY

## 2019-10-19 PROCEDURE — 94761 N-INVAS EAR/PLS OXIMETRY MLT: CPT

## 2019-10-19 PROCEDURE — 6360000002 HC RX W HCPCS: Performed by: NURSE PRACTITIONER

## 2019-10-19 PROCEDURE — 2580000003 HC RX 258: Performed by: NURSE PRACTITIONER

## 2019-10-19 PROCEDURE — 2500000003 HC RX 250 WO HCPCS: Performed by: INTERNAL MEDICINE

## 2019-10-19 PROCEDURE — 2580000003 HC RX 258: Performed by: INTERNAL MEDICINE

## 2019-10-19 PROCEDURE — 83605 ASSAY OF LACTIC ACID: CPT

## 2019-10-19 PROCEDURE — 6370000000 HC RX 637 (ALT 250 FOR IP): Performed by: INTERNAL MEDICINE

## 2019-10-19 PROCEDURE — 6370000000 HC RX 637 (ALT 250 FOR IP): Performed by: PHYSICIAN ASSISTANT

## 2019-10-19 PROCEDURE — 84145 PROCALCITONIN (PCT): CPT

## 2019-10-19 PROCEDURE — 80053 COMPREHEN METABOLIC PANEL: CPT

## 2019-10-19 PROCEDURE — 85025 COMPLETE CBC W/AUTO DIFF WBC: CPT

## 2019-10-19 PROCEDURE — 6360000002 HC RX W HCPCS: Performed by: INTERNAL MEDICINE

## 2019-10-19 RX ORDER — CLOPIDOGREL BISULFATE 75 MG/1
75 TABLET ORAL DAILY
Status: DISCONTINUED | OUTPATIENT
Start: 2019-10-20 | End: 2019-10-19

## 2019-10-19 RX ORDER — ASPIRIN 81 MG/1
81 TABLET, CHEWABLE ORAL DAILY
Status: DISCONTINUED | OUTPATIENT
Start: 2019-10-20 | End: 2019-10-23 | Stop reason: HOSPADM

## 2019-10-19 RX ORDER — SODIUM CHLORIDE 9 MG/ML
INJECTION, SOLUTION INTRAVENOUS ONCE
Status: DISCONTINUED | OUTPATIENT
Start: 2019-10-19 | End: 2019-10-19

## 2019-10-19 RX ORDER — MIDODRINE HYDROCHLORIDE 5 MG/1
10 TABLET ORAL
Status: COMPLETED | OUTPATIENT
Start: 2019-10-19 | End: 2019-10-19

## 2019-10-19 RX ORDER — AMIODARONE HYDROCHLORIDE 200 MG/1
200 TABLET ORAL DAILY
Status: DISCONTINUED | OUTPATIENT
Start: 2019-10-19 | End: 2019-10-23 | Stop reason: HOSPADM

## 2019-10-19 RX ORDER — SODIUM CHLORIDE 9 MG/ML
INJECTION, SOLUTION INTRAVENOUS ONCE
Status: COMPLETED | OUTPATIENT
Start: 2019-10-19 | End: 2019-10-19

## 2019-10-19 RX ORDER — SODIUM CHLORIDE, SODIUM LACTATE, POTASSIUM CHLORIDE, CALCIUM CHLORIDE 600; 310; 30; 20 MG/100ML; MG/100ML; MG/100ML; MG/100ML
1000 INJECTION, SOLUTION INTRAVENOUS ONCE
Status: DISCONTINUED | OUTPATIENT
Start: 2019-10-19 | End: 2019-10-19

## 2019-10-19 RX ADMIN — ENOXAPARIN SODIUM 40 MG: 40 INJECTION SUBCUTANEOUS at 08:29

## 2019-10-19 RX ADMIN — BENZONATATE 100 MG: 100 CAPSULE ORAL at 17:36

## 2019-10-19 RX ADMIN — SODIUM CHLORIDE: 9 INJECTION, SOLUTION INTRAVENOUS at 14:08

## 2019-10-19 RX ADMIN — MIDODRINE HYDROCHLORIDE 10 MG: 5 TABLET ORAL at 00:50

## 2019-10-19 RX ADMIN — IPRATROPIUM BROMIDE AND ALBUTEROL SULFATE 1 AMPULE: .5; 3 SOLUTION RESPIRATORY (INHALATION) at 12:28

## 2019-10-19 RX ADMIN — SODIUM CHLORIDE: 9 INJECTION, SOLUTION INTRAVENOUS at 23:19

## 2019-10-19 RX ADMIN — VANCOMYCIN HYDROCHLORIDE 1250 MG: 5 INJECTION, POWDER, LYOPHILIZED, FOR SOLUTION INTRAVENOUS at 05:22

## 2019-10-19 RX ADMIN — Medication 2.5 MCG/MIN: at 14:05

## 2019-10-19 RX ADMIN — AMIODARONE HYDROCHLORIDE 200 MG: 200 TABLET ORAL at 18:27

## 2019-10-19 RX ADMIN — VANCOMYCIN HYDROCHLORIDE 1250 MG: 5 INJECTION, POWDER, LYOPHILIZED, FOR SOLUTION INTRAVENOUS at 23:20

## 2019-10-19 RX ADMIN — IPRATROPIUM BROMIDE AND ALBUTEROL SULFATE 1 AMPULE: .5; 3 SOLUTION RESPIRATORY (INHALATION) at 09:01

## 2019-10-19 RX ADMIN — IPRATROPIUM BROMIDE AND ALBUTEROL SULFATE 1 AMPULE: .5; 3 SOLUTION RESPIRATORY (INHALATION) at 16:19

## 2019-10-19 RX ADMIN — Medication 10 ML: at 19:47

## 2019-10-19 RX ADMIN — TAMSULOSIN HYDROCHLORIDE 0.4 MG: 0.4 CAPSULE ORAL at 08:30

## 2019-10-19 RX ADMIN — PIPERACILLIN AND TAZOBACTAM 3.38 G: 3; .375 INJECTION, POWDER, LYOPHILIZED, FOR SOLUTION INTRAVENOUS at 17:36

## 2019-10-19 RX ADMIN — MIDODRINE HYDROCHLORIDE 10 MG: 5 TABLET ORAL at 17:36

## 2019-10-19 RX ADMIN — MIDODRINE HYDROCHLORIDE 10 MG: 5 TABLET ORAL at 08:30

## 2019-10-19 RX ADMIN — SIMVASTATIN 20 MG: 20 TABLET, FILM COATED ORAL at 19:46

## 2019-10-19 RX ADMIN — MIDODRINE HYDROCHLORIDE 10 MG: 5 TABLET ORAL at 11:38

## 2019-10-19 RX ADMIN — PIPERACILLIN AND TAZOBACTAM 3.38 G: 3; .375 INJECTION, POWDER, LYOPHILIZED, FOR SOLUTION INTRAVENOUS at 02:02

## 2019-10-19 RX ADMIN — PANTOPRAZOLE SODIUM 40 MG: 40 TABLET, DELAYED RELEASE ORAL at 05:22

## 2019-10-19 RX ADMIN — SERTRALINE HYDROCHLORIDE 50 MG: 50 TABLET ORAL at 19:47

## 2019-10-19 RX ADMIN — PIPERACILLIN AND TAZOBACTAM 3.38 G: 3; .375 INJECTION, POWDER, LYOPHILIZED, FOR SOLUTION INTRAVENOUS at 10:30

## 2019-10-19 RX ADMIN — IPRATROPIUM BROMIDE AND ALBUTEROL SULFATE 1 AMPULE: .5; 3 SOLUTION RESPIRATORY (INHALATION) at 21:31

## 2019-10-20 LAB
ANION GAP SERPL CALCULATED.3IONS-SCNC: 5 MMOL/L (ref 4–16)
BUN BLDV-MCNC: 5 MG/DL (ref 6–23)
CALCIUM SERPL-MCNC: 7.9 MG/DL (ref 8.3–10.6)
CHLORIDE BLD-SCNC: 107 MMOL/L (ref 99–110)
CO2: 27 MMOL/L (ref 21–32)
CREAT SERPL-MCNC: 0.5 MG/DL (ref 0.6–1.1)
CREAT SERPL-MCNC: 0.5 MG/DL (ref 0.6–1.1)
CULTURE: NORMAL
GFR AFRICAN AMERICAN: >60 ML/MIN/1.73M2
GFR AFRICAN AMERICAN: >60 ML/MIN/1.73M2
GFR NON-AFRICAN AMERICAN: >60 ML/MIN/1.73M2
GFR NON-AFRICAN AMERICAN: >60 ML/MIN/1.73M2
GLUCOSE BLD-MCNC: 132 MG/DL (ref 70–99)
Lab: NORMAL
MAGNESIUM: 1.3 MG/DL (ref 1.8–2.4)
POTASSIUM SERPL-SCNC: 3.4 MMOL/L (ref 3.5–5.1)
SODIUM BLD-SCNC: 139 MMOL/L (ref 135–145)
SPECIMEN: NORMAL

## 2019-10-20 PROCEDURE — 6370000000 HC RX 637 (ALT 250 FOR IP): Performed by: PHYSICIAN ASSISTANT

## 2019-10-20 PROCEDURE — 6370000000 HC RX 637 (ALT 250 FOR IP): Performed by: NURSE PRACTITIONER

## 2019-10-20 PROCEDURE — 2580000003 HC RX 258: Performed by: NURSE PRACTITIONER

## 2019-10-20 PROCEDURE — 6370000000 HC RX 637 (ALT 250 FOR IP): Performed by: INTERNAL MEDICINE

## 2019-10-20 PROCEDURE — 82565 ASSAY OF CREATININE: CPT

## 2019-10-20 PROCEDURE — 6360000002 HC RX W HCPCS: Performed by: INTERNAL MEDICINE

## 2019-10-20 PROCEDURE — 2000000000 HC ICU R&B

## 2019-10-20 PROCEDURE — 2580000003 HC RX 258: Performed by: INTERNAL MEDICINE

## 2019-10-20 PROCEDURE — 87081 CULTURE SCREEN ONLY: CPT

## 2019-10-20 PROCEDURE — 2700000000 HC OXYGEN THERAPY PER DAY

## 2019-10-20 PROCEDURE — 94640 AIRWAY INHALATION TREATMENT: CPT

## 2019-10-20 PROCEDURE — 80048 BASIC METABOLIC PNL TOTAL CA: CPT

## 2019-10-20 PROCEDURE — 83735 ASSAY OF MAGNESIUM: CPT

## 2019-10-20 PROCEDURE — 94761 N-INVAS EAR/PLS OXIMETRY MLT: CPT

## 2019-10-20 PROCEDURE — 6360000002 HC RX W HCPCS: Performed by: NURSE PRACTITIONER

## 2019-10-20 RX ORDER — ACETAMINOPHEN 325 MG/1
650 TABLET ORAL EVERY 4 HOURS PRN
Status: DISCONTINUED | OUTPATIENT
Start: 2019-10-20 | End: 2019-10-23 | Stop reason: HOSPADM

## 2019-10-20 RX ORDER — MAGNESIUM SULFATE IN WATER 40 MG/ML
2 INJECTION, SOLUTION INTRAVENOUS ONCE
Status: COMPLETED | OUTPATIENT
Start: 2019-10-20 | End: 2019-10-20

## 2019-10-20 RX ORDER — POTASSIUM CHLORIDE 7.45 MG/ML
10 INJECTION INTRAVENOUS PRN
Status: DISCONTINUED | OUTPATIENT
Start: 2019-10-20 | End: 2019-10-23 | Stop reason: HOSPADM

## 2019-10-20 RX ORDER — POTASSIUM CHLORIDE 20 MEQ/1
40 TABLET, EXTENDED RELEASE ORAL PRN
Status: DISCONTINUED | OUTPATIENT
Start: 2019-10-20 | End: 2019-10-23 | Stop reason: HOSPADM

## 2019-10-20 RX ORDER — OXYCODONE HYDROCHLORIDE AND ACETAMINOPHEN 5; 325 MG/1; MG/1
1 TABLET ORAL
Status: ACTIVE | OUTPATIENT
Start: 2019-10-20 | End: 2019-10-20

## 2019-10-20 RX ORDER — MAGNESIUM SULFATE 1 G/100ML
1 INJECTION INTRAVENOUS PRN
Status: DISCONTINUED | OUTPATIENT
Start: 2019-10-20 | End: 2019-10-23 | Stop reason: HOSPADM

## 2019-10-20 RX ADMIN — PIPERACILLIN AND TAZOBACTAM 3.38 G: 3; .375 INJECTION, POWDER, LYOPHILIZED, FOR SOLUTION INTRAVENOUS at 17:31

## 2019-10-20 RX ADMIN — BENZONATATE 100 MG: 100 CAPSULE ORAL at 10:25

## 2019-10-20 RX ADMIN — SIMVASTATIN 20 MG: 20 TABLET, FILM COATED ORAL at 21:05

## 2019-10-20 RX ADMIN — IPRATROPIUM BROMIDE AND ALBUTEROL SULFATE 1 AMPULE: .5; 3 SOLUTION RESPIRATORY (INHALATION) at 08:50

## 2019-10-20 RX ADMIN — ENOXAPARIN SODIUM 40 MG: 40 INJECTION SUBCUTANEOUS at 08:27

## 2019-10-20 RX ADMIN — MAGNESIUM SULFATE HEPTAHYDRATE 1 G: 1 INJECTION, SOLUTION INTRAVENOUS at 11:40

## 2019-10-20 RX ADMIN — SERTRALINE HYDROCHLORIDE 50 MG: 50 TABLET ORAL at 21:04

## 2019-10-20 RX ADMIN — POTASSIUM CHLORIDE 40 MEQ: 1500 TABLET, EXTENDED RELEASE ORAL at 10:25

## 2019-10-20 RX ADMIN — ASPIRIN 81 MG 81 MG: 81 TABLET ORAL at 08:27

## 2019-10-20 RX ADMIN — IPRATROPIUM BROMIDE AND ALBUTEROL SULFATE 1 AMPULE: .5; 3 SOLUTION RESPIRATORY (INHALATION) at 16:22

## 2019-10-20 RX ADMIN — AMIODARONE HYDROCHLORIDE 200 MG: 200 TABLET ORAL at 08:27

## 2019-10-20 RX ADMIN — MIDODRINE HYDROCHLORIDE 10 MG: 5 TABLET ORAL at 17:27

## 2019-10-20 RX ADMIN — PANTOPRAZOLE SODIUM 40 MG: 40 TABLET, DELAYED RELEASE ORAL at 08:27

## 2019-10-20 RX ADMIN — ACETAMINOPHEN 650 MG: 325 TABLET ORAL at 21:05

## 2019-10-20 RX ADMIN — SODIUM CHLORIDE: 9 INJECTION, SOLUTION INTRAVENOUS at 10:31

## 2019-10-20 RX ADMIN — VANCOMYCIN HYDROCHLORIDE 1250 MG: 5 INJECTION, POWDER, LYOPHILIZED, FOR SOLUTION INTRAVENOUS at 17:31

## 2019-10-20 RX ADMIN — MAGNESIUM SULFATE HEPTAHYDRATE 1 G: 1 INJECTION, SOLUTION INTRAVENOUS at 10:25

## 2019-10-20 RX ADMIN — PIPERACILLIN AND TAZOBACTAM 3.38 G: 3; .375 INJECTION, POWDER, LYOPHILIZED, FOR SOLUTION INTRAVENOUS at 09:49

## 2019-10-20 RX ADMIN — MAGNESIUM SULFATE HEPTAHYDRATE 2 G: 40 INJECTION, SOLUTION INTRAVENOUS at 12:50

## 2019-10-20 RX ADMIN — MIDODRINE HYDROCHLORIDE 10 MG: 5 TABLET ORAL at 11:40

## 2019-10-20 RX ADMIN — IPRATROPIUM BROMIDE AND ALBUTEROL SULFATE 1 AMPULE: .5; 3 SOLUTION RESPIRATORY (INHALATION) at 12:18

## 2019-10-20 RX ADMIN — MIDODRINE HYDROCHLORIDE 10 MG: 5 TABLET ORAL at 08:27

## 2019-10-20 RX ADMIN — PIPERACILLIN AND TAZOBACTAM 3.38 G: 3; .375 INJECTION, POWDER, LYOPHILIZED, FOR SOLUTION INTRAVENOUS at 02:30

## 2019-10-20 ASSESSMENT — PAIN DESCRIPTION - LOCATION: LOCATION: BACK

## 2019-10-20 ASSESSMENT — PAIN SCALES - GENERAL
PAINLEVEL_OUTOF10: 3
PAINLEVEL_OUTOF10: 8

## 2019-10-21 LAB
ALBUMIN SERPL-MCNC: 1.8 GM/DL (ref 3.4–5)
ALP BLD-CCNC: 76 IU/L (ref 40–128)
ALT SERPL-CCNC: 6 U/L (ref 10–40)
ANION GAP SERPL CALCULATED.3IONS-SCNC: 6 MMOL/L (ref 4–16)
AST SERPL-CCNC: 10 IU/L (ref 15–37)
BILIRUB SERPL-MCNC: 0.4 MG/DL (ref 0–1)
BUN BLDV-MCNC: 5 MG/DL (ref 6–23)
CALCIUM SERPL-MCNC: 8 MG/DL (ref 8.3–10.6)
CHLORIDE BLD-SCNC: 109 MMOL/L (ref 99–110)
CO2: 26 MMOL/L (ref 21–32)
CREAT SERPL-MCNC: 0.4 MG/DL (ref 0.6–1.1)
CREAT SERPL-MCNC: 0.4 MG/DL (ref 0.6–1.1)
CULTURE: ABNORMAL
CULTURE: ABNORMAL
GFR AFRICAN AMERICAN: >60 ML/MIN/1.73M2
GFR AFRICAN AMERICAN: >60 ML/MIN/1.73M2
GFR NON-AFRICAN AMERICAN: >60 ML/MIN/1.73M2
GFR NON-AFRICAN AMERICAN: >60 ML/MIN/1.73M2
GLUCOSE BLD-MCNC: 81 MG/DL (ref 70–99)
Lab: ABNORMAL
MAGNESIUM: 1.7 MG/DL (ref 1.8–2.4)
POTASSIUM SERPL-SCNC: 3.8 MMOL/L (ref 3.5–5.1)
PRO-BNP: 1651 PG/ML
SODIUM BLD-SCNC: 141 MMOL/L (ref 135–145)
SPECIMEN: ABNORMAL
TOTAL PROTEIN: 4.1 GM/DL (ref 6.4–8.2)
TROPONIN T: <0.01 NG/ML

## 2019-10-21 PROCEDURE — 2580000003 HC RX 258: Performed by: NURSE PRACTITIONER

## 2019-10-21 PROCEDURE — 6370000000 HC RX 637 (ALT 250 FOR IP): Performed by: INTERNAL MEDICINE

## 2019-10-21 PROCEDURE — 2580000003 HC RX 258: Performed by: INTERNAL MEDICINE

## 2019-10-21 PROCEDURE — 2700000000 HC OXYGEN THERAPY PER DAY

## 2019-10-21 PROCEDURE — 94761 N-INVAS EAR/PLS OXIMETRY MLT: CPT

## 2019-10-21 PROCEDURE — 6370000000 HC RX 637 (ALT 250 FOR IP): Performed by: NURSE PRACTITIONER

## 2019-10-21 PROCEDURE — 83735 ASSAY OF MAGNESIUM: CPT

## 2019-10-21 PROCEDURE — 2140000000 HC CCU INTERMEDIATE R&B

## 2019-10-21 PROCEDURE — 94664 DEMO&/EVAL PT USE INHALER: CPT

## 2019-10-21 PROCEDURE — 84484 ASSAY OF TROPONIN QUANT: CPT

## 2019-10-21 PROCEDURE — 97163 PT EVAL HIGH COMPLEX 45 MIN: CPT

## 2019-10-21 PROCEDURE — 82565 ASSAY OF CREATININE: CPT

## 2019-10-21 PROCEDURE — 97530 THERAPEUTIC ACTIVITIES: CPT

## 2019-10-21 PROCEDURE — 97112 NEUROMUSCULAR REEDUCATION: CPT

## 2019-10-21 PROCEDURE — 97110 THERAPEUTIC EXERCISES: CPT

## 2019-10-21 PROCEDURE — 94640 AIRWAY INHALATION TREATMENT: CPT

## 2019-10-21 PROCEDURE — 80053 COMPREHEN METABOLIC PANEL: CPT

## 2019-10-21 PROCEDURE — 93308 TTE F-UP OR LMTD: CPT

## 2019-10-21 PROCEDURE — 83880 ASSAY OF NATRIURETIC PEPTIDE: CPT

## 2019-10-21 PROCEDURE — 6360000002 HC RX W HCPCS: Performed by: NURSE PRACTITIONER

## 2019-10-21 PROCEDURE — 99213 OFFICE O/P EST LOW 20 MIN: CPT

## 2019-10-21 RX ORDER — LEVOFLOXACIN 500 MG/1
750 TABLET, FILM COATED ORAL DAILY
Status: DISCONTINUED | OUTPATIENT
Start: 2019-10-21 | End: 2019-10-23 | Stop reason: HOSPADM

## 2019-10-21 RX ORDER — FUROSEMIDE 20 MG/1
20 TABLET ORAL DAILY
Status: DISCONTINUED | OUTPATIENT
Start: 2019-10-21 | End: 2019-10-23 | Stop reason: HOSPADM

## 2019-10-21 RX ADMIN — MIDODRINE HYDROCHLORIDE 10 MG: 5 TABLET ORAL at 08:50

## 2019-10-21 RX ADMIN — MIDODRINE HYDROCHLORIDE 10 MG: 5 TABLET ORAL at 13:21

## 2019-10-21 RX ADMIN — Medication 10 ML: at 08:51

## 2019-10-21 RX ADMIN — SODIUM CHLORIDE: 9 INJECTION, SOLUTION INTRAVENOUS at 00:00

## 2019-10-21 RX ADMIN — SIMVASTATIN 20 MG: 20 TABLET, FILM COATED ORAL at 21:13

## 2019-10-21 RX ADMIN — IPRATROPIUM BROMIDE AND ALBUTEROL SULFATE 1 AMPULE: .5; 3 SOLUTION RESPIRATORY (INHALATION) at 07:17

## 2019-10-21 RX ADMIN — PANTOPRAZOLE SODIUM 40 MG: 40 TABLET, DELAYED RELEASE ORAL at 06:30

## 2019-10-21 RX ADMIN — LEVOFLOXACIN 750 MG: 500 TABLET, FILM COATED ORAL at 11:24

## 2019-10-21 RX ADMIN — ASPIRIN 81 MG 81 MG: 81 TABLET ORAL at 08:50

## 2019-10-21 RX ADMIN — PIPERACILLIN AND TAZOBACTAM 3.38 G: 3; .375 INJECTION, POWDER, LYOPHILIZED, FOR SOLUTION INTRAVENOUS at 03:20

## 2019-10-21 RX ADMIN — ENOXAPARIN SODIUM 40 MG: 40 INJECTION SUBCUTANEOUS at 08:50

## 2019-10-21 RX ADMIN — SERTRALINE HYDROCHLORIDE 50 MG: 50 TABLET ORAL at 21:13

## 2019-10-21 RX ADMIN — IPRATROPIUM BROMIDE AND ALBUTEROL SULFATE 1 AMPULE: .5; 3 SOLUTION RESPIRATORY (INHALATION) at 11:19

## 2019-10-21 RX ADMIN — IPRATROPIUM BROMIDE AND ALBUTEROL SULFATE 1 AMPULE: .5; 3 SOLUTION RESPIRATORY (INHALATION) at 16:37

## 2019-10-21 RX ADMIN — MIDODRINE HYDROCHLORIDE 10 MG: 5 TABLET ORAL at 17:23

## 2019-10-21 RX ADMIN — IPRATROPIUM BROMIDE AND ALBUTEROL SULFATE 1 AMPULE: .5; 3 SOLUTION RESPIRATORY (INHALATION) at 21:36

## 2019-10-21 RX ADMIN — FUROSEMIDE 20 MG: 20 TABLET ORAL at 15:33

## 2019-10-21 RX ADMIN — Medication 10 ML: at 21:13

## 2019-10-21 RX ADMIN — AMIODARONE HYDROCHLORIDE 200 MG: 200 TABLET ORAL at 08:50

## 2019-10-21 ASSESSMENT — PAIN SCALES - GENERAL
PAINLEVEL_OUTOF10: 9
PAINLEVEL_OUTOF10: 0

## 2019-10-21 ASSESSMENT — PAIN DESCRIPTION - PAIN TYPE: TYPE: CHRONIC PAIN

## 2019-10-21 ASSESSMENT — PAIN DESCRIPTION - LOCATION: LOCATION: GENERALIZED

## 2019-10-22 LAB
CREAT SERPL-MCNC: 0.4 MG/DL (ref 0.6–1.1)
GFR AFRICAN AMERICAN: >60 ML/MIN/1.73M2
GFR NON-AFRICAN AMERICAN: >60 ML/MIN/1.73M2

## 2019-10-22 PROCEDURE — 6370000000 HC RX 637 (ALT 250 FOR IP): Performed by: NURSE PRACTITIONER

## 2019-10-22 PROCEDURE — 6370000000 HC RX 637 (ALT 250 FOR IP): Performed by: INTERNAL MEDICINE

## 2019-10-22 PROCEDURE — 94640 AIRWAY INHALATION TREATMENT: CPT

## 2019-10-22 PROCEDURE — 82565 ASSAY OF CREATININE: CPT

## 2019-10-22 PROCEDURE — 97166 OT EVAL MOD COMPLEX 45 MIN: CPT

## 2019-10-22 PROCEDURE — 2140000000 HC CCU INTERMEDIATE R&B

## 2019-10-22 PROCEDURE — 97530 THERAPEUTIC ACTIVITIES: CPT

## 2019-10-22 PROCEDURE — 36592 COLLECT BLOOD FROM PICC: CPT

## 2019-10-22 PROCEDURE — 6360000002 HC RX W HCPCS: Performed by: NURSE PRACTITIONER

## 2019-10-22 PROCEDURE — 2580000003 HC RX 258: Performed by: NURSE PRACTITIONER

## 2019-10-22 PROCEDURE — 97535 SELF CARE MNGMENT TRAINING: CPT

## 2019-10-22 RX ORDER — TRAZODONE HYDROCHLORIDE 50 MG/1
50 TABLET ORAL NIGHTLY
Status: DISCONTINUED | OUTPATIENT
Start: 2019-10-22 | End: 2019-10-23 | Stop reason: HOSPADM

## 2019-10-22 RX ADMIN — MIDODRINE HYDROCHLORIDE 10 MG: 5 TABLET ORAL at 18:57

## 2019-10-22 RX ADMIN — ENOXAPARIN SODIUM 40 MG: 40 INJECTION SUBCUTANEOUS at 11:44

## 2019-10-22 RX ADMIN — PANTOPRAZOLE SODIUM 40 MG: 40 TABLET, DELAYED RELEASE ORAL at 06:26

## 2019-10-22 RX ADMIN — MIDODRINE HYDROCHLORIDE 10 MG: 5 TABLET ORAL at 11:44

## 2019-10-22 RX ADMIN — MIDODRINE HYDROCHLORIDE 10 MG: 5 TABLET ORAL at 14:25

## 2019-10-22 RX ADMIN — TRAZODONE HYDROCHLORIDE 50 MG: 50 TABLET ORAL at 20:59

## 2019-10-22 RX ADMIN — LEVOFLOXACIN 750 MG: 500 TABLET, FILM COATED ORAL at 11:43

## 2019-10-22 RX ADMIN — SIMVASTATIN 20 MG: 20 TABLET, FILM COATED ORAL at 20:59

## 2019-10-22 RX ADMIN — IPRATROPIUM BROMIDE AND ALBUTEROL SULFATE 1 AMPULE: .5; 3 SOLUTION RESPIRATORY (INHALATION) at 02:50

## 2019-10-22 RX ADMIN — AMIODARONE HYDROCHLORIDE 200 MG: 200 TABLET ORAL at 11:44

## 2019-10-22 RX ADMIN — SERTRALINE HYDROCHLORIDE 50 MG: 50 TABLET ORAL at 20:59

## 2019-10-22 RX ADMIN — Medication 10 ML: at 14:27

## 2019-10-22 RX ADMIN — Medication 10 ML: at 21:00

## 2019-10-22 RX ADMIN — ASPIRIN 81 MG 81 MG: 81 TABLET ORAL at 11:44

## 2019-10-22 RX ADMIN — FUROSEMIDE 20 MG: 20 TABLET ORAL at 11:43

## 2019-10-22 ASSESSMENT — PAIN DESCRIPTION - PAIN TYPE: TYPE: ACUTE PAIN

## 2019-10-22 ASSESSMENT — PAIN DESCRIPTION - LOCATION: LOCATION: ABDOMEN;VAGINA

## 2019-10-22 ASSESSMENT — PAIN SCALES - GENERAL: PAINLEVEL_OUTOF10: 9

## 2019-10-23 VITALS
DIASTOLIC BLOOD PRESSURE: 80 MMHG | HEART RATE: 104 BPM | WEIGHT: 184.8 LBS | BODY MASS INDEX: 36.28 KG/M2 | SYSTOLIC BLOOD PRESSURE: 130 MMHG | TEMPERATURE: 98.1 F | RESPIRATION RATE: 21 BRPM | OXYGEN SATURATION: 100 % | HEIGHT: 60 IN

## 2019-10-23 LAB
CREAT SERPL-MCNC: 0.5 MG/DL (ref 0.6–1.1)
CULTURE: NORMAL
CULTURE: NORMAL
GFR AFRICAN AMERICAN: >60 ML/MIN/1.73M2
GFR NON-AFRICAN AMERICAN: >60 ML/MIN/1.73M2
Lab: NORMAL
Lab: NORMAL
SPECIMEN: NORMAL
SPECIMEN: NORMAL

## 2019-10-23 PROCEDURE — 6360000002 HC RX W HCPCS: Performed by: NURSE PRACTITIONER

## 2019-10-23 PROCEDURE — 90686 IIV4 VACC NO PRSV 0.5 ML IM: CPT | Performed by: INTERNAL MEDICINE

## 2019-10-23 PROCEDURE — 82565 ASSAY OF CREATININE: CPT

## 2019-10-23 PROCEDURE — 6370000000 HC RX 637 (ALT 250 FOR IP): Performed by: INTERNAL MEDICINE

## 2019-10-23 PROCEDURE — 94640 AIRWAY INHALATION TREATMENT: CPT

## 2019-10-23 PROCEDURE — G0008 ADMIN INFLUENZA VIRUS VAC: HCPCS | Performed by: INTERNAL MEDICINE

## 2019-10-23 PROCEDURE — 6370000000 HC RX 637 (ALT 250 FOR IP): Performed by: PHYSICIAN ASSISTANT

## 2019-10-23 PROCEDURE — 6370000000 HC RX 637 (ALT 250 FOR IP): Performed by: NURSE PRACTITIONER

## 2019-10-23 PROCEDURE — 6360000002 HC RX W HCPCS: Performed by: INTERNAL MEDICINE

## 2019-10-23 RX ORDER — ATORVASTATIN CALCIUM 40 MG/1
40 TABLET, FILM COATED ORAL DAILY
Qty: 30 TABLET | Refills: 0 | Status: SHIPPED | OUTPATIENT
Start: 2019-10-23 | End: 2019-11-22

## 2019-10-23 RX ORDER — FUROSEMIDE 20 MG/1
20 TABLET ORAL DAILY
Qty: 60 TABLET | Refills: 3 | Status: SHIPPED | OUTPATIENT
Start: 2019-10-23

## 2019-10-23 RX ORDER — LEVOFLOXACIN 750 MG/1
750 TABLET ORAL DAILY
Qty: 5 TABLET | Refills: 0 | Status: SHIPPED | OUTPATIENT
Start: 2019-10-23 | End: 2019-10-28

## 2019-10-23 RX ORDER — CARVEDILOL 3.12 MG/1
3.12 TABLET ORAL 2 TIMES DAILY WITH MEALS
Qty: 60 TABLET | Refills: 1 | Status: SHIPPED | OUTPATIENT
Start: 2019-10-23

## 2019-10-23 RX ADMIN — ENOXAPARIN SODIUM 40 MG: 40 INJECTION SUBCUTANEOUS at 10:30

## 2019-10-23 RX ADMIN — ACETAMINOPHEN 650 MG: 325 TABLET ORAL at 05:58

## 2019-10-23 RX ADMIN — LEVOFLOXACIN 750 MG: 500 TABLET, FILM COATED ORAL at 10:33

## 2019-10-23 RX ADMIN — FUROSEMIDE 20 MG: 20 TABLET ORAL at 10:21

## 2019-10-23 RX ADMIN — INFLUENZA A VIRUS A/BRISBANE/02/2018 IVR-190 (H1N1) ANTIGEN (PROPIOLACTONE INACTIVATED), INFLUENZA A VIRUS A/KANSAS/14/2017 X-327 (H3N2) ANTIGEN (PROPIOLACTONE INACTIVATED), INFLUENZA B VIRUS B/MARYLAND/15/2016 ANTIGEN (PROPIOLACTONE INACTIVATED), INFLUENZA B VIRUS B/PHUKET/3073/2013 BVR-1B ANTIGEN (PROPIOLACTONE INACTIVATED) 0.5 ML: 15; 15; 15; 15 INJECTION, SUSPENSION INTRAMUSCULAR at 10:33

## 2019-10-23 RX ADMIN — IPRATROPIUM BROMIDE AND ALBUTEROL SULFATE 1 AMPULE: .5; 3 SOLUTION RESPIRATORY (INHALATION) at 08:49

## 2019-10-23 RX ADMIN — PANTOPRAZOLE SODIUM 40 MG: 40 TABLET, DELAYED RELEASE ORAL at 05:52

## 2019-10-23 RX ADMIN — AMIODARONE HYDROCHLORIDE 200 MG: 200 TABLET ORAL at 10:20

## 2019-10-23 RX ADMIN — ASPIRIN 81 MG 81 MG: 81 TABLET ORAL at 10:20

## 2019-10-23 RX ADMIN — ACETAMINOPHEN 650 MG: 325 TABLET ORAL at 10:22

## 2019-10-23 RX ADMIN — MIDODRINE HYDROCHLORIDE 10 MG: 5 TABLET ORAL at 10:21

## 2019-10-23 ASSESSMENT — PAIN DESCRIPTION - DESCRIPTORS: DESCRIPTORS: ACHING

## 2019-10-23 ASSESSMENT — PAIN SCALES - GENERAL
PAINLEVEL_OUTOF10: 8
PAINLEVEL_OUTOF10: 3
PAINLEVEL_OUTOF10: 7

## 2019-10-23 ASSESSMENT — PAIN DESCRIPTION - FREQUENCY: FREQUENCY: CONTINUOUS

## 2019-10-23 ASSESSMENT — PAIN - FUNCTIONAL ASSESSMENT: PAIN_FUNCTIONAL_ASSESSMENT: PREVENTS OR INTERFERES SOME ACTIVE ACTIVITIES AND ADLS

## 2019-10-23 ASSESSMENT — PAIN DESCRIPTION - LOCATION: LOCATION: SHOULDER;GENERALIZED

## 2019-10-23 ASSESSMENT — PAIN DESCRIPTION - PAIN TYPE: TYPE: CHRONIC PAIN

## 2019-10-23 ASSESSMENT — PAIN DESCRIPTION - ONSET: ONSET: GRADUAL

## 2019-10-24 LAB
EKG ATRIAL RATE: 96 BPM
EKG DIAGNOSIS: NORMAL
EKG P AXIS: 36 DEGREES
EKG P-R INTERVAL: 126 MS
EKG Q-T INTERVAL: 426 MS
EKG QRS DURATION: 132 MS
EKG QTC CALCULATION (BAZETT): 538 MS
EKG R AXIS: 247 DEGREES
EKG T AXIS: -86 DEGREES
EKG VENTRICULAR RATE: 96 BPM